# Patient Record
Sex: FEMALE | Race: WHITE | NOT HISPANIC OR LATINO | Employment: OTHER | URBAN - METROPOLITAN AREA
[De-identification: names, ages, dates, MRNs, and addresses within clinical notes are randomized per-mention and may not be internally consistent; named-entity substitution may affect disease eponyms.]

---

## 2017-04-21 ENCOUNTER — THERAPY VISIT (OUTPATIENT)
Dept: PHYSICAL THERAPY | Facility: CLINIC | Age: 67
End: 2017-04-21
Payer: MEDICARE

## 2017-04-21 DIAGNOSIS — M25.562 BILATERAL KNEE PAIN: ICD-10-CM

## 2017-04-21 DIAGNOSIS — M25.561 BILATERAL KNEE PAIN: ICD-10-CM

## 2017-04-21 DIAGNOSIS — M25.512 LEFT SHOULDER PAIN: Primary | ICD-10-CM

## 2017-04-21 PROCEDURE — G8979 MOBILITY GOAL STATUS: HCPCS | Mod: GP | Performed by: PHYSICAL THERAPIST

## 2017-04-21 PROCEDURE — 97530 THERAPEUTIC ACTIVITIES: CPT | Mod: GP | Performed by: PHYSICAL THERAPIST

## 2017-04-21 PROCEDURE — G8978 MOBILITY CURRENT STATUS: HCPCS | Mod: GP | Performed by: PHYSICAL THERAPIST

## 2017-04-21 PROCEDURE — 97110 THERAPEUTIC EXERCISES: CPT | Mod: GP | Performed by: PHYSICAL THERAPIST

## 2017-04-21 PROCEDURE — 97162 PT EVAL MOD COMPLEX 30 MIN: CPT | Mod: GP | Performed by: PHYSICAL THERAPIST

## 2017-04-21 ASSESSMENT — ACTIVITIES OF DAILY LIVING (ADL)
HOW_WOULD_YOU_RATE_THE_CURRENT_FUNCTION_OF_YOUR_KNEE_DURING_YOUR_USUAL_DAILY_ACTIVITIES_ON_A_SCALE_FROM_0_TO_100_WITH_100_BEING_YOUR_LEVEL_OF_KNEE_FUNCTION_PRIOR_TO_YOUR_INJURY_AND_0_BEING_THE_INABILITY_TO_PERFORM_ANY_OF_YOUR_USUAL_DAILY_ACTIVITIES?: 75
GO UP STAIRS: ACTIVITY IS SOMEWHAT DIFFICULT
SIT WITH YOUR KNEE BENT: ACTIVITY IS NOT DIFFICULT
AS_A_RESULT_OF_YOUR_KNEE_INJURY,_HOW_WOULD_YOU_RATE_YOUR_CURRENT_LEVEL_OF_DAILY_ACTIVITY?: NEARLY NORMAL
WALK: ACTIVITY IS MINIMALLY DIFFICULT
SQUAT: ACTIVITY IS FAIRLY DIFFICULT
KNEE_ACTIVITY_OF_DAILY_LIVING_SCORE: 64.29
HOW_WOULD_YOU_RATE_THE_OVERALL_FUNCTION_OF_YOUR_KNEE_DURING_YOUR_USUAL_DAILY_ACTIVITIES?: NEARLY NORMAL
GO DOWN STAIRS: ACTIVITY IS MINIMALLY DIFFICULT
WEAKNESS: THE SYMPTOM AFFECTS MY ACTIVITY MODERATELY
KNEEL ON THE FRONT OF YOUR KNEE: ACTIVITY IS VERY DIFFICULT
KNEE_ACTIVITY_OF_DAILY_LIVING_SUM: 45
LIMPING: THE SYMPTOM AFFECTS MY ACTIVITY SLIGHTLY
SWELLING: I DO NOT HAVE THE SYMPTOM
GIVING WAY, BUCKLING OR SHIFTING OF KNEE: THE SYMPTOM AFFECTS MY ACTIVITY SLIGHTLY
STAND: ACTIVITY IS NOT DIFFICULT
RISE FROM A CHAIR: ACTIVITY IS FAIRLY DIFFICULT
RAW_SCORE: 45
STIFFNESS: THE SYMPTOM AFFECTS MY ACTIVITY SLIGHTLY
PAIN: THE SYMPTOM AFFECTS MY ACTIVITY SLIGHTLY

## 2017-04-21 NOTE — PROGRESS NOTES
Subjective:  Physical Therapy Initial Evaluation    Therapist Impression:   Petra Mcnulty is a 66 year old female patient presenting to Physical Therapy with: knee and L shoulder pain. These impairments limit their ability to stand without pain. Skilled PT services are necessary in order to reduce impairments and improve independent function.    Precautions/Restrictions/MD instructions: Evaluate and treat       Subjective:    DOI/onset: 4/4/17  History of current symptoms:  Shoulder: pain started 4 months ago; Knees have hurt since TKAs. Shoulder pain when reaching to the left side. Also still has neck pain after diskectomy.  Primary pain location: knees: right above knee cap; shoulder: down to right bicep and forearm on occasion  Quality/radiation: knee: none;  Shoulder: down arm.  Exacerbated by: Knee: sit to stand, prolonged walking. Shoulder: laying down on that side in bed Relieved by: rest  Pain: Pain is rated 6/10 Currently, 0/10 at best, and 9/10 at worst.  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 Overweight, HTN, sleep apnea, thyroid. Anterior cervical diskectomy and fusion C3-4.  Medications: refer to medical chart; gabapentine for neck pain, thyroid, coumadin, pain meds, msucle relaxants, HTN  Sleeping: sleeps on right side; sleep apnea    Patient's goals: Stand without pain; strengthen left shoulder  General health as reported by patient: fair.     Previous functional status:  fully functional prior to pain onset/injury.     HPI                    Objective:  KNEE:    Standing Posture: lordosis with slightly flexed hips and standing knee valgus.    Functional:   - Squat/ SL Squat: pain with double leg mini  squat    ROM: slightly decreased ROM with R and L due to total knee replacements: R 0-2-110, L 0-2- 110    Strength:   L R   HIP     Flex -supine 90  (and SLR) 100 100   Ext -prone     ABd -sidelying 3-/5 (cramp at hip) 3/5   KNEE     Flex 4/5 4/5   Ext 5 5     Hip PROM:  Decreased extension bilaterally    Palpation: TTP throughout the quadriceps tendon proximal to the patellae bilaterally    Patellar tracking: wnl    ______________________________________________________________    SHOULDER:    Irritability: low    Posture: forward head, rounded shoulders    Cervical Screen: She has on and off, intermittent neck pain that she has had for a long time and takes gabapentine for     neurodynamic quick exam in standing (abduction + wrist extension): negative    Shoulder: (* indicates patient's pain); ROM in parentheses is PROM   AROM L  (PROM) AROM R  (PROM) MMT L MMT R   Flex wnl wnl 4/5* 5   Abd wnl wnl 3+/5* 5   IR   5 5   ER wnl wnl 3+/5* 5   Ext/IR       Supine 90 degree abduction ROM: IR and ER were 90 degrees    Neuro screening: negative    Palpation: TTP at anterior shoulder    GH/Capsular Mobility: mild hypermobility    Special tests:   L R   Impingement     Neer's     Hawkin's-Johnathon (+)    Coracoid Impingement     Internal impingement            System    Physical Exam    General     ROS    Assessment/Plan:      Patient is a 66 year old female with left side shoulder and both sides knee complaints.    Patient has the following significant findings with corresponding treatment plan.                Diagnosis 1:  Bilateral knee quadriceps tendonopathy  Pain -  manual therapy, self management, education and home program  Decreased strength - therapeutic exercise, therapeutic activities and home program  Decreased proprioception - neuro re-education, therapeutic activities and home program  Impaired gait - gait training and home program  Impaired muscle performance - neuro re-education and home program  Decreased  function - therapeutic activities and home program  Diagnosis 2:  Left shoulder RC tendonopathy   Pain -  manual therapy, self management, education and home program  Decreased strength - therapeutic exercise, therapeutic activities and home program  Decreased proprioception - neuro re-education, therapeutic activities and home program  Impaired muscle performance - neuro re-education and home program  Decreased function - therapeutic activities and home program  Impaired posture - neuro re-education and home program  Instability -  Therapeutic Activity  Therapeutic Exercise  home program    Therapy Evaluation Codes:   1) History comprised of:   Personal factors that impact the plan of care:      Time since onset of symptoms.    Comorbidity factors that impact the plan of care are:      Depression, High blood pressure, Osteoarthritis, Overweight, Sleep disorder/apnea and Thyroid.     Medications impacting care: High blood pressure, Heparin/coumadin, Muscle relaxant, Pain and thyroid.  2) Examination of Body Systems comprised of:   Body structures and functions that impact the plan of care:      Knee and Shoulder.   Activity limitations that impact the plan of care are:      Lifting and Standing.  3) Clinical presentation characteristics are:   Evolving/Changing. Shoulder has been getting worse for 4 months  4) Decision-Making    Moderate complexity using standardized patient assessment instrument and/or measureable assessment of functional outcome.  Cumulative Therapy Evaluation is: Moderate complexity.    Previous and current functional limitations:  (See Goal Flow Sheet for this information)    Short term and Long term goals: (See Goal Flow Sheet for this information)     Communication ability:  Patient appears to be able to clearly communicate and understand verbal and written communication and follow directions correctly.  Treatment Explanation - The following has been discussed with the patient:   RX  ordered/plan of care  Anticipated outcomes  Possible risks and side effects  This patient would benefit from PT intervention to resume normal activities.   Rehab potential is good.    Frequency:  1 X week, once daily  Duration:  for 8 weeks  Discharge Plan:  Achieve all LTG.  Independent in home treatment program.  Reach maximal therapeutic benefit.    Please refer to the daily flowsheet for treatment today, total treatment time and time spent performing 1:1 timed codes.

## 2017-04-21 NOTE — LETTER
DEPARTMENT OF HEALTH AND HUMAN SERVICES  [facility] MEDICA[facility] SERVICES    PLAN/UPDATED PLAN OF PROGRESS FOR OUTPATIENT REHABILITATION    PATIENTS NAME:  Petra Mcnulty   : 1950  PROVIDER NUMBER:    [id number]  [id number]  PROVIDER NAME: [facility]  MEDICAL RECORD NUMBER: [id number]   START OF CARE DATE:  SOC Date: 17   TYPE:  PT  PRIMARY/TREATMENT DIAGNOSIS: (Pertinent Medical Diagnosis)  Left shoulder pain  Bilateral knee pain    VISITS FROM START OF CARE:  Rxs Used: 1     Physical Therapy Initial Evaluation  17    Therapist Impression:   Petra Mcnulty is a 66 year old female patient presenting to Physical Therapy with: knee and L shoulder pain. These impairments limit their ability to stand without pain. Skilled PT services are necessary in order to reduce impairments and improve independent function.    Precautions/Restrictions/MD instructions: Evaluate and treat       Subjective:    DOI/onset: 17  History of current symptoms:  Shoulder: pain started 4 months ago; Knees have hurt since TKAs. Shoulder pain when reaching to the left side. Also still has neck pain after diskectomy.  Primary pain location: knees: right above knee cap; shoulder: down to right bicep and forearm on occasion  Quality/radiation: knee: none;  Shoulder: down arm.  Exacerbated by: Knee: sit to stand, prolonged walking. Shoulder: laying down on that side in bed Relieved by: rest  Pain: Pain is rated 6/10 Currently, 0/10 at best, and 9/10 at worst.  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 Overweight, HTN, sleep apnea, thyroid. Anterior cervical diskectomy and fusion C3-4.  Medications: refer to  medical chart; gabapentine for neck pain, thyroid, coumadin, pain meds, msucle relaxants, HTN  Sleeping: sleeps on right side; sleep apnea    Patient's goals: Stand without pain; strengthen left shoulder  General health as reported by patient: fair.     Previous functional status:  fully functional prior to pain onset/injury.                 PATIENTS NAME:  Petra Mcnulty   : 1950    Objective:  KNEE:    Standing Posture: lordosis with slightly flexed hips and standing knee valgus.    Functional:   - Squat/ SL Squat: pain with double leg mini squat    ROM: slightly decreased ROM with R and L due to total knee replacements: R 0-2-110, L 0-2- 110    Strength:   L R   HIP     Flex -supine 90  (and SLR) 100 100   Ext -prone     ABd -sidelying 3-/5 (cramp at hip) 3/5   KNEE     Flex 4/5 4/5   Ext 5 5     Hip PROM:  Decreased extension bilaterally  Palpation: TTP throughout the quadriceps tendon proximal to the patellae bilaterally  Patellar tracking: wnl  ___________________________________________________________    SHOULDER:    Irritability: low  Posture: forward head, rounded shoulders    Cervical Screen: She has on and off, intermittent neck pain that she has had for a long time and takes gabapentine for     neurodynamic quick exam in standing (abduction + wrist extension): negative    Shoulder: (* indicates patient's pain); ROM in parentheses is PROM   AROM L  (PROM) AROM R  (PROM) MMT L MMT R   Flex wnl wnl 4/5* 5   Abd wnl wnl 3+/5* 5   IR   5 5   ER wnl wnl 3+/5* 5   Ext/IR       Supine 90 degree abduction ROM: IR and ER were 90 degrees    Neuro screening: negative  Palpation: TTP at anterior shoulder  GH/Capsular Mobility: mild hypermobility      PATIENTS NAME:  Petra Mcnulty   : 1950    Special tests:   L R   Impingement     Neer's     Wenkin's-Johnathon (+)    Coracoid Impingement     Internal impingement            Assessment/Plan:      Patient is a 66 year old female with left side  shoulder and both sides knee complaints.    Patient has the following significant findings with corresponding treatment plan.                Diagnosis 1:  Bilateral knee quadriceps tendonopathy  Pain -  manual therapy, self management, education and home program  Decreased strength - therapeutic exercise, therapeutic activities and home program  Decreased proprioception - neuro re-education, therapeutic activities and home program  Impaired gait - gait training and home program  Impaired muscle performance - neuro re-education and home program  Decreased function - therapeutic activities and home program  Diagnosis 2:  Left shoulder RC tendonopathy   Pain -  manual therapy, self management, education and home program  Decreased strength - therapeutic exercise, therapeutic activities and home program  Decreased proprioception - neuro re-education, therapeutic activities and home program  Impaired muscle performance - neuro re-education and home program  Decreased function - therapeutic activities and home program  Impaired posture - neuro re-education and home program  Instability -  Therapeutic Activity  Therapeutic Exercise  home program    Therapy Evaluation Codes:   1) History comprised of:   Personal factors that impact the plan of care:      Time since onset of symptoms.    Comorbidity factors that impact the plan of care are:      Depression, High blood pressure, Osteoarthritis, Overweight, Sleep disorder/apnea and Thyroid.     Medications impacting care: High blood pressure, Heparin/coumadin, Muscle relaxant, Pain and thyroid.  2) Examination of Body Systems comprised of:   Body structures and functions that impact the plan of care:      Knee and Shoulder.   Activity limitations that impact the plan of care are:      Lifting and Standing.  3) Clinical presentation characteristics are:   Evolving/Changing. Shoulder has been getting worse for 4 months  4) Decision-Making    Moderate complexity using  "standardized patient assessment instrument and/or measureable assessment of functional outcome.    PATIENTS NAME:  Petra Mcnulty   : 1950    Cumulative Therapy Evaluation is: Moderate complexity.    Previous and current functional limitations:  (See Goal Flow Sheet for this information)    Short term and Long term goals: (See Goal Flow Sheet for this information)     Communication ability:  Patient appears to be able to clearly communicate and understand verbal and written communication and follow directions correctly.  Treatment Explanation - The following has been discussed with the patient:   RX ordered/plan of care  Anticipated outcomes  Possible risks and side effects  This patient would benefit from PT intervention to resume normal activities.   Rehab potential is good.    Frequency:  1 X week, once daily  Duration:  for 8 weeks  Discharge Plan:  Achieve all LTG.  Independent in home treatment program.  Reach maximal therapeutic benefit.                  Caregiver Signature/Credentials _____________________________ Date ________      Treating Provider: Jon Schoenecker, DPT   I have reviewed and certified the need for these services and plan of treatment while under my care.        PHYSICIAN'S SIGNATURE:   ___________________________________ Date___________     Jerome Fritz    Certification period:  Beginning of Cert date period: 17 to  End of Cert period date: 17     Functional Level Progress Report: Please see attached \"Goal Flow sheet for Functional level.\"    ____X____ Continue Services or       ________ DC Services                Service dates: From  SOC Date: 17 date to present                         "

## 2017-04-21 NOTE — MR AVS SNAPSHOT
"              After Visit Summary   4/21/2017    Petra Mcnulty    MRN: [id number]           Patient Information     Date Of Birth          1950        Visit Information        Provider Department      4/21/2017 11:40 AM Schoenecker, Jon, PT [facility]        Today's Diagnoses     Left shoulder pain    -  1    Bilateral knee pain           Follow-ups after your visit        Your next 10 appointments already scheduled     Apr 28, 2017 11:00 AM CDT   ANA Extremity with Jon Schoenecker, PT   [facility] ([facility]  )    [address] [facility]  [address] [place] [address]   [phone]            May 08, 2017  5:50 PM CDT   ANA Extremity with Jon Schoenecker, PT   [facility] ([facility]  )    [address] [address] [address]   [phone]              Who to contact     If you have questions or need follow up information about today's clinic visit or your schedule please contact [facility] directly at [phone].  Normal or non-critical lab and imaging results will be communicated to you by Data Camphart, letter or phone within 4 business days after the clinic has received the results. If you do not hear from us within 7 days, please contact the clinic through The FeedRoomt or phone. If you have a critical or abnormal lab result, we will notify you by phone as soon as possible.  Submit refill requests through In[facility] or call your pharmacy and they will forward the refill request to us. Please allow 3 business days for your refill to be completed.          Additional Information About Your Visit        Data Camphart Information     Inclinix lets you send messages to your doctor, view your test results, renew your prescriptions, schedule appointments and more. To sign up, go to [email or web address] . Click on \"Log in\" on the left side of the screen, which will take you to the Welcome page. Then click on \"Sign up Now\" on the right side of the page.     You " will be asked to enter the access code listed below, as well as some personal information. Please follow the directions to create your username and password.     Your access code is: [email or web address]  Expires: 2017  1:21 PM     Your access code will  in 90 days. If you need help or a new code, please call your [facility] clinic or [phone].        Care EveryWhere ID     This is your Care EveryWhere ID. This could be used by other organizations to access your [facility] medical records  [id number]         Blood Pressure from Last 3 Encounters:   No data found for BP    Weight from Last 3 Encounters:   No data found for Wt              We Performed the Following     ANA CERT REPORT     ANA INITIAL EVAL REPORT     PT Eval, Moderate Complexity ([id number])     THERAPEUTIC ACTIVITIES     THERAPEUTIC EXERCISES        Primary Care Provider Office Phone # Fax #    Connie Rivers [phone]       [facility] INTERNAL MEDICINE [address] [address]        Thank you!     Thank you for choosing [facility]  for your care. Our goal is always to provide you with excellent care. Hearing back from our patients is one way we can continue to improve our services. Please take a few minutes to complete the written survey that you may receive in the mail after your visit with us. Thank you!             Your Updated Medication List - Protect others around you: Learn how to safely use, store and throw away your medicines at www.disposemymeds.org.      Notice  As of 2017  1:21 PM    You have not been prescribed any medications.

## 2017-05-01 ENCOUNTER — THERAPY VISIT (OUTPATIENT)
Dept: PHYSICAL THERAPY | Facility: CLINIC | Age: 67
End: 2017-05-01
Payer: MEDICARE

## 2017-05-01 DIAGNOSIS — M25.562 BILATERAL KNEE PAIN: ICD-10-CM

## 2017-05-01 DIAGNOSIS — M25.512 LEFT SHOULDER PAIN: ICD-10-CM

## 2017-05-01 DIAGNOSIS — M25.561 BILATERAL KNEE PAIN: ICD-10-CM

## 2017-05-01 PROCEDURE — 97110 THERAPEUTIC EXERCISES: CPT | Mod: GP | Performed by: PHYSICAL THERAPIST

## 2017-05-01 PROCEDURE — 97530 THERAPEUTIC ACTIVITIES: CPT | Mod: GP | Performed by: PHYSICAL THERAPIST

## 2017-05-01 NOTE — MR AVS SNAPSHOT
After Visit Summary   5/1/2017    Petra Mcnulty    MRN: [id number]           Patient Information     Date Of Birth          1950        Visit Information        Provider Department      5/1/2017 5:00 PM Sawyer Pacheco, PT [facility]        Today's Diagnoses     Bilateral knee pain        Left shoulder pain           Follow-ups after your visit        Your next 10 appointments already scheduled     May 08, 2017  5:50 PM CDT   ANA Extremity with Jon Schoenecker, PT   [facility] ([facility] [place]  )    [address] [place] [address]   [phone]            May 11, 2017  3:10 PM CDT   ANA Extremity with Jon Schoenecker, PT   [facility] ([facility] [place]  )    [address] [place] [address]   [phone]            May 15, 2017 11:20 AM CDT   ANA Extremity with Jon Schoenecker, PT   [facility] ([facility] [place]  )    [address] [place] [address]   [phone]            May 18, 2017 10:40 AM CDT   ANA Extremity with Jon Schoenecker, PT   [facility] ([facility] [place]  )    [address] [place] [address]   [phone]            May 22, 2017 11:20 AM CDT   ANA Extremity with Jon Schoenecker, PT   [facility] ([facility] [place]  )    [address] [place] [address]   [phone]            Jun 01, 2017  3:50 PM CDT   ANA Extremity with Jon Schoenecker, PT   [facility] ([facility] [place]  )    [address] [place] [address]   [phone]              Who to contact     If you have questions or need follow up information about today's clinic visit or your schedule please contact [facility] directly at [phone].  Normal or non-critical lab and imaging results will be communicated to you by MyChart, letter or phone within 4 business days after the clinic has received the results. If you do not hear  "from us within 7 days, please contact the clinic through Zinkia or phone. If you have a critical or abnormal lab result, we will notify you by phone as soon as possible.  Submit refill requests through Zinkia or call your pharmacy and they will forward the refill request to us. Please allow 3 business days for your refill to be completed.          Additional Information About Your Visit        PicturaeharPlunify Information     Zinkia lets you send messages to your doctor, view your test results, renew your prescriptions, schedule appointments and more. To sign up, go to [email or web address] . Click on \"Log in\" on the left side of the screen, which will take you to the Welcome page. Then click on \"Sign up Now\" on the right side of the page.     You will be asked to enter the access code listed below, as well as some personal information. Please follow the directions to create your username and password.     Your access code is: [email or web address]  Expires: 2017  1:21 PM     Your access code will  in 90 days. If you need help or a new code, please call your [facility] clinic or [phone].        Care EveryWhere ID     This is your Care EveryWhere ID. This could be used by other organizations to access your [facility] medical records  [id number]         Blood Pressure from Last 3 Encounters:   No data found for BP    Weight from Last 3 Encounters:   No data found for Wt              We Performed the Following     Therapeutic Activities     Therapeutic Exercises        Primary Care Provider Office Phone # Fax #    Connie Garciaulloch [phone]       [facility] INTERNAL MEDICINE [address] [facility] [address]  [facility] [address]        Thank you!     Thank you for choosing [facility]  for your care. Our goal is always to provide you with excellent care. Hearing back from our patients is one way we can continue to improve our services. Please take a few minutes to complete the written survey that you " may receive in the mail after your visit with us. Thank you!             Your Updated Medication List - Protect others around you: Learn how to safely use, store and throw away your medicines at www.disposemymeds.org.      Notice  As of 5/1/2017  5:42 PM    You have not been prescribed any medications.

## 2017-05-01 NOTE — PROGRESS NOTES
Subjective:    HPI                    Objective:    System    Physical Exam    General     ROS    Assessment/Plan:      SUBJECTIVE  Subjective changes as noted by pt:  Pt still notes intemittent pain. Pain increases throughout the day as I become more fatigued.     Current pain level: 5/10  Left shoulder 2 knees   Changes in function:  Pt notes increased ease with rising from a chair. Pt still notes difficulty with sidelying.     Adverse reaction to treatment or activity:  None    OBJECTIVE  Changes in objective findings:  Pt still notes pain with resisted left shoulder ER. Improved strength quads.         ASSESSMENT  Petra continues to require intervention to meet STG and LTG's: PT  Patient's symptoms are resolving.  Response to therapy has shown an improvement in  strength  Progress made towards STG/LTG?  Yes,     PLAN  Current treatment program is being advanced to more complex exercises.    PTA/ATC plan:  N/A    Please refer to the daily flowsheet for treatment today, total treatment time and time spent performing 1:1 timed codes.

## 2017-05-08 ENCOUNTER — THERAPY VISIT (OUTPATIENT)
Dept: PHYSICAL THERAPY | Facility: CLINIC | Age: 67
End: 2017-05-08
Payer: MEDICARE

## 2017-05-08 DIAGNOSIS — M25.512 LEFT SHOULDER PAIN: ICD-10-CM

## 2017-05-08 DIAGNOSIS — M25.562 BILATERAL KNEE PAIN: ICD-10-CM

## 2017-05-08 DIAGNOSIS — M25.561 BILATERAL KNEE PAIN: ICD-10-CM

## 2017-05-08 PROCEDURE — 97110 THERAPEUTIC EXERCISES: CPT | Mod: GP | Performed by: PHYSICAL THERAPIST

## 2017-05-08 PROCEDURE — 97530 THERAPEUTIC ACTIVITIES: CPT | Mod: GP | Performed by: PHYSICAL THERAPIST

## 2017-05-15 ENCOUNTER — THERAPY VISIT (OUTPATIENT)
Dept: PHYSICAL THERAPY | Facility: CLINIC | Age: 67
End: 2017-05-15
Payer: MEDICARE

## 2017-05-15 DIAGNOSIS — M25.512 LEFT SHOULDER PAIN: ICD-10-CM

## 2017-05-15 DIAGNOSIS — M25.562 BILATERAL KNEE PAIN: ICD-10-CM

## 2017-05-15 DIAGNOSIS — M25.561 BILATERAL KNEE PAIN: ICD-10-CM

## 2017-05-15 PROCEDURE — 97110 THERAPEUTIC EXERCISES: CPT | Mod: GP | Performed by: PHYSICAL THERAPIST

## 2017-05-15 PROCEDURE — 97140 MANUAL THERAPY 1/> REGIONS: CPT | Mod: GP | Performed by: PHYSICAL THERAPIST

## 2017-05-22 ENCOUNTER — THERAPY VISIT (OUTPATIENT)
Dept: PHYSICAL THERAPY | Facility: CLINIC | Age: 67
End: 2017-05-22
Payer: MEDICARE

## 2017-05-22 DIAGNOSIS — M25.512 LEFT SHOULDER PAIN: ICD-10-CM

## 2017-05-22 DIAGNOSIS — M25.561 BILATERAL KNEE PAIN: ICD-10-CM

## 2017-05-22 DIAGNOSIS — M25.562 BILATERAL KNEE PAIN: ICD-10-CM

## 2017-05-22 PROCEDURE — 97530 THERAPEUTIC ACTIVITIES: CPT | Mod: GP | Performed by: PHYSICAL THERAPIST

## 2017-05-22 PROCEDURE — 97110 THERAPEUTIC EXERCISES: CPT | Mod: GP | Performed by: PHYSICAL THERAPIST

## 2017-06-01 ENCOUNTER — THERAPY VISIT (OUTPATIENT)
Dept: PHYSICAL THERAPY | Facility: CLINIC | Age: 67
End: 2017-06-01
Payer: MEDICARE

## 2017-06-01 DIAGNOSIS — M25.512 LEFT SHOULDER PAIN: ICD-10-CM

## 2017-06-01 DIAGNOSIS — M25.561 BILATERAL KNEE PAIN: ICD-10-CM

## 2017-06-01 DIAGNOSIS — M25.562 BILATERAL KNEE PAIN: ICD-10-CM

## 2017-06-01 PROCEDURE — 97110 THERAPEUTIC EXERCISES: CPT | Mod: GP | Performed by: PHYSICAL THERAPIST

## 2017-06-01 PROCEDURE — 97530 THERAPEUTIC ACTIVITIES: CPT | Mod: GP | Performed by: PHYSICAL THERAPIST

## 2017-06-01 PROCEDURE — 97140 MANUAL THERAPY 1/> REGIONS: CPT | Mod: GP | Performed by: PHYSICAL THERAPIST

## 2017-06-05 ENCOUNTER — THERAPY VISIT (OUTPATIENT)
Dept: PHYSICAL THERAPY | Facility: CLINIC | Age: 67
End: 2017-06-05
Payer: MEDICARE

## 2017-06-05 DIAGNOSIS — M25.512 LEFT SHOULDER PAIN: ICD-10-CM

## 2017-06-05 DIAGNOSIS — M25.561 BILATERAL KNEE PAIN: ICD-10-CM

## 2017-06-05 DIAGNOSIS — M25.562 BILATERAL KNEE PAIN: ICD-10-CM

## 2017-06-05 PROCEDURE — 97140 MANUAL THERAPY 1/> REGIONS: CPT | Mod: GP | Performed by: PHYSICAL THERAPIST

## 2017-06-05 PROCEDURE — 97110 THERAPEUTIC EXERCISES: CPT | Mod: GP | Performed by: PHYSICAL THERAPIST

## 2017-06-19 ENCOUNTER — THERAPY VISIT (OUTPATIENT)
Dept: PHYSICAL THERAPY | Facility: CLINIC | Age: 67
End: 2017-06-19
Payer: MEDICARE

## 2017-06-19 DIAGNOSIS — M25.561 BILATERAL KNEE PAIN: ICD-10-CM

## 2017-06-19 DIAGNOSIS — M25.512 LEFT SHOULDER PAIN: ICD-10-CM

## 2017-06-19 DIAGNOSIS — M25.562 BILATERAL KNEE PAIN: ICD-10-CM

## 2017-06-19 PROCEDURE — 97110 THERAPEUTIC EXERCISES: CPT | Mod: GP | Performed by: PHYSICAL THERAPIST

## 2017-06-19 PROCEDURE — 97140 MANUAL THERAPY 1/> REGIONS: CPT | Mod: GP | Performed by: PHYSICAL THERAPIST

## 2017-06-19 ASSESSMENT — ACTIVITIES OF DAILY LIVING (ADL)
WALK: ACTIVITY IS MINIMALLY DIFFICULT
RAW_SCORE: 50
GO UP STAIRS: ACTIVITY IS MINIMALLY DIFFICULT
KNEE_ACTIVITY_OF_DAILY_LIVING_SCORE: 71.43
AS_A_RESULT_OF_YOUR_KNEE_INJURY,_HOW_WOULD_YOU_RATE_YOUR_CURRENT_LEVEL_OF_DAILY_ACTIVITY?: NEARLY NORMAL
KNEE_ACTIVITY_OF_DAILY_LIVING_SUM: 50
SQUAT: ACTIVITY IS SOMEWHAT DIFFICULT
RISE FROM A CHAIR: ACTIVITY IS MINIMALLY DIFFICULT
WEAKNESS: THE SYMPTOM AFFECTS MY ACTIVITY SLIGHTLY
HOW_WOULD_YOU_RATE_THE_CURRENT_FUNCTION_OF_YOUR_KNEE_DURING_YOUR_USUAL_DAILY_ACTIVITIES_ON_A_SCALE_FROM_0_TO_100_WITH_100_BEING_YOUR_LEVEL_OF_KNEE_FUNCTION_PRIOR_TO_YOUR_INJURY_AND_0_BEING_THE_INABILITY_TO_PERFORM_ANY_OF_YOUR_USUAL_DAILY_ACTIVITIES?: 90
KNEEL ON THE FRONT OF YOUR KNEE: ACTIVITY IS VERY DIFFICULT
HOW_WOULD_YOU_RATE_THE_OVERALL_FUNCTION_OF_YOUR_KNEE_DURING_YOUR_USUAL_DAILY_ACTIVITIES?: NEARLY NORMAL
GO DOWN STAIRS: ACTIVITY IS MINIMALLY DIFFICULT
GIVING WAY, BUCKLING OR SHIFTING OF KNEE: I HAVE THE SYMPTOM BUT IT DOES NOT AFFECT MY ACTIVITY
STAND: ACTIVITY IS MINIMALLY DIFFICULT
SIT WITH YOUR KNEE BENT: ACTIVITY IS NOT DIFFICULT
LIMPING: THE SYMPTOM AFFECTS MY ACTIVITY SLIGHTLY
STIFFNESS: THE SYMPTOM AFFECTS MY ACTIVITY SLIGHTLY
SWELLING: I DO NOT HAVE THE SYMPTOM
PAIN: THE SYMPTOM AFFECTS MY ACTIVITY SLIGHTLY

## 2017-07-20 ENCOUNTER — THERAPY VISIT (OUTPATIENT)
Dept: PHYSICAL THERAPY | Facility: CLINIC | Age: 67
End: 2017-07-20
Payer: MEDICARE

## 2017-07-20 DIAGNOSIS — M25.562 BILATERAL KNEE PAIN: ICD-10-CM

## 2017-07-20 DIAGNOSIS — M25.512 LEFT SHOULDER PAIN: ICD-10-CM

## 2017-07-20 DIAGNOSIS — M25.561 BILATERAL KNEE PAIN: ICD-10-CM

## 2017-07-20 PROCEDURE — G8979 MOBILITY GOAL STATUS: HCPCS | Mod: GP | Performed by: PHYSICAL THERAPIST

## 2017-07-20 PROCEDURE — 97110 THERAPEUTIC EXERCISES: CPT | Mod: GP | Performed by: PHYSICAL THERAPIST

## 2017-07-20 PROCEDURE — G8978 MOBILITY CURRENT STATUS: HCPCS | Mod: GP | Performed by: PHYSICAL THERAPIST

## 2017-07-20 NOTE — LETTER
DEPARTMENT OF HEALTH AND HUMAN SERVICES  CENTERS FOR MEDICARE & MEDICAID SERVICES    PLAN/UPDATED PLAN OF PROGRESS FOR OUTPATIENT REHABILITATION    PATIENTS NAME:  Petra Mcnulty   : 1950  [facility] NUMBER:    [id number]  [id number]   PROVIDER NAME: [facility]  MEDICAL RECORD NUMBER: [id number]   START OF CARE DATE:  SOC Date: 17   TYPE:  PT  PRIMARY/TREATMENT DIAGNOSIS:Bilateral knee pain  Left shoulder pain  VISITS FROM START OF CARE:  Rxs Used: 9     PROGRESS  REPORT  Progress reporting period is from 17 to 17.       SUBJECTIVE  Subjective: Shoulder and knees are better overall but left shoulder still painful on occasion and it goes past elbow. She is concerned it might be her neck as she has had a C3-4 fusion 12 years ago - pain only at night or when driving.      Current Pain level: 1/10.     Initial Pain level: 6/10.   Changes in function:  Yes (See Goal flowsheet attached for changes in current functional level)  Adverse reaction to treatment or activity: None    OBJECTIVE  Objective: no arm pain with any resisted movement today - couldn't reproduce. Gave Retraction to try when she does get pain. No knee pain with squatting.     ASSESSMENT/PLAN  Updated problem list and treatment plan: Diagnosis 1:  Bilateral knee and Left shoulder pain  Pain -  manual therapy, self management, education, directional preference exercise and home program  Decreased ROM/flexibility - manual therapy, therapeutic exercise and home program  Decreased strength - therapeutic exercise, therapeutic activities and home program  Decreased function - therapeutic activities and home program  STG/LTGs have been met or progress has been made towards goals:  Yes (See Goal flow sheet completed today.)  Assessment of Progress: The patient's condition is improving.  Self Management Plans:  Patient has been instructed in a home treatment program.  Patient is independent in self management of symptoms.  I  "have re-evaluated this patient and find that the nature, scope, duration and intensity of the therapy is appropriate for the medical condition of the patient.  Petra continues to require the following intervention to meet STG and LTG's:  PT                PATIENTS NAME:  Petra Mcnulty   : 1950      Recommendations:  This patient would benefit from continued therapy.     Frequency:  2 X a month, once daily  Duration:  for 2 months      Caregiver Signature/Credentials _____________________________ Date ________       Treating Provider: Jon Schoenecker, DPT     I have reviewed and certified the need for these services and plan of treatment while under my care.        PHYSICIAN'S SIGNATURE:   ______________________________  Date___________      Jerome Fritz    Certification period:  Beginning of Cert date period: 17 to  End of Cert period date: 10/01/17     Functional Level Progress Report: Please see attached \"Goal Flow sheet for Functional level.\"    ____X____ Continue Services or       ________ DC Services                Service dates: From  SOC Date: 17 date to present                         "

## 2017-07-20 NOTE — MR AVS SNAPSHOT
"              After Visit Summary   7/20/2017    Petra Mcnulty    MRN: [id number]           Patient Information     Date Of Birth          1950        Visit Information        Provider Department      7/20/2017 11:20 AM Schoenecker, Jon, [facility]        Today's Diagnoses     Bilateral knee pain        Left shoulder pain           Follow-ups after your visit        Your next 10 appointments already scheduled     Aug 10, 2017  2:50 PM CDT   ANA Extremity with [facility] ([facility]  )    [address] [address] [address]   [phone]              Who to contact     If you have questions or need follow up information about today's clinic visit or your schedule please contact [facility] directly at [phone].  Normal or non-critical lab and imaging results will be communicated to you by XO Grouphart, letter or phone within 4 business days after the clinic has received the results. If you do not hear from us within 7 days, please contact the clinic through MyChart or phone. If you have a critical or abnormal lab result, we will notify you by phone as soon as possible.  Submit refill requests through ViS or call your pharmacy and they will forward the refill request to us. Please allow 3 business days for your refill to be completed.          Additional Information About Your Visit        MyChart Information     ViS lets you send messages to your doctor, view your test results, renew your prescriptions, schedule appointments and more. To sign up, go to [email or web address] . Click on \"Log in\" on the left side of the screen, which will take you to the Welcome page. Then click on \"Sign up Now\" on the right side of the page.     You will be asked to enter the access code listed below, as well as some personal information. Please follow the directions to create your username and password.     Your access code is: " [email or web address]  Expires: 2017  1:21 PM     Your access code will  in 90 days. If you need help or a new code, please call your [place] clinic or [phone].        Care EveryWhere ID     This is your Care EveryWhere ID. This could be used by other organizations to access your [place] medical records  [id number]         Blood Pressure from Last 3 Encounters:   No data found for BP    Weight from Last 3 Encounters:   No data found for Wt              We Performed the Following     THERAPEUTIC EXERCISES        Primary Care Provider Office Phone # Fax #    Connie Rivers [phone]       [facility] INTERNAL MEDICINE [address] [address]        Equal Access to Services     VAL HOLLINS : Hadii aad ku hadasho Somagueali, waaxda luqadaha, qaybta kaalmada adeegyada, jose tom . So [facility] [phone].    ATENCIÓN: Si habla español, tiene a butler disposición servicios gratuitos de asistencia lingüística. Llame al [phone].    We comply with applicable federal civil rights laws and [place] laws. We do not discriminate on the basis of race, color, national origin, age, disability sex, sexual orientation or gender identity.            Thank you!     Thank you for choosing [facility]  for your care. Our goal is always to provide you with excellent care. Hearing back from our patients is one way we can continue to improve our services. Please take a few minutes to complete the written survey that you may receive in the mail after your visit with us. Thank you!             Your Updated Medication List - Protect others around you: Learn how to safely use, store and throw away your medicines at www.disposemymeds.org.      Notice  As of 2017 12:05 PM    You have not been prescribed any medications.

## 2017-08-10 ENCOUNTER — THERAPY VISIT (OUTPATIENT)
Dept: PHYSICAL THERAPY | Facility: CLINIC | Age: 67
End: 2017-08-10
Payer: MEDICARE

## 2017-08-10 DIAGNOSIS — M25.561 PAIN IN BOTH KNEES, UNSPECIFIED CHRONICITY: ICD-10-CM

## 2017-08-10 DIAGNOSIS — M25.562 PAIN IN BOTH KNEES, UNSPECIFIED CHRONICITY: ICD-10-CM

## 2017-08-10 DIAGNOSIS — M25.512 LEFT SHOULDER PAIN, UNSPECIFIED CHRONICITY: ICD-10-CM

## 2017-08-10 PROCEDURE — 97110 THERAPEUTIC EXERCISES: CPT | Mod: GP | Performed by: PHYSICAL THERAPIST

## 2017-08-10 PROCEDURE — 97530 THERAPEUTIC ACTIVITIES: CPT | Mod: GP | Performed by: PHYSICAL THERAPIST

## 2017-08-10 PROCEDURE — G8980 MOBILITY D/C STATUS: HCPCS | Mod: GP | Performed by: PHYSICAL THERAPIST

## 2017-08-10 PROCEDURE — G8979 MOBILITY GOAL STATUS: HCPCS | Mod: GP | Performed by: PHYSICAL THERAPIST

## 2017-08-10 ASSESSMENT — ACTIVITIES OF DAILY LIVING (ADL)
KNEEL ON THE FRONT OF YOUR KNEE: ACTIVITY IS VERY DIFFICULT
SIT WITH YOUR KNEE BENT: ACTIVITY IS SOMEWHAT DIFFICULT
WALK: ACTIVITY IS SOMEWHAT DIFFICULT
HOW_WOULD_YOU_RATE_THE_OVERALL_FUNCTION_OF_YOUR_KNEE_DURING_YOUR_USUAL_DAILY_ACTIVITIES?: NEARLY NORMAL
RAW_SCORE: 39
LIMPING: THE SYMPTOM AFFECTS MY ACTIVITY SLIGHTLY
KNEE_ACTIVITY_OF_DAILY_LIVING_SUM: 39
AS_A_RESULT_OF_YOUR_KNEE_INJURY,_HOW_WOULD_YOU_RATE_YOUR_CURRENT_LEVEL_OF_DAILY_ACTIVITY?: NEARLY NORMAL
SWELLING: I HAVE THE SYMPTOM BUT IT DOES NOT AFFECT MY ACTIVITY
STIFFNESS: THE SYMPTOM AFFECTS MY ACTIVITY SLIGHTLY
SQUAT: ACTIVITY IS VERY DIFFICULT
GO UP STAIRS: ACTIVITY IS SOMEWHAT DIFFICULT
RISE FROM A CHAIR: ACTIVITY IS SOMEWHAT DIFFICULT
KNEE_ACTIVITY_OF_DAILY_LIVING_SCORE: 55.71
PAIN: THE SYMPTOM AFFECTS MY ACTIVITY SLIGHTLY
GO DOWN STAIRS: ACTIVITY IS MINIMALLY DIFFICULT
WEAKNESS: THE SYMPTOM AFFECTS MY ACTIVITY SLIGHTLY
GIVING WAY, BUCKLING OR SHIFTING OF KNEE: THE SYMPTOM AFFECTS MY ACTIVITY SLIGHTLY
STAND: ACTIVITY IS FAIRLY DIFFICULT
HOW_WOULD_YOU_RATE_THE_CURRENT_FUNCTION_OF_YOUR_KNEE_DURING_YOUR_USUAL_DAILY_ACTIVITIES_ON_A_SCALE_FROM_0_TO_100_WITH_100_BEING_YOUR_LEVEL_OF_KNEE_FUNCTION_PRIOR_TO_YOUR_INJURY_AND_0_BEING_THE_INABILITY_TO_PERFORM_ANY_OF_YOUR_USUAL_DAILY_ACTIVITIES?: 75

## 2017-08-10 NOTE — MR AVS SNAPSHOT
"              After Visit Summary   8/10/2017    Petra Mcnulty    MRN: [id number]           Patient Information     Date Of Birth          1950        Visit Information        Provider Department      8/10/2017 2:50 PM Alyx Nelson, PT [facility]        Today's Diagnoses     Pain in both knees, unspecified chronicity        Left shoulder pain, unspecified chronicity           Follow-ups after your visit        Who to contact     If you have questions or need follow up information about today's clinic visit or your schedule please contact [facility] directly at [phone].  Normal or non-critical lab and imaging results will be communicated to you by 0xdatahart, letter or phone within 4 business days after the clinic has received the results. If you do not hear from us within 7 days, please contact the clinic through 0xdatahart or phone. If you have a critical or abnormal lab result, we will notify you by phone as soon as possible.  Submit refill requests through AriadNEXT or call your pharmacy and they will forward the refill request to us. Please allow 3 business days for your refill to be completed.          Additional Information About Your Visit        MyChart Information     AriadNEXT lets you send messages to your doctor, view your test results, renew your prescriptions, schedule appointments and more. To sign up, go to [email or web address] . Click on \"Log in\" on the left side of the screen, which will take you to the Welcome page. Then click on \"Sign up Now\" on the right side of the page.     You will be asked to enter the access code listed below, as well as some personal information. Please follow the directions to create your username and password.     Your access code is: [email or web address]  Expires: 11/15/2017  9:35 AM     Your access code will  in 90 days. If you need help or a new code, please call your [place] clinic or [phone].        Care EveryWhere ID     This is your " Care EveryWhere ID. This could be used by other organizations to access your [place] medical records  [id number]         Blood Pressure from Last 3 Encounters:   No data found for BP    Weight from Last 3 Encounters:   No data found for Wt              We Performed the Following     THERAPEUTIC ACTIVITIES     THERAPEUTIC EXERCISES        Primary Care Provider Office Phone # Fax #    Connie Rivers [phone]       [facility] INTERNAL MEDICINE [address] [address]        Equal Access to Services     VAL HOLLINS : Hadii aad ku hadasho Soomaali, waaxda luqadaha, qaybta kaalmada adeegyada, waxay idiin hayaan adeeg kharash la'ibrahiman . So [facility] [phone].    ATENCIÓN: Si habla español, tiene a butler disposición servicios gratuitos de asistencia lingüística. [facility] [phone].    We comply with applicable federal civil rights laws and [place] laws. We do not discriminate on the basis of race, color, national origin, age, disability sex, sexual orientation or gender identity.            Thank you!     Thank you for choosing [facility]  for your care. Our goal is always to provide you with excellent care. Hearing back from our patients is one way we can continue to improve our services. Please take a few minutes to complete the written survey that you may receive in the mail after your visit with us. Thank you!             Your Updated Medication List - Protect others around you: Learn how to safely use, store and throw away your medicines at www.disposemymeds.org.      Notice  As of 8/10/2017 11:59 PM    You have not been prescribed any medications.

## 2017-08-14 NOTE — PROGRESS NOTES
Subjective:    HPI                    Objective:    System    Physical Exam    General     ROS    Assessment/Plan:      PROGRESS  REPORT    Progress reporting period is from 4/21/17 to 7/20/17.       SUBJECTIVE  Subjective: Shoulder and knees are better overall but left shoulder still painful on occasion and it goes past elbow. She is concerned it might be her neck as she has had a C3-4 fusion 12 years ago - pain only at night or when driving.      Current Pain level: 1/10.     Initial Pain level: 6/10.   Changes in function:  Yes (See Goal flowsheet attached for changes in current functional level)  Adverse reaction to treatment or activity: None    OBJECTIVE  Objective: no arm pain with any resisted movement today - couldn't reproduce. Gave Retraction to try when she does get pain. No knee pain with squatting.     ASSESSMENT/PLAN  Updated problem list and treatment plan: Diagnosis 1:  Bilateral knee and Left shoulder pain  Pain -  manual therapy, self management, education, directional preference exercise and home program  Decreased ROM/flexibility - manual therapy, therapeutic exercise and home program  Decreased strength - therapeutic exercise, therapeutic activities and home program  Decreased function - therapeutic activities and home program  STG/LTGs have been met or progress has been made towards goals:  Yes (See Goal flow sheet completed today.)  Assessment of Progress: The patient's condition is improving.  Self Management Plans:  Patient has been instructed in a home treatment program.  Patient is independent in self management of symptoms.  I have re-evaluated this patient and find that the nature, scope, duration and intensity of the therapy is appropriate for the medical condition of the patient.  Petra continues to require the following intervention to meet STG and LTG's:  PT    Recommendations:  This patient would benefit from continued therapy.     Frequency:  2 X a month, once daily  Duration:   for 2 months          Please refer to the daily flowsheet for treatment today, total treatment time and time spent performing 1:1 timed codes.

## 2017-08-17 PROBLEM — M25.561 BILATERAL KNEE PAIN: Status: RESOLVED | Noted: 2017-04-21 | Resolved: 2017-08-17

## 2017-08-17 PROBLEM — M25.562 BILATERAL KNEE PAIN: Status: RESOLVED | Noted: 2017-04-21 | Resolved: 2017-08-17

## 2017-08-17 PROBLEM — M25.512 LEFT SHOULDER PAIN: Status: RESOLVED | Noted: 2017-04-21 | Resolved: 2017-08-17

## 2017-08-17 NOTE — PROGRESS NOTES
Discharge  REPORT     Progress reporting period is from 4/21/17 to 8/10/17.        SUBJECTIVE  Subjective: Shoulder and knees are better overall but left shoulder still painful on occasion and it goes past elbow. She is concerned it might be her neck as she has had a C3-4 fusion 12 years ago - pain only at night or when driving.      Current Pain level: 1/10.     Initial Pain level: 6/10.   Changes in function:  Yes (See Goal flowsheet attached for changes in current functional level)  Adverse reaction to treatment or activity: None     OBJECTIVE  Objective: no arm pain with any resisted movement today - couldn't reproduce. Gave Retraction to try when she does get pain. No knee pain with squatting.      ASSESSMENT/PLAN  Updated problem list and treatment plan: Diagnosis 1:  Bilateral knee and Left shoulder pain  Pain -  manual therapy, self management, education, directional preference exercise and home program  Decreased ROM/flexibility - manual therapy, therapeutic exercise and home program  Decreased strength - therapeutic exercise, therapeutic activities and home program  Decreased function - therapeutic activities and home program  STG/LTGs have been met or progress has been made towards goals:  Yes (See Goal flow sheet completed today.)  Assessment of Progress: The patient's condition is improving.  Self Management Plans:  Patient has been instructed in a home treatment program.  Patient is independent in self management of symptoms.  I have re-evaluated this patient and find that the nature, scope, duration and intensity of the therapy is appropriate for the medical condition of the patient.  Petra continues to require the following intervention to meet STG and LTG's:  PT     Recommendations:  This patient would benefit from continued therapy.     Frequency:  2 X a month, once daily  Duration:  for 2 months              Please refer to the daily flowsheet for treatment today, total treatment time and time  spent performing 1:1 timed codes.

## 2017-12-05 ENCOUNTER — MEDICAL CORRESPONDENCE (OUTPATIENT)
Dept: HEALTH INFORMATION MANAGEMENT | Facility: CLINIC | Age: 67
End: 2017-12-05

## 2018-01-08 ENCOUNTER — HOSPITAL ENCOUNTER (OUTPATIENT)
Dept: PHYSICAL THERAPY | Facility: CLINIC | Age: 68
Setting detail: THERAPIES SERIES
End: 2018-01-08
Attending: INTERNAL MEDICINE
Payer: MEDICARE

## 2018-01-08 PROCEDURE — G8978 MOBILITY CURRENT STATUS: HCPCS | Mod: GP,CJ | Performed by: PHYSICAL THERAPIST

## 2018-01-08 PROCEDURE — 97110 THERAPEUTIC EXERCISES: CPT | Mod: GP | Performed by: PHYSICAL THERAPIST

## 2018-01-08 PROCEDURE — 97161 PT EVAL LOW COMPLEX 20 MIN: CPT | Mod: GP | Performed by: PHYSICAL THERAPIST

## 2018-01-08 PROCEDURE — 40000189 ZZH STATISTIC PT POOL VISIT: Performed by: PHYSICAL THERAPIST

## 2018-01-08 PROCEDURE — G8979 MOBILITY GOAL STATUS: HCPCS | Mod: GP,CI | Performed by: PHYSICAL THERAPIST

## 2018-01-08 NOTE — PROGRESS NOTES
01/08/18 0801   Quick Adds   Type of Visit Initial OP PT Evaluation   General Information   Start of Care Date 01/08/18   Referring Physician Jerome Fritz MD  ([facility])   Orders Evaluate and Treat as Indicated   Order Date 12/05/17   Medical Diagnosis Lower Back Pain   Onset of illness/injury or Date of Surgery 11/30/14  (after TIANA in 2014)   Precautions/Limitations no known precautions/limitations   Special Instructions aquatic therapy   Surgical/Medical history reviewed Yes   Pertinent history of current problem (include personal factors and/or comorbidities that impact the POC) Pt presents to PT to address chronic LBP, orders for pool therapy.  PMH including overweight, HTN, thyroid problems, depression.  Hx of B TKAs (2010 and 2013), L TIANA (2014) with complicated hematoma/bleeding issues, pain, surgical revision, and debility which has since improved but did put on weight and felt depressed during this time period, C3-4 fusion/diskectomy ~12 yrs ago.  She reports increased LBP over the past couple years, she relates to difficult recovery after TIANA and putting on extra weight.  She reports pain increases with prolonged standing and walking, improves with sitting/resting.  She reports pain bands across her low back, L>R, denies radicular pain into her legs.  She reports she can ambulate for short distances without AD, but uses a 4WW for longer distances.  She uses Tylenol, Ibuprofen, and Robaxin for pain control prn.  Hx of aquatic therapy for her knees, which helped.  She is currently attending a [employer] program for weight management 1x/wk.   Prior level of function comment Indep PLOF, uses a 4WW for long distance amb outside of home.  Limited by chronic LBP, stanidng tolerance <5-10 min, walking <1 block.   Diagnostic Tests MRI   MRI Results Results   MRI results Lumbar facet joint effusion, degenerative changes, multi-level spondylosis, neural foraminal narrowing, mild disc  bulges.   Previous/Current Treatment Physical Therapy;Medication(s)   Improvement after PT Moderate   Improvement after medication Moderate   Current Community Support (spouse)   Patient role/Employment history Retired   [facility]   Home/Community Accessibility Comments Split level home; 8 stairs up, 8 stairs down. Railings present.   Current Assistive Devices Four Wheeled Walker  (for longer distances)   ADL Devices Raised Toilet Seat;Shower/Tub Grab Bar  (bed rail)   Assistive Devices Comments Uses 4WW for longer distances outside   Patient/Family Goals Statement Improve standing tolerance and walking tolerance   General Information Comments Pt and spouse both have current [employer] memberships.   Fall Risk Screen   Fall screen completed by PT   Have you fallen 2 or more times in the past year? No  (1 fall ~4 months ago)   Have you fallen and had an injury in the past year? No   Timed Up and Go score (seconds) 10.03   Is patient a fall risk? No   Pain   Pain location banding across low back, L>R   Pain rating 3/10   Pain description Pressure;Sharp;Dull   Pain comments Pain increases with prolonged standing/walking, 8-9/10 at works, improves to 0-2/10 at rest.   Cognitive Status Examination   Orientation orientation to person, place and time   Integumentary   Integumentary Comments mild swelling B lower legs   Posture   Posture Forward head position;Protracted shoulders   Palpation   Palpation minimal TTP about low back, mild central pain wtih standing PA assessment of lumbar spine   Range of Motion (ROM)   ROM Comment ~80 deg standing trunk rotation, mild loss standing trunk flexion/extension.  Decreased end range B hip flexion due to soft tissue approximation, decreased lumbar mobility with modified standing spring test and LTR assessement.   Strength   Strength Comments Full 5/5 B DF, knee ext, knee flexion, 4/5 B hip flexion, R hip abduction 4/5, L hip abduction 4-/5.  Able to activate TA but  weak/fatigues quickly.   Bed Mobility   Bed Mobility Comments Log roll indep   Transfer Skills   Transfer Comments Indep sit <> stand, stabilizes independently, mild use of hands   Gait   Gait Comments Mild slowed gait speed, B toe out, trendelenberg during L stance phase, decreased stance time on L.  Reciprocal gait pattern, no AD used.  Reciprocal pattern up/down stairs, use of 1 railing.   Gait Special Tests   Gait Special Tests 25 FOOT TIMED WALK   Gait Special Tests 25 Foot Timed Walk   Seconds 8.06   Comments no AD   Balance   Balance Comments NBOS EO and EC steady with minimal sway.   Balance Special Tests   Balance Special Tests Sit to stand reps   Balance Special Tests Sit to Stand Reps in 30 Seconds   Reps in 30 seconds 10   Comments no use of hands, 18'' chair   Sensory Examination   Sensory Perception no deficits were identified   Muscle Tone   Muscle Tone no deficits were identified   Modality Interventions   Planned Modality Interventions Comments as needed per therapist discretion   Planned Therapy Interventions   Planned Therapy Interventions balance training;gait training;neuromuscular re-education;ROM;strengthening;stretching;transfer training;manual therapy;joint mobilization;other (see comments)  (aquatic therapy)   Clinical Impression   Criteria for Skilled Therapeutic Interventions Met yes, treatment indicated   PT Diagnosis LBP with impaired functional mobility tolerance   Influenced by the following impairments LBP, decreased spinal mobility, decreased LE hip ROM, proximal hip/core weakness, overweight   Functional limitations due to impairments Impaired tolerance with bed mobility skills, sit <> stand transitions, limited standing tolerance < 5-10 min, limited walking tolerance < 1-2 blocks, impaired tolerance with weight management program   Clinical Presentation Stable/Uncomplicated   Clinical Presentation Rationale stable medical status, PMH, PLOF, previous success with pool therapy for  her knees, support from spouse at home, retired   Clinical Decision Making (Complexity) Low complexity   Therapy Frequency 2 times/Week   Predicted Duration of Therapy Intervention (days/wks) 6 wks  (extended POC to allow for scheduling)   Risk & Benefits of therapy have been explained Yes   Patient, Family & other staff in agreement with plan of care Yes   Goal 1   Goal Identifier Oswestry   Goal Description Petra will score ** on the Oswestry to indicate significantly improved LBP and decreased perception of disability, needed for improved QOL.   Target Date 02/23/18   Goal 2   Goal Identifier Pain   Goal Description Petra will report pain no greater than 4/10 along her low back with daily activity for improved tolerance with weight management program, ADLs, walking, and improved QOL.   Target Date 02/23/18   Goal 3   Goal Identifier TUG   Goal Description Petra will demo TUG in 8 sec or less to indicate improving mobility skills, strength, and balance with decreased risk for falls (baseline score 10.03 sec, no AD).   Target Date 02/23/18   Goal 4   Goal Identifier HEP   Goal Description Petra will demo (I) with aquatic HEP for continued management/improvement in symptoms on her own for improved mobility tolerance, return to weight management program and improved QOL.   Target Date 02/23/18   Total Evaluation Time   Total Evaluation Time (Minutes) 30   Therapy Certification   Certification date from 01/08/18   Certification date to 02/23/18   Medical Diagnosis Lower Back Pain

## 2018-01-08 NOTE — PROGRESS NOTES
[facility]        OUTPATIENT PHYSICAL THERAPY FUNCTIONAL EVALUATION  PLAN OF TREATMENT FOR OUTPATIENT REHABILITATION  (COMPLETE FOR INITIAL CLAIMS ONLY)  Patient's Last Name, First Name, M.I.  YOB: 1950  Petra Mcnulty     Provider's Name   [facility]   Medical Record No.  [id number]     Start of Care Date:  01/08/18   Onset Date:  11/30/14 (after TIANA in 2014)   Type:     _X__PT   ____OT  ____SLP Medical Diagnosis:  Lower Back Pain     PT Diagnosis:  LBP with impaired functional mobility tolerance Visits from SOC:  1                              __________________________________________________________________________________  Plan of Treatment/Functional Goals:  balance training, gait training, neuromuscular re-education, ROM, strengthening, stretching, transfer training, manual therapy, joint mobilization, other (see comments) (aquatic therapy)           GOALS  Oswestry  Petra will score ** on the Oswestry to indicate significantly improved LBP and decreased perception of disability, needed for improved QOL.  02/23/18    Pain  Petra will report pain no greater than 4/10 along her low back with daily activity for improved tolerance with weight management program, ADLs, walking, and improved QOL.  02/23/18    TUG  Petra will demo TUG in 8 sec or less to indicate improving mobility skills, strength, and balance with decreased risk for falls (baseline score 10.03 sec, no AD).  02/23/18    HEP   Petra will demo (I) with aquatic HEP for continued management/improvement in symptoms on her own for improved mobility tolerance, return to weight management program and improved QOL.  02/23/18    Therapy Frequency:  2 times/Week (decrease frequency as indicated)  Predicted Duration of Therapy Intervention:  6 wks (extended POC to allow for scheduling)    Salas Ponce  PT                                    I CERTIFY THE NEED FOR THESE SERVICES FURNISHED UNDER        THIS PLAN OF TREATMENT AND WHILE UNDER MY CARE .             Physician Signature               Date    X_____________________________________________________                        Certification Date From:  01/08/18   Certification Date To:  02/23/18    Referring Provider:  Jerome Fritz MD ([facility])    Initial Assessment  See Epic Evaluation- Start of Care Date: 01/08/18

## 2018-01-16 ENCOUNTER — HOSPITAL ENCOUNTER (OUTPATIENT)
Dept: PHYSICAL THERAPY | Facility: CLINIC | Age: 68
Setting detail: THERAPIES SERIES
End: 2018-01-16
Attending: INTERNAL MEDICINE
Payer: MEDICARE

## 2018-01-16 PROCEDURE — 97113 AQUATIC THERAPY/EXERCISES: CPT | Mod: GP | Performed by: PHYSICAL THERAPIST

## 2018-01-16 PROCEDURE — 40000189 ZZH STATISTIC PT POOL VISIT: Performed by: PHYSICAL THERAPIST

## 2018-01-16 NOTE — PROGRESS NOTES
01/16/18 1200   Oswestry Disability Index (AMANDA   [employer] 1980 Version 2.1a, All rights reserved)   Section 1 - Pain intensity 1   Section 2 - Personal care (washing, dressing, etc.)  1   Section 3 - Lifting 2   Section 4 - Walking 3   Section 5 - Sitting 0   Section 6 - Standing 4   Section 7 - Sleeping 1   Section 8 - Sex life (if applicable) 0   Section 9 - Social life 3   Section 10 - Traveling 1   Sum 16   Count 10   Oswestry Score (%) 32 %

## 2018-01-16 NOTE — PROGRESS NOTES
AQUATIC PHYSICAL THERAPY EXERCISE LOG (TRUNK)  Date  1/16/18   Warm Up Ambulation (Forward/Side/Back/March/All) Forward, backwards walking and side stepping, x4 widths of pool each             Stretching/ROM Chin Tucks     CROM (Flex/Ext/SB/Rot/All)     Shoulder (Shrugs/Rolls)     Scapular (Retraction/Depression)     Upper Trunk (Levator/Scalene/Upper Trapezius)     Pec Stretch (Unilateral/Bilateral)     Posterior Shoulder     Gluteal Back against wall, knee to chest, 2x30 seconds B    Hamstring (On Step/Cuff) Back against wall, leg extended straight in front of body, 2x30 seconds B    Calf (In Hole/At Wall)     Quad     Hip Flexor (Kneel)     Piriformis (Seated/Stand)     Trunk ROM (Flex/Ext/SB/Rot/All)     BAD RAGAZ              Strengthening Abdominal Sets/ Pelvic Tilt Verbal cues for upright posture, abdominal activation with exercises    Shoulder (Flex/Ext, Abd/Add, IR/ER, Circles, Alternation flex/ext for core)     Horizon (Abd/Add, Diagonals)     Rowing Arms     UE PNF (D1/D2)     Abdominal Sets (Push/Pull, side to side, push down with board) Away from wall, push/pull and pushdowns with medium kickboard, x10 reps each    Squat     Lunge Squat     Hip (Flex/Ext, Abd/Add, single leg bike, circles, figure 8, All) Hip flexion, abduction, and extension, x10 reps each B    Heel/Toe Raises x10 reps    Step Ups (Forward, Lateral)     Noodle Push Downs with UE(B UE/1 UE) for core strengthening     Noodle Push Downs with LE     Stir the Pot/Punches with Dumbells     Sit to Stand at Bench     Prone Plank on step     Side Plank on step              Aerobic Fast Walking     Bike/Ski/Johnathan/All              Balance Narrow Base of Support 2x30 seconds, eyes open and closed    Tandem Stand Staggered stance, 2x30 seconds B, eyes open and closed    Single Leg Stance     Heel/Toe Walking     3 Step and Stop x4 widths of pool    Braiding                   Cool Down Ambulation x2 widths of pool    [place] Dangle     Whirlpool At  end of session for pain relief and relaxation

## 2018-02-05 ENCOUNTER — HOSPITAL ENCOUNTER (OUTPATIENT)
Dept: PHYSICAL THERAPY | Facility: CLINIC | Age: 68
Setting detail: THERAPIES SERIES
End: 2018-02-05
Attending: INTERNAL MEDICINE
Payer: MEDICARE

## 2018-02-05 PROCEDURE — 97113 AQUATIC THERAPY/EXERCISES: CPT | Mod: GP | Performed by: PHYSICAL THERAPIST

## 2018-02-05 PROCEDURE — 40000193 ZZH STATISTIC PT WARD VISIT: Performed by: PHYSICAL THERAPIST

## 2018-02-05 NOTE — PROGRESS NOTES
AQUATIC PHYSICAL THERAPY EXERCISE LOG (TRUNK)  Date   1/16/18 April Gayle, DPT  2/5/18   Warm Up Ambulation (Forward/Side/Back/March/All) Forward, backwards walking and side stepping, x4 widths of pool each F, S, B x 2 each                     Stretching/ROM Chin Tucks        CROM (Flex/Ext/SB/Rot/All)        Shoulder (Shrugs/Rolls)        Scapular (Retraction/Depression)        Upper Trunk (Levator/Scalene/Upper Trapezius)        Pec Stretch (Unilateral/Bilateral)        Posterior Shoulder        Gluteal Back against wall, knee to chest, 2x30 seconds B 30 sec x 2      Hamstring (On Step/Cuff) Back against wall, leg extended straight in front of body, 2x30 seconds B 30 sec x 2     Calf (In Hole/At Wall)        Quad   30 sec x 2 with noodle     Hip Flexor (Kneel)   30 sec x 2 in standing, visual and verbal cues     Piriformis (Seated/Stand)        Trunk ROM (Flex/Ext/SB/Rot/All)        BAD RAGAZ                        Strengthening Abdominal Sets/ Pelvic Tilt Verbal cues for upright posture, abdominal activation with exercises Verbal cues for for abd set     Shoulder (Flex/Ext, Abd/Add, IR/ER, Circles, Alternation flex/ext for core)        Horizon (Abd/Add, Diagonals)        Rowing Arms        UE PNF (D1/D2)        Abdominal Sets (Push/Pull, side to side, push down with board) Away from wall, push/pull and pushdowns with medium kickboard, x10 reps each With KB x 10 with small KB     Squat   X 10 Wall support     Lunge Squat        Hip (Flex/Ext, Abd/Add, single leg bike, circles, figure 8, All) Hip flexion, abduction, and extension, x10 reps each B X 10 with wall support     Heel/Toe Raises x10 reps      Step Ups (Forward, Lateral)        Noodle Push Downs with UE(B UE/1 UE) for core strengthening        Noodle Push Downs with LE   X 10 with colored noodle     Stir the Pot/Punches with Dumbells        Sit to Stand at Bench        Prone Plank on step        Side Plank on step                        Aerobic Fast  Walking        Bike/Ski/Johnathan/All   X 5 mins in corner                     Balance Narrow Base of Support 2x30 seconds, eyes open and closed      Tandem Stand Staggered stance, 2x30 seconds B, eyes open and closed      Single Leg Stance        Heel/Toe Walking        3 Step and Stop x4 widths of pool      Braiding                                 Cool Down Ambulation x2 widths of pool      [place] Dangle        Whirlpool At end of session for pain relief and relaxation At end of session

## 2018-02-12 ENCOUNTER — HOSPITAL ENCOUNTER (OUTPATIENT)
Dept: PHYSICAL THERAPY | Facility: CLINIC | Age: 68
Setting detail: THERAPIES SERIES
End: 2018-02-12
Attending: INTERNAL MEDICINE
Payer: MEDICARE

## 2018-02-12 PROCEDURE — 97113 AQUATIC THERAPY/EXERCISES: CPT | Mod: GP | Performed by: PHYSICAL THERAPIST

## 2018-02-12 PROCEDURE — 40000189 ZZH STATISTIC PT POOL VISIT: Performed by: PHYSICAL THERAPIST

## 2018-02-12 NOTE — PROGRESS NOTES
AQUATIC PHYSICAL THERAPY EXERCISE LOG (TRUNK)  Date    1/16/18 April Gayle, DPT  2/5/18 April Gayle, DPT  2/12/18   Warm Up Ambulation (Forward/Side/Back/March/All) Forward, backwards walking and side stepping, x4 widths of pool each F, S, B x 2 each X 2 Each                               Stretching/ROM Chin Tucks            CROM (Flex/Ext/SB/Rot/All)            Shoulder (Shrugs/Rolls)            Scapular (Retraction/Depression)            Upper Trunk (Levator/Scalene/Upper Trapezius)            Pec Stretch (Unilateral/Bilateral)            Posterior Shoulder            Gluteal Back against wall, knee to chest, 2x30 seconds B 30 sec x 2  30 sec x 2      Hamstring (On Step/Cuff) Back against wall, leg extended straight in front of body, 2x30 seconds B 30 sec x 2 30 sec x 2      Calf (In Hole/At Wall)      30 sec x 2      Quad    30 sec x 2 with noodle       Hip Flexor (Kneel)    30 sec x 2 in standing, visual and verbal cues      Piriformis (Seated/Stand)            Trunk ROM (Flex/Ext/SB/Rot/All)            BAD RAGAZ                    Hip adduction stretch x 30 sec x 2                 Strengthening Abdominal Sets/ Pelvic Tilt Verbal cues for upright posture, abdominal activation with exercises Verbal cues for for abd set With exercises      Shoulder (Flex/Ext, Abd/Add, IR/ER, Circles, Alternation flex/ext for core)      For core strengthening x 10        Horizon (Abd/Add, Diagonals)      As above      Rowing Arms            UE PNF (D1/D2)            Abdominal Sets (Push/Pull, side to side, push down with board) Away from wall, push/pull and pushdowns with medium kickboard, x10 reps each With KB x 10 with small KB With small KB     Squat    X 10 Wall support X 10 Wall support     Lunge Squat            Hip (Flex/Ext, Abd/Add, single leg bike, circles, figure 8, All) Hip flexion, abduction, and extension, x10 reps each B X 10 with wall support X 10 with wall support      Heel/Toe Raises x10 reps         Step Ups  (Forward, Lateral)      X 10 without wall support      Noodle Push Downs with UE(B UE/1 UE) for core strengthening      X 10 with colored noodle      Noodle Push Downs with LE    X 10 with colored noodle X 10 with colored noodle     Stir the Pot/Punches with Dumbells            Sit to Stand at Bench            Prone Plank on step            Side Plank on step                                     Aerobic Fast Walking            Bike/Ski/Johnathan/All    X 5 mins in corner X 5 mins in corner                              Balance Narrow Base of Support 2x30 seconds, eyes open and closed         Tandem Stand Staggered stance, 2x30 seconds B, eyes open and closed         Single Leg Stance            Heel/Toe Walking            3 Step and Stop x4 widths of pool         Braiding                                                   Cool Down Ambulation x2 widths of pool         [place] Dangle            Whirlpool At end of session for pain relief and relaxation At end of session

## 2018-02-15 ENCOUNTER — HOSPITAL ENCOUNTER (OUTPATIENT)
Dept: PHYSICAL THERAPY | Facility: CLINIC | Age: 68
Setting detail: THERAPIES SERIES
End: 2018-02-15
Attending: INTERNAL MEDICINE
Payer: MEDICARE

## 2018-02-15 PROCEDURE — 97113 AQUATIC THERAPY/EXERCISES: CPT | Mod: GP | Performed by: PHYSICAL THERAPIST

## 2018-02-15 PROCEDURE — 40000189 ZZH STATISTIC PT POOL VISIT: Performed by: PHYSICAL THERAPIST

## 2018-02-15 NOTE — PROGRESS NOTES
AQUATIC PHYSICAL THERAPY EXERCISE LOG (TRUNK)  Date    1/16/18 April Gayle, DPT  2/5/18 April Gayle, DPT  2/12/18 April Gayle, DPT  2/15/18   Warm Up Ambulation (Forward/Side/Back/March/All) Forward, backwards walking and side stepping, x4 widths of pool each F, S, B x 2 each X 2 Each X 2 each                                     Stretching/ROM Chin Tucks               CROM (Flex/Ext/SB/Rot/All)               Shoulder (Shrugs/Rolls)               Scapular (Retraction/Depression)               Upper Trunk (Levator/Scalene/Upper Trapezius)               Pec Stretch (Unilateral/Bilateral)               Posterior Shoulder               Gluteal Back against wall, knee to chest, 2x30 seconds B 30 sec x 2  30 sec x 2 30 sec x 2      Hamstring (On Step/Cuff) Back against wall, leg extended straight in front of body, 2x30 seconds B 30 sec x 2 30 sec x 2 30 sec x 2      Calf (In Hole/At Wall)       30 sec x 2 30 sec x 2      Quad    30 sec x 2 with noodle         Hip Flexor (Kneel)    30 sec x 2 in standing, visual and verbal cues         Piriformis (Seated/Stand)               Trunk ROM (Flex/Ext/SB/Rot/All)               BAD RAGAZ                        Hip adduction stretch x 30 sec x 2 Hip add stretch 30 sec x 2                    Strengthening Abdominal Sets/ Pelvic Tilt Verbal cues for upright posture, abdominal activation with exercises Verbal cues for for abd set With exercises With exercise      Shoulder (Flex/Ext, Abd/Add, IR/ER, Circles, Alternation flex/ext for core)       For core strengthening x 10 For core- with feet together, increased R hip pain, improved with feet apart- had to take break due to pain      Horizon (Abd/Add, Diagonals)       As above As above      Rowing Arms               UE PNF (D1/D2)               Abdominal Sets (Push/Pull, side to side, push down with board) Away from wall, push/pull and pushdowns with medium kickboard, x10 reps each With KB x 10 with small KB With small KB With Med KB x  10      Squat    X 10 Wall support X 10 Wall support X 10 with noodle and glute taps     Lunge Squat               Hip (Flex/Ext, Abd/Add, single leg bike, circles, figure 8, All) Hip flexion, abduction, and extension, x10 reps each B X 10 with wall support X 10 with wall support X 10 with wall support      Heel/Toe Raises x10 reps            Step Ups (Forward, Lateral)       X 10 without wall support Without wall support      Noodle Push Downs with UE(B UE/1 UE) for core strengthening       X 10 with colored noodle X 10 with colored noodle       Noodle Push Downs with LE    X 10 with colored noodle X 10 with colored noodle X 10 with colored noodle      Stir the Pot/Punches with Dumbells               Sit to Stand at Bench               Prone Plank on step               Side Plank on step                                              Aerobic Fast Walking               Bike/Ski/Johnathan/All    X 5 mins in corner X 5 mins in corner X 5 mins in corner                                     Balance Narrow Base of Support 2x30 seconds, eyes open and closed            Tandem Stand Staggered stance, 2x30 seconds B, eyes open and closed            Single Leg Stance               Heel/Toe Walking               3 Step and Stop x4 widths of pool            Braiding                                                               Cool Down Ambulation x2 widths of pool            Deep Water Dangle               Whirlpool At end of session for pain relief and relaxation At end of session

## 2018-02-27 ENCOUNTER — HOSPITAL ENCOUNTER (OUTPATIENT)
Dept: PHYSICAL THERAPY | Facility: CLINIC | Age: 68
Setting detail: THERAPIES SERIES
End: 2018-02-27
Attending: INTERNAL MEDICINE
Payer: MEDICARE

## 2018-02-27 PROCEDURE — 40000189 ZZH STATISTIC PT POOL VISIT: Performed by: PHYSICAL THERAPIST

## 2018-02-27 PROCEDURE — 97113 AQUATIC THERAPY/EXERCISES: CPT | Mod: GP | Performed by: PHYSICAL THERAPIST

## 2018-02-27 NOTE — PROGRESS NOTES
Outpatient Physical Therapy Progress Note     Patient: Petra Mcnulty  : 1950    Beginning/End Dates of Reporting Period:  2018 to 2018    Referring Provider: Dr. Jerome Fritz    Therapy Diagnosis: LBP with impaired functional mobility tolerance     Client Self Report: Patient rates low back pain at 2-3/10 at start of session today. Discussed progress in PT so far. Patient reports minimal change in her ability to stand for >10 minutes on land without a significant increase in pain. However, she notes that she is easily able to tolerate 45 minutes of standing in pool and the aquatic environment allows her to complete a full workout with minimal pain.      Outcome Measures (most recent score):   Oswestry Score (%): 28 %    Goals:  Goal Identifier Oswestry   Goal Description Petra will report an objective reduction in low back pain symptoms as evidenced by a reduction of 12 points (the minimally clinically important difference) in her score on the Oswestry Disability Index in order to engage in activities of daily living with less pain.    Target Date 18  extend to 18   Progress: Progressing. Petra's score on the Oswestry has decreased 4 points since initial evaluation.       Goal Identifier Pain   Goal Description Petra will report pain no greater than 4/10 along her low back with daily activity for improved tolerance with weight management program, ADLs, walking, and improved QOL.    Target Date 18  extend to 18   Progress: Minimal change. Petra continues to report a significant increase in low back pain at 8-9/10 with standing activities for >10 minutes.        Goal Identifier TUG   Goal Description Petra will demo TUG in 8 sec or less to indicate improving mobility skills, strength, and balance with decreased risk for falls    Target Date 18  extend to 18   Progress: Progressing. Petra completes Timed Up and Go in 8.4 seconds; improved from 10.03 seconds at  initial evaluation.       Goal Identifier HEP   Goal Description  Petra will demo (I) with aquatic HEP for continued management/improvement in symptoms on her own for improved mobility tolerance, return to weight management program and improved QOL.    Target Date 02/23/18  extend to 5/27/18   Progress: Progressing. Petra is able to demonstrate all exercises in her aquatic HEP with minimal verbal cues.     Progress Toward Goals:   Progress this reporting period: Petra has completed 5 pool therapy sessions since start of care. She participates well with pool-based activity and reports improved tolerance with aquatic exercise vs. land. See above for information regarding progress towards goals.     Plan:  Continue therapy per current plan of care. Anticipate 2 additional sessions prior to discharge to ensure independence with aquatic HEP. Patient is member of [facility] and plans to continue with her program independently in the [facility] pool.    Discharge:  No    Thank you for referring Petra to [facility]. Please contact me at [phone] with any questions or concerns.    Elva Britton, PT, DPT  [facility] - [address]  [place] [address]  Office: [phone]  Pager: [phone]  Fax: [phone]  [email or web address]

## 2018-02-27 NOTE — PROGRESS NOTES
AQUATIC PHYSICAL THERAPY EXERCISE LOG (TRUNK)  Date    1/16/18 April Gayle, DPT  2/5/18 April Gayle, DPT  2/12/18 April Gayle, DPT  2/15/18 Elva Britton, PT  2/27/18   Warm Up Ambulation (Forward/Side/Back/March/All) Forward, backwards walking and side stepping, x4 widths of pool each F, S, B x 2 each X 2 Each X 2 each x2 each                                       Stretching/ROM Chin Tucks                CROM (Flex/Ext/SB/Rot/All)                Shoulder (Shrugs/Rolls)                Scapular (Retraction/Depression)                Upper Trunk (Levator/Scalene/Upper Trapezius)                Pec Stretch (Unilateral/Bilateral)                Posterior Shoulder                Gluteal Back against wall, knee to chest, 2x30 seconds B 30 sec x 2  30 sec x 2 30 sec x 2 30 sec x 2      Hamstring (On Step/Cuff) Back against wall, leg extended straight in front of body, 2x30 seconds B 30 sec x 2 30 sec x 2 30 sec x 2 30 sec x 2      Calf (In Hole/At Wall)       30 sec x 2 30 sec x 2 30 seconds x 2      Quad    30 sec x 2 with noodle          Hip Flexor (Kneel)    30 sec x 2 in standing, visual and verbal cues          Piriformis (Seated/Stand)                Trunk ROM (Flex/Ext/SB/Rot/All)                BAD RAGAZ                         Hip adduction stretch x 30 sec x 2 Hip add stretch 30 sec x 2 Hip adduction stretch, 2x30 sec B                     Strengthening Abdominal Sets/ Pelvic Tilt Verbal cues for upright posture, abdominal activation with exercises Verbal cues for for abd set With exercises With exercise With exercise      Shoulder (Flex/Ext, Abd/Add, IR/ER, Circles, Alternation flex/ext for core)       For core strengthening x 10 For core- with feet together, increased R hip pain, improved with feet apart- had to take break due to pain Feet apart, away from wall, with closed blue paddles; completed shoulder flexion, abduction, and ER, x15 reps each B      Horizon (Abd/Add, Diagonals)       As above As above x15  reps with closed blue paddles      Rowing Arms                UE PNF (D1/D2)                Abdominal Sets (Push/Pull, side to side, push down with board) Away from wall, push/pull and pushdowns with medium kickboard, x10 reps each With KB x 10 with small KB With small KB With Med KB x 10 With large KB, x15      Squat    X 10 Wall support X 10 Wall support X 10 with noodle and glute taps x20     Lunge Squat                Hip (Flex/Ext, Abd/Add, single leg bike, circles, figure 8, All) Hip flexion, abduction, and extension, x10 reps each B X 10 with wall support X 10 with wall support X 10 with wall support x20 each with 1-2 UE support from wall      Heel/Toe Raises x10 reps             Step Ups (Forward, Lateral)       X 10 without wall support Without wall support x10 each with medium step, minimal wall support      Noodle Push Downs with UE(B UE/1 UE) for core strengthening       X 10 with colored noodle X 10 with colored noodle        Noodle Push Downs with LE    X 10 with colored noodle X 10 with colored noodle X 10 with colored noodle x20 with blue/white noodle       Stir the Pot/Punches with Dumbells          Punches with green/yellow dumbbells, x1 minute      Sit to Stand at Bench                Prone Plank on step                Side Plank on step                                                 Aerobic Fast Walking                Bike/Ski/Johnathan/All    X 5 mins in corner X 5 mins in corner X 5 mins in corner                                        Balance Narrow Base of Support 2x30 seconds, eyes open and closed             Tandem Stand Staggered stance, 2x30 seconds B, eyes open and closed             Single Leg Stance                Heel/Toe Walking                3 Step and Stop x4 widths of pool             Braiding                                                                   Cool Down Ambulation x2 widths of pool             [place] Dangle                Whirlpool At end of session for pain relief  and relaxation At end of session

## 2018-02-27 NOTE — PROGRESS NOTES
[facility]      OUTPATIENT PHYSICAL THERAPY  PLAN OF TREATMENT FOR OUTPATIENT REHABILITATION    Patient's Last Name, First Name, M.I.                YOB: 1950  Petra Mcnulty                        Provider's Name  [facility] Medical Record No.  [id number]                               Onset Date: 11/30/14 (after TIANA surgery)   Start of Care Date: 1/8/18   Type:     _X_PT   ___OT   ___SLP Medical Diagnosis: Lower back pain                       PT Diagnosis: Low back pain with impaired functional mobility tolerance      _________________________________________________________________________________  Plan of Treatment:    Frequency/Duration: 1x/week for 4 weeks (anticipate 2 more visits prior to discharge)     Goals:  Goal Identifier Oswestry   Goal Description Petra will report an objective reduction in low back pain symptoms as evidenced by a reduction of 12 points (the minimally clinically important difference) in her score on the Oswestry Disability Index in order to engage in activities of daily living with less pain.    Target Date 05/27/18       Goal Identifier Pain   Goal Description Petra will report pain no greater than 4/10 along her low back with daily activity for improved tolerance with weight management program, ADLs, walking, and improved QOL.   Target Date 05/27/18       Goal Identifier TUG   Goal Description Petra will demo TUG in 8 sec or less to indicate improving mobility skills, strength, and balance with decreased risk for falls    Target Date 05/27/18       Goal Identifier HEP   Goal Description  Petra will demo (I) with aquatic HEP for continued management/improvement in symptoms on her own for improved mobility tolerance, return to weight management program and improved QOL. \   Target Date 05/27/18     Progress Toward Goals:   Progress this  reporting period: Petra has completed 5 pool therapy sessions since start of care. She participates well with pool-based activity and reports improved tolerance with aquatic exercise vs. Land. Her Oswestry score has improved by 4 points from start of care and her Timed Up and Go has improved from 10.03 seconds to 8.40 seconds.    Certification date from 2/23/18 to 5/24/18 (extended date to full 90 days ensure completion of all sessions within this certification period).    Elva Britton, PT          I CERTIFY THE NEED FOR THESE SERVICES FURNISHED UNDER        THIS PLAN OF TREATMENT AND WHILE UNDER MY CARE .             Physician Signature               Date    X_____________________________________________________                      Referring Provider: Dr. Jerome Fritz

## 2018-02-27 NOTE — PROGRESS NOTES
02/27/18 0800   Oswestry Disability Index (AMANDA   [employer] 1980 Version 2.1a, All rights reserved)   Section 1 - Pain intensity 1   Section 2 - Personal care (washing, dressing, etc.)  2   Section 3 - Lifting 0   Section 4 - Walking 3   Section 5 - Sitting 0   Section 6 - Standing 4   Section 7 - Sleeping 0   Section 8 - Sex life (if applicable) 1   Section 9 - Social life 3   Section 10 - Traveling 0   Sum 14   Count 10   Oswestry Score (%) 28 %

## 2018-03-08 ENCOUNTER — HOSPITAL ENCOUNTER (OUTPATIENT)
Dept: PHYSICAL THERAPY | Facility: CLINIC | Age: 68
Setting detail: THERAPIES SERIES
End: 2018-03-08
Attending: INTERNAL MEDICINE
Payer: MEDICARE

## 2018-03-08 PROCEDURE — 40000189 ZZH STATISTIC PT POOL VISIT: Performed by: PHYSICAL THERAPIST

## 2018-03-08 PROCEDURE — 97113 AQUATIC THERAPY/EXERCISES: CPT | Mod: GP | Performed by: PHYSICAL THERAPIST

## 2018-03-08 NOTE — PROGRESS NOTES
AQUATIC PHYSICAL THERAPY EXERCISE LOG (TRUNK)  Date    1/16/18 April Gayle, DPT  2/5/18 April Gayle, DPT  2/12/18 April Gayle, DPT  2/15/18 Elva Britton, PT  2/27/18 April Gayle, DPT  3/8/18   Warm Up Ambulation (Forward/Side/Back/March/All) Forward, backwards walking and side stepping, x4 widths of pool each F, S, B x 2 each X 2 Each X 2 each x2 each X 2 each                                         Stretching/ROM Chin Tucks                 CROM (Flex/Ext/SB/Rot/All)                 Shoulder (Shrugs/Rolls)                 Scapular (Retraction/Depression)                 Upper Trunk (Levator/Scalene/Upper Trapezius)                 Pec Stretch (Unilateral/Bilateral)                 Posterior Shoulder                 Gluteal Back against wall, knee to chest, 2x30 seconds B 30 sec x 2  30 sec x 2 30 sec x 2 30 sec x 2 30 sec x 2       Hamstring (On Step/Cuff) Back against wall, leg extended straight in front of body, 2x30 seconds B 30 sec x 2 30 sec x 2 30 sec x 2 30 sec x 2 30 sec x 2 with noodle      Calf (In Hole/At Wall)       30 sec x 2 30 sec x 2 30 seconds x 2       Quad    30 sec x 2 with noodle     30 sec x 2 with noodle      Hip Flexor (Kneel)    30 sec x 2 in standing, visual and verbal cues           Piriformis (Seated/Stand)                 Trunk ROM (Flex/Ext/SB/Rot/All)                 BAD RAGAZ                          Hip adduction stretch x 30 sec x 2 Hip add stretch 30 sec x 2 Hip adduction stretch, 2x30 sec B Hip adduction stretch 30 sec x 2                      Strengthening Abdominal Sets/ Pelvic Tilt Verbal cues for upright posture, abdominal activation with exercises Verbal cues for for abd set With exercises With exercise With exercise With exercises      Shoulder (Flex/Ext, Abd/Add, IR/ER, Circles, Alternation flex/ext for core)       For core strengthening x 10 For core- with feet together, increased R hip pain, improved with feet apart- had to take break due to pain Feet apart, away from  wall, with closed blue paddles; completed shoulder flexion, abduction, and ER, x15 reps each B Feet together with closed paddles x 10       Horizon (Abd/Add, Diagonals)       As above As above x15 reps with closed blue paddles As above      Rowing Arms                 UE PNF (D1/D2)                 Abdominal Sets (Push/Pull, side to side, push down with board) Away from wall, push/pull and pushdowns with medium kickboard, x10 reps each With KB x 10 with small KB With small KB With Med KB x 10 With large KB, x15 With large KB x 10      Squat    X 10 Wall support X 10 Wall support X 10 with noodle and glute taps x20 X 10 away from wall      Lunge Squat           On step for positioning assistance- x 10 cues for pushing through heel for improved glute activation      Hip (Flex/Ext, Abd/Add, single leg bike, circles, figure 8, All) Hip flexion, abduction, and extension, x10 reps each B X 10 with wall support X 10 with wall support X 10 with wall support x20 each with 1-2 UE support from wall X 10 with DBs for balance challenge      Heel/Toe Raises x10 reps              Step Ups (Forward, Lateral)       X 10 without wall support Without wall support x10 each with medium step, minimal wall support       Noodle Push Downs with UE(B UE/1 UE) for core strengthening       X 10 with colored noodle X 10 with colored noodle   X 10 with colored noodle      Noodle Push Downs with LE    X 10 with colored noodle X 10 with colored noodle X 10 with colored noodle x20 with blue/white noodle  X 10 with colored/white noodle      Stir the Pot/Punches with Dumbells          Punches with green/yellow dumbbells, x1 minute       Sit to Stand at Bench                 Prone Plank on step                 Side Plank on step                              X 2 laps of monster walks- walking laterally in squat postion, mild increases in low back pain                      Aerobic Fast Walking                 Bike/Ski/Johnathan/All    X 5 mins in corner X 5  mins in corner X 5 mins in corner  X 5 mins with flippers                                         Balance Narrow Base of Support 2x30 seconds, eyes open and closed              Tandem Stand Staggered stance, 2x30 seconds B, eyes open and closed              Single Leg Stance                 Heel/Toe Walking                 3 Step and Stop x4 widths of pool              Braiding                                                                       Cool Down Ambulation x2 widths of pool              Deep Water Dangle                 Whirlpool At end of session for pain relief and relaxation At end of session

## 2018-03-15 ENCOUNTER — HOSPITAL ENCOUNTER (OUTPATIENT)
Dept: PHYSICAL THERAPY | Facility: CLINIC | Age: 68
Setting detail: THERAPIES SERIES
End: 2018-03-15
Attending: INTERNAL MEDICINE
Payer: MEDICARE

## 2018-03-15 PROCEDURE — 97113 AQUATIC THERAPY/EXERCISES: CPT | Mod: GP | Performed by: PHYSICAL THERAPIST

## 2018-03-15 PROCEDURE — 40000189 ZZH STATISTIC PT POOL VISIT: Performed by: PHYSICAL THERAPIST

## 2018-03-15 NOTE — PROGRESS NOTES
AQUATIC PHYSICAL THERAPY EXERCISE LOG (TRUNK)  Date    1/16/18 April Gayle, DPT  2/5/18 April Gayle, DPT  2/12/18 April Gayle, DPT  2/15/18 Elva Britton, PT  2/27/18 April Gayle, DPT  3/8/18 April Gayle, DPT   Warm Up Ambulation (Forward/Side/Back/March/All) Forward, backwards walking and side stepping, x4 widths of pool each F, S, B x 2 each X 2 Each X 2 each x2 each X 2 each X 2 Each                                           Stretching/ROM Chin Tucks                  CROM (Flex/Ext/SB/Rot/All)                  Shoulder (Shrugs/Rolls)                  Scapular (Retraction/Depression)                  Upper Trunk (Levator/Scalene/Upper Trapezius)                  Pec Stretch (Unilateral/Bilateral)                  Posterior Shoulder                  Gluteal Back against wall, knee to chest, 2x30 seconds B 30 sec x 2  30 sec x 2 30 sec x 2 30 sec x 2 30 sec x 2  30 sec x 2      Hamstring (On Step/Cuff) Back against wall, leg extended straight in front of body, 2x30 seconds B 30 sec x 2 30 sec x 2 30 sec x 2 30 sec x 2 30 sec x 2 with noodle 30 sec x 2 with noodle      Calf (In Hole/At Wall)       30 sec x 2 30 sec x 2 30 seconds x 2        Quad    30 sec x 2 with noodle     30 sec x 2 with noodle 30 sec x 2 with noodle      Hip Flexor (Kneel)    30 sec x 2 in standing, visual and verbal cues            Piriformis (Seated/Stand)                  Trunk ROM (Flex/Ext/SB/Rot/All)                  JAIME ROCKWELL                           Hip adduction stretch x 30 sec x 2 Hip add stretch 30 sec x 2 Hip adduction stretch, 2x30 sec B Hip adduction stretch 30 sec x 2 Hip adduction 30 sec x 2                       Strengthening Abdominal Sets/ Pelvic Tilt Verbal cues for upright posture, abdominal activation with exercises Verbal cues for for abd set With exercises With exercise With exercise With exercises With exercises      Shoulder (Flex/Ext, Abd/Add, IR/ER, Circles, Alternation flex/ext for core)       For core strengthening  x 10 For core- with feet together, increased R hip pain, improved with feet apart- had to take break due to pain Feet apart, away from wall, with closed blue paddles; completed shoulder flexion, abduction, and ER, x15 reps each B Feet together with closed paddles x 10  With feet together closed paddles x 10      Horizon (Abd/Add, Diagonals)       As above As above x15 reps with closed blue paddles As above As above      Rowing Arms                  UE PNF (D1/D2)                  Abdominal Sets (Push/Pull, side to side, push down with board) Away from wall, push/pull and pushdowns with medium kickboard, x10 reps each With KB x 10 with small KB With small KB With Med KB x 10 With large KB, x15 With large KB x 10 With med KB, cues for shoulder positioning      Squat    X 10 Wall support X 10 Wall support X 10 with noodle and glute taps x20 X 10 away from wall  Away from wall x 10     Lunge Squat           On step for positioning assistance- x 10 cues for pushing through heel for improved glute activation On step- tolerating well with push through heel x 10      Hip (Flex/Ext, Abd/Add, single leg bike, circles, figure 8, All) Hip flexion, abduction, and extension, x10 reps each B X 10 with wall support X 10 with wall support X 10 with wall support x20 each with 1-2 UE support from wall X 10 with DBs for balance challenge X 10 with DBs for balance challenge      Heel/Toe Raises x10 reps               Step Ups (Forward, Lateral)       X 10 without wall support Without wall support x10 each with medium step, minimal wall support        Noodle Push Downs with UE(B UE/1 UE) for core strengthening       X 10 with colored noodle X 10 with colored noodle   X 10 with colored noodle X 10 with colored noodle      Noodle Push Downs with LE    X 10 with colored noodle X 10 with colored noodle X 10 with colored noodle x20 with blue/white noodle  X 10 with colored/white noodle X 10 with white noodle      Stir the Pot/Punches with  Dumbells          Punches with green/yellow dumbbells, x1 minute        Sit to Stand at Bench                  Prone Plank on step                  Side Plank on step                               X 2 laps of monster walks- walking laterally in squat postion, mild increases in low back pain                        Aerobic Fast Walking                  Bike/Ski/Johnathan/All    X 5 mins in corner X 5 mins in corner X 5 mins in corner  X 5 mins with flippers Over noodle with education on goal of increased aerobic exercise to 30 mins as tolerated.                                            Balance Narrow Base of Support 2x30 seconds, eyes open and closed               Tandem Stand Staggered stance, 2x30 seconds B, eyes open and closed               Single Leg Stance                  Heel/Toe Walking                  3 Step and Stop x4 widths of pool               Braiding                                                                           Cool Down Ambulation x2 widths of pool               Deep Water Dangle                  Whirlpool At end of session for pain relief and relaxation At end of session

## 2018-03-15 NOTE — PROGRESS NOTES
Outpatient Physical Therapy Progress Note and Discharge Note     Patient: Petra Mcnulty  : 1950    Beginning/End Dates of Reporting Period:  18 to 3/15/2018    Referring Provider: Jerome Dos Santos Diagnosis: LBP with impaired functional mobility tolerance     Client Self Report: Pt reports she feels relief from her pain while in the pool. Pt reports she will continue with pool exercises 2/week. Pt also looking into  at the [facility] to round out her exercise program. Pt feels comfortable with her HEP and is ready to continue independently    Objective Measurements:  Objective Measure: Oswestry score   Details: 32%  Objective Measure: TUG  Details: 9.5 secs, no AD               Goals:  Goal Identifier Oswestry   Goal Description Petra will report an objective reduction in low back pain symptoms as evidenced by a reduction of 12 points (the minimally clinically important difference) in her score on the Oswestry Disability Index in order to engage in activities of daily living with less pain. (28%, from 32%)   Target Date 18   Date Met      Progress:Improved by 4 % from initial session, but continued limitations with functional mobility.      Goal Identifier Pain   Goal Description Petra will report pain no greater than 4/10 along her low back with daily activity for improved tolerance with weight management program, ADLs, walking, and improved QOL. (Pt reports 4-5/10 in the past week, but today 7/10)   Target Date 18   Date Met      Progress: Most days able to perform activity with 4-5/10 on average but continues to have some days where pain is 7/10 like today.      Goal Identifier TUG   Goal Description Petra will demo TUG in 8 sec or less to indicate improving mobility skills, strength, and balance with decreased risk for falls (baseline score 10.03 sec, no AD). (9.5 seconds)   Target Date 18   Date Met      Progress:improved from initial, with decrease in by 0.5  seconds.        Goal Identifier HEP   Goal Description  Petra will demo (I) with aquatic HEP for continued management/improvement in symptoms on her own for improved mobility tolerance, return to weight management program and improved QOL.   Target Date 05/27/18   Date Met   3/15/18   Progress: Goal met today   Progress Toward Goals:   Progress this reporting period: Pt TUG score decreasing by 0.5 secs. Pt pain continues to flutuate, average days only 4-5/10. Pt continues to have days with pain higher as today 7/10. Pt did decrease her Oswestry score by 4% points but not meeting MCID on this measure. Pt reports feeling better in pool, no pain increases(or minimal) with exercises. Pt reports that she is able to stand pain free/or minimal pain for her 40-45 min session in the pool. Pt reports feeling confident in HEP and will continue 2/week at least.     Plan:  Discharge from therapy.    Discharge:    Reason for Discharge: Pt met 1/4 goals, but made progress in all areas. Pt feel comfortable with HEP.     Equipment Issued: HEP    Discharge Plan: Patient to continue home program.

## 2019-07-29 ENCOUNTER — TRANSFERRED RECORDS (OUTPATIENT)
Dept: PHYSICAL THERAPY | Facility: CLINIC | Age: 69
End: 2019-07-29

## 2019-08-09 ENCOUNTER — TRANSFERRED RECORDS (OUTPATIENT)
Dept: HEALTH INFORMATION MANAGEMENT | Facility: CLINIC | Age: 69
End: 2019-08-09

## 2019-09-12 ENCOUNTER — THERAPY VISIT (OUTPATIENT)
Dept: PHYSICAL THERAPY | Facility: CLINIC | Age: 69
End: 2019-09-12
Payer: COMMERCIAL

## 2019-09-12 DIAGNOSIS — N39.41 URGE INCONTINENCE OF URINE: ICD-10-CM

## 2019-09-12 DIAGNOSIS — N81.89 PELVIC FLOOR WEAKNESS IN FEMALE: ICD-10-CM

## 2019-09-12 PROCEDURE — 97162 PT EVAL MOD COMPLEX 30 MIN: CPT | Mod: GP | Performed by: PHYSICAL THERAPIST

## 2019-09-12 PROCEDURE — 97535 SELF CARE MNGMENT TRAINING: CPT | Mod: GP | Performed by: PHYSICAL THERAPIST

## 2019-09-12 PROCEDURE — 97110 THERAPEUTIC EXERCISES: CPT | Mod: GP | Performed by: PHYSICAL THERAPIST

## 2019-09-12 NOTE — PROGRESS NOTES
Physical Therapy Initial Evaluation/Plan of Care    History of current episode:    Onset date/MD order: MD referral 8/9/19    CC/Present symptoms: Petra presents with urinary incontinence, ongoing for several years (>20). Recommended to start PT. Was on oxybutynin for many years, recently changed medication. Uses Poise pads 24/7, previously was able to use smaller ones but has needed increased thickness with time.  Notices some post-void dribbling, needs to change position on toilet to empty bladder fully. Significant urge, often not able to make it to the bathroom in time. Notices some irritation in the vulvar region.   Pain rating (0-10): 0  Conditioning is improving/unchanging/worsening: worsening    How problem began: not sure  Pelvic/Abdominal Surgeries:cholecystectomy  Hx of or present sexually transmitted disease: none  SMHx: high blood pressure, overweight, sleep apnea, thyroid problems, COPD  General Health: fair  Current occupation: retired nurse  Goals: try to gain control of bladder, prevent leakage  Red flags: none    # of pregnancies: 2  # of live births: 2  # vaginal deliveries: 2  # c-sections: 0  Complications: none - not sure if small tear with last child    Urination:  Do you leak on the way to the bathroom or with a strong urge to void? Yes - a lot of times doesn't make it to the bathroom  Do you leak with cough,sneeze, jumping, running? No   Any other activities that cause leaking? no  Do you have triggers that make you feel you can't wait to go to the bathroom? Yes - sometimes water running, gets occasional bladder spasms  Type of pad and number used per day? 2-3 Poise pads per day  When you leak what is the amount? large  How long can you delay the need to urinate? 11 - 30 minutes.   Do you feel excessive pressure in pelvic floor: no  Frequency of daytime urination: 8x/day  Frequency of nighttime urination: 1-4x/night  Can you stop the flow of urine when on the toilet? No  Is the volume of  urine passed usually: small to medium. (8sec rule= 250ml with average bladder storing 400-600ml)  Do you strain to pass urine? No  Do you have a slow or hesitant urinary stream? No  Do you have difficulty initiating the urine stream? No  Is urination painful? No  How many bladder infections have you had in last 12 months? 1  Fluid intake(one glass is 8oz or one cup) 2-3 glasses water/day, 0 caffeinated glasses/day  0 alcohol glasses/day.    Bowel habits:  Frequency of bowel movements? 2 times a day  Consistancy of stool? soft, soft formed  Do you ignore the urge to defecate? No  Do you strain to pass stool? Yes     Pelvic Pain:  Do you have any pelvic pain with intercourse, exams, use of tampons? No  Are you sexually active?No  Have you ever been worried for your physical safety? No  Have you practiced the PF(kegel) exercises for 4 or more weeks? no    Treatment/Education provided this session (see flow sheet for information):      Flexibility: Assess next    Abdominal Wall  Diastasis recti: none noted    External Exam:  Skin condition: mild redness noted; white cream visible on external tissue  Scars: none  Is tissue symmetrical?: yes  Introitus: not visible easily externally  Muscle contraction - pelvic elevation present/slight/absent? slight  Perineal mobility - descent/no movement? No movement  Cystocele/Rectocele/none?: none visible on supine strain    Internal Exam    Sensation intact?: yes    Muscle exam   Neg Pain (-) Min Pain (+) Mod Pain (++) Severe Pain (+++) Increased tone?   Ischiocavernosus -       Bulbocavernosus -       Transverse Perineals -       Levator Ani -       Perineal Body -         Contraction Grade  0 (no contraction)    1 (flicker)    2 (weak squeeze, 2 sec hold) X   3 (fair squeeze, definite lift)    4 (good squeeze, good hold, repeatable)    5 (strong squeeze, good hold, repeatable)    -fatigues quickly    Patient is a 68 year old female with pelvic complaints.    Patient has the  following significant findings with corresponding treatment plan.                Diagnosis 1:  Urge incontinence, pelvic floor weakness  Decreased strength - therapeutic exercise, therapeutic activities and biofeedback  Decreased proprioception - neuro re-education and therapeutic activities  Impaired muscle performance - neuro re-education  Decreased function - therapeutic activities and self-care/home management    Therapy Evaluation Codes:   1) History comprised of:   Personal factors that impact the plan of care:      None.    Comorbidity factors that impact the plan of care are:      Overweight.     Medications impacting care: High blood pressure, Muscle relaxant and toviaz.  2) Examination of Body Systems comprised of:   Body structures and functions that impact the plan of care:      Lumbar spine and Pelvis.   Activity limitations that impact the plan of care are:      Frequency, Urge incontinence and Urinary incontinence.  3) Clinical presentation characteristics are:   Evolving/Changing.  4) Decision-Making    Moderate complexity using standardized patient assessment instrument and/or measureable assessment of functional outcome.  Cumulative Therapy Evaluation is: Moderate complexity.    Previous and current functional limitations:  (See Goal Flow Sheet for this information)    Short term and Long term goals: (See Goal Flow Sheet for this information)     Communication ability:  Patient appears to be able to clearly communicate and understand verbal and written communication and follow directions correctly.  Treatment Explanation - The following has been discussed with the patient:   RX ordered/plan of care  Anticipated outcomes  Possible risks and side effects  This patient would benefit from PT intervention to resume normal activities.   Rehab potential is good.    Frequency:  1 X week, once daily x 3 weeks then tapering to 1x every other week x 8 weeks  Duration:  for 12 weeks  Discharge Plan:  Achieve  all LTG.  Independent in home treatment program.  Reach maximal therapeutic benefit.    Please refer to the daily flowsheet for treatment today, total treatment time and time spent performing 1:1 timed codes.

## 2019-09-12 NOTE — LETTER
ANA CRUZ PT  [address] [facility] [address]  [facility] [address]  [phone]    2019    Re: Petra Mcnulty   :   1950  MRN:  [id number]   REFERRING PHYSICIAN:   Karlee CRUZ PT  Date of Initial Evaluation:  2019  Visits:  Rxs Used: 1  Reason for Referral:     Urge incontinence of urine  Pelvic floor weakness in female    EVALUATION SUMMARY    Physical Therapy Initial Evaluation/Plan of Care    History of current episode:    Onset date/MD order: MD referral 19    CC/Present symptoms: Petra presents with urinary incontinence, ongoing for several years (>20). Recommended to start PT. Was on oxybutynin for many years, recently changed medication. Uses Poise pads , previously was able to use smaller ones but has needed increased thickness with time.  Notices some post-void dribbling, needs to change position on toilet to empty bladder fully. Significant urge, often not able to make it to the bathroom in time. Notices some irritation in the vulvar region.   Pain rating (0-10): 0  Conditioning is improving/unchanging/worsening: worsening    How problem began: not sure  Pelvic/Abdominal Surgeries:cholecystectomy  Hx of or present sexually transmitted disease: none  SMHx: high blood pressure, overweight, sleep apnea, thyroid problems, COPD  General Health: fair  Current occupation: retired nurse  Goals: try to gain control of bladder, prevent leakage  Red flags: none    # of pregnancies: 2  # of live births: 2  # vaginal deliveries: 2  # c-sections: 0  Complications: none - not sure if small tear with last child                        Re: Petra Mcnulty   :   1950    Urination:  Do you leak on the way to the bathroom or with a strong urge to void? Yes - a lot of times doesn't make it to the bathroom  Do you leak with cough,sneeze, jumping, running? No   Any other activities that cause leaking? no  Do you have triggers that make you feel you  can't wait to go to the bathroom? Yes - sometimes water running, gets occasional bladder spasms  Type of pad and number used per day? 2-3 Poise pads per day  When you leak what is the amount? large  How long can you delay the need to urinate? 11 - 30 minutes.   Do you feel excessive pressure in pelvic floor: no  Frequency of daytime urination: 8x/day  Frequency of nighttime urination: 1-4x/night  Can you stop the flow of urine when on the toilet? No  Is the volume of urine passed usually: small to medium. (8sec rule= 250ml with average bladder storing 400-600ml)  Do you strain to pass urine? No  Do you have a slow or hesitant urinary stream? No  Do you have difficulty initiating the urine stream? No  Is urination painful? No  How many bladder infections have you had in last 12 months? 1  Fluid intake(one glass is 8oz or one cup) 2-3 glasses water/day, 0 caffeinated glasses/day  0 alcohol glasses/day.    Bowel habits:  Frequency of bowel movements? 2 times a day  Consistancy of stool? soft, soft formed  Do you ignore the urge to defecate? No  Do you strain to pass stool? Yes     Pelvic Pain:  Do you have any pelvic pain with intercourse, exams, use of tampons? No  Are you sexually active?No  Have you ever been worried for your physical safety? No  Have you practiced the PF(kegel) exercises for 4 or more weeks? no    Treatment/Education provided this session (see flow sheet for information):      Flexibility: Assess next    Abdominal Wall  Diastasis recti: none noted    External Exam:  Skin condition: mild redness noted; white cream visible on external tissue  Scars: none  Is tissue symmetrical?: yes  Introitus: not visible easily externally  Muscle contraction - pelvic elevation present/slight/absent? slight  Perineal mobility - descent/no movement? No movement  Cystocele/Rectocele/none?: none visible on supine strain          Re: Petra Mcnulty   :   1950      Internal Exam    Sensation intact?:  yes    Muscle exam   Neg Pain (-) Min Pain (+) Mod Pain (++) Severe Pain (+++) Increased tone?   Ischiocavernosus -       Bulbocavernosus -       Transverse Perineals -       Levator Ani -       Perineal Body -         Contraction Grade  0 (no contraction)    1 (flicker)    2 (weak squeeze, 2 sec hold) X   3 (fair squeeze, definite lift)    4 (good squeeze, good hold, repeatable)    5 (strong squeeze, good hold, repeatable)    -fatigues quickly    Patient is a 68 year old female with pelvic complaints.    Patient has the following significant findings with corresponding treatment plan.                Diagnosis 1:  Urge incontinence, pelvic floor weakness  Decreased strength - therapeutic exercise, therapeutic activities and biofeedback  Decreased proprioception - neuro re-education and therapeutic activities  Impaired muscle performance - neuro re-education  Decreased function - therapeutic activities and self-care/home management    Therapy Evaluation Codes:   1) History comprised of:   Personal factors that impact the plan of care:      None.    Comorbidity factors that impact the plan of care are:      Overweight.     Medications impacting care: High blood pressure, Muscle relaxant and toviaz.  2) Examination of Body Systems comprised of:   Body structures and functions that impact the plan of care:      Lumbar spine and Pelvis.   Activity limitations that impact the plan of care are:      Frequency, Urge incontinence and Urinary incontinence.  3) Clinical presentation characteristics are:   Evolving/Changing.  4) Decision-Making    Moderate complexity using standardized patient assessment instrument and/or measureable assessment of functional outcome.  Cumulative Therapy Evaluation is: Moderate complexity.    Previous and current functional limitations:  (See Goal Flow Sheet for this information)    Short term and Long term goals: (See Goal Flow Sheet for this information)           Re: Petra Mcnulty DOB:    1950    Communication ability:  Patient appears to be able to clearly communicate and understand verbal and written communication and follow directions correctly.  Treatment Explanation - The following has been discussed with the patient:   RX ordered/plan of care  Anticipated outcomes  Possible risks and side effects  This patient would benefit from PT intervention to resume normal activities.   Rehab potential is good.    Frequency:  1 X week, once daily x 3 weeks then tapering to 1x every other week x 8 weeks  Duration:  for 12 weeks  Discharge Plan:  Achieve all LTG.  Independent in home treatment program.  Reach maximal therapeutic benefit.      Thank you for your referral.    INQUIRIES  Therapist: KATHY Parker [facility] PT  [id number] [address] [address]  Phone: [phone]  Fax: [phone]

## 2019-09-17 ENCOUNTER — THERAPY VISIT (OUTPATIENT)
Dept: PHYSICAL THERAPY | Facility: CLINIC | Age: 69
End: 2019-09-17
Payer: COMMERCIAL

## 2019-09-17 DIAGNOSIS — N81.89 PELVIC FLOOR WEAKNESS IN FEMALE: ICD-10-CM

## 2019-09-17 DIAGNOSIS — N39.41 URGE INCONTINENCE OF URINE: ICD-10-CM

## 2019-09-17 PROCEDURE — 97535 SELF CARE MNGMENT TRAINING: CPT | Mod: GP | Performed by: PHYSICAL THERAPIST

## 2019-09-17 PROCEDURE — 97110 THERAPEUTIC EXERCISES: CPT | Mod: GP | Performed by: PHYSICAL THERAPIST

## 2019-09-24 ENCOUNTER — THERAPY VISIT (OUTPATIENT)
Dept: PHYSICAL THERAPY | Facility: CLINIC | Age: 69
End: 2019-09-24
Payer: COMMERCIAL

## 2019-09-24 DIAGNOSIS — N39.41 URGE INCONTINENCE OF URINE: ICD-10-CM

## 2019-09-24 DIAGNOSIS — N81.89 PELVIC FLOOR WEAKNESS IN FEMALE: ICD-10-CM

## 2019-09-24 PROCEDURE — 97110 THERAPEUTIC EXERCISES: CPT | Mod: GP | Performed by: PHYSICAL THERAPIST

## 2019-10-15 ENCOUNTER — THERAPY VISIT (OUTPATIENT)
Dept: PHYSICAL THERAPY | Facility: CLINIC | Age: 69
End: 2019-10-15
Payer: COMMERCIAL

## 2019-10-15 DIAGNOSIS — N39.41 URGE INCONTINENCE OF URINE: ICD-10-CM

## 2019-10-15 DIAGNOSIS — N81.89 PELVIC FLOOR WEAKNESS IN FEMALE: ICD-10-CM

## 2019-10-15 PROCEDURE — 97110 THERAPEUTIC EXERCISES: CPT | Mod: GP | Performed by: PHYSICAL THERAPIST

## 2019-10-15 PROCEDURE — 97535 SELF CARE MNGMENT TRAINING: CPT | Mod: GP | Performed by: PHYSICAL THERAPIST

## 2019-11-26 ENCOUNTER — THERAPY VISIT (OUTPATIENT)
Dept: PHYSICAL THERAPY | Facility: CLINIC | Age: 69
End: 2019-11-26
Payer: COMMERCIAL

## 2019-11-26 DIAGNOSIS — N81.89 PELVIC FLOOR WEAKNESS IN FEMALE: ICD-10-CM

## 2019-11-26 DIAGNOSIS — N39.41 URGE INCONTINENCE OF URINE: ICD-10-CM

## 2019-11-26 PROCEDURE — 97110 THERAPEUTIC EXERCISES: CPT | Mod: GP | Performed by: PHYSICAL THERAPIST

## 2019-11-26 PROCEDURE — 97535 SELF CARE MNGMENT TRAINING: CPT | Mod: GP | Performed by: PHYSICAL THERAPIST

## 2019-11-26 NOTE — PROGRESS NOTES
DISCHARGE REPORT    Progress reporting period is from 9/12/19 to 11/26/19.       SUBJECTIVE  Subjective changes noted by patient:  Waiting to hear about getting a 7 day trial of spinal cord stimulation for LBP, wonders if this may impact bladder symptoms. Overall feels bladder symptoms continue to be improved, but has had a couple of episodes where she has had large losses of urine over the past ~6 weeks. Wonders if there is a dietary trigger that might have caused those instances. Feels like when she has the large loss it comes along with bladder spasms.    Current pain level is 0/10  .     Previous pain level was 0/10.   Changes in function:  Yes (See Goal flowsheet attached for changes in current functional level)  Adverse reaction to treatment or activity: None    OBJECTIVE  Changes noted in objective findings:  Yes-  Objective: pt making gradual progress with urinary sx, good PF/TA engagement at this time     ASSESSMENT/PLAN  Updated problem list and treatment plan: Diagnosis 1:  Urge incontinence, pelvic floor weakness  Decreased strength - home program  STG/LTGs have been met or progress has been made towards goals:  Yes (See Goal flow sheet completed today.)  Assessment of Progress: The patient's condition is improving.  The patient has met all of their long term goals.  Self Management Plans:  Patient is independent in a home treatment program.  I have re-evaluated this patient and find that the nature, scope, duration and intensity of the therapy is appropriate for the medical condition of the patient.  Petra continues to require the following intervention to meet STG and LTG's:  PT intervention is no longer required to meet STG/LTG.    Recommendations:  This patient is ready to be discharged from therapy and continue their home treatment program.    Please refer to the daily flowsheet for treatment today, total treatment time and time spent performing 1:1 timed codes.

## 2020-01-27 ENCOUNTER — TRANSFERRED RECORDS (OUTPATIENT)
Dept: HEALTH INFORMATION MANAGEMENT | Facility: CLINIC | Age: 70
End: 2020-01-27

## 2020-02-05 RX ORDER — HYDROCODONE BITARTRATE AND ACETAMINOPHEN 5; 325 MG/1; MG/1
1 TABLET ORAL EVERY 6 HOURS PRN
COMMUNITY
End: 2022-01-01

## 2020-02-05 RX ORDER — ACETAMINOPHEN 500 MG
1000 TABLET ORAL 2 TIMES DAILY
COMMUNITY

## 2020-02-05 RX ORDER — WARFARIN SODIUM 2 MG/1
2 TABLET ORAL DAILY
COMMUNITY
End: 2020-05-22

## 2020-02-05 RX ORDER — FESOTERODINE FUMARATE 8 MG/1
TABLET, FILM COATED, EXTENDED RELEASE ORAL DAILY
COMMUNITY
End: 2021-03-07

## 2020-02-05 RX ORDER — LISINOPRIL 10 MG/1
10 TABLET ORAL DAILY
COMMUNITY
End: 2021-01-26

## 2020-02-05 RX ORDER — METHOCARBAMOL 750 MG/1
750 TABLET, FILM COATED ORAL 4 TIMES DAILY PRN
COMMUNITY
End: 2020-06-18

## 2020-02-05 RX ORDER — GABAPENTIN 100 MG/1
100 CAPSULE ORAL 3 TIMES DAILY
COMMUNITY
End: 2020-06-18

## 2020-02-05 RX ORDER — ALBUTEROL SULFATE 90 UG/1
2 AEROSOL, METERED RESPIRATORY (INHALATION) EVERY 4 HOURS PRN
COMMUNITY
End: 2020-06-09

## 2020-02-05 RX ORDER — LORATADINE 10 MG/1
10 TABLET ORAL DAILY
COMMUNITY

## 2020-02-05 RX ORDER — PRAVASTATIN SODIUM 40 MG
40 TABLET ORAL DAILY
COMMUNITY
End: 2021-01-26

## 2020-02-05 RX ORDER — SILVER SULFADIAZINE 10 MG/G
CREAM TOPICAL DAILY
COMMUNITY
End: 2020-06-09

## 2020-02-05 RX ORDER — CHLORAL HYDRATE 500 MG
2 CAPSULE ORAL 2 TIMES DAILY
Status: ON HOLD | COMMUNITY
End: 2020-02-06

## 2020-02-05 RX ORDER — TRIAMCINOLONE ACETONIDE 1 MG/G
CREAM TOPICAL 2 TIMES DAILY
Status: ON HOLD | COMMUNITY
End: 2020-02-06

## 2020-02-05 RX ORDER — LEVOTHYROXINE SODIUM 100 UG/1
100 TABLET ORAL DAILY
COMMUNITY
End: 2021-01-26

## 2020-02-05 RX ORDER — GABAPENTIN 300 MG/1
300 CAPSULE ORAL 3 TIMES DAILY
COMMUNITY
End: 2020-06-18

## 2020-02-05 RX ORDER — CLOBETASOL PROPIONATE 0.5 MG/G
OINTMENT TOPICAL AT BEDTIME
COMMUNITY
End: 2020-02-12

## 2020-02-05 RX ORDER — MUPIROCIN 20 MG/G
OINTMENT TOPICAL 2 TIMES DAILY
COMMUNITY
End: 2020-06-09

## 2020-02-05 RX ORDER — METHYLPREDNISOLONE 4 MG
TABLET, DOSE PACK ORAL SEE ADMIN INSTRUCTIONS
Status: ON HOLD | COMMUNITY
End: 2020-02-06

## 2020-02-06 ENCOUNTER — HOSPITAL ENCOUNTER (OUTPATIENT)
Facility: CLINIC | Age: 70
Discharge: HOME | End: 2020-02-06
Attending: PSYCHIATRY & NEUROLOGY | Admitting: PSYCHIATRY & NEUROLOGY
Payer: COMMERCIAL

## 2020-02-06 ENCOUNTER — ANESTHESIA EVENT (OUTPATIENT)
Dept: SURGERY | Facility: CLINIC | Age: 70
End: 2020-02-06
Payer: COMMERCIAL

## 2020-02-06 ENCOUNTER — ANESTHESIA (OUTPATIENT)
Dept: SURGERY | Facility: CLINIC | Age: 70
End: 2020-02-06
Payer: COMMERCIAL

## 2020-02-06 ENCOUNTER — APPOINTMENT (OUTPATIENT)
Dept: GENERAL RADIOLOGY | Facility: CLINIC | Age: 70
End: 2020-02-06
Attending: PSYCHIATRY & NEUROLOGY
Payer: COMMERCIAL

## 2020-02-06 VITALS
HEART RATE: 71 BPM | OXYGEN SATURATION: 94 % | BODY MASS INDEX: 44.41 KG/M2 | SYSTOLIC BLOOD PRESSURE: 155 MMHG | WEIGHT: 293 LBS | HEIGHT: 68 IN | RESPIRATION RATE: 16 BRPM | DIASTOLIC BLOOD PRESSURE: 86 MMHG | TEMPERATURE: 98.2 F

## 2020-02-06 LAB
APTT PPP: 29 SEC (ref 22–37)
INR PPP: 1.06 (ref 0.86–1.14)

## 2020-02-06 PROCEDURE — 25000128 H RX IP 250 OP 636: Performed by: NURSE ANESTHETIST, CERTIFIED REGISTERED

## 2020-02-06 PROCEDURE — 37000008 ZZH ANESTHESIA TECHNICAL FEE, 1ST 30 MIN: Performed by: PSYCHIATRY & NEUROLOGY

## 2020-02-06 PROCEDURE — 71000027 ZZH RECOVERY PHASE 2 EACH 15 MINS: Performed by: PSYCHIATRY & NEUROLOGY

## 2020-02-06 PROCEDURE — 71000013 ZZH RECOVERY PHASE 1 LEVEL 1 EA ADDTL HR: Performed by: PSYCHIATRY & NEUROLOGY

## 2020-02-06 PROCEDURE — C1897 LEAD, NEUROSTIM TEST KIT: HCPCS | Performed by: PSYCHIATRY & NEUROLOGY

## 2020-02-06 PROCEDURE — 36000063 ZZH SURGERY LEVEL 4 EA 15 ADDTL MIN: Performed by: PSYCHIATRY & NEUROLOGY

## 2020-02-06 PROCEDURE — 40000279 XR SURGERY CARM FLUORO GREATER THAN 5 MIN W STILLS

## 2020-02-06 PROCEDURE — 25000125 ZZHC RX 250: Performed by: NURSE ANESTHETIST, CERTIFIED REGISTERED

## 2020-02-06 PROCEDURE — 27210794 ZZH OR GENERAL SUPPLY STERILE: Performed by: PSYCHIATRY & NEUROLOGY

## 2020-02-06 PROCEDURE — 40000169 ZZH STATISTIC PRE-PROCEDURE ASSESSMENT I: Performed by: PSYCHIATRY & NEUROLOGY

## 2020-02-06 PROCEDURE — 36415 COLL VENOUS BLD VENIPUNCTURE: CPT | Performed by: PSYCHIATRY & NEUROLOGY

## 2020-02-06 PROCEDURE — 25000128 H RX IP 250 OP 636: Performed by: PSYCHIATRY & NEUROLOGY

## 2020-02-06 PROCEDURE — 25800030 ZZH RX IP 258 OP 636: Performed by: NURSE ANESTHETIST, CERTIFIED REGISTERED

## 2020-02-06 PROCEDURE — 71000012 ZZH RECOVERY PHASE 1 LEVEL 1 FIRST HR: Performed by: PSYCHIATRY & NEUROLOGY

## 2020-02-06 PROCEDURE — 85730 THROMBOPLASTIN TIME PARTIAL: CPT | Performed by: PSYCHIATRY & NEUROLOGY

## 2020-02-06 PROCEDURE — 25000125 ZZHC RX 250: Performed by: PSYCHIATRY & NEUROLOGY

## 2020-02-06 PROCEDURE — 85610 PROTHROMBIN TIME: CPT | Performed by: PSYCHIATRY & NEUROLOGY

## 2020-02-06 PROCEDURE — 36000065 ZZH SURGERY LEVEL 4 W FLUORO 1ST 30 MIN: Performed by: PSYCHIATRY & NEUROLOGY

## 2020-02-06 PROCEDURE — 37000009 ZZH ANESTHESIA TECHNICAL FEE, EACH ADDTL 15 MIN: Performed by: PSYCHIATRY & NEUROLOGY

## 2020-02-06 PROCEDURE — 27211024 ZZHC OR SUPPLY OTHER OPNP: Performed by: PSYCHIATRY & NEUROLOGY

## 2020-02-06 DEVICE — LEAD KIT VECTRIS TRIAL COMPACT SURESCAN 1X8MMX90CM 977D260: Type: IMPLANTABLE DEVICE | Site: BACK | Status: FUNCTIONAL

## 2020-02-06 RX ORDER — SODIUM CHLORIDE, SODIUM LACTATE, POTASSIUM CHLORIDE, CALCIUM CHLORIDE 600; 310; 30; 20 MG/100ML; MG/100ML; MG/100ML; MG/100ML
INJECTION, SOLUTION INTRAVENOUS CONTINUOUS
Status: DISCONTINUED | OUTPATIENT
Start: 2020-02-06 | End: 2020-02-06 | Stop reason: HOSPADM

## 2020-02-06 RX ORDER — HYDROMORPHONE HYDROCHLORIDE 1 MG/ML
.3-.5 INJECTION, SOLUTION INTRAMUSCULAR; INTRAVENOUS; SUBCUTANEOUS EVERY 10 MIN PRN
Status: DISCONTINUED | OUTPATIENT
Start: 2020-02-06 | End: 2020-02-06 | Stop reason: HOSPADM

## 2020-02-06 RX ORDER — MEPERIDINE HYDROCHLORIDE 25 MG/ML
12.5 INJECTION INTRAMUSCULAR; INTRAVENOUS; SUBCUTANEOUS
Status: DISCONTINUED | OUTPATIENT
Start: 2020-02-06 | End: 2020-02-06 | Stop reason: HOSPADM

## 2020-02-06 RX ORDER — FENTANYL CITRATE 0.05 MG/ML
25-50 INJECTION, SOLUTION INTRAMUSCULAR; INTRAVENOUS
Status: DISCONTINUED | OUTPATIENT
Start: 2020-02-06 | End: 2020-02-06 | Stop reason: HOSPADM

## 2020-02-06 RX ORDER — CEFAZOLIN SODIUM 2 G/100ML
2 INJECTION, SOLUTION INTRAVENOUS
Status: DISCONTINUED | OUTPATIENT
Start: 2020-02-06 | End: 2020-02-06 | Stop reason: HOSPADM

## 2020-02-06 RX ORDER — NICOTINE POLACRILEX 4 MG
15-30 LOZENGE BUCCAL
Status: DISCONTINUED | OUTPATIENT
Start: 2020-02-06 | End: 2020-02-06 | Stop reason: HOSPADM

## 2020-02-06 RX ORDER — PROPOFOL 10 MG/ML
INJECTION, EMULSION INTRAVENOUS CONTINUOUS PRN
Status: DISCONTINUED | OUTPATIENT
Start: 2020-02-06 | End: 2020-02-06

## 2020-02-06 RX ORDER — LIDOCAINE 40 MG/G
CREAM TOPICAL
Status: DISCONTINUED | OUTPATIENT
Start: 2020-02-06 | End: 2020-02-06 | Stop reason: HOSPADM

## 2020-02-06 RX ORDER — LIDOCAINE HYDROCHLORIDE 20 MG/ML
INJECTION, SOLUTION INFILTRATION; PERINEURAL PRN
Status: DISCONTINUED | OUTPATIENT
Start: 2020-02-06 | End: 2020-02-06

## 2020-02-06 RX ORDER — ONDANSETRON 2 MG/ML
4 INJECTION INTRAMUSCULAR; INTRAVENOUS EVERY 30 MIN PRN
Status: DISCONTINUED | OUTPATIENT
Start: 2020-02-06 | End: 2020-02-06 | Stop reason: HOSPADM

## 2020-02-06 RX ORDER — DEXTROSE MONOHYDRATE 25 G/50ML
25-50 INJECTION, SOLUTION INTRAVENOUS
Status: DISCONTINUED | OUTPATIENT
Start: 2020-02-06 | End: 2020-02-06 | Stop reason: HOSPADM

## 2020-02-06 RX ORDER — MAGNESIUM HYDROXIDE 1200 MG/15ML
LIQUID ORAL PRN
Status: DISCONTINUED | OUTPATIENT
Start: 2020-02-06 | End: 2020-02-06 | Stop reason: HOSPADM

## 2020-02-06 RX ORDER — LIDOCAINE HYDROCHLORIDE 10 MG/ML
INJECTION, SOLUTION EPIDURAL; INFILTRATION; INTRACAUDAL; PERINEURAL
Status: DISCONTINUED
Start: 2020-02-06 | End: 2020-02-06 | Stop reason: HOSPADM

## 2020-02-06 RX ORDER — NALOXONE HYDROCHLORIDE 0.4 MG/ML
.1-.4 INJECTION, SOLUTION INTRAMUSCULAR; INTRAVENOUS; SUBCUTANEOUS
Status: DISCONTINUED | OUTPATIENT
Start: 2020-02-06 | End: 2020-02-06 | Stop reason: HOSPADM

## 2020-02-06 RX ORDER — CEFAZOLIN SODIUM IN 0.9 % NACL 3 G/100 ML
3 INTRAVENOUS SOLUTION, PIGGYBACK (ML) INTRAVENOUS ONCE
Status: COMPLETED | OUTPATIENT
Start: 2020-02-06 | End: 2020-02-06

## 2020-02-06 RX ORDER — ONDANSETRON 4 MG/1
4 TABLET, ORALLY DISINTEGRATING ORAL EVERY 30 MIN PRN
Status: DISCONTINUED | OUTPATIENT
Start: 2020-02-06 | End: 2020-02-06 | Stop reason: HOSPADM

## 2020-02-06 RX ORDER — ONDANSETRON 2 MG/ML
INJECTION INTRAMUSCULAR; INTRAVENOUS PRN
Status: DISCONTINUED | OUTPATIENT
Start: 2020-02-06 | End: 2020-02-06

## 2020-02-06 RX ORDER — SODIUM CHLORIDE, SODIUM LACTATE, POTASSIUM CHLORIDE, CALCIUM CHLORIDE 600; 310; 30; 20 MG/100ML; MG/100ML; MG/100ML; MG/100ML
INJECTION, SOLUTION INTRAVENOUS CONTINUOUS PRN
Status: DISCONTINUED | OUTPATIENT
Start: 2020-02-06 | End: 2020-02-06

## 2020-02-06 RX ADMIN — SODIUM CHLORIDE, POTASSIUM CHLORIDE, SODIUM LACTATE AND CALCIUM CHLORIDE: 600; 310; 30; 20 INJECTION, SOLUTION INTRAVENOUS at 07:40

## 2020-02-06 RX ADMIN — Medication 3 G: at 07:43

## 2020-02-06 RX ADMIN — LIDOCAINE HYDROCHLORIDE 40 MG: 20 INJECTION, SOLUTION INFILTRATION; PERINEURAL at 07:42

## 2020-02-06 RX ADMIN — PROPOFOL 50 MCG/KG/MIN: 10 INJECTION, EMULSION INTRAVENOUS at 07:42

## 2020-02-06 RX ADMIN — MIDAZOLAM 2 MG: 1 INJECTION INTRAMUSCULAR; INTRAVENOUS at 07:40

## 2020-02-06 RX ADMIN — ONDANSETRON 4 MG: 2 INJECTION INTRAMUSCULAR; INTRAVENOUS at 08:21

## 2020-02-06 RX ADMIN — DEXMEDETOMIDINE HYDROCHLORIDE 0.7 MCG/KG/HR: 100 INJECTION, SOLUTION INTRAVENOUS at 07:43

## 2020-02-06 ASSESSMENT — LIFESTYLE VARIABLES: TOBACCO_USE: 1

## 2020-02-06 ASSESSMENT — MIFFLIN-ST. JEOR: SCORE: 2060.83

## 2020-02-06 ASSESSMENT — COPD QUESTIONNAIRES: COPD: 1

## 2020-02-06 NOTE — DISCHARGE INSTRUCTIONS
Same Day Surgery Discharge Instructions for  Sedation and General Anesthesia       It's not unusual to feel dizzy, light-headed or faint for up to 24 hours after surgery or while taking pain medication.  If you have these symptoms: sit for a few minutes before standing and have someone assist you when you get up to walk or use the bathroom.      You should rest and relax for the next 24 hours. We recommend you make arrangements to have an adult stay with you for at least 24 hours after your discharge.  Avoid hazardous and strenuous activity.      DO NOT DRIVE any vehicle or operate mechanical equipment for 24 hours following the end of your surgery.  Even though you may feel normal, your reactions may be affected by the medication you have received.      Do not drink alcoholic beverages for 24 hours following surgery.       Slowly progress to your regular diet as you feel able. It's not unusual to feel nauseated and/or vomit after receiving anesthesia.  If you develop these symptoms, drink clear liquids (apple juice, ginger ale, broth, 7-up, etc. ) until you feel better.  If your nausea and vomiting persists for 24 hours, please notify your surgeon.        All narcotic pain medications, along with inactivity and anesthesia, can cause constipation. Drinking plenty of liquids and increasing fiber intake will help.      For any questions of a medical nature, call your surgeon.      Do not make important decisions for 24 hours.      If you had general anesthesia, you may have a sore throat for a couple of days related to the breathing tube used during surgery.  You may use Cepacol lozenges to help with this discomfort.  If it worsens or if you develop a fever, contact your surgeon.       If you feel your pain is not well managed with the pain medications prescribed by your surgeon, please contact your surgeon's office to let them know so they can address your concerns.       Spinal Cord Stimulator Trial Discharge  Instructions    After your trial procedure:      Resume light activity as tolerated    Restrict bending, lifting and twisting to reduce the chance of your leads moving.      Resume your regular pain medication regimen    Begin taking your oral antibiotic the day after your trial procedure.  It is important you finish the entire course of medication regimen    No driving with the stimulator on (Nevro brand--exempt)    A small amount of blood visible in the bandage is praful;, if any leaks out of the bandage contact [facility].    Site Care:      DO NOT remove any bandages from the trial.  A RN or your provider will assess your dressing at your mid-trial appointment and will remove the dressing at your lead pull appointment.     NO showering, baths, pools or whirlpools during the trial    Bandage needs to stay dry and intact.    After the bandage is removed, check your insertion sites daily.  Keep the area clean and dry.    Call [facility] during business hours or the on-call provider if you develop any of the following:      Signs of infection such as: fevers, chills, increased pain, drainage (as mentioned above), redness and swelling from your incision sites.    Headaches persisting for more than 48 hours.      Unusual changes in or stopping of sensation in your legs or back.      Uncontrolled pain.    Signs or symptoms of a deep vein thrombosis (DVT): swelling in one or both legs, pain or tenderness in one or both legs, warm skin on your leg, and/or red or discolored skin on your leg.    [facility]: [phone]     After hours provider: [phone]    Emergency Situations go to the Emergency Department or Call 911:      Sudden sever back pain.    Sudden onset of leg weakness and spasms.    Loss of bladder control and/or bowel function.    Signs or symptoms of a pulmonary embolism(PE): sudden coughing--which may bring up blood sharp chest pain, rapid breathing or shortness of  breath, and/or severe lightheadedness.        **Because you had anesthesia today and your history of sleep apnea, it is extremely important that you use your CPAP machine for the next 24 hours while napping or sleeping.**

## 2020-02-06 NOTE — ANESTHESIA POSTPROCEDURE EVALUATION
Patient: Petra Mcnulty    Procedure(s):  SPINAL CORD STIMULATOR TRIAL    Diagnosis:Postlaminectomy syndrome, cervical region [M96.1]  Diagnosis Additional Information: No value filed.    Anesthesia Type:  MAC    Note:  Anesthesia Post Evaluation    Patient location during evaluation: PACU  Patient participation: Able to fully participate in evaluation  Level of consciousness: responsive to verbal stimuli  Pain management: adequate  Airway patency: patent  Cardiovascular status: acceptable  Respiratory status: acceptable  Hydration status: acceptable  PONV: none     Anesthetic complications: None          Last vitals:  Vitals:    02/06/20 0900 02/06/20 0915 02/06/20 0930   BP: 116/66 124/65 132/58   Pulse: 73 71 68   Resp: 12 22 16   Temp:      SpO2: 95% 95% 96%         Electronically Signed By: Yvan Mcmillan MD  February 6, 2020  9:48 AM

## 2020-02-06 NOTE — ANESTHESIA CARE TRANSFER NOTE
Patient: Petra Mcnulty    Procedure(s):  SPINAL CORD STIMULATOR TRIAL    Diagnosis: Postlaminectomy syndrome, cervical region [M96.1]  Diagnosis Additional Information: No value filed.    Anesthesia Type:   MAC     Note:  Airway :Face Mask  Patient transferred to:PACU  Handoff Report: Identifed the Patient, Identified the Reponsible Provider, Reviewed the pertinent medical history, Discussed the surgical course, Reviewed Intra-OP anesthesia mangement and issues during anesthesia, Set expectations for post-procedure period and Allowed opportunity for questions and acknowledgement of understanding      Vitals: (Last set prior to Anesthesia Care Transfer)    CRNA VITALS  2/6/2020 0812 - 2/6/2020 0846      2/6/2020             Resp Rate (set):  10                Electronically Signed By: BRETT Munoz CRNA  February 6, 2020  8:46 AM

## 2020-02-06 NOTE — ANESTHESIA PREPROCEDURE EVALUATION
Anesthesia Pre-Procedure Evaluation    Patient: Petra Mcnulty   MRN: [id number] : 1950          Preoperative Diagnosis: Postlaminectomy syndrome, cervical region [M96.1]    Procedure(s):  SPINAL CORD STIMULATOR TRIAL    Past Medical History:   Diagnosis Date     COPD (chronic obstructive pulmonary disease) (H)      Diabetes (H)      Factor V deficiency (H)      Glaucoma      Hypertension      Hypothyroid      Insomnia      Obese      Onychomycosis      PPD positive     INH TX x 1 year     Pulmonary embolism (H)      Sleep apnea      Past Surgical History:   Procedure Laterality Date     ARTHROSCOPY KNEE       AS TOTAL HIP ARTHROPLASTY       BIOPSY      breast     C TOTAL KNEE ARTHROPLASTY       CHOLECYSTECTOMY       COLONOSCOPY       HYSTEROSCOPY      d and c     EKG - SR, first degree AVB  K 4.4  INR 1.06    Anesthesia Evaluation     . Pt has had prior anesthetic.            ROS/MED HX    ENT/Pulmonary:     (+)sleep apnea, tobacco use, Past use COPD, , . Other pulmonary disease hx of PE .    Neurologic:       Cardiovascular: Comment: Normal dobutamine stress test     (+) hypertension----. Taking blood thinners : . . BARTH, . :. .       METS/Exercise Tolerance:     Hematologic:     (+) History of blood clots pt is anticoagulated, Other Hematologic Disorder-Factor V Leiden      Musculoskeletal: Comment: Post laminectomy syndrome  Neck and back pain  Lumbar radiculopathy  (+) arthritis,  -       GI/Hepatic:     (+) GERD       Renal/Genitourinary: Comment: Stress incontinence        Endo:     (+) type II DM thyroid problem hypothyroidism, Obesity, .      Psychiatric:         Infectious Disease: Comment: Hx of + PPD - treated        Malignancy:         Other:                          Physical Exam  Normal systems: cardiovascular, pulmonary and dental    Airway   Mallampati: III  TM distance: >3 FB  Neck ROM: limited  Comment: Prominent frontal incisors    Dental     Cardiovascular   Rhythm and rate:  regular and normal      Pulmonary    breath sounds clear to auscultation            Lab Results   Component Value Date    PTT 29 02/06/2020    INR 1.06 02/06/2020       Preop Vitals  BP Readings from Last 3 Encounters:   No data found for BP    Pulse Readings from Last 3 Encounters:   No data found for Pulse      Resp Readings from Last 3 Encounters:   No data found for Resp    SpO2 Readings from Last 3 Encounters:   No data found for SpO2      Temp Readings from Last 1 Encounters:   No data found for Temp    Ht Readings from Last 1 Encounters:   No data found for Ht      Wt Readings from Last 1 Encounters:   No data found for Wt    There is no height or weight on file to calculate BMI.       Anesthesia Plan      History & Physical Review  History and physical reviewed and following examination; no interval change.    ASA Status:  3 .    NPO Status:  > 8 hours    Plan for MAC Reason for MAC:  Deep or markedly invasive procedure (G8)  PONV prophylaxis:  Ondansetron (or other 5HT-3)       Postoperative Care  Postoperative pain management:  IV analgesics.      Consents  Anesthetic plan, risks, benefits and alternatives discussed with:  Patient and Spouse..                 Yvan Mcmillan MD

## 2020-02-12 ENCOUNTER — OFFICE VISIT (OUTPATIENT)
Dept: INTERNAL MEDICINE | Facility: CLINIC | Age: 70
End: 2020-02-12
Payer: COMMERCIAL

## 2020-02-12 VITALS
HEIGHT: 68 IN | BODY MASS INDEX: 44.41 KG/M2 | OXYGEN SATURATION: 95 % | RESPIRATION RATE: 12 BRPM | DIASTOLIC BLOOD PRESSURE: 70 MMHG | SYSTOLIC BLOOD PRESSURE: 120 MMHG | HEART RATE: 96 BPM | TEMPERATURE: 98.2 F | WEIGHT: 293 LBS

## 2020-02-12 DIAGNOSIS — D68.51 FACTOR V LEIDEN (H): ICD-10-CM

## 2020-02-12 DIAGNOSIS — Z86.711 HISTORY OF PULMONARY EMBOLISM: ICD-10-CM

## 2020-02-12 DIAGNOSIS — E66.01 MORBID OBESITY (H): Primary | ICD-10-CM

## 2020-02-12 DIAGNOSIS — G62.9 NEUROPATHY: ICD-10-CM

## 2020-02-12 DIAGNOSIS — N95.0 POSTMENOPAUSAL BLEEDING: ICD-10-CM

## 2020-02-12 DIAGNOSIS — Z01.00 VISIT FOR EYE AND VISION EXAM: ICD-10-CM

## 2020-02-12 DIAGNOSIS — E78.5 HYPERLIPIDEMIA, UNSPECIFIED HYPERLIPIDEMIA TYPE: ICD-10-CM

## 2020-02-12 DIAGNOSIS — G47.33 OSA (OBSTRUCTIVE SLEEP APNEA): ICD-10-CM

## 2020-02-12 DIAGNOSIS — I10 ESSENTIAL HYPERTENSION: ICD-10-CM

## 2020-02-12 PROCEDURE — 99203 OFFICE O/P NEW LOW 30 MIN: CPT | Performed by: INTERNAL MEDICINE

## 2020-02-12 RX ORDER — FLUTICASONE PROPIONATE 50 MCG
1 SPRAY, SUSPENSION (ML) NASAL DAILY
COMMUNITY
End: 2020-06-09

## 2020-02-12 ASSESSMENT — MIFFLIN-ST. JEOR: SCORE: 2047.23

## 2020-02-12 NOTE — PROGRESS NOTES
Subjective     Patient present with her .     Petra Mcnulty is a 69 year old female who presents to clinic today for the following health issues:    HPI   New Patient/Transfer of Care  Changing since previous primary care provider is out of network.   Hyperlipidemia Follow-Up      Are you regularly taking any medication or supplement to lower your cholesterol?   Yes- pravastatin    Are you having muscle aches or other side effects that you think could be caused by your cholesterol lowering medication?  No    Hypertension Follow-up      Do you check your blood pressure regularly outside of the clinic? No     Are you following a low salt diet? Yes    Are your blood pressures ever more than 140 on the top number (systolic) OR more   than 90 on the bottom number (diastolic), for example 140/90? No    Hypothyroidism Follow-up      Since last visit, patient describes the following symptoms: Weight stable, no hair loss, no skin changes, no constipation, no loose stools    H/o of PE and factor V Leiden.  Normally on coumadin.  The patient is needing a referral for INR.    H/o spinal cord stimulator- removing tomorrow.    July 19th she had vaginal bleeding.  She had another episode in 2013 and had a polyp.   Ordered a pelvic ultrasound and had a biopsy of the polyp.  The tissue was benign.   She was to have a D&C and insurance no longer covered this gynecology.  She has had one episode since.     Patient would like a referral to an sleep medicine doctor closer to for her LOR.   She also would like an eye doctor for her routine eye visits.       How many servings of fruits and vegetables do you eat daily?  2-3    On average, how many sweetened beverages do you drink each day (Examples: soda, juice, sweet tea, etc.  Do NOT count diet or artificially sweetened beverages)?   0    How many days per week do you exercise enough to make your heart beat faster? 3 or less    How many minutes a day do you exercise enough to  "make your heart beat faster? 9 or less    How many days per week do you miss taking your medication? 0        Reviewed and updated as needed this visit by Provider         Review of Systems   ROS COMP: CONSTITUTIONAL: NEGATIVE for fever, chills, change in weight  RESP: NEGATIVE for significant cough or SOB  CV: NEGATIVE for chest pain, palpitations or peripheral edema      Objective    /70   Pulse 96   Temp 98.2  F (36.8  C) (Oral)   Resp 12   Ht 1.727 m (5' 8\")   Wt 147.9 kg (326 lb)   SpO2 95%   BMI 49.57 kg/m    Body mass index is 49.57 kg/m .  Physical Exam   GENERAL: healthy, alert and no distress  NECK: no adenopathy, no asymmetry, masses, or scars and thyroid normal to palpation  RESP: lungs clear to auscultation - no rales, rhonchi or wheezes  CV: regular rate and rhythm, normal S1 S2, no S3 or S4, no murmur, click or rub    Diagnostic Test Results:  Labs reviewed in Epic        Assessment & Plan     (E66.01) Morbid obesity (H)  (primary encounter diagnosis)  Comment:   Plan:     (D68.51) Factor V Leiden (H)  Comment:   Plan: ANTICOAGULATION CLINIC REFERRAL           (E78.5) Hyperlipidemia, unspecified hyperlipidemia type  Comment:   Plan: statin    (I10) Essential hypertension  Comment: at goal  Plan:lisinopril    (Z86.711) History of pulmonary embolism  Comment: needs new referral closer to her  Plan: ANTICOAGULATION CLINIC REFERRAL            (G47.33) LOR (obstructive sleep apnea)  Comment: needs new referral closer   Plan: SLEEP EVALUATION & MANAGEMENT REFERRAL - ADULT         [facility]          [phone] (Age 18 and up)            (G62.9) Neuropathy  Comment:   Plan:     (N95.0) Postmenopausal bleeding  Comment: needs new referral  Plan: OB/GYN REFERRAL            (Z01.00) Visit for eye and vision exam  Comment: needs new referral  Plan: OPHTHALMOLOGY ADULT REFERRAL               BMI:   Estimated body mass index is 49.57 kg/m  as calculated from the " "following:    Height as of this encounter: 1.727 m (5' 8\").    Weight as of this encounter: 147.9 kg (326 lb).         See Patient Instructions    No follow-ups on file.    Mona Diaz MD  [facility]    "

## 2020-02-12 NOTE — NURSING NOTE
"/70   Pulse 96   Temp 98.2  F (36.8  C) (Oral)   Resp 12   Ht 1.727 m (5' 8\")   Wt 147.9 kg (326 lb)   SpO2 95%   BMI 49.57 kg/m      "

## 2020-02-14 ENCOUNTER — TELEPHONE (OUTPATIENT)
Dept: INTERNAL MEDICINE | Facility: CLINIC | Age: 70
End: 2020-02-14

## 2020-02-14 NOTE — TELEPHONE ENCOUNTER
New INR clinic referral received.  Called patient to set up appointment.  She has been on warfarin for several years and is in the process of switching clinics due to insurance reasons.  Patient had a procedure yesterday and is currently on lovenox for bridging.  Patient would prefer to go to [place] [facility] [place] [facility] during this bridging period and has an appointment on 2/21.  Assisted patient to schedule visit on 2/28 for follow up.  Patient is a retired nurse.  She verbalized understanding of plan.  Rae Wing RN

## 2020-02-28 ENCOUNTER — TELEPHONE (OUTPATIENT)
Dept: OBGYN | Facility: CLINIC | Age: 70
End: 2020-02-28

## 2020-02-28 ENCOUNTER — ANTICOAGULATION THERAPY VISIT (OUTPATIENT)
Dept: ANTICOAGULATION | Facility: CLINIC | Age: 70
End: 2020-02-28
Payer: COMMERCIAL

## 2020-02-28 DIAGNOSIS — Z86.711 HISTORY OF PULMONARY EMBOLISM: ICD-10-CM

## 2020-02-28 DIAGNOSIS — D68.51 FACTOR V LEIDEN (H): ICD-10-CM

## 2020-02-28 LAB — INR POINT OF CARE: 1.9 (ref 0.86–1.14)

## 2020-02-28 PROCEDURE — 85610 PROTHROMBIN TIME: CPT | Mod: QW

## 2020-02-28 PROCEDURE — 36416 COLLJ CAPILLARY BLOOD SPEC: CPT

## 2020-02-28 PROCEDURE — 99207 ZZC NO CHARGE NURSE ONLY: CPT

## 2020-02-28 NOTE — TELEPHONE ENCOUNTER
Patient completed SARITA to release from [facility] to [place]    Completed in office.    Faxed to [facility] and awaiting records

## 2020-02-28 NOTE — PROGRESS NOTES
ANTICOAGULATION FOLLOW-UP CLINIC VISIT    Patient Name:  Petra Mcnulty  Date:  2020  Contact Type:  Face to Face    SUBJECTIVE:  Patient Findings     Comments:   Patient has been on warfarin for several years.  She is new to our INR Clinic.  She is a retired nurse.  Spinal cord stimulator trial procedure done on 20.  Patient was using lovenox bridging. Last INR was 1.6 on 20.  Maintenance dose increased today since INR continues to be low.        Clinical Outcomes     Comments:   Patient has been on warfarin for several years.  She is new to our INR Clinic.  She is a retired nurse.  Spinal cord stimulator trial procedure done on 20.  Patient was using lovenox bridging. Last INR was 1.6 on 20.  Maintenance dose increased today since INR continues to be low.           OBJECTIVE    INR Protime   Date Value Ref Range Status   2020 1.9 (A) 0.86 - 1.14 Final       ASSESSMENT / PLAN  INR assessment SUB    Recheck INR In: 1 WEEK    INR Location Clinic      Anticoagulation Summary  As of 2020    INR goal:   2.0-3.0   TTR:   --   INR used for dosin.9! (2020)   Warfarin maintenance plan:   3 mg (2 mg x 1.5) every Wed; 4 mg (2 mg x 2) all other days   Full warfarin instructions:   3 mg every Wed; 4 mg all other days   Weekly warfarin total:   27 mg   Plan last modified:   Rae Wing RN (2020)   Next INR check:   3/6/2020   Target end date:   Indefinite    Indications    History of pulmonary embolism [Z86.711]  Factor V Leiden (H) [D68.51]             Anticoagulation Episode Summary     INR check location:       Preferred lab:       Send INR reminders to:   [facility]    Comments:         Anticoagulation Care Providers     Provider Role Specialty Phone number    Mona Diaz MD Referring Internal Medicine [phone]            See the Encounter Report to view Anticoagulation Flowsheet and Dosing Calendar (Go to Encounters tab in chart review, and  find the Anticoagulation Therapy Visit)    Dosage adjustment made based on physician directed care plan.    Rae Wing RN

## 2020-03-06 ENCOUNTER — ANTICOAGULATION THERAPY VISIT (OUTPATIENT)
Dept: ANTICOAGULATION | Facility: CLINIC | Age: 70
End: 2020-03-06
Payer: COMMERCIAL

## 2020-03-06 DIAGNOSIS — Z86.711 HISTORY OF PULMONARY EMBOLISM: ICD-10-CM

## 2020-03-06 DIAGNOSIS — D68.51 FACTOR V LEIDEN (H): ICD-10-CM

## 2020-03-06 LAB — INR POINT OF CARE: 1.9 (ref 0.86–1.14)

## 2020-03-06 PROCEDURE — 36416 COLLJ CAPILLARY BLOOD SPEC: CPT

## 2020-03-06 PROCEDURE — 99207 ZZC NO CHARGE NURSE ONLY: CPT

## 2020-03-06 PROCEDURE — 85610 PROTHROMBIN TIME: CPT | Mod: QW

## 2020-03-06 NOTE — PROGRESS NOTES
ANTICOAGULATION FOLLOW-UP CLINIC VISIT    Patient Name:  Petra Mcnulty  Date:  3/6/2020  Contact Type:  Face to Face    SUBJECTIVE:  Patient Findings     Positives:   Upcoming invasive procedure (Working on scheduling her spinal cord stimulator.  Previously used lovenox bridging when she had the trial done.)    Comments:   The patient was assessed for diet, medication, and activity level changes, missed or extra doses, bruising or bleeding, with no problem findings.  Patient denies any identifiable changes that caused the sub therapeutic INR. Maintenance dose was increased by 4% today.            Clinical Outcomes     Negatives:   Major bleeding event, Thromboembolic event, Anticoagulation-related hospital admission, Anticoagulation-related ED visit, Anticoagulation-related fatality    Comments:   The patient was assessed for diet, medication, and activity level changes, missed or extra doses, bruising or bleeding, with no problem findings.  Patient denies any identifiable changes that caused the sub therapeutic INR. Maintenance dose was increased by 4% today.               OBJECTIVE    INR Protime   Date Value Ref Range Status   2020 1.9 (A) 0.86 - 1.14 Final       ASSESSMENT / PLAN  INR assessment SUB    Recheck INR In: 1 WEEK    INR Location Clinic      Anticoagulation Summary  As of 3/6/2020    INR goal:   2.0-3.0   TTR:   0.0 % (1 wk)   INR used for dosin.9! (3/6/2020)   Warfarin maintenance plan:   4 mg (2 mg x 2) every day   Full warfarin instructions:   3/6: 6 mg; Otherwise 4 mg every day   Weekly warfarin total:   28 mg   Plan last modified:   Rae Wing RN (3/6/2020)   Next INR check:   3/13/2020   Target end date:   Indefinite    Indications    History of pulmonary embolism [Z86.711]  Factor V Leiden (H) [D68.51]             Anticoagulation Episode Summary     INR check location:       Preferred lab:       Send INR reminders to:   [facility]    Comments:          Anticoagulation Care Providers     Provider Role Specialty Phone number    Mona Diaz MD Referring Internal Medicine [phone]            See the Encounter Report to view Anticoagulation Flowsheet and Dosing Calendar (Go to Encounters tab in chart review, and find the Anticoagulation Therapy Visit)    Dosage adjustment made based on physician directed care plan.    Rae Wing RN

## 2020-03-10 ENCOUNTER — TELEPHONE (OUTPATIENT)
Dept: INTERNAL MEDICINE | Facility: CLINIC | Age: 70
End: 2020-03-10

## 2020-03-10 NOTE — TELEPHONE ENCOUNTER
Fax received from [facility] - INR Hold for review and signature.  Put in Dr. Diaz's in basket.

## 2020-03-11 NOTE — TELEPHONE ENCOUNTER
Patient had the trial for the spinal cord stimulator prior to establishing care with the INR clinic.  She did use lovenox for that procedure as well.  Previously she held her warfarin for 13 days without any lovenox or warfarin before she was told that she could restart them.  Last INR was 1.9 which is still subtherapeutic for her, however she did stop the lovenox since she had been on it for several days.  Patient is due for INR check on 3/13.  Please advise if/when it would be recommended for patient to schedule the spinal cord stimulator procedure.    Patient will need another rx for the lovenox.  Previous dose was 150 mg BID.  Rae Wing RN

## 2020-03-13 ENCOUNTER — ANTICOAGULATION THERAPY VISIT (OUTPATIENT)
Dept: ANTICOAGULATION | Facility: CLINIC | Age: 70
End: 2020-03-13
Payer: COMMERCIAL

## 2020-03-13 DIAGNOSIS — Z86.711 HISTORY OF PULMONARY EMBOLISM: ICD-10-CM

## 2020-03-13 DIAGNOSIS — D68.51 FACTOR V LEIDEN (H): ICD-10-CM

## 2020-03-13 LAB — INR POINT OF CARE: 3.2 (ref 0.86–1.14)

## 2020-03-13 PROCEDURE — 85610 PROTHROMBIN TIME: CPT | Mod: QW

## 2020-03-13 PROCEDURE — 99207 ZZC NO CHARGE NURSE ONLY: CPT

## 2020-03-13 PROCEDURE — 36416 COLLJ CAPILLARY BLOOD SPEC: CPT

## 2020-03-13 NOTE — TELEPHONE ENCOUNTER
INR today was 3.2.  Patient also states that that she would be able to start her warfarin and lovenox the same day as the procedure this time since they will not need to remove anything afterward.  Please advise if/when patient can have the spinal cord stimulator placed and send rx for lovenox.  Rae Wing RN

## 2020-03-13 NOTE — PROGRESS NOTES
ANTICOAGULATION FOLLOW-UP CLINIC VISIT    Patient Name:  Petra Mcnulty  Date:  3/13/2020  Contact Type:  Face to Face    SUBJECTIVE:  Patient Findings     Comments:   May be having a spinal cord stimulator placed soon.  She will need lovenox bridging for this.  The patient was assessed for diet, medication, and activity level changes, missed or extra doses, bruising or bleeding, with no problem findings.          Clinical Outcomes     Negatives:   Major bleeding event, Thromboembolic event, Anticoagulation-related hospital admission, Anticoagulation-related ED visit, Anticoagulation-related fatality    Comments:   May be having a spinal cord stimulator placed soon.  She will need lovenox bridging for this.  The patient was assessed for diet, medication, and activity level changes, missed or extra doses, bruising or bleeding, with no problem findings.             OBJECTIVE    INR Protime   Date Value Ref Range Status   03/13/2020 3.2 (A) 0.86 - 1.14 Final       ASSESSMENT / PLAN  INR assessment SUPRA    Recheck INR In: 1 WEEK    INR Location Clinic      Anticoagulation Summary  As of 3/13/2020    INR goal:   2.0-3.0   TTR:   38.5 % (2 wk)   INR used for dosing:   3.2! (3/13/2020)   Warfarin maintenance plan:   4 mg (2 mg x 2) every day   Full warfarin instructions:   4 mg every day   Weekly warfarin total:   28 mg   No change documented:   Rae Wing RN   Plan last modified:   Rae Wing RN (3/6/2020)   Next INR check:   3/20/2020   Target end date:   Indefinite    Indications    History of pulmonary embolism [Z86.711]  Factor V Leiden (H) [D68.51]             Anticoagulation Episode Summary     INR check location:       Preferred lab:       [facility]    Comments:         Anticoagulation Care Providers     Provider Role Specialty Phone number    Mona Diaz MD Referring Internal Medicine [phone]            See the Encounter Report to view  Anticoagulation Flowsheet and Dosing Calendar (Go to Encounters tab in chart review, and find the Anticoagulation Therapy Visit)    Dosage adjustment made based on physician directed care plan.    Rae Wing RN

## 2020-03-17 ENCOUNTER — TELEPHONE (OUTPATIENT)
Dept: INTERNAL MEDICINE | Facility: CLINIC | Age: 70
End: 2020-03-17

## 2020-03-17 NOTE — TELEPHONE ENCOUNTER
Plan is for patient to have INR this Friday.    If still therapeutic, okay to bridge with lovenox and hold coumadin for the procedure.

## 2020-03-17 NOTE — TELEPHONE ENCOUNTER
[facility] pain clinic  Orders to hold coumadin for spinal cord stimulator  Dr. Diaz in basket  Fax to [phone]

## 2020-03-17 NOTE — OP NOTE
"Name:    Petra Mcnulty   :    1950  Location:     Procedure date: 2020 07:30 AM  Procedure:    [employer] Spinal Cord Stimulator Trial  Indication:  Postlaminectomy syndrome, not elsewhere classified M96.1      Allergies:  Ingredient Reaction (Severity) Medication Name Comment   ATORVASTATIN CALCIUM Muscle pain Lipitor    SIMVASTATIN Rash Zocor        This procedure was performed using MAC sedation due to the patient's anxiety.    Leads:  The lead lot numbers were [id number] and [id number].    Description of Procedure: IV access was obtained via a registered nurse and antibiotic mixed in normal saline was administered through the IV. During this time, a [employer] representative met with the patient to discuss where the patient's pain was located in relation to where they hoped to receive spinal cord stimulation.     The patient was brought into the procedure room and positioned prone on the fluoroscopy table with pillows under their abdomen and prepped with Duraprep surgical solution and draped in the usual sterile fashion. The registered nurse initiated a verbal timeout or \"pause for the cause\" stating the patient's name, date of birth, and procedure to be performed. Karlee Duffy MD  then repeated back the procedure and the patient's name in confirmation. MAC sedation was administered in increments throughout the procedure which resulted in satisfactory relaxation and sedation.    The patient's skin was anesthetized at the appropriate level and a 14 gauge Tuohy needle was introduced through the skin on the left side. Fluoroscopy was used to identify correct placement. The neurostimulator kit manufactured by [employer] was used. The needle was advanced up to the T12-L1 lamina then advanced further through the T12-L1 interlaminar space. The epidural space was penetrated as a loss of resistance was felt with an air filled syringe. The stylet was then removed and a " spinal cord stimulator lead with 8 contacts was introduced. The lead was advanced without difficulty under fluoroscopy until the top of the lead was at the T7/T8 interspace. This lead was Left of midline. A lateral fluoroscopy view confirmed posterior epidural placement.     A second 14 gauge Tuohy needle was introduced through the skin on the left side. Fluoroscopy was used to identify correct placement. The neurostimulator kit manufactured by [employer] was used. The needle was advanced up to the T11-T12 lamina and then advanced further through the T11-T12 interlaminar space. The epidural space was penetrated as a loss of resistance was felt with an air filled syringe. The stylet was then removed and a spinal cord stimulator lead with 8 contacts was introduced. The lead was advanced without difficulty under fluoroscopy until the top of the lead was at the T7/T8 interspace. This lead was right of midline. A lateral fluoroscopy view confirmed posterior epidural placement.     The needles were removed and the leads were secured to the skin with steri strips. The insertion sites were then covered with a Tegaderm dressing. The leads were connected to the external pulse generator.     The patient was brought back to the recovery area where they were monitored by a registered nurse. Physiologic parameters were observed utilizing pulse oximetery and blood pressure checks every ten minutes in recovery. Monitoring revealed normal oxygen saturation and pulse rate. The patient tolerated the procedure well and no immediate complications were encountered. Further reprogramming was performed in the recovery area. The patient was shown how to use the neurostimulator controls and will follow up in our office in two days for re-evaluation.      Karlee Duffy MD   02/06/2020 07:30 AM

## 2020-03-20 ENCOUNTER — ANTICOAGULATION THERAPY VISIT (OUTPATIENT)
Dept: ANTICOAGULATION | Facility: CLINIC | Age: 70
End: 2020-03-20
Payer: COMMERCIAL

## 2020-03-20 DIAGNOSIS — Z86.711 HISTORY OF PULMONARY EMBOLISM: ICD-10-CM

## 2020-03-20 DIAGNOSIS — D68.51 FACTOR V LEIDEN (H): ICD-10-CM

## 2020-03-20 LAB — INR POINT OF CARE: 4.5 (ref 0.86–1.14)

## 2020-03-20 PROCEDURE — 85610 PROTHROMBIN TIME: CPT | Mod: QW

## 2020-03-20 PROCEDURE — 99207 ZZC NO CHARGE NURSE ONLY: CPT

## 2020-03-20 PROCEDURE — 36416 COLLJ CAPILLARY BLOOD SPEC: CPT

## 2020-03-20 NOTE — PROGRESS NOTES
ANTICOAGULATION FOLLOW-UP CLINIC VISIT    Patient Name:  Petra Mcnulty  Date:  3/20/2020  Contact Type:  Face to Face    SUBJECTIVE:  Patient Findings     Comments:   Patient was previously taking 4 mg daily except 3 mg Mon, Wed, Fri prior to establishing care with our INR clinic.  Will consider trying this maintenance dose after INR stabilizes again. The patient was assessed for diet, medication, and activity level changes, missed or extra doses, bruising or bleeding, with no problem findings.  Explained to her that next INR will be in lab and she will be called with results.           Clinical Outcomes     Negatives:   Major bleeding event, Thromboembolic event, Anticoagulation-related hospital admission, Anticoagulation-related ED visit, Anticoagulation-related fatality    Comments:   Patient was previously taking 4 mg daily except 3 mg Mon, Wed, Fri prior to establishing care with our INR clinic.  Will consider trying this maintenance dose after INR stabilizes again. The patient was assessed for diet, medication, and activity level changes, missed or extra doses, bruising or bleeding, with no problem findings.  Explained to her that next INR will be in lab and she will be called with results.              OBJECTIVE    INR Protime   Date Value Ref Range Status   2020 4.5 (A) 0.86 - 1.14 Final       ASSESSMENT / PLAN  INR assessment SUPRA    Recheck INR In: 1 WEEK    INR Location Clinic      Anticoagulation Summary  As of 3/20/2020    INR goal:   2.0-3.0   TTR:   25.6 % (3 wk)   INR used for dosin.5! (3/20/2020)   Warfarin maintenance plan:   3 mg (2 mg x 1.5) every Mon, Wed, Fri; 4 mg (2 mg x 2) all other days   Full warfarin instructions:   3/20: Hold; 3/21: 3 mg; Otherwise 3 mg every Mon, Wed, Fri; 4 mg all other days   Weekly warfarin total:   25 mg   Plan last modified:   Rae Wing RN (3/20/2020)   Next INR check:   3/27/2020   Target end date:   Indefinite    Indications    History of  pulmonary embolism [Z86.711]  Factor V Leiden (H) [D68.51]             Anticoagulation Episode Summary     INR check location:       Preferred lab:       Send INR reminders to:   [facility]    Comments:   Call cell number with results for INR      Anticoagulation Care Providers     Provider Role Specialty Phone number    Mona Diaz MD [facility] Internal Medicine [phone]            See the Encounter Report to view Anticoagulation Flowsheet and Dosing Calendar (Go to Encounters tab in chart review, and find the Anticoagulation Therapy Visit)    Dosage adjustment made based on physician directed care plan.    Rae Wing RN

## 2020-03-24 DIAGNOSIS — Z79.01 LONG TERM CURRENT USE OF ANTICOAGULANTS WITH INR GOAL OF 2.0-3.0: Primary | ICD-10-CM

## 2020-03-24 DIAGNOSIS — D68.51 FACTOR V LEIDEN (H): ICD-10-CM

## 2020-03-24 DIAGNOSIS — Z86.711 HISTORY OF PULMONARY EMBOLISM: ICD-10-CM

## 2020-03-27 ENCOUNTER — ANTICOAGULATION THERAPY VISIT (OUTPATIENT)
Dept: ANTICOAGULATION | Facility: CLINIC | Age: 70
End: 2020-03-27

## 2020-03-27 DIAGNOSIS — D68.51 FACTOR V LEIDEN (H): ICD-10-CM

## 2020-03-27 DIAGNOSIS — Z86.711 HISTORY OF PULMONARY EMBOLISM: ICD-10-CM

## 2020-03-27 DIAGNOSIS — Z79.01 LONG TERM CURRENT USE OF ANTICOAGULANTS WITH INR GOAL OF 2.0-3.0: ICD-10-CM

## 2020-03-27 LAB — INR PPP: 3 (ref 0.86–1.14)

## 2020-03-27 PROCEDURE — 36416 COLLJ CAPILLARY BLOOD SPEC: CPT | Performed by: INTERNAL MEDICINE

## 2020-03-27 PROCEDURE — 85610 PROTHROMBIN TIME: CPT | Performed by: INTERNAL MEDICINE

## 2020-03-27 NOTE — TELEPHONE ENCOUNTER
Please double check plan with Rae Wing RN, as she knows the details from her previous procedure

## 2020-03-27 NOTE — PROGRESS NOTES
INR 3.0. Pt had 21 mg past week coming down from 4.5. Discussed with pt keeping maintenance  dose same. Pt will take 3mg today and 3/28/20, then cont 3mg MWF, 4mg all other days. INR check 2 weeks appt made, Maxine GouldRN

## 2020-03-31 NOTE — TELEPHONE ENCOUNTER
Spoke with patient on 3/20.  Her procedure is currently on hold (since it is an elective procedure).  She will notify our clinic once this has been rescheduled.  She is aware that she will need to use lovenox bridging when she holds the warfarin.  Rae Wing RN     Improved.

## 2020-04-10 ENCOUNTER — ANTICOAGULATION THERAPY VISIT (OUTPATIENT)
Dept: ANTICOAGULATION | Facility: CLINIC | Age: 70
End: 2020-04-10

## 2020-04-10 DIAGNOSIS — Z86.711 HISTORY OF PULMONARY EMBOLISM: ICD-10-CM

## 2020-04-10 DIAGNOSIS — D68.51 FACTOR V LEIDEN (H): ICD-10-CM

## 2020-04-10 DIAGNOSIS — Z79.01 LONG TERM CURRENT USE OF ANTICOAGULANTS WITH INR GOAL OF 2.0-3.0: ICD-10-CM

## 2020-04-10 LAB
CAPILLARY BLOOD COLLECTION: NORMAL
INR PPP: 3.4 (ref 0.86–1.14)

## 2020-04-10 PROCEDURE — 99207 ZZC NO CHARGE NURSE ONLY: CPT | Performed by: INTERNAL MEDICINE

## 2020-04-10 PROCEDURE — 36416 COLLJ CAPILLARY BLOOD SPEC: CPT | Performed by: INTERNAL MEDICINE

## 2020-04-10 PROCEDURE — 85610 PROTHROMBIN TIME: CPT | Performed by: INTERNAL MEDICINE

## 2020-04-10 NOTE — PROGRESS NOTES
ANTICOAGULATION FOLLOW-UP CLINIC VISIT    Patient Name:  Petra Mcnulty  Date:  4/10/2020  Contact Type:  Telephone/ discussed with patient    SUBJECTIVE:  Patient Findings     Comments:   Patient denies any identifiable changes that caused the supra therapeutic INR. INR has been consistently elevated so maintenance dose was decreased by 8% today.  The patient was assessed for diet, medication, and activity level changes, missed or extra doses, bruising or bleeding, with no problem findings.            Clinical Outcomes     Negatives:   Major bleeding event, Thromboembolic event, Anticoagulation-related hospital admission, Anticoagulation-related ED visit, Anticoagulation-related fatality    Comments:   Patient denies any identifiable changes that caused the supra therapeutic INR. INR has been consistently elevated so maintenance dose was decreased by 8% today.  The patient was assessed for diet, medication, and activity level changes, missed or extra doses, bruising or bleeding, with no problem findings.               OBJECTIVE    INR   Date Value Ref Range Status   04/10/2020 3.40 (H) 0.86 - 1.14 Final     Comment:     This test is intended for monitoring Coumadin therapy.  Results are not   accurate in patients with prolonged INR due to factor deficiency.         ASSESSMENT / PLAN  INR assessment SUPRA    Recheck INR In: 2 WEEKS    INR Location Clinic      Anticoagulation Summary  As of 4/10/2020    INR goal:   2.0-3.0   TTR:   12.7 % (1.4 mo)   INR used for dosing:   3.40! (4/10/2020)   Warfarin maintenance plan:   4 mg (2 mg x 2) every Sun, Thu; 3 mg (2 mg x 1.5) all other days   Full warfarin instructions:   4/10: 1 mg; Otherwise 4 mg every Sun, Thu; 3 mg all other days   Weekly warfarin total:   23 mg   Plan last modified:   Rae Wing RN (4/10/2020)   Next INR check:   4/24/2020   Target end date:   Indefinite    Indications    History of pulmonary embolism [Z86.711]  Factor V Leiden (H) [D68.51]              Anticoagulation Episode Summary     INR check location:       Preferred lab:       Send INR reminders to:   [facility]    Comments:   Call cell number with results for INR      Anticoagulation Care Providers     Provider Role Specialty Phone number    Mona Diaz MD [facility] Internal Medicine [phone]            See the Encounter Report to view Anticoagulation Flowsheet and Dosing Calendar (Go to Encounters tab in chart review, and find the Anticoagulation Therapy Visit)    Dosage adjustment made based on physician directed care plan.    Rae Wing RN

## 2020-04-20 ENCOUNTER — VIRTUAL VISIT (OUTPATIENT)
Dept: OBGYN | Facility: CLINIC | Age: 70
End: 2020-04-20
Payer: COMMERCIAL

## 2020-04-20 DIAGNOSIS — N39.46 MIXED STRESS AND URGE URINARY INCONTINENCE: ICD-10-CM

## 2020-04-20 DIAGNOSIS — N95.0 PMB (POSTMENOPAUSAL BLEEDING): Primary | ICD-10-CM

## 2020-04-20 PROCEDURE — 99202 OFFICE O/P NEW SF 15 MIN: CPT | Mod: 95 | Performed by: OBSTETRICS & GYNECOLOGY

## 2020-04-20 NOTE — PROGRESS NOTES
"Petra Mcnulty is a 69 year old female who is being evaluated via a billable telephone visit.      The patient has been notified of following:     \"This telephone visit will be conducted via a call between you and your physician/provider. We have found that certain health care needs can be provided without the need for a physical exam.  This service lets us provide the care you need with a short phone conversation.  If a prescription is necessary we can send it directly to your pharmacy.  If lab work is needed we can place an order for that and you can then stop by our lab to have the test done at a later time.    Telephone visits are billed at different rates depending on your insurance coverage. During this emergency period, for some insurers they may be billed the same as an in-person visit.  Please reach out to your insurance provider with any questions.    If during the course of the call the physician/provider feels a telephone visit is not appropriate, you will not be charged for this service.\"    Patient has given verbal consent for Telephone visit?  Yes    How would you like to obtain your AVS? Mail a copy      "

## 2020-04-20 NOTE — PROGRESS NOTES
"Petra Mcnulty is a 69 year old female who is being evaluated via a billable telephone visit.      The patient has been notified of following:     \"This telephone visit will be conducted via a call between you and your physician/provider. We have found that certain health care needs can be provided without the need for a physical exam.  This service lets us provide the care you need with a short phone conversation.  If a prescription is necessary we can send it directly to your pharmacy.  If lab work is needed we can place an order for that and you can then stop by our lab to have the test done at a later time.    Telephone visits are billed at different rates depending on your insurance coverage. During this emergency period, for some insurers they may be billed the same as an in-person visit.  Please reach out to your insurance provider with any questions.    If during the course of the call the physician/provider feels a telephone visit is not appropriate, you will not be charged for this service.\"    Patient has given verbal consent for Telephone visit?  Yes    How would you like to obtain your AVS? Mail a copy    Petra Mcnulty is a 69 year old female  with recurrent postmenopausal bleeding. Her first recent episode occurred in July, lasting 3 days and she felt this to be heavy with clots. She did have pelvic cramping with this episode similar to a period. She underwent pelvic ultrasound and endometrial biopsy at that time. The US revealed a 6cm stripe, the endometrial biopsy revealed benign endometrial tissue and a polyp.     More recently she had a quarter sized spot of bright red blood on toilet paper in January. She wears a pad daily for urinary incontinence and has never had blood on the pad.    She does have a history of polyp with postmenopausal bleeding in  which resolved with hysteroscopic polypectomy.     Endometrial Cancer Risk Factors:  HRT: negative, hx FVL w/PE in  (multiple " large clots bilaterally). She did use premarin vaginal cream previously, but due to price she stopped using. Now using Replens   Obesity:  Weighs 327, morbidly obese  Nulliparity: no,    Family history: negative  Personal history of breast cancer: negative     She also has a history of stress incontinence and has undergone urodynamic testing showing mixed urinary incontinence. She is on medication for urge incontinence but also is interested in a midurethral sling procedure for management of her stress incontinence. She plans a consultation with Dr. Mac, but informs me that she would much rather have surgery at [facility] than elsewhere. I have offered the possibility of performing her TVT at the same time as a hysteroscopy once she undergoes urologic consultation.     She is on coumadin and wants to minimize the number of times that she comes off of her coumadin for surgery.     1. PMB (postmenopausal bleeding)  - hx endometrial biopsy with rocket in 2019. Recommend hysteroscopy with directed biopsies and possible polypectomy as soon as reasonably safe in the setting of COVID pandemic. Reviewed that endometrial biopsy is reassuring but does not completely rule out malignancy. She understands this but wants to avoid the hospital at this time due to infection risk.   - case requested for hysteroscopy, possibly in  or July. PCP for pre-op clearance and management of coumadin ezio-op    2. Mixed stress and urge urinary incontinence  - if TVT is felt to be reasonable following Urology consultation I am happy to perform this during hysteroscopy procedure. Discussed with patient, but would plan pre-op visit for further discussion of risk, benefits, and alternatives.        Total time of phone visit was 22 minutes    Parisa Goldberg MD

## 2020-04-20 NOTE — PROGRESS NOTES
"Petra Mcnulty is a 69 year old female who is being evaluated via a billable telephone visit.      The patient has been notified of following:     \"This telephone visit will be conducted via a call between you and your physician/provider. We have found that certain health care needs can be provided without the need for a physical exam.  This service lets us provide the care you need with a short phone conversation.  If a prescription is necessary we can send it directly to your pharmacy.  If lab work is needed we can place an order for that and you can then stop by our lab to have the test done at a later time.    Telephone visits are billed at different rates depending on your insurance coverage. During this emergency period, for some insurers they may be billed the same as an in-person visit.  Please reach out to your insurance provider with any questions.    If during the course of the call the physician/provider feels a telephone visit is not appropriate, you will not be charged for this service.\"    Patient has given verbal consent for Telephone visit?  Yes    How would you like to obtain your AVS? Mail a copy    Petra Mcnulty is a 69 year old female  with recurrent postmenopausal bleeding. Her first recent episode occurred in July, lasting 3 days and she felt this to be heavy with clots. She did have pelvic cramping with this episode similar to a period. She underwent pelvic ultrasound and endometrial biopsy at that time. The US revealed a 6cm stripe     More recently she had a quarter sized spot of bright red blood on toilet paper in January. She wears a pad daily for urinary incontinence and has never had blood on the pad. She did     She does have a history of polyp with postmenopausal bleeding in  which resolved with hysteroscopic polypectomy.     Endometrial Cancer Risk Factors:  HRT: negative, hx FVL w/PE in  (multiple large clots bilaterally). She did use premarin vaginal cream " previously, but due to price she stopped using. Now using Replens   Obesity:  Weighs 327, morbidly obese  Nulliparity: no,    Family history: negative  Personal history of breast cancer: negative     She also has a history of stress incontinence and has undergone urodynamic testing    She is on coumadin and wants to minimize the number of times that she comes off of her coumadin for surgery.     Total time of phone visit was 22 minutes

## 2020-04-24 ENCOUNTER — ANTICOAGULATION THERAPY VISIT (OUTPATIENT)
Dept: ANTICOAGULATION | Facility: CLINIC | Age: 70
End: 2020-04-24

## 2020-04-24 DIAGNOSIS — Z79.01 LONG TERM CURRENT USE OF ANTICOAGULANTS WITH INR GOAL OF 2.0-3.0: ICD-10-CM

## 2020-04-24 DIAGNOSIS — Z86.711 HISTORY OF PULMONARY EMBOLISM: ICD-10-CM

## 2020-04-24 DIAGNOSIS — D68.51 FACTOR V LEIDEN (H): ICD-10-CM

## 2020-04-24 LAB
CAPILLARY BLOOD COLLECTION: NORMAL
INR PPP: 1.8 (ref 0.86–1.14)

## 2020-04-24 PROCEDURE — 99207 ZZC NO CHARGE NURSE ONLY: CPT | Performed by: INTERNAL MEDICINE

## 2020-04-24 PROCEDURE — 36416 COLLJ CAPILLARY BLOOD SPEC: CPT | Performed by: INTERNAL MEDICINE

## 2020-04-24 PROCEDURE — 85610 PROTHROMBIN TIME: CPT | Performed by: INTERNAL MEDICINE

## 2020-04-24 NOTE — PROGRESS NOTES
ANTICOAGULATION FOLLOW-UP CLINIC VISIT    Patient Name:  Petra Mcnulty  Date:  2020  Contact Type:  Telephone/ discussed with patient    SUBJECTIVE:  Patient Findings     Comments:   The patient was assessed for diet, medication, and activity level changes, missed or extra doses, bruising or bleeding, with no problem findings.  Maintenance dose was lowered by 8% at last visit.  Increased maintenance dose by 4% today to try to stabilize INR.          Clinical Outcomes     Negatives:   Major bleeding event, Thromboembolic event, Anticoagulation-related hospital admission, Anticoagulation-related ED visit, Anticoagulation-related fatality    Comments:   The patient was assessed for diet, medication, and activity level changes, missed or extra doses, bruising or bleeding, with no problem findings.  Maintenance dose was lowered by 8% at last visit.  Increased maintenance dose by 4% today to try to stabilize INR.             OBJECTIVE    INR   Date Value Ref Range Status   2020 1.80 (H) 0.86 - 1.14 Final     Comment:     This test is intended for monitoring Coumadin therapy.  Results are not   accurate in patients with prolonged INR due to factor deficiency.         ASSESSMENT / PLAN  INR assessment SUB    Recheck INR In: 2 WEEKS    INR Location Clinic      Anticoagulation Summary  As of 2020    INR goal:   2.0-3.0   TTR:   25.1 % (1.9 mo)   INR used for dosin.80! (2020)   Warfarin maintenance plan:   4 mg (2 mg x 2) every Sun, Tue, Thu; 3 mg (2 mg x 1.5) all other days   Full warfarin instructions:   : 4 mg; Otherwise 4 mg every Sun, Tue, Thu; 3 mg all other days   Weekly warfarin total:   24 mg   Plan last modified:   Rae Wing RN (2020)   Next INR check:   2020   Target end date:   Indefinite    Indications    History of pulmonary embolism [Z86.711]  Factor V Leiden (H) [D68.51]             Anticoagulation Episode Summary     INR check location:       Preferred lab:        Send INR reminders to:   MARIUM OLVERAS    Comments:   Call cell number with results for INR      Anticoagulation Care Providers     Provider Role Specialty Phone number    Mona Diaz MD Responsible Internal Medicine [phone]            See the Encounter Report to view Anticoagulation Flowsheet and Dosing Calendar (Go to Encounters tab in chart review, and find the Anticoagulation Therapy Visit)    Dosage adjustment made based on physician directed care plan.    Rae Wing RN

## 2020-05-04 ENCOUNTER — TELEPHONE (OUTPATIENT)
Dept: OBGYN | Facility: CLINIC | Age: 70
End: 2020-05-04

## 2020-05-04 ENCOUNTER — PREP FOR PROCEDURE (OUTPATIENT)
Dept: OBGYN | Facility: CLINIC | Age: 70
End: 2020-05-04

## 2020-05-04 DIAGNOSIS — N95.0 POSTMENOPAUSAL BLEEDING: Primary | ICD-10-CM

## 2020-05-04 NOTE — TELEPHONE ENCOUNTER
Surgeon:Parisa Goldberg MD   Assist:  No   Location: [facility]   Date/time preference:  per patient, when OR available for non-emergent cases and COVID risk is decreased     Surgery:  operative hysteroscopy with morcellation   Length of Surgery:  45 min   Diagnosis:  postmenopausal bleeding   Anesthesia type:  GENERAL     Special instructions / equipment:  myosure, fluid management   Am admit or same day: SAME DAY   Bowel prep: No   Pre op: PCP   Office visit with surgeon prior to surgery: No     Parisa Goldberg MD

## 2020-05-08 ENCOUNTER — ANTICOAGULATION THERAPY VISIT (OUTPATIENT)
Dept: NURSING | Facility: CLINIC | Age: 70
End: 2020-05-08

## 2020-05-08 DIAGNOSIS — Z86.711 HISTORY OF PULMONARY EMBOLISM: ICD-10-CM

## 2020-05-08 DIAGNOSIS — D68.51 FACTOR V LEIDEN (H): ICD-10-CM

## 2020-05-08 DIAGNOSIS — Z79.01 LONG TERM CURRENT USE OF ANTICOAGULANTS WITH INR GOAL OF 2.0-3.0: ICD-10-CM

## 2020-05-08 LAB
CAPILLARY BLOOD COLLECTION: NORMAL
INR PPP: 3.2 (ref 0.86–1.14)

## 2020-05-08 PROCEDURE — 36416 COLLJ CAPILLARY BLOOD SPEC: CPT | Performed by: INTERNAL MEDICINE

## 2020-05-08 PROCEDURE — 85610 PROTHROMBIN TIME: CPT | Performed by: INTERNAL MEDICINE

## 2020-05-08 NOTE — PROGRESS NOTES
Anticoagulation Management    Unable to reach Petra today.    Today's INR result of 3.2 is supratherapeutic (goal INR of 2.0-3.0).  Result received from: Clinic Lab    Follow up required to confirm warfarin dose taken and assess for changes    Left message to call [phone]      Anticoagulation clinic to follow up tentative plan- continue maintenance dose and follow up in 2 weeks. 3 weeks ago had maintenance dose decreased to 23 mg/week and came back subtherapeutic so was increased to 24 mg/week. Due to frequent shifts in INR and changes in mg/week want to see what INR does staying on a consistent dose. May need to change tablets to 1 mg and change weekly dose to 23.5 mg/week based off past dosing and results.    Soraya Ferguson RN  ANTICOAGULATION FOLLOW-UP CLINIC VISIT    Patient Name:  Petra Mcnulty  Date:  5/8/2020  Contact Type:  Telephone    SUBJECTIVE:  Patient Findings     Comments:   Patient is having increase in sciatic pain - inflammation. The patient was assessed for diet, medication, and activity level changes, missed or extra doses, bruising or bleeding, with no problem findings.          Clinical Outcomes     Negatives:   Major bleeding event, Thromboembolic event, Anticoagulation-related hospital admission, Anticoagulation-related ED visit, Anticoagulation-related fatality    Comments:   Patient is having increase in sciatic pain - inflammation. The patient was assessed for diet, medication, and activity level changes, missed or extra doses, bruising or bleeding, with no problem findings.             OBJECTIVE    INR   Date Value Ref Range Status   05/08/2020 3.20 (H) 0.86 - 1.14 Final     Comment:     This test is intended for monitoring Coumadin therapy.  Results are not   accurate in patients with prolonged INR due to factor deficiency.         ASSESSMENT / PLAN  No question data found.  Anticoagulation Summary  As of 5/8/2020    INR goal:   2.0-3.0   TTR:   34.3 % (2.3 mo)   INR used for  dosing:   3.20! (5/8/2020)   Warfarin maintenance plan:   4 mg (2 mg x 2) every Sun, Tue, Thu; 3 mg (2 mg x 1.5) all other days   Full warfarin instructions:   4 mg every Sun, Tue, Thu; 3 mg all other days   Weekly warfarin total:   24 mg   No change documented:   Soraya Ferguson RN   Plan last modified:   Rae Wing RN (4/24/2020)   Next INR check:   5/22/2020   Target end date:   Indefinite    Indications    History of pulmonary embolism [Z86.711]  Factor V Leiden (H) [D68.51]             Anticoagulation Episode Summary     INR check location:       Preferred lab:       Send INR reminders to:   [facility]    Comments:   Call cell number with results for INR      Anticoagulation Care Providers     Provider Role Specialty Phone number    Mona Diaz MD [facility] Internal Medicine [phone]            See the Encounter Report to view Anticoagulation Flowsheet and Dosing Calendar (Go to Encounters tab in chart review, and find the Anticoagulation Therapy Visit)        Soraya Ferguson RN

## 2020-05-12 ENCOUNTER — VIRTUAL VISIT (OUTPATIENT)
Dept: UROLOGY | Facility: CLINIC | Age: 70
End: 2020-05-12
Payer: COMMERCIAL

## 2020-05-12 DIAGNOSIS — N39.46 MIXED STRESS AND URGE URINARY INCONTINENCE: Primary | ICD-10-CM

## 2020-05-12 PROCEDURE — 99213 OFFICE O/P EST LOW 20 MIN: CPT | Mod: 95 | Performed by: UROLOGY

## 2020-05-12 RX ORDER — MIRABEGRON 25 MG/1
25 TABLET, FILM COATED, EXTENDED RELEASE ORAL DAILY
Qty: 30 TABLET | Refills: 3 | Status: SHIPPED | OUTPATIENT
Start: 2020-05-12 | End: 2020-08-13

## 2020-05-12 NOTE — PROGRESS NOTES
"May 12, 2020    Petra Mcnulty is a 69 year old female who is being evaluated via a billable telephone visit.      The patient has been notified of following:     \"This telephone visit will be conducted via a call between you and your physician/provider. We have found that certain health care needs can be provided without the need for a physical exam.  This service lets us provide the care you need with a short phone conversation.  If a prescription is necessary we can send it directly to your pharmacy.  If lab work is needed we can place an order for that and you can then stop by our lab to have the test done at a later time.    Telephone visits are billed at different rates depending on your insurance coverage. During this emergency period, for some insurers they may be billed the same as an in-person visit.  Please reach out to your insurance provider with any questions.    If during the course of the call the physician/provider feels a telephone visit is not appropriate, you will not be charged for this service.\"    Patient has given verbal consent for Telephone visit?  Yes    What phone number would you like to be contacted at? [phone]    How would you like to obtain your AVS? Mail a copy     Referring Provider: FABIAN Elam [facility]  [address] [place] [address]    Primary Care Provider: Mona Diaz    CC: Mixed urinary incontinence    HPI:  Petra Mcnulty is a 69 year old female who presents for evaluation of her pelvic floor symptoms.  She has followed with Karlee Deluca prior,was on oxybutynin but stopped secondary to concern for cognitive side effects. Currently on the fesotolterodine which helps.  She states that her nocturia is much better with her LOR treated.  Patient has had to transfer her care out of [facility] because of insurance changes.      Also has done PFPT with Vijaya at [facility]-minimal help    Denies gross hematuria, UTI, constipation.    Had vaginal " bleeding and found to have a endometrial polyp and she is in need of d&c, on hold because of COVID.  Has bad chronic back pain and needs spinal cord stimulator placed, also on hold because of COVID.    [place] [facility] [place] smaller capacity bladder-custodial 253mL, PVR 91    Past Medical History:   Diagnosis Date     COPD (chronic obstructive pulmonary disease) (H)      Factor V deficiency (H)      Glaucoma      History of thrombophlebitis      Hypertension      Hypothyroid      Insomnia      Obese      Onychomycosis      PPD positive     INH TX x 1 year     Pulmonary embolism (H)      Sleep apnea      Past Surgical History:   Procedure Laterality Date     ARTHROSCOPY KNEE       AS TOTAL HIP ARTHROPLASTY       BACK SURGERY      c3-c4 discectomy/fusion,     BACK SURGERY      lumbar decompression/lami      BIOPSY      breast     C TOTAL KNEE ARTHROPLASTY      left     CHOLECYSTECTOMY       COLONOSCOPY       D & C       HYSTEROSCOPY      d and c     INSERT STIMULATOR DORSAL COLUMN TRIAL N/A 2020    Procedure: SPINAL CORD STIMULATOR TRIAL;  Surgeon: Karlee Duffy MD;  Location:  OR       Social History     Socioeconomic History     Marital status:      Spouse name: Not on file     Number of children: Not on file     Years of education: Not on file     Highest education level: Not on file   Occupational History     Not on file   Social Needs     Financial resource strain: Not on file     Food insecurity     Worry: Not on file     Inability: Not on file     Transportation needs     Medical: Not on file     Non-medical: Not on file   Tobacco Use     Smoking status: Former Smoker     Packs/day: 0.50     Types: Cigarettes     Last attempt to quit: 2006     Years since quittin.3     Smokeless tobacco: Never Used   Substance and Sexual Activity     Alcohol use: Yes     Comment: rare     Drug use: Never     Sexual activity: Not on file   Lifestyle     Physical activity     Days per week: Not on file     Minutes  per session: Not on file     Stress: Not on file   Relationships     Social connections     Talks on phone: Not on file     Gets together: Not on file     Attends Nondenominational service: Not on file     Active member of club or organization: Not on file     Attends meetings of clubs or organizations: Not on file     Relationship status: Not on file     Intimate partner violence     Fear of current or ex partner: Not on file     Emotionally abused: Not on file     Physically abused: Not on file     Forced sexual activity: Not on file   Other Topics Concern     Parent/sibling w/ CABG, MI or angioplasty before 65F 55M? Not Asked   Social History Narrative     Not on file     History reviewed. No pertinent family history.    Allergies   Allergen Reactions     Atorvastatin      myalgia     Simvastatin Rash     Current Outpatient Medications   Medication     acetaminophen (TYLENOL) 500 MG tablet     albuterol (PROAIR HFA/PROVENTIL HFA/VENTOLIN HFA) 108 (90 Base) MCG/ACT inhaler     Fesoterodine Fumarate 8 MG TB24     gabapentin (NEURONTIN) 100 MG capsule     gabapentin (NEURONTIN) 300 MG capsule     HYDROcodone-acetaminophen (NORCO) 5-325 MG tablet     levothyroxine (SYNTHROID/LEVOTHROID) 100 MCG tablet     lisinopril (PRINIVIL/ZESTRIL) 10 MG tablet     loratadine (CLARITIN) 10 MG tablet     methocarbamol (ROBAXIN) 750 MG tablet     pravastatin (PRAVACHOL) 40 MG tablet     warfarin ANTICOAGULANT (COUMADIN) 2 MG tablet     enoxaparin ANTICOAGULANT (LOVENOX) 150 MG/ML syringe     fluticasone (FLONASE) 50 MCG/ACT nasal spray     mupirocin (BACTROBAN) 2 % external ointment     silver sulfADIAZINE (SILVADENE) 1 % external cream     No current facility-administered medications for this visit.      There were no vitals taken for this visit. No LMP recorded. Patient is postmenopausal. There is no height or weight on file to calculate BMI.  She is alert and oriented.  She is well groomed, comfortable in no acute distress.  Eyes have  anicteric sclerae, moist conjunctivae.  Normal mood and affect.   Normocephalic, atraumatic without masses, lesions, obvious abnormalities.  Non-labored breathing.      A/P: Petra Mcnulty is a 69 year old F with obesity, factor V Leiden, hyperlipidemia, history of PE, LOR, mixed urinary incontinence    We discussed the etiologies of stress and urge incontinence and how they differ. We discussed that the options of treating stress incontinence to include observation, weight loss, pelvic floor physical therapy, incontinence pessary, urethral bulking agents, and surgical correction most commonly with midurethral sling or autologous rectus fascial sling. We then discussed that urge incontinence treatments include observation, weight loss, medications most commonly anticholinergics, physical therapy, biofeedback, intravesical botulinum toxin, percutaneous tibial nerve stimulation and sacral neuromodulation.     At this time she wishes to try the mirabegron in addition to the fesotolterodine.  She will monitor her BP while on the medication    We discussed pessary and she is interested in this but not sure she wants [place] [facility] [place] so will consider    RTC 2-3 months, sooner if needed    20 minutes were spent with the patient today, > 50% in counseling and coordination of care    Leti Mac MD MPH    Urology    CC  Patient Care Team:  Mona Diaz MD as PCP - General (Internal Medicine)  Mona Diaz MD as Assigned PCP  JESUS MATA

## 2020-05-12 NOTE — PATIENT INSTRUCTIONS
Websites with free information:    American Urogynec[employer] Society patient website: www.voicesforpfd.org    Total Control Program: www.totalcontrolprogram.com    Try the mirabegron, monitor you blood pressure while on the medication    If you have questions [phone]    RTC 2-3 months, sooner if needed    It was a pleasure meeting with you today.  Thank you for allowing me and my team the privilege of caring for you today.  YOU are the reason we are here, and I truly hope we provided you with the excellent service you deserve.  Please let us know if there is anything else we can do for you so that we can be sure you are leaving completely satisfied with your care experience.

## 2020-05-13 DIAGNOSIS — Z11.59 ENCOUNTER FOR SCREENING FOR OTHER VIRAL DISEASES: Primary | ICD-10-CM

## 2020-05-13 PROBLEM — N95.0 POSTMENOPAUSAL BLEEDING: Status: ACTIVE | Noted: 2020-05-13

## 2020-05-14 NOTE — TELEPHONE ENCOUNTER
Type of surgery: operative hysteroscopy with morcellation  Location of surgery: [facility]  Date and time of surgery: 6/24/20 @ 10:30 am  Surgeon: Dr. Goldberg  Pre-Op Appt Date: Patient advised to schedule  Post-Op Appt Date:    Packet sent out: Yes  Pre-cert/Authorization completed:  No  Date: 5/14/20

## 2020-05-22 ENCOUNTER — ANTICOAGULATION THERAPY VISIT (OUTPATIENT)
Dept: ANTICOAGULATION | Facility: CLINIC | Age: 70
End: 2020-05-22

## 2020-05-22 ENCOUNTER — TELEPHONE (OUTPATIENT)
Dept: INTERNAL MEDICINE | Facility: CLINIC | Age: 70
End: 2020-05-22

## 2020-05-22 DIAGNOSIS — D68.51 FACTOR V LEIDEN (H): ICD-10-CM

## 2020-05-22 DIAGNOSIS — Z79.01 LONG TERM CURRENT USE OF ANTICOAGULANTS WITH INR GOAL OF 2.0-3.0: ICD-10-CM

## 2020-05-22 DIAGNOSIS — Z86.711 HISTORY OF PULMONARY EMBOLISM: ICD-10-CM

## 2020-05-22 LAB
CAPILLARY BLOOD COLLECTION: NORMAL
INR PPP: 2.9 (ref 0.86–1.14)

## 2020-05-22 PROCEDURE — 99207 ZZC NO CHARGE NURSE ONLY: CPT | Performed by: INTERNAL MEDICINE

## 2020-05-22 PROCEDURE — 85610 PROTHROMBIN TIME: CPT | Performed by: INTERNAL MEDICINE

## 2020-05-22 PROCEDURE — 36416 COLLJ CAPILLARY BLOOD SPEC: CPT | Performed by: INTERNAL MEDICINE

## 2020-05-22 RX ORDER — WARFARIN SODIUM 2 MG/1
TABLET ORAL
Qty: 161 TABLET | Refills: 0 | Status: SHIPPED | OUTPATIENT
Start: 2020-05-22 | End: 2020-05-22

## 2020-05-22 NOTE — TELEPHONE ENCOUNTER
Spoke with Petra regarding heparin and cost.  Will do bridge plan and loop back with Petra.  Prefers call back on cell.    Loren De La Cruz, PharmD BCACP  Anticoagulation Clinical Pharmacist

## 2020-05-22 NOTE — TELEPHONE ENCOUNTER
Spoke with Loren De La Cruz [facility] and found out that the price of heparin may be more expensive at this time due to a shortage.  Left message for patient to call office and will suggest for her to try [facility] to find a discount.  Rae Wing RN

## 2020-05-22 NOTE — TELEPHONE ENCOUNTER
Patient has an OB procedure scheduled on 6/24.  She has used lovenox bridging in the past, but this has been expensive for her.  She questions if heparin would be a safe and less expensive alternative for her to use for bridging.  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    I also gave her info for [employer] as another cost saving.  Rae Wing RN

## 2020-05-22 NOTE — PROGRESS NOTES
ANTICOAGULATION FOLLOW-UP CLINIC VISIT    Patient Name:  Petra Mcnulty  Date:  2020  Contact Type:  Telephone/ discussed with patient    SUBJECTIVE:  Patient Findings     Positives:   Upcoming invasive procedure (OB procedure .  Will need bridging (TE sent to PCP))    Comments:   The patient was assessed for diet, medication, and activity level changes, missed or extra doses, bruising or bleeding, with no problem findings.  Patient prefers for her 4 mg doses to be on Mon, Wed, and Fri so she can remember it easier.  Switched dose per patient's preference.         Clinical Outcomes     Negatives:   Major bleeding event, Thromboembolic event, Anticoagulation-related hospital admission, Anticoagulation-related ED visit, Anticoagulation-related fatality    Comments:   The patient was assessed for diet, medication, and activity level changes, missed or extra doses, bruising or bleeding, with no problem findings.  Patient prefers for her 4 mg doses to be on Mon, Wed, and Fri so she can remember it easier.  Switched dose per patient's preference.            OBJECTIVE    Recent labs: (last 7 days)     20  0909   INR 2.90*       ASSESSMENT / PLAN  INR assessment THER    Recheck INR In: 3 WEEKS    INR Location Clinic      Anticoagulation Summary  As of 2020    INR goal:   2.0-3.0   TTR:   34.1 % (2.8 mo)   INR used for dosin.90 (2020)   Warfarin maintenance plan:   4 mg (2 mg x 2) every Mon, Wed, Fri; 3 mg (2 mg x 1.5) all other days   Full warfarin instructions:   4 mg every Mon, Wed, Fri; 3 mg all other days   Weekly warfarin total:   24 mg   Plan last modified:   Rae Wing RN (2020)   Next INR check:   2020   Target end date:   Indefinite    Indications    History of pulmonary embolism [Z86.711]  Factor V Leiden (H) [D68.51]             Anticoagulation Episode Summary     INR check location:       Preferred lab:       Send INR reminders to:   [facility]     Comments:   Call cell number with results for INR      Anticoagulation Care Providers     Provider Role Specialty Phone number    Mona Diaz MD [facility] Internal Medicine [phone]            See the Encounter Report to view Anticoagulation Flowsheet and Dosing Calendar (Go to Encounters tab in chart review, and find the Anticoagulation Therapy Visit)    Dosage adjustment made based on physician directed care plan.    Rae Wing RN

## 2020-05-26 DIAGNOSIS — D68.51 FACTOR V LEIDEN (H): ICD-10-CM

## 2020-05-26 DIAGNOSIS — Z86.711 HISTORY OF PULMONARY EMBOLISM: ICD-10-CM

## 2020-05-27 ENCOUNTER — TELEPHONE (OUTPATIENT)
Dept: ANTICOAGULATION | Facility: CLINIC | Age: 70
End: 2020-05-27

## 2020-05-27 DIAGNOSIS — Z86.711 HISTORY OF PULMONARY EMBOLISM: ICD-10-CM

## 2020-05-27 DIAGNOSIS — D68.51 FACTOR V LEIDEN (H): Primary | ICD-10-CM

## 2020-05-27 RX ORDER — HEPARIN SODIUM 5000 [USP'U]/ML
5000 INJECTION, SOLUTION INTRAVENOUS; SUBCUTANEOUS EVERY 12 HOURS
Qty: 20 ML | Refills: 1 | Status: SHIPPED | OUTPATIENT
Start: 2020-05-27 | End: 2021-01-19

## 2020-05-27 NOTE — TELEPHONE ENCOUNTER
BARBER-PROCEDURAL ANTICOAGULATION  MANAGEMENT    Assessment     Warfarin interruption plan for hysteroscopy with morcellation on 2020.    PE, Factor V Leiden      Risk stratification for thromboembolism: moderate to high (2018 American Society of Hematology guideline)      unclear if Factor V Leiden heterzygous or homozygous (heterzygous = moderate, homozygous moves to high risk)    VTE: 2016 Anticoagulation Forum clinical guidance recommends, no bridge therapy for most VTE patients interrupting warfarin with possible exceptions for VTE within previous month, prior hx recurrent VTE during anticoagulation therapy interruption, or undergoing a procedure with high for VTE  (joint replacement surgery or major abdominal cancer resection    Plan       Pre-Procedure:    Hold warfarin until after procedure startin2020     Heparin 5,000U subq Q 8-12 hrs     Start Heparin: 2020    Last dose of heparin prior to procedure: 2020 PM       Post-Procedure:    Resume home warfarin dose if okay with provider doing procedure on night of procedure, 2020 PM: 4mg    Resume heparin ~ 24-72 hrs post procedure when okay with provider doing procedure. Continue until INR >= 2.3    Recheck INR 6 days after resuming warfarin   ?   Loren De La Cruz [facility]    Subjective/Objective:      Petra Mcnluty, a 69 year old female    Reason for Anticoagulation: PE and Factor V Leiden    Goal INR Range: 2.0-3.0    Patient bridged in past: Yes: with Lovenox    Pertinent History: HX excessive bruising with Lovenox.  Dose is 150mg BID based on weight, has high BMI.    Wt Readings from Last 3 Encounters:   20 147.9 kg (326 lb)   20 149.2 kg (329 lb)        Ideal body weight: 63.9 kg (140 lb 14 oz)  Adjusted ideal body weight: 97.5 kg (214 lb 14.8 oz)     Lab Results   Component Value Date    INR 2.90 (H) 2020    INR 3.20 (H) 2020    INR 1.80 (H) 2020     No results found for: HGB, HCT, PLT  No  results found for: CR  CrCl cannot be calculated (No successful lab value found.).

## 2020-05-27 NOTE — TELEPHONE ENCOUNTER
Left message for patient to call office.  Will review heparin dosing instructions listed below and let patient know that the rx has been sent to the pharmacy.  Rae Wing RN

## 2020-05-27 NOTE — TELEPHONE ENCOUNTER
See bridge plan encounter 05/27/2020.    Loren De La Cruz, PharmD BCACP  Anticoagulation Clinical Pharmacist

## 2020-05-27 NOTE — TELEPHONE ENCOUNTER
Notified patient that heparin will be every 12 hours and added pharmacy.  Please send in rx.  Rae Wing RN

## 2020-05-28 RX ORDER — WARFARIN SODIUM 2 MG/1
TABLET ORAL
Qty: 477 TABLET | Refills: 0 | Status: SHIPPED | OUTPATIENT
Start: 2020-05-28 | End: 2021-01-26

## 2020-05-28 NOTE — TELEPHONE ENCOUNTER
Spoke with patient.    Pharm:  Can you review Heparin start date per below?  Thank you.  Ruby Thomas RN  Anticoagulation Nurse - [facility] [place]

## 2020-05-28 NOTE — TELEPHONE ENCOUNTER
Spoke with Petra regarding questions about heparin.  Plan to start hold 06/19/2020 06/20/2020 hold warfarin    06/21/2020 hold warfarin & start heparin 5000U every 12 hours starting in AM    06/22/2020 hold warfarin & heparin 5000U every 12 hours    06/23/2020 hold warfarin & heparin 5000U every 12 hours    06/24/2020 resume warfarin 6mg dose PM after procedure if cleared by provider     06/25/2020 warfarin 4mg dose & restart heparin 5000U every 12 hours if cleared by provider    06/26/2020 resume warfarin schedule 4mg Fri/M/W, 3mg all other days of the week.  Continue heparin 5000U every 12 hours until INR at least 2.3.  Recheck INR 06/29/2020.    Loren De La Cruz, PharmD BCACP  Anticoagulation Clinical Pharmacist

## 2020-06-03 ENCOUNTER — TELEPHONE (OUTPATIENT)
Dept: INTERNAL MEDICINE | Facility: CLINIC | Age: 70
End: 2020-06-03

## 2020-06-08 NOTE — TELEPHONE ENCOUNTER
Due to updated info, if able to do spinal cord stimulator, would be able to just continue heparin bridge and restart warfarin after that procedure.  Updated bridge plan provided for review and appointment tomorrow.  In addition, have confirmed via Care Everywhere that DX is heterozygous Factor V Leiden carrier = moderate risk.      06/20/2020 hold warfarin     06/21/2020 hold warfarin & start heparin 5000U every 12 hours starting in AM     06/22/2020 hold warfarin & heparin 5000U every 12 hours     06/23/2020 hold warfarin & heparin 5000U every 12 hours, last dose 8-12 hours before procedure)     06/24/2020 hold warfarin & heparin (procedure day)     06/25/2020 hold warfarin & heparin      06/26/2020 day of implant procedure - resume warfarin schedule 4mg Fri/M/W, 3mg all other days of the week if cleared by provider.  Resume heparin 5000U every 12 hours 24-72 hours later as advised by provider doing spinal procedure, continue until INR at least 2.3.  Recheck INR 5-6 days after resume warfarin.    Loren De La Cruz, PharmD BCACP  Anticoagulation Clinical Pharmacist

## 2020-06-09 ENCOUNTER — OFFICE VISIT (OUTPATIENT)
Dept: INTERNAL MEDICINE | Facility: CLINIC | Age: 70
End: 2020-06-09
Payer: COMMERCIAL

## 2020-06-09 ENCOUNTER — ANTICOAGULATION THERAPY VISIT (OUTPATIENT)
Dept: ANTICOAGULATION | Facility: CLINIC | Age: 70
End: 2020-06-09

## 2020-06-09 VITALS
WEIGHT: 293 LBS | OXYGEN SATURATION: 99 % | BODY MASS INDEX: 44.41 KG/M2 | HEIGHT: 68 IN | TEMPERATURE: 97.7 F | RESPIRATION RATE: 20 BRPM | HEART RATE: 80 BPM | DIASTOLIC BLOOD PRESSURE: 76 MMHG | SYSTOLIC BLOOD PRESSURE: 132 MMHG

## 2020-06-09 DIAGNOSIS — Z86.711 HISTORY OF PULMONARY EMBOLISM: ICD-10-CM

## 2020-06-09 DIAGNOSIS — D68.51 FACTOR V LEIDEN (H): ICD-10-CM

## 2020-06-09 DIAGNOSIS — D68.51 HETEROZYGOUS FACTOR V LEIDEN MUTATION (H): ICD-10-CM

## 2020-06-09 DIAGNOSIS — N95.0 POST-MENOPAUSAL BLEEDING: ICD-10-CM

## 2020-06-09 DIAGNOSIS — E66.01 MORBID OBESITY (H): ICD-10-CM

## 2020-06-09 DIAGNOSIS — Z01.818 PREOP GENERAL PHYSICAL EXAM: Primary | ICD-10-CM

## 2020-06-09 DIAGNOSIS — Z79.01 LONG TERM CURRENT USE OF ANTICOAGULANTS WITH INR GOAL OF 2.0-3.0: ICD-10-CM

## 2020-06-09 LAB
BASOPHILS # BLD AUTO: 0 10E9/L (ref 0–0.2)
BASOPHILS NFR BLD AUTO: 0.2 %
DIFFERENTIAL METHOD BLD: NORMAL
EOSINOPHIL # BLD AUTO: 0.1 10E9/L (ref 0–0.7)
EOSINOPHIL NFR BLD AUTO: 2.3 %
ERYTHROCYTE [DISTWIDTH] IN BLOOD BY AUTOMATED COUNT: 13 % (ref 10–15)
HCT VFR BLD AUTO: 45.5 % (ref 35–47)
HGB BLD-MCNC: 14.4 G/DL (ref 11.7–15.7)
INR PPP: 2.96 (ref 0.86–1.14)
LYMPHOCYTES # BLD AUTO: 1.7 10E9/L (ref 0.8–5.3)
LYMPHOCYTES NFR BLD AUTO: 27.5 %
MCH RBC QN AUTO: 30.2 PG (ref 26.5–33)
MCHC RBC AUTO-ENTMCNC: 31.6 G/DL (ref 31.5–36.5)
MCV RBC AUTO: 95 FL (ref 78–100)
MONOCYTES # BLD AUTO: 0.4 10E9/L (ref 0–1.3)
MONOCYTES NFR BLD AUTO: 6.4 %
NEUTROPHILS # BLD AUTO: 3.9 10E9/L (ref 1.6–8.3)
NEUTROPHILS NFR BLD AUTO: 63.6 %
PLATELET # BLD AUTO: 238 10E9/L (ref 150–450)
RBC # BLD AUTO: 4.77 10E12/L (ref 3.8–5.2)
WBC # BLD AUTO: 6.1 10E9/L (ref 4–11)

## 2020-06-09 PROCEDURE — 85025 COMPLETE CBC W/AUTO DIFF WBC: CPT | Performed by: INTERNAL MEDICINE

## 2020-06-09 PROCEDURE — 80053 COMPREHEN METABOLIC PANEL: CPT | Performed by: INTERNAL MEDICINE

## 2020-06-09 PROCEDURE — 99215 OFFICE O/P EST HI 40 MIN: CPT | Performed by: INTERNAL MEDICINE

## 2020-06-09 PROCEDURE — 36415 COLL VENOUS BLD VENIPUNCTURE: CPT | Performed by: INTERNAL MEDICINE

## 2020-06-09 PROCEDURE — 85610 PROTHROMBIN TIME: CPT | Performed by: INTERNAL MEDICINE

## 2020-06-09 PROCEDURE — 99207 ZZC NO CHARGE NURSE ONLY: CPT | Performed by: INTERNAL MEDICINE

## 2020-06-09 RX ORDER — ALBUTEROL SULFATE 90 UG/1
2 AEROSOL, METERED RESPIRATORY (INHALATION) EVERY 4 HOURS PRN
Qty: 1 INHALER | Refills: 3 | Status: SHIPPED | OUTPATIENT
Start: 2020-06-09 | End: 2021-09-27

## 2020-06-09 ASSESSMENT — MIFFLIN-ST. JEOR: SCORE: 2050.4

## 2020-06-09 NOTE — PROGRESS NOTES
[place] [address]  [facility] [address]  [phone]  Dept: [phone]    PRE-OP EVALUATION:  Today's date: 2020    Petra Mcnulty (: 1950) presents for pre-operative evaluation assessment.    Proposed Surgery/ Procedure: hysteroscopy/D&C & spinal cord stimulation implant  Date of Surgery/ Procedure: 2020 & 2020  Time of Surgery/ Procedure:   Hospital/Surgical Facility: [facility]  Primary Physician: Dr. BENNY English for Mona Diaz  Type of Anesthesia Anticipated: Local with MAC    Patient has a Health Care Directive or Living Will:  YES     1. NO - Do you have a history of heart attack, stroke, stent, bypass or surgery on an artery in the head, neck, heart or legs?  2. NO - Do you ever have any pain or discomfort in your chest?  3. NO - Do you have a history of  Heart Failure?  4. YES - ARE YOUR TROUBLED BY SHORTNESS OF BREATH WHEN WALKING ON THE LEVEL, UP A SLIGHT HILL OR AT NIGHT? For years, no change from baseline   5. NO - Do you currently have a cold, bronchitis or other respiratory infection?  6. YES - DO YOU HAVE A COUGH, SHORTNESS OF BREATH OR WHEEZING? occasional wheezing no change from baseline   7. NO - Do you sometimes get pains in the calves of your legs when you walk?  8. YES - DO YOU OR ANYONE IN YOUR FAMILY HAVE PREVIOUS HISTORY OF BLOOD CLOTS? Recurrent PEs. On anticoagulation since  Leiden Factor V positive  9. NO - Do you or does anyone in your family have a serious bleeding problem such as prolonged bleeding following surgeries or cuts?  10. NO - Have you ever had problems with anemia or been told to take iron pills?  11. YES - HAVE YOU HAD ANY ABNORMAL BLOOD LOSS SUCH AS BLACK, TARRY OR BLOODY STOOLS, OR ABNORMAL VAGINAL BLEEDING? See HPI  12. YES - HAVE YOU EVER HAD A BLOOD TRANSFUSION? 2014 after total hip arthroplasty   13. NO - Have you or any of your relatives ever had problems with anesthesia?  14. YES -  DO YOU HAVE SLEEP APNEA, EXCESSIVE SNORING OR DAYTIME DROWSINESS? Uses CPAP  15. NO - Do you have any prosthetic heart valves?  16. YES - DO YOU HAVE PROSTHETIC JOINTS? bilateral total knee arthroplasty right total hip arthroplasty   17. NO - Is there any chance that you may be pregnant?      HPI:     HPI related to upcoming procedure: 1 episode heavy menses recently getting hysteroscopy and removal of polyp.      See problem list for active medical problems.  Problems all longstanding and stable, except as noted/documented.  See ROS for pertinent symptoms related to these conditions.      MEDICAL HISTORY:     Patient Active Problem List    Diagnosis Date Noted     Postmenopausal bleeding 05/13/2020     Priority: Medium     Added automatically from request for surgery [id number]       Heterozygous factor V Leiden mutation (H) 02/12/2020     Priority: Medium     Morbid obesity (H) 02/12/2020     Priority: Medium     History of pulmonary embolism 02/12/2020     Priority: Medium     Knee pain 11/13/2013     Priority: Medium     Knee joint replacement by other means 11/13/2013     Priority: Medium     Pain in joint, lower leg 02/09/2011     Priority: Medium      Past Medical History:   Diagnosis Date     COPD (chronic obstructive pulmonary disease) (H)      Factor V deficiency (H)      Glaucoma      History of thrombophlebitis      Hypertension      Hypothyroid      Insomnia      Obese      Onychomycosis      PPD positive     INH TX x 1 year     Pulmonary embolism (H)      Sleep apnea      Past Surgical History:   Procedure Laterality Date     ARTHROSCOPY KNEE       AS TOTAL HIP ARTHROPLASTY       BACK SURGERY      c3-c4 discectomy/fusion,     BACK SURGERY      lumbar decompression/lami      BIOPSY      breast     C TOTAL KNEE ARTHROPLASTY      left     CHOLECYSTECTOMY       COLONOSCOPY       D & C       HYSTEROSCOPY      d and c     INSERT STIMULATOR DORSAL COLUMN TRIAL N/A 2/6/2020    Procedure: SPINAL CORD STIMULATOR  TRIAL;  Surgeon: Karlee Duffy MD;  Location: [facility] OR     Current Outpatient Medications   Medication Sig Dispense Refill     acetaminophen (TYLENOL) 500 MG tablet Take 1,000 mg by mouth daily        albuterol (PROAIR HFA/PROVENTIL HFA/VENTOLIN HFA) 108 (90 Base) MCG/ACT inhaler Inhale 2 puffs into the lungs every 4 hours as needed for shortness of breath / dyspnea or wheezing 1 Inhaler 3     Fesoterodine Fumarate 8 MG TB24 Take by mouth daily       gabapentin (NEURONTIN) 100 MG capsule Take 100 mg by mouth 3 times daily       gabapentin (NEURONTIN) 300 MG capsule Take 300 mg by mouth 3 times daily       Heparin Sodium, Porcine, (HEPARIN, PORCINE,) 5000 UNIT/ML SOLN injection Inject 1 mL (5,000 Units) Subcutaneous every 12 hours Starting 06/21, last dose 06/23/20 PM, resume as directed after surgery 20 mL 1     HYDROcodone-acetaminophen (NORCO) 5-325 MG tablet Take 1 tablet by mouth every 6 hours as needed for severe pain       levothyroxine (SYNTHROID/LEVOTHROID) 100 MCG tablet Take 100 mcg by mouth daily       lisinopril (PRINIVIL/ZESTRIL) 10 MG tablet Take 10 mg by mouth daily       loratadine (CLARITIN) 10 MG tablet Take 10 mg by mouth daily       methocarbamol (ROBAXIN) 750 MG tablet Take 750 mg by mouth 4 times daily as needed for muscle spasms       mirabegron (MYRBETRIQ) 25 MG 24 hr tablet Take 1 tablet (25 mg) by mouth daily 30 tablet 3     pravastatin (PRAVACHOL) 40 MG tablet Take 40 mg by mouth daily       warfarin ANTICOAGULANT (COUMADIN) 2 MG tablet TAKE 2 TABLETS ON MONDAY, WEDNESDAY, FRIDAY, AND 1 1/2 TABLET ON SUN, TUESDAY, THURSDAY, AND SATURDAY OR AS DIRECTED BY INR CLINIC 477 tablet 0     OTC products: None, except as noted above    Allergies   Allergen Reactions     Atorvastatin      myalgia     Simvastatin Rash      Latex Allergy: NO    Social History     Tobacco Use     Smoking status: Former Smoker     Packs/day: 0.50     Types: Cigarettes     Last attempt to quit: 1/1/2006     Years since  "quittin.4     Smokeless tobacco: Never Used   Substance Use Topics     Alcohol use: Yes     Comment: rare     History   Drug Use Unknown       REVIEW OF SYSTEMS:   CONSTITUTIONAL: NEGATIVE for fever, chills, change in weight  INTEGUMENTARY/SKIN: NEGATIVE for worrisome rashes, moles or lesions  EYES: NEGATIVE for vision changes or irritation  ENT/MOUTH: NEGATIVE for ear, mouth and throat problems  BREAST: NEGATIVE for masses, tenderness or discharge  CV: NEGATIVE for chest pain, palpitations positive for intermittent lower extremity edema worse in hot weather  GI: NEGATIVE for nausea, abdominal pain, heartburn, or change in bowel habits  : NEGATIVE for frequency, dysuria, or hematuria  MUSCULOSKELETAL: NEGATIVE for significant arthralgias or myalgia  NEURO: NEGATIVE for weakness, dizziness or paresthesias  ENDOCRINE: NEGATIVE for temperature intolerance, skin/hair changes  HEME: NEGATIVE for bleeding problems  PSYCHIATRIC: NEGATIVE for changes in mood or affect    EXAM:   /76 (BP Location: Right arm, Patient Position: Chair, Cuff Size: Adult Large)   Pulse 80   Temp 97.7  F (36.5  C) (Oral)   Resp 20   Ht 1.727 m (5' 8\")   Wt 148.2 kg (326 lb 11.2 oz)   SpO2 99%   Breastfeeding No   BMI 49.67 kg/m      GENERAL APPEARANCE: healthy, alert and no distress     EYES: EOMI, PERRL     HENT: ear canals and TM's normal and nose and mouth without ulcers or lesions     NECK: no adenopathy, no asymmetry, masses, or scars and thyroid normal to palpation     RESP: lungs clear to auscultation - no rales, rhonchi or wheezes     CV: regular rates and rhythm, normal S1 S2, no S3 or S4 and no murmur, click or rub     ABDOMEN:  soft, nontender, no HSM or masses and bowel sounds normal     MS: extremities normal- no gross deformities noted and decreased pedal pulses trace edema at ankles only.     SKIN: no suspicious lesions or rashes     NEURO: Normal strength and tone, sensory exam grossly normal, mentation intact " and speech normal     PSYCH: mentation appears normal. and affect normal/bright     LYMPHATICS: No cervical adenopathy    DIAGNOSTICS:     EKG: see chart  Labs Drawn and in Process:   Unresulted Labs Ordered in the Past 30 Days of this Admission     No orders found from 5/10/2020 to 6/10/2020.          Recent Labs   Lab Test 05/22/20  0909 05/08/20  0915   INR 2.90* 3.20*        IMPRESSION:   Reason for surgery/procedure: hysteroscopy and polyp removal  Diagnosis/reason for consult: Leiden Factor V, HX PE, Morbid Obesity    The proposed surgical procedure is considered INTERMEDIATE risk.    REVISED CARDIAC RISK INDEX  The patient has the following serious cardiovascular risks for perioperative complications such as (MI, PE, VFib and 3  AV Block):  No serious cardiac risks  INTERPRETATION: 0 risks: Class I (very low risk - 0.4% complication rate)    The patient has the following additional risks for perioperative complications:  Morbid obesity  Hx PE  Leiden Factor V heterozygote         ICD-10-CM    1. Preop general physical exam  Z01.818 Asymptomatic COVID-19 Virus (Coronavirus) by PCR     CBC with platelets and differential     Comprehensive metabolic panel (BMP + Alb, Alk Phos, ALT, AST, Total. Bili, TP)   2. Post-menopausal bleeding  N95.0 Asymptomatic COVID-19 Virus (Coronavirus) by PCR     CBC with platelets and differential     Comprehensive metabolic panel (BMP + Alb, Alk Phos, ALT, AST, Total. Bili, TP)   3. Morbid obesity (H)  E66.01    4. History of pulmonary embolism  Z86.711 albuterol (PROAIR HFA/PROVENTIL HFA/VENTOLIN HFA) 108 (90 Base) MCG/ACT inhaler     INR   5. Heterozygous factor V Leiden mutation (H)  D68.51    6. Factor V Leiden (H)  D68.51 INR   7. Long term current use of anticoagulants with INR goal of 2.0-3.0  Z79.01 INR       RECOMMENDATIONS:     --Because of DVT or PE history, patient should be bridged with heparin or low molecular wt heperin or equivalent before and after surgery      --Patient is to take all scheduled medications on the day of surgery EXCEPT for modifications listed below.    APPROVAL GIVEN to proceed with proposed procedure, without further diagnostic evaluation       Signed Electronically by: Shanta English MD    Copy of this evaluation report is provided to requesting physician.    [facility] Preop Guidelines    Revised Cardiac Risk Index

## 2020-06-09 NOTE — PROGRESS NOTES
"New bridging plan if patient also has spinal cord stimulator per phone encounter dated 6/3:    \"Due to updated info, if able to do spinal cord stimulator, would be able to just continue heparin bridge and restart warfarin after that procedure.  Updated bridge plan provided for review and appointment tomorrow.  In addition, have confirmed via Care Everywhere that DX is heterozygous Factor V Leiden carrier = moderate risk.        06/20/2020 hold warfarin     06/21/2020 hold warfarin & start heparin 5000U every 12 hours starting in AM     06/22/2020 hold warfarin & heparin 5000U every 12 hours     06/23/2020 hold warfarin & heparin 5000U every 12 hours, last dose 8-12 hours before procedure)     06/24/2020 hold warfarin & heparin (procedure day)     06/25/2020 hold warfarin & heparin      06/26/2020 day of implant procedure - resume warfarin schedule 4mg Fri/M/W, 3mg all other days of the week if cleared by provider.  Resume heparin 5000U every 12 hours 24-72 hours later as advised by provider doing spinal procedure, continue until INR at least 2.3.  Recheck INR 5-6 days after resume warfarin.     Loren De La Cruz, PharmD BCACP  Anticoagulation Clinical Pharmacist\"      Will notify patient of this bridging schedule once she confirms if she is having the spinal cord stimulator procedure.  Rae Wing RN       "

## 2020-06-09 NOTE — TELEPHONE ENCOUNTER
Spoke with patient today.  She may be having a spinal cord stimulator placed on 6/26.  She is also still scheduled for her OB procedure on 6/24.  The procedure on 6/26 is pending insurance approval.  Patient will call to confirm with INR clinic if this second procedure will be happening.  Please advise how bridging plan should be adjusted if patient does have this second procedure done so patient can be notified of plan when she calls back.  Rae Wing RN

## 2020-06-09 NOTE — PROGRESS NOTES
ANTICOAGULATION FOLLOW-UP CLINIC VISIT    Patient Name:  Petra Mcnulty  Date:  2020  Contact Type:  Telephone/ discussed with patient    SUBJECTIVE:  Patient Findings     Positives:   Upcoming invasive procedure (OB procedure on .  Will use heparin bridging.)    Comments:   Patient will be using Heparin bridging.  She may also have an additional procedure on  for a spinal cord stimulator.  See phone encounter dated 20 for orders for bridging plan.  Discussed dosing plan through  with patient at this time.  Will confirm further dosing once we know if she is having the additional procedure.  The patient was assessed for diet, medication, and activity level changes, missed or extra doses, bruising or bleeding, with no problem findings.          Clinical Outcomes     Negatives:   Major bleeding event, Thromboembolic event, Anticoagulation-related hospital admission, Anticoagulation-related ED visit, Anticoagulation-related fatality    Comments:   Patient will be using Heparin bridging.  She may also have an additional procedure on  for a spinal cord stimulator.  See phone encounter dated 20 for orders for bridging plan.  Discussed dosing plan through  with patient at this time.  Will confirm further dosing once we know if she is having the additional procedure.  The patient was assessed for diet, medication, and activity level changes, missed or extra doses, bruising or bleeding, with no problem findings.             OBJECTIVE    Recent labs: (last 7 days)     20  1113   INR 2.96*       ASSESSMENT / PLAN  INR assessment THER    Recheck INR In: 2 WEEKS    INR Location Clinic      Anticoagulation Summary  As of 2020    INR goal:   2.0-3.0   TTR:   45.8 % (3.4 mo)   INR used for dosin.96 (2020)   Warfarin maintenance plan:   4 mg (2 mg x 2) every Mon, Wed, Fri; 3 mg (2 mg x 1.5) all other days   Full warfarin instructions:   : Hold; : Hold; : Hold; :  Hold; 6/23: Hold; 6/24: 6 mg; 6/25: 4 mg; Otherwise 4 mg every Mon, Wed, Fri; 3 mg all other days   Weekly warfarin total:   24 mg   Plan last modified:   Rae Wing RN (5/22/2020)   Next INR check:   6/24/2020   Target end date:   Indefinite    Indications    History of pulmonary embolism [Z86.711]  Heterozygous factor V Leiden mutation (H) [D68.51]             Anticoagulation Episode Summary     INR check location:       Preferred lab:       Send INR reminders to:   [facility]    Comments:   Call cell number with results for INR      Anticoagulation Care Providers     Provider Role Specialty Phone number    Mona Diaz MD Responsible Internal Medicine [phone]            See the Encounter Report to view Anticoagulation Flowsheet and Dosing Calendar (Go to Encounters tab in chart review, and find the Anticoagulation Therapy Visit)    Dosage adjustment made based on physician directed care plan.    Rae Wing RN

## 2020-06-10 LAB
ALBUMIN SERPL-MCNC: 3.3 G/DL (ref 3.4–5)
ALP SERPL-CCNC: 67 U/L (ref 40–150)
ALT SERPL W P-5'-P-CCNC: 23 U/L (ref 0–50)
ANION GAP SERPL CALCULATED.3IONS-SCNC: 5 MMOL/L (ref 3–14)
AST SERPL W P-5'-P-CCNC: 22 U/L (ref 0–45)
BILIRUB SERPL-MCNC: 0.3 MG/DL (ref 0.2–1.3)
BUN SERPL-MCNC: 13 MG/DL (ref 7–30)
CALCIUM SERPL-MCNC: 8.3 MG/DL (ref 8.5–10.1)
CHLORIDE SERPL-SCNC: 109 MMOL/L (ref 94–109)
CO2 SERPL-SCNC: 25 MMOL/L (ref 20–32)
CREAT SERPL-MCNC: 0.55 MG/DL (ref 0.52–1.04)
GFR SERPL CREATININE-BSD FRML MDRD: >90 ML/MIN/{1.73_M2}
GLUCOSE SERPL-MCNC: 106 MG/DL (ref 70–99)
POTASSIUM SERPL-SCNC: 4.1 MMOL/L (ref 3.4–5.3)
PROT SERPL-MCNC: 7.4 G/DL (ref 6.8–8.8)
SODIUM SERPL-SCNC: 139 MMOL/L (ref 133–144)

## 2020-06-10 ASSESSMENT — ASTHMA QUESTIONNAIRES: ACT_TOTALSCORE: 15

## 2020-06-11 DIAGNOSIS — Z11.59 ENCOUNTER FOR SCREENING FOR OTHER VIRAL DISEASES: Primary | ICD-10-CM

## 2020-06-12 ENCOUNTER — MYC MEDICAL ADVICE (OUTPATIENT)
Dept: OBGYN | Facility: CLINIC | Age: 70
End: 2020-06-12

## 2020-06-15 ENCOUNTER — MYC MEDICAL ADVICE (OUTPATIENT)
Dept: OBGYN | Facility: CLINIC | Age: 70
End: 2020-06-15

## 2020-06-15 ENCOUNTER — TELEPHONE (OUTPATIENT)
Dept: ANTICOAGULATION | Facility: CLINIC | Age: 70
End: 2020-06-15

## 2020-06-15 DIAGNOSIS — Z86.711 HISTORY OF PULMONARY EMBOLISM: ICD-10-CM

## 2020-06-15 DIAGNOSIS — D68.51 HETEROZYGOUS FACTOR V LEIDEN MUTATION (H): ICD-10-CM

## 2020-06-15 NOTE — TELEPHONE ENCOUNTER
"Spoke with Petra, confirmed 06/26/2020 is set date for spinal procedure now,  proposed plan from last week confirmed, and sent via [employer]:    \"Due to updated info, if able to do spinal cord stimulator, would be able to just continue heparin bridge and restart warfarin after that procedure.  Updated bridge plan provided for review and appointment tomorrow.  In addition, have confirmed via Care Everywhere that DX is heterozygous Factor V Leiden carrier = moderate risk.        06/20/2020 hold warfarin     06/21/2020 hold warfarin & start heparin 5000U every 12 hours starting in AM     06/22/2020 hold warfarin & heparin 5000U every 12 hours     06/23/2020 hold warfarin & heparin 5000U every 12 hours, last dose 8-12 hours before procedure)     06/24/2020 hold warfarin & heparin (procedure day)     06/25/2020 hold warfarin & heparin      06/26/2020 day of implant procedure - resume warfarin schedule 4mg Fri/M/W, 3mg all other days of the week if cleared by provider.  Resume heparin 5000U every 12 hours 24-72 hours later as advised by provider doing spinal procedure, continue until INR at least 2.3.  Recheck INR 5-6 days after resume warfarin.       Signs and symptoms of clotting also included in message, discussed trha twill have a slightly longer time with no anticoagulation.    Loren De La Cruz, PharmD BCACP  Anticoagulation Clinical Pharmacist      "

## 2020-06-18 RX ORDER — GABAPENTIN 300 MG/1
300 CAPSULE ORAL
COMMUNITY
End: 2021-01-26

## 2020-06-18 RX ORDER — METHOCARBAMOL 750 MG/1
750 TABLET, FILM COATED ORAL AT BEDTIME
COMMUNITY
End: 2021-01-26

## 2020-06-21 DIAGNOSIS — Z11.59 ENCOUNTER FOR SCREENING FOR OTHER VIRAL DISEASES: ICD-10-CM

## 2020-06-21 DIAGNOSIS — Z01.818 PREOP GENERAL PHYSICAL EXAM: ICD-10-CM

## 2020-06-21 DIAGNOSIS — N95.0 POST-MENOPAUSAL BLEEDING: ICD-10-CM

## 2020-06-21 PROCEDURE — 99000 SPECIMEN HANDLING OFFICE-LAB: CPT | Performed by: OBSTETRICS & GYNECOLOGY

## 2020-06-21 PROCEDURE — U0003 INFECTIOUS AGENT DETECTION BY NUCLEIC ACID (DNA OR RNA); SEVERE ACUTE RESPIRATORY SYNDROME CORONAVIRUS 2 (SARS-COV-2) (CORONAVIRUS DISEASE [COVID-19]), AMPLIFIED PROBE TECHNIQUE, MAKING USE OF HIGH THROUGHPUT TECHNOLOGIES AS DESCRIBED BY CMS-2020-01-R: HCPCS | Mod: 90 | Performed by: OBSTETRICS & GYNECOLOGY

## 2020-06-21 PROCEDURE — 99207 ZZC NO BILLABLE SERVICE THIS VISIT: CPT

## 2020-06-22 LAB
SARS-COV-2 RNA SPEC QL NAA+PROBE: NOT DETECTED
SPECIMEN SOURCE: NORMAL

## 2020-06-23 DIAGNOSIS — Z11.59 ENCOUNTER FOR SCREENING FOR OTHER VIRAL DISEASES: ICD-10-CM

## 2020-06-23 LAB
SARS-COV-2 RNA SPEC QL NAA+PROBE: NOT DETECTED
SPECIMEN SOURCE: NORMAL

## 2020-06-23 PROCEDURE — 99000 SPECIMEN HANDLING OFFICE-LAB: CPT | Performed by: PHYSICAL MEDICINE & REHABILITATION

## 2020-06-23 PROCEDURE — U0003 INFECTIOUS AGENT DETECTION BY NUCLEIC ACID (DNA OR RNA); SEVERE ACUTE RESPIRATORY SYNDROME CORONAVIRUS 2 (SARS-COV-2) (CORONAVIRUS DISEASE [COVID-19]), AMPLIFIED PROBE TECHNIQUE, MAKING USE OF HIGH THROUGHPUT TECHNOLOGIES AS DESCRIBED BY CMS-2020-01-R: HCPCS | Mod: 90 | Performed by: PHYSICAL MEDICINE & REHABILITATION

## 2020-06-24 ENCOUNTER — HOSPITAL ENCOUNTER (OUTPATIENT)
Facility: CLINIC | Age: 70
Discharge: HOME | End: 2020-06-24
Attending: OBSTETRICS & GYNECOLOGY | Admitting: OBSTETRICS & GYNECOLOGY
Payer: COMMERCIAL

## 2020-06-24 ENCOUNTER — TELEPHONE (OUTPATIENT)
Dept: ANTICOAGULATION | Facility: CLINIC | Age: 70
End: 2020-06-24

## 2020-06-24 ENCOUNTER — ANESTHESIA (OUTPATIENT)
Dept: SURGERY | Facility: CLINIC | Age: 70
End: 2020-06-24
Payer: COMMERCIAL

## 2020-06-24 ENCOUNTER — ANESTHESIA EVENT (OUTPATIENT)
Dept: SURGERY | Facility: CLINIC | Age: 70
End: 2020-06-24
Payer: COMMERCIAL

## 2020-06-24 VITALS
HEIGHT: 68 IN | DIASTOLIC BLOOD PRESSURE: 78 MMHG | RESPIRATION RATE: 16 BRPM | SYSTOLIC BLOOD PRESSURE: 130 MMHG | TEMPERATURE: 97.9 F | BODY MASS INDEX: 44.41 KG/M2 | OXYGEN SATURATION: 96 % | WEIGHT: 293 LBS | HEART RATE: 79 BPM

## 2020-06-24 DIAGNOSIS — N95.0 POSTMENOPAUSAL BLEEDING: ICD-10-CM

## 2020-06-24 LAB
ERYTHROCYTE [DISTWIDTH] IN BLOOD BY AUTOMATED COUNT: 12.8 % (ref 10–15)
HCT VFR BLD AUTO: 45.9 % (ref 35–47)
HGB BLD-MCNC: 14.4 G/DL (ref 11.7–15.7)
INR PPP: 1.14 (ref 0.86–1.14)
MCH RBC QN AUTO: 30.3 PG (ref 26.5–33)
MCHC RBC AUTO-ENTMCNC: 31.4 G/DL (ref 31.5–36.5)
MCV RBC AUTO: 96 FL (ref 78–100)
PLATELET # BLD AUTO: 241 10E9/L (ref 150–450)
RBC # BLD AUTO: 4.76 10E12/L (ref 3.8–5.2)
WBC # BLD AUTO: 5.6 10E9/L (ref 4–11)

## 2020-06-24 PROCEDURE — 25000128 H RX IP 250 OP 636: Performed by: NURSE ANESTHETIST, CERTIFIED REGISTERED

## 2020-06-24 PROCEDURE — 36000058 ZZH SURGERY LEVEL 3 EA 15 ADDTL MIN: Performed by: OBSTETRICS & GYNECOLOGY

## 2020-06-24 PROCEDURE — 85610 PROTHROMBIN TIME: CPT | Performed by: OBSTETRICS & GYNECOLOGY

## 2020-06-24 PROCEDURE — 25000125 ZZHC RX 250: Performed by: NURSE ANESTHETIST, CERTIFIED REGISTERED

## 2020-06-24 PROCEDURE — 58558 HYSTEROSCOPY BIOPSY: CPT | Performed by: OBSTETRICS & GYNECOLOGY

## 2020-06-24 PROCEDURE — 27210794 ZZH OR GENERAL SUPPLY STERILE: Performed by: OBSTETRICS & GYNECOLOGY

## 2020-06-24 PROCEDURE — 25800030 ZZH RX IP 258 OP 636: Performed by: ANESTHESIOLOGY

## 2020-06-24 PROCEDURE — 25800030 ZZH RX IP 258 OP 636: Performed by: NURSE ANESTHETIST, CERTIFIED REGISTERED

## 2020-06-24 PROCEDURE — 71000012 ZZH RECOVERY PHASE 1 LEVEL 1 FIRST HR: Performed by: OBSTETRICS & GYNECOLOGY

## 2020-06-24 PROCEDURE — 71000027 ZZH RECOVERY PHASE 2 EACH 15 MINS: Performed by: OBSTETRICS & GYNECOLOGY

## 2020-06-24 PROCEDURE — 36415 COLL VENOUS BLD VENIPUNCTURE: CPT | Performed by: OBSTETRICS & GYNECOLOGY

## 2020-06-24 PROCEDURE — 88305 TISSUE EXAM BY PATHOLOGIST: CPT | Performed by: OBSTETRICS & GYNECOLOGY

## 2020-06-24 PROCEDURE — 37000008 ZZH ANESTHESIA TECHNICAL FEE, 1ST 30 MIN: Performed by: OBSTETRICS & GYNECOLOGY

## 2020-06-24 PROCEDURE — 37000009 ZZH ANESTHESIA TECHNICAL FEE, EACH ADDTL 15 MIN: Performed by: OBSTETRICS & GYNECOLOGY

## 2020-06-24 PROCEDURE — 40000306 ZZH STATISTIC PRE PROC ASSESS II: Performed by: OBSTETRICS & GYNECOLOGY

## 2020-06-24 PROCEDURE — 36000056 ZZH SURGERY LEVEL 3 1ST 30 MIN: Performed by: OBSTETRICS & GYNECOLOGY

## 2020-06-24 PROCEDURE — 85027 COMPLETE CBC AUTOMATED: CPT | Performed by: OBSTETRICS & GYNECOLOGY

## 2020-06-24 PROCEDURE — 88305 TISSUE EXAM BY PATHOLOGIST: CPT | Mod: 26 | Performed by: OBSTETRICS & GYNECOLOGY

## 2020-06-24 PROCEDURE — 88305 TISSUE EXAM BY PATHOLOGIST: CPT | Mod: 26 | Performed by: PATHOLOGY

## 2020-06-24 RX ORDER — DEXAMETHASONE SODIUM PHOSPHATE 4 MG/ML
INJECTION, SOLUTION INTRA-ARTICULAR; INTRALESIONAL; INTRAMUSCULAR; INTRAVENOUS; SOFT TISSUE PRN
Status: DISCONTINUED | OUTPATIENT
Start: 2020-06-24 | End: 2020-06-24

## 2020-06-24 RX ORDER — FENTANYL CITRATE 50 UG/ML
INJECTION, SOLUTION INTRAMUSCULAR; INTRAVENOUS PRN
Status: DISCONTINUED | OUTPATIENT
Start: 2020-06-24 | End: 2020-06-24

## 2020-06-24 RX ORDER — FENTANYL CITRATE 50 UG/ML
25-50 INJECTION, SOLUTION INTRAMUSCULAR; INTRAVENOUS
Status: DISCONTINUED | OUTPATIENT
Start: 2020-06-24 | End: 2020-06-24 | Stop reason: HOSPADM

## 2020-06-24 RX ORDER — LIDOCAINE 40 MG/G
CREAM TOPICAL
Status: DISCONTINUED | OUTPATIENT
Start: 2020-06-24 | End: 2020-06-24 | Stop reason: HOSPADM

## 2020-06-24 RX ORDER — HYDROCODONE BITARTRATE AND ACETAMINOPHEN 5; 325 MG/1; MG/1
1 TABLET ORAL
Status: DISCONTINUED | OUTPATIENT
Start: 2020-06-24 | End: 2020-06-24 | Stop reason: HOSPADM

## 2020-06-24 RX ORDER — GLYCOPYRROLATE 0.2 MG/ML
INJECTION, SOLUTION INTRAMUSCULAR; INTRAVENOUS PRN
Status: DISCONTINUED | OUTPATIENT
Start: 2020-06-24 | End: 2020-06-24

## 2020-06-24 RX ORDER — ONDANSETRON 4 MG/1
4 TABLET, ORALLY DISINTEGRATING ORAL EVERY 30 MIN PRN
Status: DISCONTINUED | OUTPATIENT
Start: 2020-06-24 | End: 2020-06-24 | Stop reason: HOSPADM

## 2020-06-24 RX ORDER — ONDANSETRON 2 MG/ML
4 INJECTION INTRAMUSCULAR; INTRAVENOUS EVERY 30 MIN PRN
Status: DISCONTINUED | OUTPATIENT
Start: 2020-06-24 | End: 2020-06-24 | Stop reason: HOSPADM

## 2020-06-24 RX ORDER — ONDANSETRON 2 MG/ML
INJECTION INTRAMUSCULAR; INTRAVENOUS PRN
Status: DISCONTINUED | OUTPATIENT
Start: 2020-06-24 | End: 2020-06-24

## 2020-06-24 RX ORDER — MEPERIDINE HYDROCHLORIDE 25 MG/ML
12.5 INJECTION INTRAMUSCULAR; INTRAVENOUS; SUBCUTANEOUS
Status: DISCONTINUED | OUTPATIENT
Start: 2020-06-24 | End: 2020-06-24 | Stop reason: HOSPADM

## 2020-06-24 RX ORDER — SODIUM CHLORIDE, SODIUM LACTATE, POTASSIUM CHLORIDE, CALCIUM CHLORIDE 600; 310; 30; 20 MG/100ML; MG/100ML; MG/100ML; MG/100ML
INJECTION, SOLUTION INTRAVENOUS CONTINUOUS
Status: DISCONTINUED | OUTPATIENT
Start: 2020-06-24 | End: 2020-06-24 | Stop reason: HOSPADM

## 2020-06-24 RX ORDER — LIDOCAINE HYDROCHLORIDE 10 MG/ML
INJECTION, SOLUTION INFILTRATION; PERINEURAL PRN
Status: DISCONTINUED | OUTPATIENT
Start: 2020-06-24 | End: 2020-06-24

## 2020-06-24 RX ORDER — ACETAMINOPHEN 325 MG/1
975 TABLET ORAL ONCE
Status: DISCONTINUED | OUTPATIENT
Start: 2020-06-24 | End: 2020-06-24 | Stop reason: HOSPADM

## 2020-06-24 RX ORDER — PROPOFOL 10 MG/ML
INJECTION, EMULSION INTRAVENOUS CONTINUOUS PRN
Status: DISCONTINUED | OUTPATIENT
Start: 2020-06-24 | End: 2020-06-24

## 2020-06-24 RX ORDER — PROPOFOL 10 MG/ML
INJECTION, EMULSION INTRAVENOUS PRN
Status: DISCONTINUED | OUTPATIENT
Start: 2020-06-24 | End: 2020-06-24

## 2020-06-24 RX ORDER — NALOXONE HYDROCHLORIDE 0.4 MG/ML
.1-.4 INJECTION, SOLUTION INTRAMUSCULAR; INTRAVENOUS; SUBCUTANEOUS
Status: DISCONTINUED | OUTPATIENT
Start: 2020-06-24 | End: 2020-06-24 | Stop reason: HOSPADM

## 2020-06-24 RX ADMIN — DEXAMETHASONE SODIUM PHOSPHATE 4 MG: 4 INJECTION, SOLUTION INTRA-ARTICULAR; INTRALESIONAL; INTRAMUSCULAR; INTRAVENOUS; SOFT TISSUE at 11:03

## 2020-06-24 RX ADMIN — SODIUM CHLORIDE, POTASSIUM CHLORIDE, SODIUM LACTATE AND CALCIUM CHLORIDE: 600; 310; 30; 20 INJECTION, SOLUTION INTRAVENOUS at 11:16

## 2020-06-24 RX ADMIN — PHENYLEPHRINE HYDROCHLORIDE 100 MCG: 10 INJECTION INTRAVENOUS at 11:14

## 2020-06-24 RX ADMIN — PROPOFOL 60 MCG/KG/MIN: 10 INJECTION, EMULSION INTRAVENOUS at 11:07

## 2020-06-24 RX ADMIN — ONDANSETRON HYDROCHLORIDE 4 MG: 2 INJECTION, SOLUTION INTRAVENOUS at 11:07

## 2020-06-24 RX ADMIN — PHENYLEPHRINE HYDROCHLORIDE 100 MCG: 10 INJECTION INTRAVENOUS at 11:25

## 2020-06-24 RX ADMIN — Medication 120 MG: at 10:53

## 2020-06-24 RX ADMIN — FENTANYL CITRATE 100 MCG: 50 INJECTION, SOLUTION INTRAMUSCULAR; INTRAVENOUS at 10:56

## 2020-06-24 RX ADMIN — MIDAZOLAM 1 MG: 1 INJECTION INTRAMUSCULAR; INTRAVENOUS at 10:53

## 2020-06-24 RX ADMIN — ROCURONIUM BROMIDE 5 MG: 10 INJECTION INTRAVENOUS at 10:53

## 2020-06-24 RX ADMIN — SODIUM CHLORIDE, POTASSIUM CHLORIDE, SODIUM LACTATE AND CALCIUM CHLORIDE: 600; 310; 30; 20 INJECTION, SOLUTION INTRAVENOUS at 10:39

## 2020-06-24 RX ADMIN — PROPOFOL 200 MG: 10 INJECTION, EMULSION INTRAVENOUS at 10:53

## 2020-06-24 RX ADMIN — LIDOCAINE HYDROCHLORIDE 50 MG: 10 INJECTION, SOLUTION INFILTRATION; PERINEURAL at 10:53

## 2020-06-24 RX ADMIN — GLYCOPYRROLATE 0.2 MG: 0.2 INJECTION, SOLUTION INTRAMUSCULAR; INTRAVENOUS at 10:53

## 2020-06-24 ASSESSMENT — MIFFLIN-ST. JEOR: SCORE: 2054.94

## 2020-06-24 ASSESSMENT — COPD QUESTIONNAIRES
COPD: 1
CAT_SEVERITY: MILD

## 2020-06-24 ASSESSMENT — ENCOUNTER SYMPTOMS: DYSRHYTHMIAS: 0

## 2020-06-24 ASSESSMENT — LIFESTYLE VARIABLES: TOBACCO_USE: 1

## 2020-06-24 NOTE — ANESTHESIA CARE TRANSFER NOTE
Patient: Petra Mcnulty    Procedure(s):  OPERATIVE HYSTEROSCOPY WITH MORCELLATION, polypectomy, D&C exam under anesthesia    Diagnosis: Postmenopausal bleeding [N95.0]  Diagnosis Additional Information: No value filed.    Anesthesia Type:   No value filed.     Note:  Airway :Face Mask  Patient transferred to:PACU  Comments: To PACU, oxygen per face mask, report to RN.      Vitals: (Last set prior to Anesthesia Care Transfer)    CRNA VITALS  6/24/2020 1102 - 6/24/2020 1142      6/24/2020             EKG:  NSR                Electronically Signed By: BRETT Farr CRNA  June 24, 2020  11:42 AM

## 2020-06-24 NOTE — PROGRESS NOTES
SPIRITUAL HEALTH SERVICES  [facility]  PRE-SURGERY VISIT    Pre-surgical visit with pt.  Provided spiritual support, prayer.   Oriented to Spiritual Health Services and availability for emotional/spiritual support during hospital stay.         John Hopper MA  Staff   Pager: [phone]  Phone: [phone]

## 2020-06-24 NOTE — TELEPHONE ENCOUNTER
ANTICOAGULATION  MANAGEMENT: Discharge Review    Petra Mcnulty chart reviewed for anticoagulation continuity of care    Outpatient surgery/procedure on today for OB procedure.  Also has procedure scheduled on 6/26 for spinal cord stimulator placement.    Discharge disposition: Home    Results:    Recent labs: (last 7 days)     06/24/20  0947   INR 1.14     Anticoagulation inpatient management:     held warfarin due to upcoming procedure.  Patient was given dosing instructions previously since this was a planned prodedure.  No changes made to the inital plan that was given.     Anticoagulation discharge instructions:     Warfarin dosing: as previously discussed   Bridging: bridging with heparin   INR goal change: No      Medication changes affecting anticoagulation: No    Additional factors affecting anticoagulation: No    Plan     No adjustment to anticoagulation plan needed    Patient not contacted    No adjustment to Anticoagulation Calendar was required (this was already done previously).      Rae Wing RN

## 2020-06-24 NOTE — ANESTHESIA POSTPROCEDURE EVALUATION
Patient: Petra Mcnulty    Procedure(s):  OPERATIVE HYSTEROSCOPY WITH MORCELLATION, polypectomy, D&C exam under anesthesia    Diagnosis:Postmenopausal bleeding [N95.0]  Diagnosis Additional Information: No value filed.    Anesthesia Type:  No value filed.    Note:  Anesthesia Post Evaluation    Patient location during evaluation: PACU  Patient participation: Able to fully participate in evaluation  Level of consciousness: awake  Pain management: adequate  Airway patency: patent  Cardiovascular status: acceptable  Respiratory status: acceptable  Hydration status: euvolemic  PONV: controlled     Anesthetic complications: None          Last vitals:  Vitals:    06/24/20 1150 06/24/20 1155 06/24/20 1205   BP: 120/81 126/83    Pulse: 79 74    Resp: 14 13    Temp:   97.2  F (36.2  C)   SpO2: 98% 98%          Electronically Signed By: Jose Carlos Colon MD  June 24, 2020  12:21 PM

## 2020-06-24 NOTE — OP NOTE
Operative Note    Patient: Petra Mcnulty  : 1950  MRN: [id number]    Date of Service: 2020    Pre-operative diagnosis:  1. Postmenopausal bleeding   2. Thickened endometrial stripe  3. History endometrial polyp  4. Morbid obesity, Body mass index is 49.83 kg/m .     Post-operative diagnosis:  1. Same as above  2. Endometrial polyp    Procedure:   1. Exam under anesthesia  2. Dilation and curettage under direct visualization  3. Operative hysteroscopy  4. polypectomy    Surgeon: Parisa Goldberg MD     Anesthesia: General    EBL: 5 mL  Urine: 100 mL clear  Fluids: 1200 cystalloid    Specimens: Endometrial curettings, endometrial polyp  Complications: none    Findings: EUA revealed normal cervix and small mobile anteverted uterus, no adnexal masses.  Uterine cavity with large fundal pedunculated polyp, bilateral ostia visualized, thin remaining endometrial lining    Indications: Petra Mcnulty is a 69 year old female who presented with recurrent episode of postmonopausal bleeding, history of endometrial polyp, and thickened endometrial stripe. Hysteroscopy for evaluation and management was recommended Risks, benefits, and alternatives to the procedure were discussed. The patient's questions were answered, understanding confirmed, and the patient signed written informed consent.    Technique: The patient was taken to the operating room where she was placed in the dorsal lithotomy position with feet in yellow fin stirrups. The patient was placed under General anesthesia. Patient safety time out was then performed. An exam under anesthesia was performed with the above noted findings. The patient was prepped and draped in the usual sterile fashion. A speculum was placed in the vagina and the cervix visualized. A single-toothed tenaculum was placed on the anterior lip of the cervix at 12 o'clock. The cervical os was carefully dilated to 6 mm with sequential dilators. The operating hysteroscope was  introduced through the cervix into the uterine cavity. Examination of the uterine cavity demonstrated the above noted findings. The remainder of the cavity was unremarkable. Pictures were taken. The hysteroscopic morcellator was used to remove the polyp with good success, then to perform a curettage under direct visualization of the entire uterine cavity. Pictures were taken. The hysteroscope apparatus was removed and total of 215 mL fluid deficit of normal saline noted. The curetings were sent to pathology. The tenaculum was removed from the cervix and good hemostasis was noted. The speculum was removed from the vagina.    Instrument counts were correct x2. The patient was awoken in the OR and transferred to the PACU in stable condition.          Parisa Goldberg MD

## 2020-06-24 NOTE — DISCHARGE INSTRUCTIONS
DR. SORIN HESS M.D.      CLINIC PHONE NUMBER:  [phone] (IF [facility] PATIENT)                                      [phone] (IF [facility] [facility] PATIENT)    LAPAROSCOPY, HYSTEROSCOPY OR PELVISCOPY DISCHARGE INSTRUCTIONS    PLEASE RETURN TO THE CLINIC IN:  ____1 WEEK  ____2 WEEKS  ____4 WEEKS  ____6 WEEKS  MAKE THIS APPOINTMENT AFTER YOU GET HOME IF IT HAS NOT ALREADY BEEN SCHEDULED.    DO NOT DRIVE A CAR, DRINK ALCOHOL OR USE MACHINERY FOR THE NEXT 24 HOURS.  YOU SHOULD WAIT UNTIL YOU HAVE RECOVERED BEFORE MAKING ANY IMPORTANT DECISIONS.    PAIN  YOU MAY HAVE CRAMPS, SHOULDER PAIN OR A LOW BACKACHE FOR 24 TO 48 HOURS.  TYLENOL (ACETAMINOPHEN) OR MOTRIN (IBUPROFEN) MAY HELP, OR YOUR DOCTOR MAY GIVE YOU PAIN MEDICINE.  CALL YOUR DOCTOR IF PAIN CANNOT BE CONTROLLED.    BLEEDING OR VAGINAL DISCHARGE  YOU MAY HAVE SOME BLEEDING OR DISCHARGE FOR UP TO A WEEK OR LONGER.  DO NOT DOUCHE, USE TAMPONS OR HAVE SEX (INTERCOURSE) FOR ______DAYS.  CALL YOUR DOCTOR IF YOU SOAK MORE THAN ONE MAXI PAD (SANITARY [place]) PER HOUR, OR IF YOU PASS LARGE BLOOD CLOTS.    FEVER  YOU MAY HAVE A LOW FEVER FOR THE FIRST TWO DAYS.  CALL YOUR DOCTOR IF IT GOES OVER 101 DEGREES.    NAUSEA  IF YOU HAVE NAUSEA (FEEL SICK TO YOUR STOMACH), STAY IN BED.  TRY DRINKING A SMALL AMOUNT OF 7-UP, TEA OR SOUP.    SWOLLEN BELLY  IF YOUR ABDOMEN (BELLY AREA) FEELS FIRM OR SWOLLEN, CALL YOUR DOCTOR.    DIZZINESS AND WEAKNESS  YOU MAY FEEL DIZZY OR WEAK FOR A FEW DAYS.  IF SO, YOU SHOULD REST OFTEN, STAND UP SLOWLY AND USE CARE WHEN CLIMBING STAIRS.    DIET AND ACTIVITY  EAT LIGHT MEALS AND DRINK PLENTY OF FLUIDS FOR THE FIRST 24 HOURS (OR LONGER, IF YOU HAVE NAUSEA).  WAIT 5 DAYS BEFORE BATHING.  SHOWERS ARE OKAY.  MOST WOMEN CAN RETURN TO WORK AFTER 24 HOURS.  YOU MAY GO BACK TO YOUR OTHER ACTIVITIES AFTER YOUR PAIN GOES AWAY.            IF YOU HAVE STITCHES  YOU MAY REMOVE YOUR BANDAGE THE DAY AFTER TREATMENT.  YOUR DOCTOR WILL  TELL YOU IF YOUR STITCHES NEED TO BE REMOVED.  SOME STITCHES DISSOLVE OVER TIME.         GENERAL ANESTHESIA OR SEDATION ADULT DISCHARGE INSTRUCTIONS   SPECIAL PRECAUTIONS FOR 24 HOURS AFTER SURGERY    IT IS NOT UNUSUAL TO FEEL LIGHT-HEADED OR FAINT, UP TO 24 HOURS AFTER SURGERY OR WHILE TAKING PAIN MEDICATION.  IF YOU HAVE THESE SYMPTOMS; SIT FOR A FEW MINUTES BEFORE STANDING AND HAVE SOMEONE ASSIST YOU WHEN YOU GET UP TO WALK OR USE THE BATHROOM.    YOU SHOULD REST AND RELAX FOR THE NEXT 24 HOURS AND YOU MUST MAKE ARRANGEMENTS TO HAVE SOMEONE STAY WITH YOU FOR AT LEAST 24 HOURS AFTER YOUR DISCHARGE.  AVOID HAZARDOUS AND STRENUOUS ACTIVITIES.  DO NOT MAKE IMPORTANT DECISIONS FOR 24 HOURS.    DO NOT DRIVE ANY VEHICLE OR OPERATE MECHANICAL EQUIPMENT FOR 24 HOURS FOLLOWING THE END OF YOUR SURGERY.  EVEN THOUGH YOU MAY FEEL NORMAL, YOUR REACTIONS MAY BE AFFECTED BY THE MEDICATION YOU HAVE RECEIVED.    DO NOT DRINK ALCOHOLIC BEVERAGES FOR 24 HOURS FOLLOWING YOUR SURGERY.    DRINK CLEAR LIQUIDS (APPLE JUICE, GINGER ALE, 7-UP, BROTH, ETC.).  PROGRESS TO YOUR REGULAR DIET AS YOU FEEL ABLE.    YOU MAY HAVE A DRY MOUTH, A SORE THROAT, MUSCLES ACHES OR TROUBLE SLEEPING.  THESE SHOULD GO AWAY AFTER 24 HOURS.    CALL YOUR DOCTOR FOR ANY OF THE FOLLOWING:  SIGNS OF INFECTION (FEVER, GROWING TENDERNESS AT THE SURGERY SITE, A LARGE AMOUNT OF DRAINAGE OR BLEEDING, SEVERE PAIN, FOUL-SMELLING DRAINAGE, REDNESS OR SWELLING.    IT HAS BEEN OVER 8 TO 10 HOURS SINCE SURGERY AND YOU ARE STILL NOT ABLE TO URINATE (PASS WATER).

## 2020-06-24 NOTE — ANESTHESIA PREPROCEDURE EVALUATION
Anesthesia Pre-Procedure Evaluation    Patient: Petra Mcnulty   MRN: [id number] : 1950          Preoperative Diagnosis: Postmenopausal bleeding [N95.0]    Procedure(s):  OPERATIVE HYSTEROSCOPY WITH MORCELLATION    Past Medical History:   Diagnosis Date     Arthritis     hands, joints     Coagulation disorder (H)     factor 5 leiden     COPD (chronic obstructive pulmonary disease) (H)      Factor V deficiency (H)      Glaucoma      History of blood transfusion      History of thrombophlebitis      Hypertension      Hypothyroid      Insomnia      Obese      Onychomycosis      Other chronic pain     back     PPD positive     INH TX x 1 year     Pulmonary embolism (H)      Sleep apnea     uses cpap     Uncomplicated asthma      Past Surgical History:   Procedure Laterality Date     ARTHROPLASTY HIP      left     ARTHROSCOPY KNEE       AS TOTAL HIP ARTHROPLASTY       BACK SURGERY      c3-c4 discectomy/fusion,     BACK SURGERY      lumbar decompression/lami      BIOPSY      breast     C TOTAL KNEE ARTHROPLASTY      left,right     CHOLECYSTECTOMY       COLONOSCOPY       D & C       HYSTEROSCOPY      d and c     INSERT STIMULATOR DORSAL COLUMN TRIAL N/A 2020    Procedure: SPINAL CORD STIMULATOR TRIAL;  Surgeon: Karlee Duffy MD;  Location: [facility] OR     Anesthesia Evaluation     .             ROS/MED HX    ENT/Pulmonary:     (+)sleep apnea, tobacco use, Past use Intermittent asthma mild COPD, , . .    Neurologic:  - neg neurologic ROS     Cardiovascular:     (+) ----. Taking blood thinners : . . . :. .      (-) CHF, arrhythmias and pulmonary hypertension   METS/Exercise Tolerance:     Hematologic:     (+) History of blood clots pt is anticoagulated, -      Musculoskeletal:        (-) arthritis   GI/Hepatic:        (-) GERD and hepatitis   Renal/Genitourinary:      (-) renal disease   Endo:     (+) thyroid problem hypothyroidism, Obesity, .      Psychiatric:        (-) psychiatric history   Infectious Disease:   - neg infectious disease ROS       Malignancy:      - no malignancy   Other:    (+) H/O Chronic Pain,                        Physical Exam      Airway   Mallampati: II  TM distance: >3 FB  Neck ROM: full    Dental     Cardiovascular   Rhythm and rate: regular and normal  (-) no murmur    Pulmonary    breath sounds clear to auscultation    Other findings: Lab Test        06/24/20 06/09/20 05/22/20                       0947          1113          0909          WBC          5.6          6.1           --           HGB          14.4         14.4          --           MCV          96           95            --           PLT          241          238           --           INR          1.14         2.96*        2.90*          Lab Test        06/09/20                       1113          NA           139           POTASSIUM    4.1           CHLORIDE     109           CO2          25            BUN          13            CR           0.55          ANIONGAP     5             GEORGE          8.3*          GLC          106*                Lab Results   Component Value Date    WBC 5.6 06/24/2020    HGB 14.4 06/24/2020    HCT 45.9 06/24/2020     06/24/2020     06/09/2020    POTASSIUM 4.1 06/09/2020    CHLORIDE 109 06/09/2020    CO2 25 06/09/2020    BUN 13 06/09/2020    CR 0.55 06/09/2020     (H) 06/09/2020    GEORGE 8.3 (L) 06/09/2020    ALBUMIN 3.3 (L) 06/09/2020    PROTTOTAL 7.4 06/09/2020    ALT 23 06/09/2020    AST 22 06/09/2020    ALKPHOS 67 06/09/2020    BILITOTAL 0.3 06/09/2020    PTT 29 02/06/2020    INR 1.14 06/24/2020       Preop Vitals  BP Readings from Last 3 Encounters:   06/24/20 135/64   06/09/20 132/76   02/12/20 120/70    Pulse Readings from Last 3 Encounters:   06/09/20 80   02/12/20 96   02/06/20 71      Resp Readings from Last 3 Encounters:   06/24/20 20   06/09/20 20   02/12/20 12    SpO2 Readings from Last 3 Encounters:   06/24/20 96%   06/09/20 99%   02/12/20 95%      Temp Readings  "from Last 1 Encounters:   06/24/20 97.9  F (36.6  C) (Temporal)    Ht Readings from Last 1 Encounters:   06/24/20 1.727 m (5' 8\")      Wt Readings from Last 1 Encounters:   06/24/20 148.6 kg (327 lb 11.2 oz)    Estimated body mass index is 49.83 kg/m  as calculated from the following:    Height as of this encounter: 1.727 m (5' 8\").    Weight as of this encounter: 148.6 kg (327 lb 11.2 oz).       Anesthesia Plan      History & Physical Review  History and physical reviewed and following examination; no interval change.    ASA Status:  3 .    NPO Status:  > 8 hours    Plan for General with Propofol induction. Maintenance will be Balanced.             Postoperative Care  Postoperative pain management:  IV analgesics and Oral pain medications.      Consents  Anesthetic plan, risks, benefits and alternatives discussed with:  Patient.  Use of blood products discussed: Yes.   Use of blood products discussed with Patient.  Consented to blood products.  .                 Jose Carlos Colon MD                    .  "

## 2020-06-25 LAB — COPATH REPORT: NORMAL

## 2020-06-25 NOTE — RESULT ENCOUNTER NOTE
I reviewed the pathology report in Dr. Goldberg's absence showing the following    Fragments of endometrium and endometrial polyp with endometrial   hyperplasia (simple and focal complex   hyperplasia) without definitive atypia.  Negative for malignancy in   submitted sample.    Please contact the patient and ask her to see Dr. Goldberg next week to review this path report and develop an appropriate therapy plan  Thank you  Vishal Lemus MD FACOG

## 2020-06-26 ENCOUNTER — ANESTHESIA EVENT (OUTPATIENT)
Dept: SURGERY | Facility: CLINIC | Age: 70
End: 2020-06-26
Payer: COMMERCIAL

## 2020-06-26 ENCOUNTER — ANESTHESIA (OUTPATIENT)
Dept: SURGERY | Facility: CLINIC | Age: 70
End: 2020-06-26
Payer: COMMERCIAL

## 2020-06-26 ENCOUNTER — APPOINTMENT (OUTPATIENT)
Dept: GENERAL RADIOLOGY | Facility: CLINIC | Age: 70
End: 2020-06-26
Attending: PHYSICAL MEDICINE & REHABILITATION
Payer: COMMERCIAL

## 2020-06-26 ENCOUNTER — HOSPITAL ENCOUNTER (OUTPATIENT)
Facility: CLINIC | Age: 70
Discharge: HOME | End: 2020-06-26
Attending: PHYSICAL MEDICINE & REHABILITATION | Admitting: PHYSICAL MEDICINE & REHABILITATION
Payer: COMMERCIAL

## 2020-06-26 VITALS
BODY MASS INDEX: 44.41 KG/M2 | RESPIRATION RATE: 16 BRPM | OXYGEN SATURATION: 96 % | WEIGHT: 293 LBS | HEART RATE: 73 BPM | SYSTOLIC BLOOD PRESSURE: 116 MMHG | DIASTOLIC BLOOD PRESSURE: 65 MMHG | HEIGHT: 68 IN | TEMPERATURE: 97.4 F

## 2020-06-26 LAB
APTT PPP: 28 SEC (ref 22–37)
INR PPP: 1.09 (ref 0.86–1.14)

## 2020-06-26 PROCEDURE — C1820 GENERATOR NEURO RECHG BAT SY: HCPCS | Performed by: PHYSICAL MEDICINE & REHABILITATION

## 2020-06-26 PROCEDURE — C1778 LEAD, NEUROSTIMULATOR: HCPCS | Performed by: PHYSICAL MEDICINE & REHABILITATION

## 2020-06-26 PROCEDURE — 36000063 ZZH SURGERY LEVEL 4 EA 15 ADDTL MIN: Performed by: PHYSICAL MEDICINE & REHABILITATION

## 2020-06-26 PROCEDURE — 36000065 ZZH SURGERY LEVEL 4 W FLUORO 1ST 30 MIN: Performed by: PHYSICAL MEDICINE & REHABILITATION

## 2020-06-26 PROCEDURE — 36415 COLL VENOUS BLD VENIPUNCTURE: CPT | Performed by: PHYSICAL MEDICINE & REHABILITATION

## 2020-06-26 PROCEDURE — 37000009 ZZH ANESTHESIA TECHNICAL FEE, EACH ADDTL 15 MIN: Performed by: PHYSICAL MEDICINE & REHABILITATION

## 2020-06-26 PROCEDURE — 40000277 XR SURGERY CARM FLUORO LESS THAN 5 MIN W STILLS

## 2020-06-26 PROCEDURE — 85730 THROMBOPLASTIN TIME PARTIAL: CPT | Performed by: PHYSICAL MEDICINE & REHABILITATION

## 2020-06-26 PROCEDURE — 71000027 ZZH RECOVERY PHASE 2 EACH 15 MINS: Performed by: PHYSICAL MEDICINE & REHABILITATION

## 2020-06-26 PROCEDURE — 37000008 ZZH ANESTHESIA TECHNICAL FEE, 1ST 30 MIN: Performed by: PHYSICAL MEDICINE & REHABILITATION

## 2020-06-26 PROCEDURE — 25000125 ZZHC RX 250: Performed by: PHYSICAL MEDICINE & REHABILITATION

## 2020-06-26 PROCEDURE — 25000132 ZZH RX MED GY IP 250 OP 250 PS 637: Performed by: ANESTHESIOLOGY

## 2020-06-26 PROCEDURE — 71000013 ZZH RECOVERY PHASE 1 LEVEL 1 EA ADDTL HR: Performed by: PHYSICAL MEDICINE & REHABILITATION

## 2020-06-26 PROCEDURE — 25000128 H RX IP 250 OP 636: Performed by: PHYSICAL MEDICINE & REHABILITATION

## 2020-06-26 PROCEDURE — 25800030 ZZH RX IP 258 OP 636: Performed by: NURSE ANESTHETIST, CERTIFIED REGISTERED

## 2020-06-26 PROCEDURE — 85610 PROTHROMBIN TIME: CPT | Performed by: PHYSICAL MEDICINE & REHABILITATION

## 2020-06-26 PROCEDURE — 71000012 ZZH RECOVERY PHASE 1 LEVEL 1 FIRST HR: Performed by: PHYSICAL MEDICINE & REHABILITATION

## 2020-06-26 PROCEDURE — 27211024 ZZHC OR SUPPLY OTHER OPNP: Performed by: PHYSICAL MEDICINE & REHABILITATION

## 2020-06-26 PROCEDURE — 25000128 H RX IP 250 OP 636: Performed by: NURSE ANESTHETIST, CERTIFIED REGISTERED

## 2020-06-26 PROCEDURE — 27210794 ZZH OR GENERAL SUPPLY STERILE: Performed by: PHYSICAL MEDICINE & REHABILITATION

## 2020-06-26 PROCEDURE — 25000125 ZZHC RX 250: Performed by: NURSE ANESTHETIST, CERTIFIED REGISTERED

## 2020-06-26 PROCEDURE — 40000170 ZZH STATISTIC PRE-PROCEDURE ASSESSMENT II: Performed by: PHYSICAL MEDICINE & REHABILITATION

## 2020-06-26 DEVICE — IMP LEAD KIT VECTRIS SCS COMPACT SURESCAN 1X8MMX60CM 977A260: Type: IMPLANTABLE DEVICE | Site: BACK | Status: FUNCTIONAL

## 2020-06-26 DEVICE — GENERATOR NEUROSTIM INTELLIS MRI SURESCAN 97715: Type: IMPLANTABLE DEVICE | Site: BACK | Status: FUNCTIONAL

## 2020-06-26 RX ORDER — KETAMINE HYDROCHLORIDE 10 MG/ML
INJECTION INTRAMUSCULAR; INTRAVENOUS PRN
Status: DISCONTINUED | OUTPATIENT
Start: 2020-06-26 | End: 2020-06-26

## 2020-06-26 RX ORDER — CEFAZOLIN SODIUM IN 0.9 % NACL 3 G/100 ML
3 INTRAVENOUS SOLUTION, PIGGYBACK (ML) INTRAVENOUS
Status: COMPLETED | OUTPATIENT
Start: 2020-06-26 | End: 2020-06-26

## 2020-06-26 RX ORDER — PROPOFOL 10 MG/ML
INJECTION, EMULSION INTRAVENOUS CONTINUOUS PRN
Status: DISCONTINUED | OUTPATIENT
Start: 2020-06-26 | End: 2020-06-26

## 2020-06-26 RX ORDER — NICOTINE POLACRILEX 4 MG
15-30 LOZENGE BUCCAL
Status: DISCONTINUED | OUTPATIENT
Start: 2020-06-26 | End: 2020-06-26 | Stop reason: HOSPADM

## 2020-06-26 RX ORDER — HYDROMORPHONE HYDROCHLORIDE 1 MG/ML
.3-.5 INJECTION, SOLUTION INTRAMUSCULAR; INTRAVENOUS; SUBCUTANEOUS EVERY 10 MIN PRN
Status: DISCONTINUED | OUTPATIENT
Start: 2020-06-26 | End: 2020-06-26 | Stop reason: HOSPADM

## 2020-06-26 RX ORDER — DEXTROSE MONOHYDRATE 25 G/50ML
25-50 INJECTION, SOLUTION INTRAVENOUS
Status: DISCONTINUED | OUTPATIENT
Start: 2020-06-26 | End: 2020-06-26 | Stop reason: HOSPADM

## 2020-06-26 RX ORDER — ONDANSETRON 2 MG/ML
4 INJECTION INTRAMUSCULAR; INTRAVENOUS EVERY 30 MIN PRN
Status: DISCONTINUED | OUTPATIENT
Start: 2020-06-26 | End: 2020-06-26 | Stop reason: HOSPADM

## 2020-06-26 RX ORDER — DEXAMETHASONE SODIUM PHOSPHATE 4 MG/ML
INJECTION, SOLUTION INTRA-ARTICULAR; INTRALESIONAL; INTRAMUSCULAR; INTRAVENOUS; SOFT TISSUE PRN
Status: DISCONTINUED | OUTPATIENT
Start: 2020-06-26 | End: 2020-06-26

## 2020-06-26 RX ORDER — ONDANSETRON 4 MG/1
4 TABLET, ORALLY DISINTEGRATING ORAL EVERY 30 MIN PRN
Status: DISCONTINUED | OUTPATIENT
Start: 2020-06-26 | End: 2020-06-26 | Stop reason: HOSPADM

## 2020-06-26 RX ORDER — FENTANYL CITRATE 0.05 MG/ML
25-50 INJECTION, SOLUTION INTRAMUSCULAR; INTRAVENOUS
Status: DISCONTINUED | OUTPATIENT
Start: 2020-06-26 | End: 2020-06-26 | Stop reason: HOSPADM

## 2020-06-26 RX ORDER — SODIUM CHLORIDE, SODIUM LACTATE, POTASSIUM CHLORIDE, CALCIUM CHLORIDE 600; 310; 30; 20 MG/100ML; MG/100ML; MG/100ML; MG/100ML
INJECTION, SOLUTION INTRAVENOUS CONTINUOUS PRN
Status: DISCONTINUED | OUTPATIENT
Start: 2020-06-26 | End: 2020-06-26

## 2020-06-26 RX ORDER — ONDANSETRON 2 MG/ML
INJECTION INTRAMUSCULAR; INTRAVENOUS PRN
Status: DISCONTINUED | OUTPATIENT
Start: 2020-06-26 | End: 2020-06-26

## 2020-06-26 RX ORDER — SODIUM CHLORIDE, SODIUM LACTATE, POTASSIUM CHLORIDE, CALCIUM CHLORIDE 600; 310; 30; 20 MG/100ML; MG/100ML; MG/100ML; MG/100ML
INJECTION, SOLUTION INTRAVENOUS CONTINUOUS
Status: DISCONTINUED | OUTPATIENT
Start: 2020-06-26 | End: 2020-06-26 | Stop reason: HOSPADM

## 2020-06-26 RX ORDER — HYDROCODONE BITARTRATE AND ACETAMINOPHEN 5; 325 MG/1; MG/1
1 TABLET ORAL EVERY 6 HOURS PRN
Status: DISCONTINUED | OUTPATIENT
Start: 2020-06-26 | End: 2020-06-26 | Stop reason: HOSPADM

## 2020-06-26 RX ORDER — NALOXONE HYDROCHLORIDE 0.4 MG/ML
.1-.4 INJECTION, SOLUTION INTRAMUSCULAR; INTRAVENOUS; SUBCUTANEOUS
Status: DISCONTINUED | OUTPATIENT
Start: 2020-06-26 | End: 2020-06-26 | Stop reason: HOSPADM

## 2020-06-26 RX ORDER — LIDOCAINE 40 MG/G
CREAM TOPICAL
Status: DISCONTINUED | OUTPATIENT
Start: 2020-06-26 | End: 2020-06-26 | Stop reason: HOSPADM

## 2020-06-26 RX ADMIN — PROPOFOL 50 MCG/KG/MIN: 10 INJECTION, EMULSION INTRAVENOUS at 11:37

## 2020-06-26 RX ADMIN — Medication 10 MG: at 12:24

## 2020-06-26 RX ADMIN — Medication 10 MG: at 12:01

## 2020-06-26 RX ADMIN — ONDANSETRON 4 MG: 2 INJECTION INTRAMUSCULAR; INTRAVENOUS at 12:23

## 2020-06-26 RX ADMIN — DEXMEDETOMIDINE HYDROCHLORIDE 0.7 MCG/KG/HR: 100 INJECTION, SOLUTION INTRAVENOUS at 11:37

## 2020-06-26 RX ADMIN — PHENYLEPHRINE HYDROCHLORIDE 100 MCG: 10 INJECTION INTRAVENOUS at 12:36

## 2020-06-26 RX ADMIN — DEXAMETHASONE SODIUM PHOSPHATE 4 MG: 4 INJECTION, SOLUTION INTRA-ARTICULAR; INTRALESIONAL; INTRAMUSCULAR; INTRAVENOUS; SOFT TISSUE at 12:23

## 2020-06-26 RX ADMIN — HYDROCODONE BITARTRATE AND ACETAMINOPHEN 1 TABLET: 5; 325 TABLET ORAL at 15:41

## 2020-06-26 RX ADMIN — Medication 10 MG: at 11:57

## 2020-06-26 RX ADMIN — MIDAZOLAM 2 MG: 1 INJECTION INTRAMUSCULAR; INTRAVENOUS at 11:37

## 2020-06-26 RX ADMIN — LIDOCAINE HYDROCHLORIDE 0.3 ML: 10 INJECTION, SOLUTION EPIDURAL; INFILTRATION; INTRACAUDAL; PERINEURAL at 10:24

## 2020-06-26 RX ADMIN — PHENYLEPHRINE HYDROCHLORIDE 100 MCG: 10 INJECTION INTRAVENOUS at 13:00

## 2020-06-26 RX ADMIN — SODIUM CHLORIDE, POTASSIUM CHLORIDE, SODIUM LACTATE AND CALCIUM CHLORIDE: 600; 310; 30; 20 INJECTION, SOLUTION INTRAVENOUS at 11:30

## 2020-06-26 RX ADMIN — PHENYLEPHRINE HYDROCHLORIDE 100 MCG: 10 INJECTION INTRAVENOUS at 12:51

## 2020-06-26 RX ADMIN — Medication 3 G: at 11:39

## 2020-06-26 ASSESSMENT — COPD QUESTIONNAIRES
CAT_SEVERITY: MODERATE
COPD: 1

## 2020-06-26 ASSESSMENT — LIFESTYLE VARIABLES: TOBACCO_USE: 1

## 2020-06-26 ASSESSMENT — MIFFLIN-ST. JEOR: SCORE: 2063.1

## 2020-06-26 NOTE — DISCHARGE INSTRUCTIONS
Same Day Surgery Discharge Instructions for  Sedation and General Anesthesia       It's not unusual to feel dizzy, light-headed or faint for up to 24 hours after surgery or while taking pain medication.  If you have these symptoms: sit for a few minutes before standing and have someone assist you when you get up to walk or use the bathroom.      You should rest and relax for the next 24 hours. We recommend you make arrangements to have an adult stay with you for at least 24 hours after your discharge.  Avoid hazardous and strenuous activity.      DO NOT DRIVE any vehicle or operate mechanical equipment for 24 hours following the end of your surgery.  Even though you may feel normal, your reactions may be affected by the medication you have received.      Do not drink alcoholic beverages for 24 hours following surgery.       Slowly progress to your regular diet as you feel able. It's not unusual to feel nauseated and/or vomit after receiving anesthesia.  If you develop these symptoms, drink clear liquids (apple juice, ginger ale, broth, 7-up, etc. ) until you feel better.  If your nausea and vomiting persists for 24 hours, please notify your surgeon.        All narcotic pain medications, along with inactivity and anesthesia, can cause constipation. Drinking plenty of liquids and increasing fiber intake will help.      For any questions of a medical nature, call your surgeon.      Do not make important decisions for 24 hours.      If you had general anesthesia, you may have a sore throat for a couple of days related to the breathing tube used during surgery.  You may use Cepacol lozenges to help with this discomfort.  If it worsens or if you develop a fever, contact your surgeon.       If you feel your pain is not well managed with the pain medications prescribed by your surgeon, please contact your surgeon's office to let them know so they can address your concerns.       CoVid 19 Information    We want to give you  information regarding Covid. Please consult your primary care provider with any questions you might have.     Patient who have symptoms (cough, fever, or shortness of breath), need to isolate for 7 days from when symptoms started OR 72 hours after fever resolves (without fever reducing medications) AND improvement of respiratory symptoms (whichever is longer).      Isolate yourself at home (in own room/own bathroom if possible)    Do Not allow any visitors    Do Not go to work or school    Do Not go to Denominational,  centers, shopping, or other public places.    Do Not shake hands.    Avoid close and intimate contact with others (hugging, kissing).    Follow CDC recommendations for household cleaning of frequently touched services.     After the initial 7 days, continue to isolate yourself from household members as much as possible. To continue decrease the risk of community spread and exposure, you and any members of your household should limit activities in public for 14 days after starting home isolation.     You can reference the following CDC link for helpful home isolation/care tips:  https://www.cdc.gov/coronavirus/2019-ncov/downloads/10Things.pdf    Protect Others:    Cover Your Mouth and Nose with a mask, disposable tissue or wash cloth to avoid spreading germs to others.    Wash your hands and face frequently with soap and water    Call Your Primary Doctor If: Breathing difficulty develops or you become worse.    For more information about COVID19 and options for caring for yourself at home, please visit the CDC website at https://www.cdc.gov/coronavirus/2019-ncov/about/steps-when-sick.html  For more options for care at [facility], please visit our website at [email or web address]    Spinal Cord Stimulator Implant Discharge Instruction      After Surgery:     Resume light activity as tolerated    Restrict bending, lifting and twisting to reduce the chance of your leads  moving for 6-8 weeks    Resume your regular pain medication regimen    Begin taking your oral antibiotic the day after your surgery.  It is important you finish the entire course of medication until gone.    Driving is not recommended while your stimulator is turned on    A small amount of blood visible on the bandages is normal, if any leaks out of the bandage contact [facility]    Site Care:    Remove any bandages 48 hours after surgery.  A RN or your provider will remove the bandages at your follow-up appointment at [facility].    Your incisions have been closed with dissolvable stitches.  There are steri-strips covering your incision under your bandages.  Do not remove these, they will fall off on their own in 5-7 days.    After the bandages are removed, check your incision sites daily.  Keep sites clean and dry.    During the first few days, you may notice some swelling or discoloration around the incision sites, which is normal.  Some fluid (yellow to light red to orange) may ooze or leak from the incisions. However, if the fluid is foul smelling, thick, or does not decrease in amount, call [facility].    DO NOT shower until your bandages have been removed.    NO soaking your incisions for 2 weeks.  This includes baths, whirlpools, swimming pools, and hot tubs.    Call [facility] during business hours or the on-call call provider after hours if you develop any of the following:    Signs of infection such as: fevers, chills, increased pain, drainage (as mentioned above), redness, and swelling from your incision sites.    Headache persisting for more than 48 hours    Unusual changes in or stopping of sensation in your legs or back.     Uncontrolled pain.     Signs or symptoms of a deep vein thrombosis (DVT) : swelling in one or both legs, pain or tenderness in one or both legs, warm skin on your leg, and/or red or discolored skin on your leg    [facility]  [facility]      After hours on-call provider     Emergency Situations go to the Emergency Department or Call 911:    Sudden severe back pain    Sudden onset of leg weakness and spasms    Loss of bladder control and/or bowel function    Signs or symptoms or a pulmonary embolism (PE): sudden coughing --which may bring up blood, sharp chest pain, rapid breathing or shortness of breath, and/or severe light headedness         RESUME COUMADIN TONIGHT  RESUME HEPARIN TOMORROW

## 2020-06-26 NOTE — ANESTHESIA CARE TRANSFER NOTE
Patient: Petra Mcnulty    Procedure(s):  SPINAL CORD STIMULATOR IMPLANT    Diagnosis: Postlaminectomy syndrome, cervical region [M96.1]  Diagnosis Additional Information: No value filed.    Anesthesia Type:   MAC     Note:  Airway :Nasal Cannula  Patient transferred to:PACU  Comments: At end of procedure, spontaneous respirations, patient alert to voice, able to follow commands. Oxygen via nasal cannula at 4 liters per minute to PACU. Oxygen tubing connected to wall O2 in PACU, SpO2, NiBP, and EKG monitors and alarms on and functioning, report on patient's clinical status given to PACU RN, RN questions answered. 100mcg phenylephrine given during handoff, RN aware. Subsequent /78. Handoff Report: Identifed the Patient, Identified the Reponsible Provider, Reviewed the pertinent medical history, Discussed the surgical course, Reviewed Intra-OP anesthesia mangement and issues during anesthesia, Set expectations for post-procedure period and Allowed opportunity for questions and acknowledgement of understanding      Vitals: (Last set prior to Anesthesia Care Transfer)    CRNA VITALS  6/26/2020 1222 - 6/26/2020 1302      6/26/2020             Resp Rate (set):  10                Electronically Signed By: BRETT Gonzalez CRNA  June 26, 2020  1:02 PM

## 2020-06-26 NOTE — ANESTHESIA PREPROCEDURE EVALUATION
Anesthesia Pre-Procedure Evaluation    Patient: Petra Mcnulty   MRN: [id number] : 1950          Preoperative Diagnosis: Postlaminectomy syndrome, cervical region [M96.1]    Procedure(s):  SPINAL CORD STIMULATOR IMPLANT    Past Medical History:   Diagnosis Date     Arthritis     hands, joints     Coagulation disorder (H)     factor 5 leiden     COPD (chronic obstructive pulmonary disease) (H)      Factor V deficiency (H)      Glaucoma      History of blood transfusion     x2     History of thrombophlebitis      Hypertension      Hypothyroid      Insomnia      Obese      Onychomycosis      Other chronic pain     back     PPD positive     INH TX x 1 year     Pulmonary embolism (H)      Sleep apnea     uses cpap     Uncomplicated asthma      Past Surgical History:   Procedure Laterality Date     ARTHROPLASTY HIP      left + 2 I&Ds     ARTHROSCOPY KNEE       AS TOTAL HIP ARTHROPLASTY       BACK SURGERY      c3-c4 discectomy/fusion,     BACK SURGERY      lumbar decompression/lami      BIOPSY      breast     C TOTAL KNEE ARTHROPLASTY      left,right     CHOLECYSTECTOMY       COLONOSCOPY       D & C       HYSTEROSCOPY      d and c     INSERT STIMULATOR DORSAL COLUMN TRIAL N/A 2020    Procedure: SPINAL CORD STIMULATOR TRIAL;  Surgeon: Karlee Duffy MD;  Location:  OR     OPERATIVE HYSTEROSCOPY WITH MORCELLATOR N/A 2020    Procedure: 1. Exam under anesthesia 2. Dilation and curettage under direct visualization 3. Operative hysteroscopy 4. polypectomy;  Surgeon: Parisa Goldberg MD;  Location:  OR       Anesthesia Evaluation     . Pt has had prior anesthetic. Type: General    No history of anesthetic complications          ROS/MED HX    ENT/Pulmonary:     (+)sleep apnea, tobacco use, moderate COPD, uses CPAP , . .   (-) recent URI   Neurologic:  - neg neurologic ROS     Cardiovascular:         METS/Exercise Tolerance:  >4 METS   Hematologic: Comments: [facility] Leiden  History of PE    (+) History of  "blood clots -      Musculoskeletal: Comment: Postlaminectomy syndrome  Chronic back pain  Radiculopathy    S/p TKA (bilat)  S/p TIANA        GI/Hepatic:  - neg GI/hepatic ROS       Renal/Genitourinary:      (-) renal disease   Endo:     (+) thyroid problem hypothyroidism, Obesity, .      Psychiatric:         Infectious Disease:         Malignancy:         Other:    (+) H/O Chronic Pain,                        Physical Exam  Normal systems: dental    Airway   Mallampati: III  TM distance: >3 FB  Neck ROM: full    Dental     Cardiovascular   Rhythm and rate: regular and normal      Pulmonary    breath sounds clear to auscultation            Lab Results   Component Value Date    WBC 5.6 06/24/2020    HGB 14.4 06/24/2020    HCT 45.9 06/24/2020     06/24/2020     06/09/2020    POTASSIUM 4.1 06/09/2020    CHLORIDE 109 06/09/2020    CO2 25 06/09/2020    BUN 13 06/09/2020    CR 0.55 06/09/2020     (H) 06/09/2020    GEORGE 8.3 (L) 06/09/2020    ALBUMIN 3.3 (L) 06/09/2020    PROTTOTAL 7.4 06/09/2020    ALT 23 06/09/2020    AST 22 06/09/2020    ALKPHOS 67 06/09/2020    BILITOTAL 0.3 06/09/2020    PTT 29 02/06/2020    INR 1.14 06/24/2020       Preop Vitals  BP Readings from Last 3 Encounters:   06/26/20 (!) 141/59   06/24/20 130/78   06/09/20 132/76    Pulse Readings from Last 3 Encounters:   06/26/20 86   06/24/20 79   06/09/20 80      Resp Readings from Last 3 Encounters:   06/26/20 16   06/24/20 16   06/09/20 20    SpO2 Readings from Last 3 Encounters:   06/26/20 95%   06/24/20 96%   06/09/20 99%      Temp Readings from Last 1 Encounters:   06/26/20 36.3  C (97.4  F) (Oral)    Ht Readings from Last 1 Encounters:   06/26/20 1.727 m (5' 8\")      Wt Readings from Last 1 Encounters:   06/26/20 149.5 kg (329 lb 8 oz)    Estimated body mass index is 50.1 kg/m  as calculated from the following:    Height as of this encounter: 1.727 m (5' 8\").    Weight as of this encounter: 149.5 kg (329 lb 8 oz).       Anesthesia " Plan      History & Physical Review  History and physical reviewed and following examination; no interval change.    ASA Status:  3 .    NPO Status:  > 8 hours    Plan for MAC     Tolerated propofol/precedex MAC for trial well.         Postoperative Care      Consents  Anesthetic plan, risks, benefits and alternatives discussed with:  Patient..                 Prateek Flores MD

## 2020-06-26 NOTE — ANESTHESIA POSTPROCEDURE EVALUATION
Patient: Petra Mcnulty    Procedure(s):  SPINAL CORD STIMULATOR IMPLANT    Diagnosis:Postlaminectomy syndrome, cervical region [M96.1]  Diagnosis Additional Information: No value filed.    Anesthesia Type:  MAC    Note:  Anesthesia Post Evaluation    Patient location during evaluation: PACU  Patient participation: Able to fully participate in evaluation  Level of consciousness: awake  Pain management: adequate  Airway patency: patent  Cardiovascular status: acceptable  Respiratory status: acceptable  Hydration status: acceptable  PONV: none     Anesthetic complications: None          Last vitals:  Vitals:    06/26/20 1410 06/26/20 1439 06/26/20 1541   BP: 125/56 116/65    Pulse: 73     Resp:  16    Temp:      SpO2: 94% 94% 96%         Electronically Signed By: Prateek Flores MD  June 26, 2020  5:15 PM

## 2020-06-27 NOTE — RESULT ENCOUNTER NOTE
Please call patient to notify her that in addition to the polyp that was resected at surgery, the pathology report does show some hyperplasia (without atypia). While this is on the lower end of the pre-cancer scale, it should be managed. Please have her see me in clinic in the next few weeks to discuss a plan.     Parisa Goldberg MD

## 2020-06-29 ENCOUNTER — DOCUMENTATION ONLY (OUTPATIENT)
Dept: NURSING | Facility: CLINIC | Age: 70
End: 2020-06-29

## 2020-06-29 NOTE — PROGRESS NOTES
ANTICOAGULATION  MANAGEMENT: Discharge Review    Petra Mcnulty chart reviewed for anticoagulation continuity of care    Hospital Admission on 06/26/20 for spinal cord stimulator.    Discharge disposition: Home    Results:    Recent labs: (last 7 days)     06/24/20  0947 06/26/20  0949   INR 1.14 1.09     Anticoagulation inpatient management:   held warfarin due to upcoming procedure.  Patient was given dosing instructions previously since this was a planned prodedure.  No changes made to the inital plan that was given.       Anticoagulation discharge instructions:     Warfarin dosing: as previously discussed with home INR clinic   Bridging: bridging with heparin   INR goal change: No      Medication changes affecting anticoagulation: No    Additional factors affecting anticoagulation: No    Plan     No adjustment to anticoagulation plan needed    Patient not contacted, INR already scheduled for 07/01/20    No adjustment to Anticoagulation Calendar was required    Jesenia Vazquez RN

## 2020-07-01 ENCOUNTER — ANTICOAGULATION THERAPY VISIT (OUTPATIENT)
Dept: ANTICOAGULATION | Facility: CLINIC | Age: 70
End: 2020-07-01

## 2020-07-01 DIAGNOSIS — D68.51 FACTOR V LEIDEN (H): ICD-10-CM

## 2020-07-01 DIAGNOSIS — Z86.711 HISTORY OF PULMONARY EMBOLISM: ICD-10-CM

## 2020-07-01 DIAGNOSIS — D68.51 HETEROZYGOUS FACTOR V LEIDEN MUTATION (H): ICD-10-CM

## 2020-07-01 DIAGNOSIS — Z79.01 LONG TERM CURRENT USE OF ANTICOAGULANTS WITH INR GOAL OF 2.0-3.0: ICD-10-CM

## 2020-07-01 LAB
CAPILLARY BLOOD COLLECTION: NORMAL
INR PPP: 1.4 (ref 0.86–1.14)

## 2020-07-01 PROCEDURE — 99207 ZZC NO CHARGE NURSE ONLY: CPT | Performed by: INTERNAL MEDICINE

## 2020-07-01 PROCEDURE — 36416 COLLJ CAPILLARY BLOOD SPEC: CPT | Performed by: INTERNAL MEDICINE

## 2020-07-01 PROCEDURE — 85610 PROTHROMBIN TIME: CPT | Performed by: INTERNAL MEDICINE

## 2020-07-01 NOTE — PROGRESS NOTES
ANTICOAGULATION FOLLOW-UP CLINIC VISIT    Patient Name:  Petra Mcnulty  Date:  2020  Contact Type:  Telephone/ discussed with patient    SUBJECTIVE:  Patient Findings     Comments:   Patient had an OB procedure on  and a spinal cord stimulator placed on .  She has been using heparin bridging during this time.  She is feeling well at this time and has very little pain now.  The patient was assessed for diet, medication, and activity level changes, extra doses, bruising or bleeding, with no problem findings.          Clinical Outcomes     Negatives:   Major bleeding event, Thromboembolic event, Anticoagulation-related hospital admission, Anticoagulation-related ED visit, Anticoagulation-related fatality    Comments:   Patient had an OB procedure on  and a spinal cord stimulator placed on .  She has been using heparin bridging during this time.  She is feeling well at this time and has very little pain now.  The patient was assessed for diet, medication, and activity level changes, extra doses, bruising or bleeding, with no problem findings.             OBJECTIVE    Recent labs: (last 7 days)     20  0926   INR 1.40*       ASSESSMENT / PLAN  INR assessment SUB    Recheck INR In: 5 DAYS    INR Location Clinic      Anticoagulation Summary  As of 2020    INR goal:   2.0-3.0   TTR:   44.0 % (4.1 mo)   INR used for dosin.40! (2020)   Warfarin maintenance plan:   4 mg (2 mg x 2) every Mon, Wed, Fri; 3 mg (2 mg x 1.5) all other days   Full warfarin instructions:   : 6 mg; 7/: 4 mg; Otherwise 4 mg every Mon, Wed, Fri; 3 mg all other days   Weekly warfarin total:   24 mg   Plan last modified:   Rae Wing RN (2020)   Next INR check:   2020   Target end date:   Indefinite    Indications    History of pulmonary embolism [Z86.711]  Heterozygous factor V Leiden mutation (H) [D68.51]             Anticoagulation Episode Summary     INR check location:       Preferred lab:        Send INR reminders to:   MARIUM OLVERAS    Comments:   Call cell number with results for INR      Anticoagulation Care Providers     Provider Role Specialty Phone number    Mona Diaz MD Responsible Internal Medicine [phone]            See the Encounter Report to view Anticoagulation Flowsheet and Dosing Calendar (Go to Encounters tab in chart review, and find the Anticoagulation Therapy Visit)    Dosage adjustment made based on physician directed care plan.    Rae Wing RN

## 2020-07-06 ENCOUNTER — ANTICOAGULATION THERAPY VISIT (OUTPATIENT)
Dept: NURSING | Facility: CLINIC | Age: 70
End: 2020-07-06
Payer: COMMERCIAL

## 2020-07-06 ENCOUNTER — OFFICE VISIT (OUTPATIENT)
Dept: OBGYN | Facility: CLINIC | Age: 70
End: 2020-07-06
Payer: COMMERCIAL

## 2020-07-06 VITALS — DIASTOLIC BLOOD PRESSURE: 62 MMHG | SYSTOLIC BLOOD PRESSURE: 128 MMHG | WEIGHT: 293 LBS | BODY MASS INDEX: 49.81 KG/M2

## 2020-07-06 DIAGNOSIS — Z79.01 LONG TERM CURRENT USE OF ANTICOAGULANTS WITH INR GOAL OF 2.0-3.0: ICD-10-CM

## 2020-07-06 DIAGNOSIS — D68.51 FACTOR V LEIDEN (H): ICD-10-CM

## 2020-07-06 DIAGNOSIS — Z86.711 HISTORY OF PULMONARY EMBOLISM: ICD-10-CM

## 2020-07-06 DIAGNOSIS — N85.00 ENDOMETRIAL HYPERPLASIA WITHOUT ATYPIA: Primary | ICD-10-CM

## 2020-07-06 DIAGNOSIS — D68.51 HETEROZYGOUS FACTOR V LEIDEN MUTATION (H): ICD-10-CM

## 2020-07-06 LAB
CAPILLARY BLOOD COLLECTION: NORMAL
INR PPP: 1.9 (ref 0.86–1.14)

## 2020-07-06 PROCEDURE — 99207 ZZC NO CHARGE NURSE ONLY: CPT

## 2020-07-06 PROCEDURE — 36416 COLLJ CAPILLARY BLOOD SPEC: CPT | Performed by: INTERNAL MEDICINE

## 2020-07-06 PROCEDURE — 85610 PROTHROMBIN TIME: CPT | Performed by: INTERNAL MEDICINE

## 2020-07-06 PROCEDURE — 99213 OFFICE O/P EST LOW 20 MIN: CPT | Performed by: OBSTETRICS & GYNECOLOGY

## 2020-07-06 NOTE — NURSING NOTE
"Chief Complaint   Patient presents with     Surgical Followup     and discuss Pathology results from surgery on 20, patient states she is feeling good.       Initial /62   Wt 148.6 kg (327 lb 9.6 oz)   BMI 49.81 kg/m   Estimated body mass index is 49.81 kg/m  as calculated from the following:    Height as of 20: 1.727 m (5' 8\").    Weight as of this encounter: 148.6 kg (327 lb 9.6 oz).  BP completed using cuff size: large    Questioned patient about current smoking habits.  Pt. Quit smoking some time ago.          The following HM Due: NONE      Jeromy Bhardwaj CMA                "

## 2020-07-06 NOTE — PROGRESS NOTES
"ANTICOAGULATION FOLLOW-UP CLINIC VISIT    Patient Name:  Petra Mcnulty  Date:  2020  Contact Type:  Telephone    SUBJECTIVE:  Patient Findings     Positives:   Change in health (Pt diagnosed with low grade 2 hyperplasia of the uterine wall, she will be getting an estrogen based IUD in near future.), Missed doses (Intentional hold  for GYN procedure and spinal stimulator placement), Change in medications (pt will continue bridging with Heparin, she got refilled recently so won't run out.), Bruising (very small bruises on abd from heparin injections. but pt states \"not as bad as when she used Lovenox in the past\")    Comments:   Warfarin dosing bumped to 6mg today, suggested by [facility] Pharm[facility]        Clinical Outcomes     Negatives:   Major bleeding event, Thromboembolic event, Anticoagulation-related hospital admission, Anticoagulation-related ED visit, Anticoagulation-related fatality    Comments:   Warfarin dosing bumped to 6mg today, suggested by [facility] Pharm[facility]           OBJECTIVE    Recent labs: (last 7 days)     20  1419   INR 1.90*       ASSESSMENT / PLAN  INR assessment SUB    Recheck INR In: 2 DAYS    INR Location Clinic      Anticoagulation Summary  As of 2020    INR goal:   2.0-3.0   TTR:   42.3 % (4.3 mo)   INR used for dosin.90! (2020)   Warfarin maintenance plan:   4 mg (2 mg x 2) every Mon, Wed, Fri; 3 mg (2 mg x 1.5) all other days   Full warfarin instructions:   : 6 mg; Otherwise 4 mg every Mon, Wed, Fri; 3 mg all other days   Weekly warfarin total:   24 mg   Plan last modified:   Jesenia Vazquez RN (2020)   Next INR check:   2020   Target end date:   Indefinite    Indications    History of pulmonary embolism [Z86.711]  Heterozygous factor V Leiden mutation (H) [D68.51]             Anticoagulation Episode Summary     INR check location:       Preferred lab:       Send INR reminders to:   MARIUM MORTON    Comments:   Call cell number " with results for INR      Anticoagulation Care Providers     Provider Role Specialty Phone number    Mona Diaz MD Responsible Internal Medicine [phone]            See the Encounter Report to view Anticoagulation Flowsheet and Dosing Calendar (Go to Encounters tab in chart review, and find the Anticoagulation Therapy Visit)        Jesenia Vazquez RN

## 2020-07-06 NOTE — PROGRESS NOTES
SUBJECTIVE:   CC: endometrial hyperplasia                                               Petra Mcnulty is a 69 year old  female who presents to clinic today to discuss management of simple endometrial hyperplasia with focal complex hyperplasia diagnosed during hysteroscopy 20 for postmenopausal bleeding and suspected endometrial polyp. She is recovering well from surgery with no complaints    FINAL DIAGNOSIS:   Endometrium, curettage.   - Fragments of endometrium and endometrial polyp with endometrial   hyperplasia (simple and focal complex   hyperplasia) without definitive atypia.  Negative for malignancy in   submitted sample.     Problem list and histories reviewed & adjusted, as indicated.  Additional history: as documented.    ROS:  Const: no weight changes, fever, chills       acetaminophen (TYLENOL) 500 MG tablet, Take 1,000 mg by mouth 2 times daily   albuterol (PROAIR HFA/PROVENTIL HFA/VENTOLIN HFA) 108 (90 Base) MCG/ACT inhaler, Inhale 2 puffs into the lungs every 4 hours as needed for shortness of breath / dyspnea or wheezing  Fesoterodine Fumarate 8 MG TB24, Take by mouth daily  gabapentin (NEURONTIN) 300 MG capsule, Take 300 mg by mouth 2 times daily  Heparin Sodium, Porcine, (HEPARIN, PORCINE,) 5000 UNIT/ML SOLN injection, Inject 1 mL (5,000 Units) Subcutaneous every 12 hours Starting , last dose 20 PM, resume as directed after surgery  HYDROcodone-acetaminophen (NORCO) 5-325 MG tablet, Take 1 tablet by mouth every 6 hours as needed for severe pain  levothyroxine (SYNTHROID/LEVOTHROID) 100 MCG tablet, Take 100 mcg by mouth daily  lisinopril (PRINIVIL/ZESTRIL) 10 MG tablet, Take 10 mg by mouth daily  loratadine (CLARITIN) 10 MG tablet, Take 10 mg by mouth daily  methocarbamol (ROBAXIN) 750 MG tablet, Take 750 mg by mouth At Bedtime  mirabegron (MYRBETRIQ) 25 MG 24 hr tablet, Take 1 tablet (25 mg) by mouth daily  pravastatin (PRAVACHOL) 40 MG tablet, Take 40 mg by mouth  daily  warfarin ANTICOAGULANT (COUMADIN) 2 MG tablet, TAKE 2 TABLETS ON MONDAY, WEDNESDAY, FRIDAY, AND 1 1/2 TABLET ON SUN, TUESDAY, THURSDAY, AND SATURDAY OR AS DIRECTED BY INR CLINIC    No current facility-administered medications on file prior to visit.     Allergies   Allergen Reactions     Atorvastatin      myalgia     Simvastatin Rash       OBJECTIVE:   /62   Wt 148.6 kg (327 lb 9.6 oz)   BMI 49.81 kg/m     Const: sitting in chair in no acute distress, comfortable  Pulm: no increased work of breathing, no cough  Psych: mood stable, appropriate affect  Neuro: A+Ox3     ASSESSMENT/PLAN:                                                    Petra Mcnulty is a 69 year old female  here to discuss management of endometrial hyperplasia, simple with focal complex hyperplasia, no atypia. We reviewed options for expectant management, progesterone therapy, and hysterectomy. Her obesity puts her at ongoing risk of EH and endometrial carcinoma so I advised against expectant management. Her history of VTE causes her to be higher risk for oral progesterone therapy, so we discussed LNG IUD as an appropriate option. She is also a poor surgical candidate due to obesity and chronic anticoagulation. Therefore she has agreed to return for Mirena IUD placement for management of hyperplasia.      1. 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  - return for mirena IUD placement in the next month, plans pretreatment with cytotec and ibuprofen (has both at home)   - plan to repeat sampling with endometrial biopsy in 3-6 months.    Parisa Goldberg MD  Obstetrics and Gynecology   [facility]

## 2020-07-07 NOTE — OP NOTE
Name:    Petra Mcnulty   :    1950  Location:   [facility]  Procedure date: 2020 11:30 AM  Procedure:     Medtronic Spinal Cord Stimulator Implant   Indication:   Postlaminectomy syndrome, not elsewhere classified,  M96.1      Allergies:  Ingredient Reaction (Severity) Medication Name Comment   ATORVASTATIN CALCIUM Muscle pain Lipitor    SIMVASTATIN Rash Zocor          Anesthesia: MAC and Local.    Implants: All implants were  products. The lead lot numbers were [id number]. The implantable pulse generator was lot number [id number] and serial number [id number].    Description of Procedure:  After discussing potential risks and benefits, the patient did wish to proceed and signed a written consent form. The pocket site was marked in the preoperative area. An antibiotic prescription was given to the patient to use for the next 10 days for prophylaxis. The patient was brought into the operating room and positioned prone on the operating table taking care relieve all pressure points and place extremities in a comfortable position. MAC anesthesia was induced. The operative field was prepped and draped in normal sterile fashion. The skin was anesthetized with Xylocaine at the appropriate level as identified fluoroscopically.  A paramedian incision was then made in the mid back. A 14 gauge Tuohy needle was then introduced under fluoroscopy. The T11-T12 epidural space was entered as a loss of resistance was felt with an air filled syringe. The 8 contact compact spinal cord stimulator lead was advanced to the  top T8 vertebral level. This lead was left of midline.  A second  8 contact compact spinal cord stimulator lead was placed through the epidural space and advanced to the  bottom T8 vertebral level. This lead was right of midline. Lateral and AP fluoroscopy views were obtained and confirmed appropriate posterior epidural placement.      The anchors were placed and tightened  around the leads using and then to the fascia using 2.0 silk sutures. A superficial gluteal pocket was developed on the  side.  A subcutaneous tunnel was made and the cables were pulled through to the gluteal pocket. The cable ends were connected to the IPG and all connections were secured to torque. An impedance check was performed and all impedances were within normal limits. The IPG and excess cable were then placed into the gluteal pocket with the excess cable coiled behind the IPG. The wounds were then irrigated profusely with saline solution was dispersed between the two incision sites. The incisions were closed in layers using absorbable sutures. Steri-strips were used as well as sutures. Final X-ray revealed no significant changes in lead positions.      The patient was awakened, positioned supine and delivered to the recovery  room.      Rosemary Dacosta DO   06/26/2020 11:30 AM

## 2020-07-08 ENCOUNTER — ANTICOAGULATION THERAPY VISIT (OUTPATIENT)
Dept: ANTICOAGULATION | Facility: CLINIC | Age: 70
End: 2020-07-08

## 2020-07-08 DIAGNOSIS — Z79.01 LONG TERM CURRENT USE OF ANTICOAGULANTS WITH INR GOAL OF 2.0-3.0: ICD-10-CM

## 2020-07-08 DIAGNOSIS — D68.51 FACTOR V LEIDEN (H): ICD-10-CM

## 2020-07-08 DIAGNOSIS — Z86.711 HISTORY OF PULMONARY EMBOLISM: ICD-10-CM

## 2020-07-08 DIAGNOSIS — D68.51 HETEROZYGOUS FACTOR V LEIDEN MUTATION (H): ICD-10-CM

## 2020-07-08 LAB
CAPILLARY BLOOD COLLECTION: NORMAL
INR PPP: 2.2 (ref 0.86–1.14)

## 2020-07-08 PROCEDURE — 85610 PROTHROMBIN TIME: CPT | Performed by: INTERNAL MEDICINE

## 2020-07-08 PROCEDURE — 36416 COLLJ CAPILLARY BLOOD SPEC: CPT | Performed by: INTERNAL MEDICINE

## 2020-07-08 PROCEDURE — 99207 ZZC NO CHARGE NURSE ONLY: CPT | Performed by: INTERNAL MEDICINE

## 2020-07-08 NOTE — PROGRESS NOTES
ANTICOAGULATION FOLLOW-UP CLINIC VISIT    Patient Name:  Petra Mcnulty  Date:  2020  Contact Type:  Telephone/ discussed with patient    SUBJECTIVE:  Patient Findings     Comments:   Patient will discontinue heparin today.  Will resume previous therapeutic maintenance dose.  May have an estrogen based IUD placed in the near future, but does not have a date for this yet.  The patient was assessed for diet, medication, and activity level changes, missed or extra doses, bruising or bleeding, with no problem findings.          Clinical Outcomes     Negatives:   Major bleeding event, Thromboembolic event, Anticoagulation-related hospital admission, Anticoagulation-related ED visit, Anticoagulation-related fatality    Comments:   Patient will discontinue heparin today.  Will resume previous therapeutic maintenance dose.  May have an estrogen based IUD placed in the near future, but does not have a date for this yet.  The patient was assessed for diet, medication, and activity level changes, missed or extra doses, bruising or bleeding, with no problem findings.             OBJECTIVE    Recent labs: (last 7 days)     20  1253   INR 2.20*       ASSESSMENT / PLAN  INR assessment THER    Recheck INR In: 5 DAYS    INR Location Clinic      Anticoagulation Summary  As of 2020    INR goal:   2.0-3.0   TTR:   42.6 % (4.4 mo)   INR used for dosin.20 (2020)   Warfarin maintenance plan:   4 mg (2 mg x 2) every Mon, Wed, Fri; 3 mg (2 mg x 1.5) all other days   Full warfarin instructions:   4 mg every Mon, Wed, Fri; 3 mg all other days   Weekly warfarin total:   24 mg   Plan last modified:   Jesenia Vazquez RN (2020)   Next INR check:   2020   Target end date:   Indefinite    Indications    History of pulmonary embolism [Z86.711]  Heterozygous factor V Leiden mutation (H) [D68.51]             Anticoagulation Episode Summary     INR check location:       Preferred lab:       Send INR reminders to:    MARIUM [facility]    Comments:   Call cell number with results for INR      Anticoagulation Care Providers     Provider Role Specialty Phone number    Mona Diaz MD Responsible Internal Medicine [phone]            See the Encounter Report to view Anticoagulation Flowsheet and Dosing Calendar (Go to Encounters tab in chart review, and find the Anticoagulation Therapy Visit)    Dosage adjustment made based on physician directed care plan.    Rae Wing RN

## 2020-07-13 ENCOUNTER — ANTICOAGULATION THERAPY VISIT (OUTPATIENT)
Dept: NURSING | Facility: CLINIC | Age: 70
End: 2020-07-13

## 2020-07-13 DIAGNOSIS — D68.51 HETEROZYGOUS FACTOR V LEIDEN MUTATION (H): ICD-10-CM

## 2020-07-13 DIAGNOSIS — Z86.711 HISTORY OF PULMONARY EMBOLISM: ICD-10-CM

## 2020-07-13 DIAGNOSIS — Z79.01 LONG TERM CURRENT USE OF ANTICOAGULANTS WITH INR GOAL OF 2.0-3.0: ICD-10-CM

## 2020-07-13 DIAGNOSIS — D68.51 FACTOR V LEIDEN (H): ICD-10-CM

## 2020-07-13 LAB
CAPILLARY BLOOD COLLECTION: NORMAL
INR PPP: 2.3 (ref 0.86–1.14)

## 2020-07-13 PROCEDURE — 36416 COLLJ CAPILLARY BLOOD SPEC: CPT | Performed by: INTERNAL MEDICINE

## 2020-07-13 PROCEDURE — 85610 PROTHROMBIN TIME: CPT | Performed by: INTERNAL MEDICINE

## 2020-07-13 PROCEDURE — 99207 ZZC NO CHARGE NURSE ONLY: CPT

## 2020-07-13 NOTE — PROGRESS NOTES
ANTICOAGULATION FOLLOW-UP CLINIC VISIT    Patient Name:  Petra Mcnulty  Date:  2020  Contact Type:  Telephone    SUBJECTIVE:  Patient Findings     Comments:   Called patient to discuss today's INR results: The patient was assessed for diet, medication, and activity level changes, missed or extra doses, bruising or bleeding, with no problem findings. Patient states she is healing/recovering well from recent GYN procedure and spinal stimulator placement. Reviewed maintenance warfarin dosing with patient. Patient will remain on the same dose until next INR check. No other questions or concerns. Scheduled next lab-only INR in two weeks, will be on vacation and out of town next week.  Vijaya DIAZ RN  Anticoagulation Clinic  [facility]          Clinical Outcomes     Negatives:   Major bleeding event, Thromboembolic event, Anticoagulation-related hospital admission, Anticoagulation-related ED visit, Anticoagulation-related fatality    Comments:   Called patient to discuss today's INR results: The patient was assessed for diet, medication, and activity level changes, missed or extra doses, bruising or bleeding, with no problem findings. Patient states she is healing/recovering well from recent GYN procedure and spinal stimulator placement. Reviewed maintenance warfarin dosing with patient. Patient will remain on the same dose until next INR check. No other questions or concerns. Scheduled next lab-only INR in two weeks, will be on vacation and out of town next week.  Vijaya DIAZ RN  Anticoagulation Clinic  [facility]             OBJECTIVE    Recent labs: (last 7 days)     20  1328   INR 2.30*       ASSESSMENT / PLAN  INR assessment THER    Recheck INR In: 2 WEEKS    INR Location Clinic      Anticoagulation Summary  As of 2020    INR goal:   2.0-3.0   TTR:   44.7 % (4.5 mo)   INR used for dosin.30 (2020)   Warfarin maintenance plan:   4 mg (2 mg x 2) every Mon, Wed, Fri; 3 mg (2 mg x 1.5) all  other days   Full warfarin instructions:   4 mg every Mon, Wed, Fri; 3 mg all other days   Weekly warfarin total:   24 mg   No change documented:   Vijaya Walters RN   Plan last modified:   Jesenia Vazquez RN (7/6/2020)   Next INR check:   7/27/2020   Priority:   Maintenance   Target end date:   Indefinite    Indications    History of pulmonary embolism [Z86.711]  Heterozygous factor V Leiden mutation (H) [D68.51]             Anticoagulation Episode Summary     INR check location:       Preferred lab:       Send INR reminders to:   [facility]    Comments:   Call cell number with results for INR      Anticoagulation Care Providers     Provider Role Specialty Phone number    Mona Diaz MD Responsible Internal Medicine [phone]            See the Encounter Report to view Anticoagulation Flowsheet and Dosing Calendar (Go to Encounters tab in chart review, and find the Anticoagulation Therapy Visit)    Vijaya Walters RN

## 2020-07-14 ENCOUNTER — DOCUMENTATION ONLY (OUTPATIENT)
Dept: OTHER | Facility: CLINIC | Age: 70
End: 2020-07-14

## 2020-07-27 ENCOUNTER — ANTICOAGULATION THERAPY VISIT (OUTPATIENT)
Dept: INTERNAL MEDICINE | Facility: CLINIC | Age: 70
End: 2020-07-27

## 2020-07-27 DIAGNOSIS — Z86.711 HISTORY OF PULMONARY EMBOLISM: ICD-10-CM

## 2020-07-27 DIAGNOSIS — D68.51 FACTOR V LEIDEN (H): ICD-10-CM

## 2020-07-27 DIAGNOSIS — Z79.01 LONG TERM CURRENT USE OF ANTICOAGULANTS WITH INR GOAL OF 2.0-3.0: ICD-10-CM

## 2020-07-27 DIAGNOSIS — D68.51 HETEROZYGOUS FACTOR V LEIDEN MUTATION (H): ICD-10-CM

## 2020-07-27 LAB
CAPILLARY BLOOD COLLECTION: NORMAL
INR PPP: 1.5 (ref 0.86–1.14)

## 2020-07-27 PROCEDURE — 85610 PROTHROMBIN TIME: CPT | Performed by: INTERNAL MEDICINE

## 2020-07-27 PROCEDURE — 36416 COLLJ CAPILLARY BLOOD SPEC: CPT | Performed by: INTERNAL MEDICINE

## 2020-07-27 NOTE — PROGRESS NOTES
ANTICOAGULATION MANAGEMENT     Patient Name:  Petra Mcnulty  Date:  7/27/2020    ASSESSMENT /SUBJECTIVE:    Today's INR result of 1.5 is subtherapeutic. Goal INR of 2.0-3.0      Warfarin dose taken: Warfarin taken as previously instructed    Diet: No new diet changes affecting INR    Medication changes/ interactions: No new medications/supplements affecting INR    Previous INR: Therapeutic     S/S of bleeding or thromboembolism: No    New injury or illness: No    Upcoming surgery, procedure or cardioversion: No    Additional findings: Patient reports recovering well from procedures a few weeks ago and had been up and moving around more until recent pain in butt/lower leg started. Denies and s/s of blood clots.      PLAN:    Spoke with Petra regarding INR result and instructed:     Warfarin Dosing Instructions: 6 mg today then change your warfarin dose to 3 mg on M and F, 4 mg all other days of the week. Increased weekly warfarin dose by 8.3%.    Instructed patient to follow up no later than: 1 week  Lab visit scheduled    Education provided: Impact of vitamin K foods on INR and Monitoring for clotting signs and symptoms      Petra verbalizes understanding and agrees to warfarin dosing plan.    Instructed to call the Anticoagulation Clinic for any changes, questions or concerns. (#[phone])        Maria A Johnson [facility]      OBJECTIVE:  Recent labs: (last 7 days)     07/27/20  1515   INR 1.50*         No question data found.  Anticoagulation Summary  As of 7/27/2020    INR goal:   2.0-3.0   TTR:   44.1 % (5 mo)   INR used for dosing:      Warfarin maintenance plan:   4 mg (2 mg x 2) every Mon, Wed, Fri; 3 mg (2 mg x 1.5) all other days   Full warfarin instructions:   4 mg every Mon, Wed, Fri; 3 mg all other days   Weekly warfarin total:   24 mg   Plan last modified:   Jesenia Vazquez RN (7/6/2020)   Next INR check:      Priority:   Maintenance   Target end date:   Indefinite    Indications    History of  pulmonary embolism [Z86.711]  Heterozygous factor V Leiden mutation (H) [D68.51]             Anticoagulation Episode Summary     INR check location:       Preferred lab:       Send INR reminders to:   MARIUM MORTON    Comments:   Call cell number with results for INR      Anticoagulation Care Providers     Provider Role Specialty Phone number    Mona Diaz MD [facility] Internal Medicine [phone]

## 2020-08-03 ENCOUNTER — DOCUMENTATION ONLY (OUTPATIENT)
Dept: CARE COORDINATION | Facility: CLINIC | Age: 70
End: 2020-08-03

## 2020-08-04 ENCOUNTER — ANTICOAGULATION THERAPY VISIT (OUTPATIENT)
Dept: ANTICOAGULATION | Facility: CLINIC | Age: 70
End: 2020-08-04

## 2020-08-04 DIAGNOSIS — Z86.711 HISTORY OF PULMONARY EMBOLISM: ICD-10-CM

## 2020-08-04 DIAGNOSIS — Z79.01 LONG TERM CURRENT USE OF ANTICOAGULANTS WITH INR GOAL OF 2.0-3.0: ICD-10-CM

## 2020-08-04 DIAGNOSIS — D68.51 FACTOR V LEIDEN (H): ICD-10-CM

## 2020-08-04 DIAGNOSIS — D68.51 HETEROZYGOUS FACTOR V LEIDEN MUTATION (H): ICD-10-CM

## 2020-08-04 LAB
CAPILLARY BLOOD COLLECTION: NORMAL
INR PPP: 1.9 (ref 0.86–1.14)

## 2020-08-04 PROCEDURE — 36416 COLLJ CAPILLARY BLOOD SPEC: CPT | Performed by: INTERNAL MEDICINE

## 2020-08-04 PROCEDURE — 85610 PROTHROMBIN TIME: CPT | Performed by: INTERNAL MEDICINE

## 2020-08-04 PROCEDURE — 99207 ZZC NO CHARGE NURSE ONLY: CPT | Performed by: INTERNAL MEDICINE

## 2020-08-04 NOTE — PROGRESS NOTES
ANTICOAGULATION FOLLOW-UP CLINIC VISIT    Patient Name:  Petra Mcnulty  Date:  2020  Contact Type:  Telephone/ discussed with patient    SUBJECTIVE:  Patient Findings     Positives:   Change in medications (voltaren cream and ibuprofen 800 mg (2 times per day) for buttox pain.  She has been taking these meds since last Thursday.)    Comments:   May be having an injection sometime soon, however, does not have anything scheduled.  INR continues to be sub therapeutic.  Maintenance dose was increased by 4% since she is also taking ibuprofen.  The patient was assessed for diet, and activity level changes, missed or extra doses, bruising or bleeding, with no problem findings.           Clinical Outcomes     Negatives:   Major bleeding event, Thromboembolic event, Anticoagulation-related hospital admission, Anticoagulation-related ED visit, Anticoagulation-related fatality    Comments:   May be having an injection sometime soon, however, does not have anything scheduled.  INR continues to be sub therapeutic.  Maintenance dose was increased by 4% since she is also taking ibuprofen.  The patient was assessed for diet, and activity level changes, missed or extra doses, bruising or bleeding, with no problem findings.              OBJECTIVE    Recent labs: (last 7 days)     20  1152   INR 1.90*       ASSESSMENT / PLAN  INR assessment SUB    Recheck INR In: 1 WEEK    INR Location Clinic      Anticoagulation Summary  As of 2020    INR goal:   2.0-3.0   TTR:   41.9 % (5.3 mo)   INR used for dosin.90! (2020)   Warfarin maintenance plan:   3 mg (2 mg x 1.5) every Mon; 4 mg (2 mg x 2) all other days   Full warfarin instructions:   3 mg every Mon; 4 mg all other days   Weekly warfarin total:   27 mg   Plan last modified:   Rae Wing RN (2020)   Next INR check:   2020   Priority:   High   Target end date:   Indefinite    Indications    History of pulmonary embolism [Z86.711]  Heterozygous factor  V Leiden mutation (H) [D68.51]             Anticoagulation Episode Summary     INR check location:       Preferred lab:       [facility]    Comments:   Call cell number with results for INR      Anticoagulation Care Providers     Provider Role Specialty Phone number    Mona Diaz MD [facility] Internal Medicine [phone]            See the Encounter Report to view Anticoagulation Flowsheet and Dosing Calendar (Go to Encounters tab in chart review, and find the Anticoagulation Therapy Visit)    Dosage adjustment made based on physician directed care plan.    Rae Wing RN

## 2020-08-11 ENCOUNTER — OFFICE VISIT (OUTPATIENT)
Dept: OBGYN | Facility: CLINIC | Age: 70
End: 2020-08-11
Payer: COMMERCIAL

## 2020-08-11 ENCOUNTER — PRE VISIT (OUTPATIENT)
Dept: UROLOGY | Facility: CLINIC | Age: 70
End: 2020-08-11

## 2020-08-11 ENCOUNTER — ANTICOAGULATION THERAPY VISIT (OUTPATIENT)
Dept: ANTICOAGULATION | Facility: CLINIC | Age: 70
End: 2020-08-11

## 2020-08-11 VITALS — DIASTOLIC BLOOD PRESSURE: 70 MMHG | WEIGHT: 293 LBS | BODY MASS INDEX: 49.43 KG/M2 | SYSTOLIC BLOOD PRESSURE: 132 MMHG

## 2020-08-11 DIAGNOSIS — D68.51 HETEROZYGOUS FACTOR V LEIDEN MUTATION (H): ICD-10-CM

## 2020-08-11 DIAGNOSIS — Z86.711 HISTORY OF PULMONARY EMBOLISM: ICD-10-CM

## 2020-08-11 DIAGNOSIS — Z30.430 ENCOUNTER FOR IUD INSERTION: ICD-10-CM

## 2020-08-11 DIAGNOSIS — D68.51 FACTOR V LEIDEN (H): ICD-10-CM

## 2020-08-11 DIAGNOSIS — Z79.01 LONG TERM CURRENT USE OF ANTICOAGULANTS WITH INR GOAL OF 2.0-3.0: ICD-10-CM

## 2020-08-11 DIAGNOSIS — N85.01 ENDOMETRIAL HYPERPLASIA WITHOUT ATYPIA, COMPLEX: Primary | ICD-10-CM

## 2020-08-11 LAB
CAPILLARY BLOOD COLLECTION: NORMAL
INR PPP: 2.1 (ref 0.86–1.14)

## 2020-08-11 PROCEDURE — 58300 INSERT INTRAUTERINE DEVICE: CPT | Performed by: OBSTETRICS & GYNECOLOGY

## 2020-08-11 PROCEDURE — 85610 PROTHROMBIN TIME: CPT | Performed by: INTERNAL MEDICINE

## 2020-08-11 PROCEDURE — 99207 ZZC NO CHARGE NURSE ONLY: CPT | Performed by: INTERNAL MEDICINE

## 2020-08-11 PROCEDURE — 36416 COLLJ CAPILLARY BLOOD SPEC: CPT | Performed by: INTERNAL MEDICINE

## 2020-08-11 NOTE — PROGRESS NOTES
INDICATIONS:                                                      Petra Mcnulty is a 69 year old female,   who presents for insertion of an IUD for history of postmenopausal bleeding with simple and focal complex hyperplasia without atypia. The risks, benefits and alternatives of IUD insertion were discussed in detail previously. She also has reviewed the product brochure.  She has elected to go ahead with the insertion  today and her questions were answered. Consent was signed. A pregnancy test was not indicated    PROCEDURE:                                                      The pelvic exam revealed normal external genitalia. On bimanual exam the uterus was  Midposition and normal in size with no tenderness present. A speculum was inserted into the vagina and the cervix was visualized. The cervix was prepped with Betadine . The anterior lip of the cervix was grasped with a single toothed tenaculum. The uterus sounded to 8 cm. A Mirena IUD was then inserted without difficulty. The string was cut to 2 cm.  The patient experienced a mild amount of cramping.    POST PROCEDURE:                                                      She  tolerated the procedure well. There were no complications. Patient was discharged in stable condition.    1. 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, complex  - INSERTION INTRAUTERINE DEVICE   - return for endometrial biopsy in 6 months.     Return to clinic in 4-5 weeks for IUD check.  Call if severe cramping, fever, abnormal bleeding, abnormal discharge or pelvic pain develop.  Patient was counseled about the chance of irregular bleeding.    Parisa Goldberg MD

## 2020-08-11 NOTE — NURSING NOTE
"Chief Complaint   Patient presents with     IUD     patient is here to have Mirena IUD placed. She did premedicate with Ibuprofen and Cytotec as requested.       Initial /70   Wt 147.5 kg (325 lb 1.6 oz)   BMI 49.43 kg/m   Estimated body mass index is 49.43 kg/m  as calculated from the following:    Height as of 20: 1.727 m (5' 8\").    Weight as of this encounter: 147.5 kg (325 lb 1.6 oz).  BP completed using cuff size: large    Questioned patient about current smoking habits.  Pt. quit smoking some time ago.          The following HM Due: NONE      Jeromy Bhardwaj CMA               "

## 2020-08-11 NOTE — PROGRESS NOTES
ANTICOAGULATION FOLLOW-UP CLINIC VISIT    Patient Name:  Petra Mcnulty  Date:  2020  Contact Type:  Telephone/ Called patient, she denies any changes. Orders discussed with patient, she agrees with the plan.Lab INR appointment scheduled on 20.    SUBJECTIVE:  Patient Findings     Comments:   The patient was assessed for diet, medication, and activity level changes, missed or extra doses, bruising or bleeding, with no problem findings. Maintenance warfarin dosing was reviewed with patient and will remain on the same dose until next INR check. Patient did not have any questions or concerns.             Clinical Outcomes     Negatives:   Major bleeding event, Thromboembolic event, Anticoagulation-related hospital admission, Anticoagulation-related ED visit, Anticoagulation-related fatality    Comments:   The patient was assessed for diet, medication, and activity level changes, missed or extra doses, bruising or bleeding, with no problem findings. Maintenance warfarin dosing was reviewed with patient and will remain on the same dose until next INR check. Patient did not have any questions or concerns.                OBJECTIVE    Recent labs: (last 7 days)     20  1506   INR 2.10*       ASSESSMENT / PLAN  INR assessment THER    Recheck INR In: 8 DAYS    INR Location Outside lab      Anticoagulation Summary  As of 2020    INR goal:   2.0-3.0   TTR:   42.2 % (5.5 mo)   INR used for dosin.10 (2020)   Warfarin maintenance plan:   3 mg (2 mg x 1.5) every Mon; 4 mg (2 mg x 2) all other days   Full warfarin instructions:   3 mg every Mon; 4 mg all other days   Weekly warfarin total:   27 mg   No change documented:   Martha De La Cruz RN   Plan last modified:   Rae Wing RN (2020)   Next INR check:   2020   Priority:   High   Target end date:   Indefinite    Indications    History of pulmonary embolism [Z86.711]  Heterozygous factor V Leiden mutation (H) [D68.51]              Anticoagulation Episode Summary     INR check location:       Preferred lab:       Send INR reminders to:   MARIUM MORTON    Comments:   Call cell number with results for INR      Anticoagulation Care Providers     Provider Role Specialty Phone number    Mona Diaz MD [facility] Internal Medicine [phone]            See the Encounter Report to view Anticoagulation Flowsheet and Dosing Calendar (Go to Encounters tab in chart review, and find the Anticoagulation Therapy Visit)    Dosage adjustment made based on physician directed care plan.    Martha De La Cruz RN

## 2020-08-11 NOTE — PROGRESS NOTES
Anticoagulation Management    Unable to reach Petra today.    Today's INR result of 2.10 is therapeutic (goal INR of 2.0-3.0).  Result received from: Clinic Lab    Follow up required to confirm warfarin dose taken and assess for changes.  Patient was previously taking ibuprofen 800 mg BID.  Will need to confirm if she is still taking this.    Left message to continue current dose of warfarin 4 mg mg tonight.      Anticoagulation clinic to follow up    Rae Wing RN

## 2020-08-11 NOTE — TELEPHONE ENCOUNTER
Reason for visit: Medication follow up      Relevant information: mixed incontinence    Records/imaging/labs/orders: all records available    Pt called: no need for a call    At Rooming: standard

## 2020-08-12 PROBLEM — Z30.430 ENCOUNTER FOR IUD INSERTION: Status: ACTIVE | Noted: 2020-08-11

## 2020-08-13 ENCOUNTER — VIRTUAL VISIT (OUTPATIENT)
Dept: UROLOGY | Facility: CLINIC | Age: 70
End: 2020-08-13
Payer: COMMERCIAL

## 2020-08-13 VITALS — WEIGHT: 293 LBS | BODY MASS INDEX: 44.41 KG/M2 | HEIGHT: 68 IN

## 2020-08-13 DIAGNOSIS — N39.46 URINARY INCONTINENCE, MIXED: ICD-10-CM

## 2020-08-13 DIAGNOSIS — E66.01 MORBID OBESITY (H): Primary | ICD-10-CM

## 2020-08-13 DIAGNOSIS — N39.46 MIXED STRESS AND URGE URINARY INCONTINENCE: ICD-10-CM

## 2020-08-13 DIAGNOSIS — Z86.711 HISTORY OF PULMONARY EMBOLISM: ICD-10-CM

## 2020-08-13 DIAGNOSIS — G47.33 OSA (OBSTRUCTIVE SLEEP APNEA): ICD-10-CM

## 2020-08-13 DIAGNOSIS — D68.51 HETEROZYGOUS FACTOR V LEIDEN MUTATION (H): ICD-10-CM

## 2020-08-13 RX ORDER — MIRABEGRON 25 MG/1
25 TABLET, FILM COATED, EXTENDED RELEASE ORAL DAILY
Qty: 30 TABLET | Refills: 11 | Status: SHIPPED | OUTPATIENT
Start: 2020-08-13 | End: 2021-04-27

## 2020-08-13 ASSESSMENT — PAIN SCALES - GENERAL: PAINLEVEL: NO PAIN (0)

## 2020-08-13 ASSESSMENT — MIFFLIN-ST. JEOR: SCORE: 2047.23

## 2020-08-13 NOTE — LETTER
"8/13/2020       RE: Petra Mcnulty  [address] [place] [address]     Dear Colleague,    Thank you for referring your patient, Petra Mcnulty, to the [facility] UROLOGY AND INST FOR PROSTATE AND UROLOGIC CANCERS at [facility]. Please see a copy of my visit note below.    August 13, 2020    Petra Mcnulty is a 69 year old female who is being evaluated via a billable telephone visit.      The patient has been notified of following:     \"This telephone visit will be conducted via a call between you and your physician/provider. We have found that certain health care needs can be provided without the need for a physical exam.  This service lets us provide the care you need with a short phone conversation.  If a prescription is necessary we can send it directly to your pharmacy.  If lab work is needed we can place an order for that and you can then stop by our lab to have the test done at a later time.    Telephone visits are billed at different rates depending on your insurance coverage. During this emergency period, for some insurers they may be billed the same as an in-person visit.  Please reach out to your insurance provider with any questions.    If during the course of the call the physician/provider feels a telephone visit is not appropriate, you will not be charged for this service.\"    Patient has given verbal consent for Telephone visit?  Yes    What phone number would you like to be contacted at? [phone]    How would you like to obtain your AVS? Acacia    Patient has elected a telephone call for today's follow up.  Called patient today and verified her name and date of birth prior to discussion.    Today's telephone visit is to discuss how she is doing after starting the mirabegron.  Her BP can get up into the 140s systolic.  She also notes that she has had some constipation.      She states that there are times the medications works really well and " other times there is no difference.      Patient denies any changes to her past medical, surgical or social history.  Patient denies any changes to her medications or allergies    A/P:  Petra Mcnulty is a 69 year old F with obesity, factor V Leiden, hyperlipidemia, history of PE, LOR, mixed urinary incontinence    We again discussed her options.  She is most interested in a pessary especially when we discussed that we can try it and she can do her follow up visits afterwards in [place].      RTC to [facility] for pessary fitting in the near future    18 minutes were spent in patient care today    Leti Mac MD MPH   of Urology    CC  Patient Care Team:  Mona Diaz MD as PCP - General (Internal Medicine)  Mona Diaz MD as Assigned PCP  SELF, REFERRED

## 2020-08-13 NOTE — PROGRESS NOTES
"Petra Mcnulty is a 69 year old female who is being evaluated via a billable video visit.      The patient has been notified of following:     \"This video visit will be conducted via a call between you and your physician/provider. We have found that certain health care needs can be provided without the need for an in-person physical exam.  This service lets us provide the care you need with a video conversation.  If a prescription is necessary we can send it directly to your pharmacy.  If lab work is needed we can place an order for that and you can then stop by our lab to have the test done at a later time.    Video visits are billed at different rates depending on your insurance coverage.  Please reach out to your insurance provider with any questions.    If during the course of the call the physician/provider feels a video visit is not appropriate, you will not be charged for this service.\"    Patient has given verbal consent for Video visit? Yes  How would you like to obtain your AVS? MyChart  If you are dropped from the video visit, the video invite should be resent to: Send to e-mail at: [email or web address]  Will anyone else be joining your video visit? No        Video-Visit Details    Type of service:  Video Visit    Video Start Time: 11:05 AM  Video End Time: 11:42 AM    Originating Location (pt. Location): Home    Distant Location (provider location):  [facility]     Platform used for Video Visit: [employer]    [facility] [place]   Outpatient Sleep Medicine Consultation  August 14, 2020      Name: Petra Mcnulty MRN# [id number]   Age: 69 year old YOB: 1950     Date of Consultation: August 14, 2020  Consultation is requested by: Mona Diaz MD  [facility] [place] [address] Mona Diaz  Primary care provider: Mona Diaz         Reason for Sleep Consult:     Petra Mcnulty is a 69 year old female with a prior " history of obstructive sleep apnea currently effectively treated with CPAP and new complaints of early morning awakening.         Assessment and Plan:     Summary Sleep Diagnoses:      Obstructive sleep apnea    Clinical features of psychophysiological insomnia      Comorbid Diagnoses:      History of pulmonary embolism with heterozygous factor V Leyden mutation    Postmenopausal bleeding    Morbid obesity BMI 50    Summary Recommendations:      CPAP supplies ordered    Patient is to fax download provided by [employer] to be scanned in documentation of CPAP usage for Medicare documentation    Return to clinic 1 year for irregular symptoms of present insomnia or recurrence of sleep disruption, daytime sleepiness or snoring    Stimulus control intervention: Avoid television after lights out in the room and move out of the room if you are alert for more than 15 minutes.  Read under low light in a separate room until you are sleepy then moved back to bedroom.  Review information on insomnia and sleep apnea provided in your after visit summary.     Summary Counseling:  Stimulus control intervention: Avoid television after lights out in the room and move out of the room if you are alert for more than 15 minutes.  Read under low light in a separate room until you are sleepy then moved back to bedroom.  Review information on insomnia and sleep apnea provided in your after visit summary.              History of Present Illness:     Petra Mcnulty is a 69 year old female who presented in 2016 at [facility] for complaints of nonrestorative sleep loud snoring and sleep disruption.  She had moderate obstructive sleep apnea and was initiated on CPAP with a nasal pillow mask which she continues.  She claims to be getting 8 hours sleep with 1-2 nights per week of difficulty maintaining sleep sometimes wakefulness after early morning awakening for the rest of the night.  She moved to the bedroom at 9 to 9:30 PM  with her  and they watch television until midnight.  She falls asleep promptly at 12 to 12:15 AM but awakens sometimes 4:57 in the morning and has rumination about attempting to fall asleep with some frustration.  Occasionally television is on during these times.    PREVI[place] [facility]   Date:PSG: 10/25/16 - AHI 24.8, Spo2 85%, PLM 11     SLEEP-WAKE SCHEDULE:   12am with TV occasional -8am with 1x/week prolonged awakening with active mind    SCALES       SLEEP APNEA: Stopbang score      SLEEP COMPLAINTS:  Cardio-respiratory   Snoring- 0/week  Dyspnea- denies  Morning headaches or confusion-denies  Coexisting Lung disease: denies    Coexisting Heart disease: denies    Does patient have a bed partner: denies  Has bed partner been sleeping separately because of snoring:  denies            RLS Screen: When you try to relax in the evening or sleep at  night, do you ever have unpleasant, restless feelings in your  legs that can be relieved by walking or movement? denies    Periodic limb movement: denies      Sleep Behaviors:    Leg symptoms/movements: denies    Motor restlessness:denies    Night terrors: denies    Bruxism: denies    Automatic behaviors: none    Other subjective complaints:    Anxiety or rumination denies    Pain and discomfort at  night: denies    Waking up with heart pounding or racing: denies    GERD or aspiration:denies         Parasomnia:   NREM - denies recurrent persistent confusional arousal, night eating, sleep walking or sleep terrors   REM  - denies dream enactment; injuries            Medications:     Current Outpatient Medications   Medication Sig     acetaminophen (TYLENOL) 500 MG tablet Take 1,000 mg by mouth 2 times daily      albuterol (PROAIR HFA/PROVENTIL HFA/VENTOLIN HFA) 108 (90 Base) MCG/ACT inhaler Inhale 2 puffs into the lungs every 4 hours as needed for shortness of breath / dyspnea or wheezing     diclofenac (VOLTAREN) 1 % topical gel Place onto the skin 4  times daily     Fesoterodine Fumarate 8 MG TB24 Take by mouth daily     gabapentin (NEURONTIN) 300 MG capsule Take 300 mg by mouth 2 times daily     HYDROcodone-acetaminophen (NORCO) 5-325 MG tablet Take 1 tablet by mouth every 6 hours as needed for severe pain     levonorgestrel (MIRENA) 20 MCG/24HR IUD 1 each (20 mcg) by Intrauterine route once     levothyroxine (SYNTHROID/LEVOTHROID) 100 MCG tablet Take 100 mcg by mouth daily     lisinopril (PRINIVIL/ZESTRIL) 10 MG tablet Take 10 mg by mouth daily     loratadine (CLARITIN) 10 MG tablet Take 10 mg by mouth daily     methocarbamol (ROBAXIN) 750 MG tablet Take 750 mg by mouth At Bedtime     mirabegron (MYRBETRIQ) 25 MG 24 hr tablet Take 1 tablet (25 mg) by mouth daily     pravastatin (PRAVACHOL) 40 MG tablet Take 40 mg by mouth daily     warfarin ANTICOAGULANT (COUMADIN) 2 MG tablet TAKE 2 TABLETS ON MONDAY, WEDNESDAY, FRIDAY, AND 1 1/2 TABLET ON SUN, TUESDAY, THURSDAY, AND SATURDAY OR AS DIRECTED BY INR CLINIC     Heparin Sodium, Porcine, (HEPARIN, PORCINE,) 5000 UNIT/ML SOLN injection Inject 1 mL (5,000 Units) Subcutaneous every 12 hours Starting 06/21, last dose 06/23/20 PM, resume as directed after surgery (Patient not taking: Reported on 8/13/2020)     No current facility-administered medications for this visit.         Allergies   Allergen Reactions     Atorvastatin      myalgia     Simvastatin Rash            Past Medical History:     Does not need 02 supplement at night   Past Medical History:   Diagnosis Date     Arthritis     hands, joints     Coagulation disorder (H)     factor 5 leiden     COPD (chronic obstructive pulmonary disease) (H)      Factor V deficiency (H) 2007     Glaucoma      History of blood transfusion     x2     History of thrombophlebitis      Hypertension      Hypothyroid      Insomnia      Obese      Onychomycosis      Other chronic pain     back     PPD positive     INH TX x 1 year     Pulmonary embolism (H)      Sleep apnea     uses  cpap     Uncomplicated asthma              Past Surgical History:    Previous upper airway surgery none  Past Surgical History:   Procedure Laterality Date     ARTHROPLASTY HIP      left + 2 I&Ds     ARTHROSCOPY KNEE       AS TOTAL HIP ARTHROPLASTY       BACK SURGERY      c3-c4 discectomy/fusion,     BACK SURGERY      lumbar decompression/lami      BIOPSY      breast     C TOTAL KNEE ARTHROPLASTY      left,right     CHOLECYSTECTOMY       COLONOSCOPY       D & C       HYSTEROSCOPY      d and c     INSERT STIMULATOR DORSAL COLUMN N/A 2020    Procedure: SPINAL CORD STIMULATOR IMPLANT;  Surgeon: Rosemary Dacosta DO;  Location:  OR     INSERT STIMULATOR DORSAL COLUMN TRIAL N/A 2020    Procedure: SPINAL CORD STIMULATOR TRIAL;  Surgeon: Karlee Duffy MD;  Location:  OR     OPERATIVE HYSTEROSCOPY WITH MORCELLATOR N/A 2020    Procedure: 1. Exam under anesthesia 2. Dilation and curettage under direct visualization 3. Operative hysteroscopy 4. polypectomy;  Surgeon: Parisa Goldberg MD;  Location:  OR            Social History:     Social History     Tobacco Use     Smoking status: Former Smoker     Packs/day: 0.50     Types: Cigarettes     Last attempt to quit: 2006     Years since quittin.6     Smokeless tobacco: Never Used   Substance Use Topics     Alcohol use: Yes     Comment: rare         Chemical History:        EtOH:              Family History:     Family History   Problem Relation Age of Onset     Diabetes Mother 80        Type II     Hypertension Mother      Cerebrovascular Disease Father                  Review of Systems:     A complete 10 point review of systems was negative other than HPI or as commented below:                Physical Examination:     Objective   Reported vitals:  There were no vitals taken for this visit.   healthy, alert and no distress  Psych: Alert and oriented times 3; coherent speech, normal   rate and volume, able to articulate logical thoughts, able   to  abstract reason, no tangential thoughts, no hallucinations   or delusions  Patient affect is normal.             Data: All pertinent previous laboratory data reviewed     No results found for: PH, PHARTERIAL, PO2, [id number], SAT, PCO2, HCO3, BASEEXCESS, AMRITA, BEB  No results found for: TSH  Lab Results   Component Value Date     (H) 06/09/2020     Lab Results   Component Value Date    HGB 14.4 06/24/2020    HGB 14.4 06/09/2020     Lab Results   Component Value Date    BUN 13 06/09/2020    CR 0.55 06/09/2020     Lab Results   Component Value Date    AST 22 06/09/2020    ALT 23 06/09/2020    ALKPHOS 67 06/09/2020    BILITOTAL 0.3 06/09/2020     No results found for: UAMP, UBARB, BENZODIAZEUR, UCANN, UCOC, OPIT, UPCP        Copy to: Mona Diaz MD 8/14/2020     Total time spent with patient: 45 min >50% counseling

## 2020-08-13 NOTE — PATIENT INSTRUCTIONS
Websites with free information:    [employer] patient website: www.voicesforpfd.org    Total Control Program: www.totalcontrolprogram.com    [facility] [address] [place] [address] 4A    It was a pleasure meeting with you today.  Thank you for allowing me and my team the privilege of caring for you today.  YOU are the reason we are here, and I truly hope we provided you with the excellent service you deserve.  Please let us know if there is anything else we can do for you so that we can be sure you are leaving completely satisfied with your care experience.

## 2020-08-13 NOTE — PROGRESS NOTES
"August 13, 2020    Petra Mcnulty is a 69 year old female who is being evaluated via a billable telephone visit.      The patient has been notified of following:     \"This telephone visit will be conducted via a call between you and your physician/provider. We have found that certain health care needs can be provided without the need for a physical exam.  This service lets us provide the care you need with a short phone conversation.  If a prescription is necessary we can send it directly to your pharmacy.  If lab work is needed we can place an order for that and you can then stop by our lab to have the test done at a later time.    Telephone visits are billed at different rates depending on your insurance coverage. During this emergency period, for some insurers they may be billed the same as an in-person visit.  Please reach out to your insurance provider with any questions.    If during the course of the call the physician/provider feels a telephone visit is not appropriate, you will not be charged for this service.\"    Patient has given verbal consent for Telephone visit?  Yes    What phone number would you like to be contacted at? [phone]    How would you like to obtain your AVS? Acacia    Patient has elected a telephone call for today's follow up.  Called patient today and verified her name and date of birth prior to discussion.    Today's telephone visit is to discuss how she is doing after starting the mirabegron.  Her BP can get up into the 140s systolic.  She also notes that she has had some constipation.      She states that there are times the medications works really well and other times there is no difference.      Patient denies any changes to her past medical, surgical or social history.  Patient denies any changes to her medications or allergies    A/P:  Petra Mcnulty is a 69 year old F with obesity, factor V Leiden, hyperlipidemia, history of PE, LOR, mixed urinary incontinence    We " again discussed her options.  She is most interested in a pessary especially when we discussed that we can try it and she can do her follow up visits afterwards in [place].      [place] [facility] [place] near [place]    18 minutes were spent in patient care today    Leti Mac MD MPH   of Urology    CC  Patient Care Team:  Mona Diaz MD as PCP - General (Internal Medicine)  Mona Diaz MD as Assigned PCP  SELF, REFERRED

## 2020-08-13 NOTE — NURSING NOTE
Chief Complaint   Patient presents with     RECHECK     Medication follow up        not currently breastfeeding. There is no height or weight on file to calculate BMI.    Patient Active Problem List   Diagnosis     Pain in joint, lower leg     Knee pain     Knee joint replacement by other means     Heterozygous factor V Leiden mutation (H)     Morbid obesity (H)     History of pulmonary embolism     Postmenopausal bleeding     Mirena IUD inserted 08/11/2020- Remove by 08/11/2025       Allergies   Allergen Reactions     Atorvastatin      myalgia     Simvastatin Rash       Current Outpatient Medications   Medication Sig Dispense Refill     acetaminophen (TYLENOL) 500 MG tablet Take 1,000 mg by mouth 2 times daily        albuterol (PROAIR HFA/PROVENTIL HFA/VENTOLIN HFA) 108 (90 Base) MCG/ACT inhaler Inhale 2 puffs into the lungs every 4 hours as needed for shortness of breath / dyspnea or wheezing 1 Inhaler 3     diclofenac (VOLTAREN) 1 % topical gel Place onto the skin 4 times daily       Fesoterodine Fumarate 8 MG TB24 Take by mouth daily       gabapentin (NEURONTIN) 300 MG capsule Take 300 mg by mouth 2 times daily       HYDROcodone-acetaminophen (NORCO) 5-325 MG tablet Take 1 tablet by mouth every 6 hours as needed for severe pain       levonorgestrel (MIRENA) 20 MCG/24HR IUD 1 each (20 mcg) by Intrauterine route once       levothyroxine (SYNTHROID/LEVOTHROID) 100 MCG tablet Take 100 mcg by mouth daily       lisinopril (PRINIVIL/ZESTRIL) 10 MG tablet Take 10 mg by mouth daily       loratadine (CLARITIN) 10 MG tablet Take 10 mg by mouth daily       methocarbamol (ROBAXIN) 750 MG tablet Take 750 mg by mouth At Bedtime       mirabegron (MYRBETRIQ) 25 MG 24 hr tablet Take 1 tablet (25 mg) by mouth daily 30 tablet 3     pravastatin (PRAVACHOL) 40 MG tablet Take 40 mg by mouth daily       warfarin ANTICOAGULANT (COUMADIN) 2 MG tablet TAKE 2 TABLETS ON MONDAY, WEDNESDAY, FRIDAY, AND 1 1/2 TABLET ON SUN, TUESDAY, THURSDAY,  AND SATURDAY OR AS DIRECTED BY INR CLINIC 477 tablet 0     Heparin Sodium, Porcine, (HEPARIN, PORCINE,) 5000 UNIT/ML SOLN injection Inject 1 mL (5,000 Units) Subcutaneous every 12 hours Starting , last dose 20 PM, resume as directed after surgery (Patient not taking: Reported on 2020) 20 mL 1       Social History     Tobacco Use     Smoking status: Former Smoker     Packs/day: 0.50     Types: Cigarettes     Last attempt to quit: 2006     Years since quittin.6     Smokeless tobacco: Never Used   Substance Use Topics     Alcohol use: Yes     Comment: rare     Drug use: Never       Sandra Blank CMA  2020  9:32 AM

## 2020-08-13 NOTE — PATIENT INSTRUCTIONS
Summary Recommendations:      CPAP supplies ordered    Patient is to fax download provided by [employer] to be scanned in documentation of CPAP usage for Medicare documentation    Return to clinic 1 year for irregular symptoms of present insomnia or recurrence of sleep disruption, daytime sleepiness or snoring  Stimulus control intervention: Avoid television after lights out in the room and move out of the room if you are alert for more than 15 minutes.  Read under low light in a separate room until you are sleepy then moved back to bedroom.  Review information on insomnia and sleep apnea provided in your after visit summary.         Cognitive Behavioral Therapy for Insomnia (CBT-I)    Developing Healthy Sleep Thoughts    Insomnia is often is triggered by stressful events such as a change in employment, a separation, medical illness, or loss.  How you handle your sleep problem, mainly your thoughts and behaviors, determines in large part whether your sleeplessness is short -term or develops into chronic insomnia well after the stressful event is over.     This step of your program involves learning a set of skills to change negative and worrisome thoughts about sleep.   Insomnia is more likely to persist if you interpret the onset as a threat or loss of control.  Worry, fears and untrue beliefs about insomnia can become a vicious cycle.  Negative sleep thoughts activate the physical and emotional systems of your body.  This strengthens wakefulness and weakens your sleep system.  This in turn produces increased stress and pressure to sleep.  We call this unhelpful sleep effort.     Changing How You Think About Insomnia    We now know that our thoughts and attitudes affect our stress response.  Negative sleep thoughts can worsen your insomnia. They can lead to greater fear or anxiety about sleep, which in turn can aggravate your sleep problem. Thinking more positively and realistically about your sleep promotes its health  and healing.     Myths about Sleep    ? People need 8 hours of sleep     This is untrue.  Sleep needs vary from person to person.  Most people need between 6-8 hours to feel alert during the day.  People who sleep 7 hours live longer than those who sleep 8 hours.  Research also suggests people who sleep 5 hours live longer than those who sleep 9 hours    ? Insomnia is the same as sleep deprivation    Sleep deprivation is lack of sleep due to not allowing enough time for sleep.  This is typically not true for insomnia where people have enough time for sleep and even extend their sleep time trying to get more sleep.  Most people with insomnia get about the same amount of sleep as normal sleepers.  The difference is that with insomnia the sleep is fragmented and less consolidated.    You are Getting More Sleep than You Think    Research using objective sleep tests reveal that people with insomnia get an average of one hour more sleep than they think. This is due in part because the brain misperceives Stage 1 and 2 sleep as being awake.  In addition, stress and arousal while awake in bed changes the brain's perception of time awake.    Examples of Unhealthy Sleep Thoughts    Negative sleep thoughts can have a profound impact on your ability to get a good night's sleep. Below are some examples of negative thoughts associated with insomnia that may sound familiar to you:    ? I must get 8 hours of sleep to function during the day.  ? I won't get to sleep tonight.  ? Insomnia is going to cause health problems.  ? I am dreading going to bed.  ? I woke up early again.  I know I won't get back to sleep.  ? The reason I feel terrible today is because of my insomnia.  ? I've totally lost control of my sleep.  ? I can't sleep without a sleeping pill.    Negative thoughts usually occur automatically and feel like a knee-jerk reaction.  They are often untrue or distorted, especially late in the evening as you become increasingly  tired and others are asleep.      Changing Unhealthy Sleep Thoughts    Now that you understand the impact that negative thoughts can have on your sleep, you are ready to change these unhelpful thoughts.  The strategy is powerful and simple:  By recognizing and replacing your negative thoughts about sleep with more accurate, positive thoughts, you will be less anxious and frustrated about your sleep. A more realistic and positive attitude about sleep will allow you to relax and sleep more easily through the night.           There are several important steps involved in changing your unhelpful and negative thoughts about sleep:            Examples of Healthy Sleep Thoughts    ? My work will not suffer much if I have a poor night's sleep.    ? I'm probably getting more sleep than I think.    ? Other things than my sleep affect my daytime functioning.    ? Because I didn't sleep well last night, I am more likely to sleep well tonight because of increased sleep drive that leads to deeper sleep.    ? Sleep requirements vary from one person to another.    ? If I don't sleep well, most of the time the worst that can happen is that my mood might not be as bright the next day.    ? If I awaken after about 5 hours of sleep, I have gotten the core sleep I need for the day.    ? I'm more likely to fall asleep the longer I've been awake    ? I'm more likely to fall asleep as my body temperature begins to decrease through the night.    ? My body's wakefulness system takes charge during the day to promote daytime functioning.    ? These sleep skills have worked for others, and they can work for me.    Other Recommendations for Changing Beliefs and Attitudes   About Your Sleep    ? Keep Realistic Expectations     There is a widespread belief that 8 hours of sleep is necessary to feel refreshed and function well during the day. Many believe that good sleep means never waking up at night.  Others come to expect that they should always  wake up in the morning feeling full of energy.  Concerns may arise when your actual sleep falls short of these expectations. Try to avoid placing undue pressure on yourself to achieve certain sleep levels.  It only increases anxiety about sleeping.  Focus on quality sleep not quantity of sleep.    ? Revise Your Thoughts about the Causes of Insomnia      There is a natural tendency to attribute our sleep problems completely to external factors such as a chemical imbalance, pain, aging or things over which we may have little control.  Although these factors may contribute to your insomnia, research shows CBT-I is beneficial even if they are present.    ? Don't Blame Insomnia for All Daytime Impairments      Many individuals blame insomnia for every symptom or concern they experience during the day from fatigue to lack of concentration. Though poor sleep may produce some of these symptoms, it us usually untrue that all daytime impairment is attributable to insomnia.  It is more often that other factors such as stress and co-occurring medical problems contribute more to how you feel during the day.      ? Don't Catastrophize      Catastrophic thinking means making a sleep mountain out of a molehill.  Some people believe poor sleep will have catastrophic consequences to their physical health, mental health and appearance. Others see insomnia as a complete loss of control.  These perceived consequences of insomnia often prompt people to seek medication or other treatment.  Keep in mind insomnia can be very unpleasant but for the most part is not dangerous.    ? Don't Focus on Sleep     Some people reduce their activity level because of poor sleep.  Although sleep is a necessary part of life, don't make it the focus of your thoughts and concern. Trust that if you engage in health sleep habits, your body will give you the sleep it needs.  Make sure you continue your normal activities despite your insomnia.    ? Never Try to  "Sleep      Of all the habits the most important one is this:  Never try to force yourself to sleep.  Doing so usually backfires and makes things worse. Instead, focus on keeping to your prescribed sleep schedule, getting up at the same time every day and using the bed only for sleep.        We would like to share some general information about sleep to help us work together to get better sleep for you.     Why do we sleep?   -clear toxins from the brain   -remove unnecessary neural connections that accumulate during the day   -enhance memory and learning   Without adequate sleep, our brain may become impaired in a manner that is similar to being intoxicated.       How much sleep do we need?   -The average adult needs 7-8 hours of sleep while young adolescents need up to 9 hours in order to function at their best.   -Between 12 and 20 years of age our sleep is often delayed up to an hour compared to older or younger people.   Aim for 7-8 sleep and a regular schedule; it may take a week or more to eliminate the effects of chronic insufficient sleep.       When should I sleep?   The timing of sleep is determined genetically for each of us.  Some of us are \"night owls\" and others are \"larks\".   The timing of sleep and the amount of sleep we need changes with our age.   Try to schedule your sleep time to fit your natural sleep schedule when you are not busy with school, work, or travel.       What if my sleep schedule doesn't fit my work schedule?   -If you have no trouble falling asleep or getting up during the work week, your work schedule is probably well-matched to your natural sleep schedule.   -You may benefit from a sleep  who can help support you to get to the best schedule.       What are some good habits to start with?   -Your bedroom is for sleeping and should be quiet, comfortably cool and dark before you lie down.   -You should not have electronic devices such as phones or computers in your bedroom. " "  -Your bedtime and awakening time should fit your natural tendency to fall asleep and wake up and should not vary much between weekdays and weekends.   -No caffeine within 6 hours or alcohol within two hours of bedtime  Be careful and regular with your sleep-you will feel better and think better.  MY TREATMENT INFORMATION FOR SLEEP APNEA-  Petra Mcnulty    DOCTOR : TERI BELLA MD    Frequently asked questions:  1. What is Obstructive Sleep Apnea (LOR)? LOR is the most common type of sleep apnea. Apnea means, \"without breath.\"  Apnea is most often caused by narrowing or collapse of the upper airway as muscles relax during sleep.   Almost everyone has occasional apneas. Most people with sleep apnea have had brief interruptions at night frequently for many years.  The severity of sleep apnea is related to how frequent and severe the events are.   2. What are the consequences of LOR? Symptoms include: feeling sleepy during the day, snoring loudly, gasping or stopping of breathing, trouble sleeping, and occasionally morning headaches or heartburn at night.  Sleepiness can be serious and even increase the risk of falling asleep while driving. Other health consequences may include development of high blood pressure and other cardiovascular disease in persons who are susceptible. Untreated LOR  can contribute to heart disease, stroke and diabetes.   3. What are the treatment options? In most situations, sleep apnea is a lifelong disease that must be managed with daily therapy. Medications are not effective for sleep apnea and surgery is generally not considered until other therapies have been tried. Your treatment is your choice . Continuous Positive Airway (CPAP) works right away and is the therapy that is effective in nearly everyone. An oral device to hold your jaw forward is usually the next most reliable option. Other options include postioning devices (to keep you off your back), weight loss, and surgery " including a tongue pacing device. There is more detail about some of these options below.    Important tips for using CPAP and similar devices   Know your equipment:  CPAP is continuous positive airway pressure that prevents obstructive sleep apnea by keeping the throat from collapsing while you are sleeping. In most cases, the device is  smart  and can slowly self-adjusts if your throat collapses and keeps a record every day of how well you are treated-this information is available to you and your care team.  BPAP is bilevel positive airway pressure that keeps your throat open and also assists each breath with a pressure boost to maintain adequate breathing.  Special kinds of BPAP are used in patients who have inadequate breathing from lung or heart disease. In most cases, the device is  smart  and can slowly self-adjusts to assist breathing. Like CPAP, the device keeps a record of how well you are treated.  Your mask is your connection to the device. You get to choose what feels most comfortable and the staff will help to make sure if fits. Here: are some examples of the different masks that are available:       Key points to remember on your journey with sleep apnea:  1. Sleep study.  PAP devices often need to be adjusted during a sleep study to show that they are effective and adjusted right.  2. Good tips to remember: Try wearing just the mask during a quiet time during the day so your body adapts to wearing it. A humidifier is recommended for comfort in most cases to prevent drying of your nose and throat. Allergy medication from your provider may help you if you are having nasal congestion.  3. Getting settled-in. It takes more than one night for most of us to get used to wearing a mask. Try wearing just the mask during a quiet time during the day so your body adapts to wearing it. A humidifier is recommended for comfort in most cases. Our team will work with you carefully on the first day and will be in  contact within 4 days and again at 2 and 4 weeks for advice and remote device adjustments. Your therapy is evaluated by the device each day.   4. Use it every night. The more you are able to sleep naturally for 7-8 hours, the more likely you will have good sleep and to prevent health risks or symptoms from sleep apnea. Even if you use it 4 hours it helps. Occasionally all of us are unable to use a medical therapy, in sleep apnea, it is not dangerous to miss one night.   5. Communicate. Call our skilled team on the number provided on the first day if your visit for problems that make it difficult to wear the device. Over 2 out of 3 patients can learn to wear the device long-term with help from our team. Remember to call our team or your sleep providers if you are unable to wear the device as we may have other solutions for those who cannot adapt to mask CPAP therapy. It is recommended that you sleep your sleep provider within the first 3 months and yearly after that if you are not having problems.   6. Use it for your health. We encourage use of CPAP masks during daytime quiet periods to allow your face and brain to adapt to the sensation of CPAP so that it will be a more natural sensation to awaken to at night or during naps. This can be very useful during the first few weeks or months of adapting to CPAP though it does not help medically to wear CPAP during wakefulness and  should not be used as a strategy just to meet guidelines.  7. Take care of your equipment. Make sure you clean your mask and tubing using directions every day and that your filter and mask are replaced as recommended or if they are not working.     BESIDES CPAP, WHAT OTHER THERAPIES ARE THERE?    Positioning Device  Positioning devices are generally used when sleep apnea is mild and only occurs on your back.This example shows a pillow that straps around the waist. It may be appropriate for those whose sleep study shows milder sleep apnea that  occurs primarily when lying flat on one's back. Preliminary studies have shown benefit but effectiveness at home may need to be verified by a home sleep test. These devices are generally not covered by medical insurance.  Examples of devices that maintain sleeping on the back to prevent snoring and mild sleep apnea.    Belt type body positioner  Http://[employer]/    Electronic reminder  Http://nightshifttherapy.com/  Http://[email or web address]au/      Oral Appliance  What is oral appliance therapy?  An oral appliance device fits on your teeth at night like a retainer used after having braces. The device is made by a specialized dentist and requires several visits over 1-2 months before a manufactured device is made to fit your teeth and is adjusted to prevent your sleep apnea. Once an oral device is working properly, snoring should be improved. A home sleep test may be recommended at that time if to determine whether the sleep apnea is adequately treated.       Some things to remember:  -Oral devices are often, but not always, covered by your medical insurance. Be sure to check with your insurance provider.   -If you are referred for oral therapy, you will be given a list of specialized dentists to consider or you may choose to visit the Web site of the American Academy of Dental Sleep Medicine  -Oral devices are less likely to work if you have severe sleep apnea or are extremely overweight.     More detailed information  An oral appliance is a small acrylic device that fits over the upper and lower teeth  (similar to a retainer or a mouth guard). This device slightly moves jaw forward, which moves the base of the tongue forward, opens the airway, improves breathing for effective treat snoring and obstructive sleep apnea in perhaps 7 out of 10 people .  The best working devices are custom-made by a dental device  after a mold is made of the teeth 1, 2, 3.  When is an oral appliance indicated?  Oral  appliance therapy is recommended as a first-line treatment for patients with primary snoring, mild sleep apnea, and for patients with moderate sleep apnea who prefer appliance therapy to use of CPAP4, 5. Severity of sleep apnea is determined by sleep testing and is based on the number of respiratory events per hour of sleep.   How successful is oral appliance therapy?  The success rate of oral appliance therapy in patients with mild sleep apnea is 75-80% while in patients with moderate sleep apnea it is 50-70%. The chance of success in patients with severe sleep apnea is 40-50%. The research also shows that oral appliances have a beneficial effect on the cardiovascular health of LOR patients at the same magnitude as CPAP therapy7.  Oral appliances should be a second-line treatment in cases of severe sleep apnea, but if not completely successful then a combination therapy utilizing CPAP plus oral appliance therapy may be effective. Oral appliances tend to be effective in a broad range of patients although studies show that the patients who have the highest success are females, younger patients, those with milder disease, and less severe obesity. 3, 6.   Finding a dentist that practices dental sleep medicine  Specific training is available through the American Academy of Dental Sleep Medicine for dentists interested in working in the field of sleep. To find a dentist who is educated in the field of sleep and the use of oral appliances, near you, visit the Web site of the American Academy of Dental Sleep Medicine.    References  1. Salinas et al. Objectively measured vs self-reported compliance during oral appliance therapy for sleep-disordered breathing. Chest 2013; 144(5): 3299-2356.  2. Magno et al. Objective measurement of compliance during oral appliance therapy for sleep-disordered breathing. Thorax 2013; 68(1): 91-96.  3. Spike et al. Mandibular advancement devices in 620 men and women with LOR  and snoring: tolerability and predictors of treatment success. Chest 2004; 125: 5208-1572.  4. Meenakshi et al. Oral appliances for snoring and LOR: a review. Sleep 2006; 29: 244-262.  5. Kika et al. Oral appliance treatment for LOR: an update. J Clin Sleep Med 2014; 10(2): 215-227.  6. Raquel et al. Predictors of OSAH treatment outcome. J Dent Res 2007; 86: 1528-0056.      Weight Loss:    Weight loss is a long-term strategy that may improve sleep apnea in some patients.    Weight management is a personal decision and the decision should be based on your interest and the potential benefits.  If you are interested in exploring weight loss strategies, the following discussion covers the impact on weight loss on sleep apnea and the approaches that may be successful.    Being overweight does not necessarily mean you will have health consequences.  Those who have BMI over 35 or over 27 with existing medical conditions carries greater risk.   Weight loss decreases severity of sleep apnea in most people with obesity. For those with mild obesity who have developed snoring with weight gain, even 15-30 pound weight loss can improve and occasionally eliminate sleep apnea.  Structured and life-long dietary and health habits are necessary to lose weight and keep healthier weight levels.     Though there may be significant health benefits from weight loss, long-term weight loss is very difficult to achieve- studies show success with dietary management in less than 10% of people. In addition, substantial weight loss may require years of dietary control and may be difficult if patients have severe obesity. In these cases, surgical management may be considered.  Finally, older individuals who have tolerated obesity without health complications may be less likely to benefit from weight loss strategies.        Your BMI is Body mass index is 49.57 kg/m .  Weight management is a personal decision.  If you are interested in  exploring weight loss strategies, the following discussion covers the approaches that may be successful. Body mass index (BMI) is one way to tell whether you are at a healthy weight, overweight, or obese. It measures your weight in relation to your height.  A BMI of 18.5 to 24.9 is in the healthy range. A person with a BMI of 25 to 29.9 is considered overweight, and someone with a BMI of 30 or greater is considered obese. More than two-thirds of American adults are considered overweight or obese.  Being overweight or obese increases the risk for further weight gain. Excess weight may lead to heart disease and diabetes.  Creating and following plans for healthy eating and physical activity may help you improve your health.  Weight control is part of healthy lifestyle and includes exercise, emotional health, and healthy eating habits. Careful eating habits lifelong are the mainstay of weight control. Though there are significant health benefits from weight loss, long-term weight loss with diet alone may be very difficult to achieve- studies show long-term success with dietary management in less than 10% of people. Attaining a healthy weight may be especially difficult to achieve in those with severe obesity. In some cases, medications, devices and surgical management might be considered.  What can you do?  If you are overweight or obese and are interested in methods for weight loss, you should discuss this with your provider.     Consider reducing daily calorie intake by 500 calories.     Keep a food journal.     Avoiding skipping meals, consider cutting portions instead.    Diet combined with exercise helps maintain muscle while optimizing fat loss. Strength training is particularly important for building and maintaining muscle mass. Exercise helps reduce stress, increase energy, and improves fitness. Increasing exercise without diet control, however, may not burn enough calories to loose weight.       Start walking  three days a week 10-20 minutes at a time    Work towards walking thirty minutes five days a week     Eventually, increase the speed of your walking for 1-2 minutes at time    In addition, we recommend that you review healthy lifestyles and methods for weight loss available through the National Institutes of Health patient information sites:  http://win.niddk.nih.gov/publications/index.htm    And look into health and wellness programs that may be available through your health insurance provider, employer, local community center, or cori club.          Surgery:    Surgery for obstructive sleep apnea is considered generally only when other therapies fail to work. Surgery may be discussed with you if you are having a difficult time tolerating CPAP and or when there is an abnormal structure that requires surgical correction.  Nose and throat surgeries often enlarge the airway to prevent collapse.  Most of these surgeries create pain for 1-2 weeks and up to half of the most common surgeries are not effective throughout life.  You should carefully discuss the benefits and drawbacks to surgery with your sleep provider and surgeon to determine if it is the best solution for you.   More information  Surgery for LOR is directed at areas that are responsible for narrowing or complete obstruction of the airway during sleep.  There are a wide range of procedures available to enlarge and/or stabilize the airway to prevent blockage of breathing in the three major areas where it can occur: the palate, tongue, and nasal regions.  Successful surgical treatment depends on the accurate identification of the factors responsible for obstructive sleep apnea in each person.  A personalized approach is required because there is no single treatment that works well for everyone.  Because of anatomic variation, consultation with an examination by a sleep surgeon is a critical first step in determining what surgical options are best for each  patient.  In some cases, examination during sedation may be recommended in order to guide the selection of procedures.  Patients will be counseled about risks and benefits as well as the typical recovery course after surgery. Surgery is typically not a cure for a person s LOR.  However, surgery will often significantly improve one s LOR severity (termed  success rate ).  Even in the absence of a cure, surgery will decrease the cardiovascular risk associated with OSA7; improve overall quality of life8 (sleepiness, functionality, sleep quality, etc).      Palate Procedures:  Patients with LOR often have narrowing of their airway in the region of their tonsils and uvula.  The goals of palate procedures are to widen the airway in this region as well as to help the tissues resist collapse.  Modern palate procedure techniques focus on tissue conservation and soft tissue rearrangement, rather than tissue removal.  Often the uvula is preserved in this procedure. Residual sleep apnea is common in patient after pharyngoplasty with an average reduction in sleep apnea events of 33%2.      Tongue Procedures:  ExamWhile patients are awake, the muscles that surround the throat are active and keep this region open for breathing. These muscles relax during sleep, allowing the tongue and other structures to collapse and block breathing.  There are several different tongue procedures available.  Selection of a tongue base procedure depends on characteristics seen on physical exam.  Generally, procedures are aimed at removing bulky tissues in this area or preventing the back of the tongue from falling back during sleep.  Success rates for tongue surgery range from 50-62%3.    Hypoglossal Nerve Stimulation:  Hypoglossal nerve stimulation has recently received approval from the [place] Food and Drug Administration for the treatment of obstructive sleep apnea.  This is based on research showing that the system was safe and effective  in treating sleep apnea6.  Results showed that the median AHI score decreased 68%, from 29.3 to 9.0. This therapy uses an implant system that senses breathing patterns and delivers mild stimulation to airway muscles, which keeps the airway open during sleep.  The system consists of three fully implanted components: a small generator (similar in size to a pacemaker), a breathing sensor, and a stimulation lead.  Using a small handheld remote, a patient turns the therapy on before bed and off upon awakening.    Candidates for this device must be greater than 22 years of age, have moderate to severe LOR (AHI between 20-65), BMI less than 32, have tried CPAP/oral appliance without success, and have appropriate upper airway anatomy (determined by a sleep endoscopy performed by Dr. Ayala).    Hypoglossal Nerve Stimulation Pathway:    The sleep surgeon s office will work with the patient through the insurance prior-authorization process (including communications and appeals).    Nasal Procedures:  Nasal obstruction can interfere with nasal breathing during the day and night.  Studies have shown that relief of nasal obstruction can improve the ability of some patients to tolerate positive airway pressure therapy for obstructive sleep apnea1.  Treatment options include medications such as nasal saline, topical corticosteroid and antihistamine sprays, and oral medications such as antihistamines or decongestants. Non-surgical treatments can include external nasal dilators for selected patients. If these are not successful by themselves, surgery can improve the nasal airway either alone or in combination with these other options.      Combination Procedures:  Combination of surgical procedures and other treatments may be recommended, particularly if patients have more than one area of narrowing or persistent positional disease.  The success rate of combination surgery ranges from 66-80%2,3.    References  1. Anusha BETH The Role  of the Nose in Snoring and Obstructive Sleep Apnoea: An Update.  Eur Arch Otorhinolaryngol. 2011; 268: 1365-73.  2.  Addis SM; Edwina JA; Remberto JR; Pallanch JF; Andrews MB; Yumiko SG; Williams OCAMPO. Surgical modifications of the upper airway for obstructive sleep apnea in adults: a systematic review and meta-analysis. SLEEP 2010;33(10):6696-5717. Mckenna MORALES. Hypopharyngeal surgery in obstructive sleep apnea: an evidence-based medicine review.  Arch Otolaryngol Head Neck Surg. 2006 Feb;132(2):206-13.  3. Vinod YH1, Yinka Y, Adan HOLDEN. The efficacy of anatomically based multilevel surgery for obstructive sleep apnea. Otolaryngol Head Neck Surg. 2003 Oct;129(4):327-35.  4. Kezirian E, Goldberg A. Hypopharyngeal Surgery in Obstructive Sleep Apnea: An Evidence-Based Medicine Review. Arch Otolaryngol Head Neck Surg. 2006 Feb;132(2):206-13.  5. Sean DC et al. Upper-Airway Stimulation for Obstructive Sleep Apnea.  N Engl J Med. 2014 Jan 9;370(2):139-49.  6. Yaima Y et al. Increased Incidence of Cardiovascular Disease in Middle-aged Men with Obstructive Sleep Apnea. Am J Respir Crit Care Med; 2002 166: 159-165  7. Galeas EM et al. Studying Life Effects and Effectiveness of Palatopharyngoplasty (SLEEP) study: Subjective Outcomes of Isolated Uvulopalatopharyngoplasty. Otolaryngol Head Neck Surg. 2011; 144: 623-631.    Your BMI is Body mass index is 49.57 kg/m .  Weight management is a personal decision.  If you are interested in exploring weight loss strategies, the following discussion covers the approaches that may be successful. Body mass index (BMI) is one way to tell whether you are at a healthy weight, overweight, or obese. It measures your weight in relation to your height.  A BMI of 18.5 to 24.9 is in the healthy range. A person with a BMI of 25 to 29.9 is considered overweight, and someone with a BMI of 30 or greater is considered obese. More than two-thirds of American adults are considered overweight or obese.  Being  overweight or obese increases the risk for further weight gain. Excess weight may lead to heart disease and diabetes.  Creating and following plans for healthy eating and physical activity may help you improve your health.  Weight control is part of healthy lifestyle and includes exercise, emotional health, and healthy eating habits. Careful eating habits lifelong are the mainstay of weight control. Though there are significant health benefits from weight loss, long-term weight loss with diet alone may be very difficult to achieve- studies show long-term success with dietary management in less than 10% of people. Attaining a healthy weight may be especially difficult to achieve in those with severe obesity. In some cases, medications, devices and surgical management might be considered.  What can you do?  If you are overweight or obese and are interested in methods for weight loss, you should discuss this with your provider.     Consider reducing daily calorie intake by 500 calories.     Keep a food journal.     Avoiding skipping meals, consider cutting portions instead.    Diet combined with exercise helps maintain muscle while optimizing fat loss. Strength training is particularly important for building and maintaining muscle mass. Exercise helps reduce stress, increase energy, and improves fitness. Increasing exercise without diet control, however, may not burn enough calories to loose weight.       Start walking three days a week 10-20 minutes at a time    Work towards walking thirty minutes five days a week     Eventually, increase the speed of your walking for 1-2 minutes at time    In addition, we recommend that you review healthy lifestyles and methods for weight loss available through the National Institutes of Health patient information sites:  http://win.niddk.nih.gov/publications/index.htm    And look into health and wellness programs that may be available through your health insurance provider,  employer, local community center, or cori club.    Weight management plan: Patient was referred to their PCP to discuss a diet and exercise plan.

## 2020-08-14 ENCOUNTER — VIRTUAL VISIT (OUTPATIENT)
Dept: SLEEP MEDICINE | Facility: CLINIC | Age: 70
End: 2020-08-14
Attending: INTERNAL MEDICINE
Payer: COMMERCIAL

## 2020-08-14 DIAGNOSIS — G47.33 OSA (OBSTRUCTIVE SLEEP APNEA): ICD-10-CM

## 2020-08-14 PROCEDURE — 99203 OFFICE O/P NEW LOW 30 MIN: CPT | Mod: 95 | Performed by: INTERNAL MEDICINE

## 2020-08-19 ENCOUNTER — ANTICOAGULATION THERAPY VISIT (OUTPATIENT)
Dept: ANTICOAGULATION | Facility: CLINIC | Age: 70
End: 2020-08-19

## 2020-08-19 DIAGNOSIS — Z79.01 LONG TERM CURRENT USE OF ANTICOAGULANTS WITH INR GOAL OF 2.0-3.0: ICD-10-CM

## 2020-08-19 DIAGNOSIS — D68.51 HETEROZYGOUS FACTOR V LEIDEN MUTATION (H): ICD-10-CM

## 2020-08-19 DIAGNOSIS — D68.51 FACTOR V LEIDEN (H): ICD-10-CM

## 2020-08-19 DIAGNOSIS — Z86.711 HISTORY OF PULMONARY EMBOLISM: ICD-10-CM

## 2020-08-19 LAB
CAPILLARY BLOOD COLLECTION: NORMAL
INR PPP: 2 (ref 0.86–1.14)

## 2020-08-19 PROCEDURE — 85610 PROTHROMBIN TIME: CPT | Performed by: INTERNAL MEDICINE

## 2020-08-19 PROCEDURE — 99207 ZZC NO CHARGE NURSE ONLY: CPT | Performed by: INTERNAL MEDICINE

## 2020-08-19 PROCEDURE — 36416 COLLJ CAPILLARY BLOOD SPEC: CPT | Performed by: INTERNAL MEDICINE

## 2020-08-19 NOTE — PROGRESS NOTES
ANTICOAGULATION FOLLOW-UP CLINIC VISIT    Patient Name:  Petra Mcnulty  Date:  2020  Contact Type:  Telephone/ Called patient, she denies any changes. Orders discussed with the patient, she agrees with the plan. Lab INR appointment scheduled on 20.    SUBJECTIVE:  Patient Findings     Comments:   The patient was assessed for diet, medication, and activity level changes, missed or extra doses, bruising or bleeding, with no problem findings. Maintenance warfarin dosing was reviewed with patient and will remain on the same dose until next INR check. Patient did not have any questions or concerns.             Clinical Outcomes     Negatives:   Major bleeding event, Thromboembolic event, Anticoagulation-related hospital admission, Anticoagulation-related ED visit, Anticoagulation-related fatality    Comments:   The patient was assessed for diet, medication, and activity level changes, missed or extra doses, bruising or bleeding, with no problem findings. Maintenance warfarin dosing was reviewed with patient and will remain on the same dose until next INR check. Patient did not have any questions or concerns.                OBJECTIVE    Recent labs: (last 7 days)     20  1255   INR 2.00*       ASSESSMENT / PLAN  INR assessment THER    Recheck INR In: 2 WEEKS    INR Location Outside lab      Anticoagulation Summary  As of 2020    INR goal:   2.0-3.0   TTR:   44.9 % (5.8 mo)   INR used for dosin.00 (2020)   Warfarin maintenance plan:   3 mg (2 mg x 1.5) every Mon; 4 mg (2 mg x 2) all other days   Full warfarin instructions:   3 mg every Mon; 4 mg all other days   Weekly warfarin total:   27 mg   No change documented:   Martha De La Cruz RN   Plan last modified:   Rae Wing RN (2020)   Next INR check:   2020   Priority:   High   Target end date:   Indefinite    Indications    History of pulmonary embolism [Z86.711]  Heterozygous factor V Leiden mutation (H) [D68.51]              Anticoagulation Episode Summary     INR check location:       Preferred lab:       Send INR reminders to:   MARIUM MORTON    Comments:   Call cell number with results for INR      Anticoagulation Care Providers     Provider Role Specialty Phone number    Mona Diaz MD [facility] [phone]            See the Encounter Report to view Anticoagulation Flowsheet and Dosing Calendar (Go to Encounters tab in chart review, and find the Anticoagulation Therapy Visit)    Dosage adjustment made based on physician directed care plan.    Mratha De La Cruz RN

## 2020-08-26 ENCOUNTER — TELEPHONE (OUTPATIENT)
Dept: OBGYN | Facility: CLINIC | Age: 70
End: 2020-08-26

## 2020-08-26 NOTE — LETTER
Regarding:  Petra Mcnulty                                                  YOB: 1950  Date:  9/3/2020      To Whom It May Concern:    I was asked to comment on the medical necessity of services provided to Petra Mcnulty 1950. This patient is under my care for evaluation and treatment of postmenopausal bleeding, endometrial polyp, and simple and complex endometrial hyperplasia. The recommended treatment for complex endometrial hyperplasia is progesterone therapy. The most effective delivery system for progesterone with the fewest side effects is a levonorgestrel IUD. The progesterone impregnated IUD is medically necessary and is the standard of care to treat simple and complex hyperplasia in a postmenopausal woman and is indicated for this purpose. ACOG recommends the use of local progesterone in a patient such a Ms. Mcnulty who is a poor surgical candidate due to morbid obesity, history of pulmonary embolism, and multiple other medical comorbidities.      Should you have any questions, please do not hesitate to contact my office at [phone]    Sincerely,        Parisa Goldberg MD   Obstetrics and Gynecology  [facility]

## 2020-08-26 NOTE — TELEPHONE ENCOUNTER
Patient said she spoke to Dr. Goldberg through [employer] on 8/26 about a letter of appeal for an OV on 8/11 for an IUD. Patient dropped off another letter to go with her letter per [employer] recommendation. Letter up at the front by Deanne beard.

## 2020-08-27 NOTE — TELEPHONE ENCOUNTER
[facility] address and fax number  [address] Her, [facility]

## 2020-09-02 ENCOUNTER — ANTICOAGULATION THERAPY VISIT (OUTPATIENT)
Dept: ANTICOAGULATION | Facility: CLINIC | Age: 70
End: 2020-09-02

## 2020-09-02 DIAGNOSIS — Z86.711 HISTORY OF PULMONARY EMBOLISM: ICD-10-CM

## 2020-09-02 DIAGNOSIS — D68.51 HETEROZYGOUS FACTOR V LEIDEN MUTATION (H): ICD-10-CM

## 2020-09-02 DIAGNOSIS — D68.51 FACTOR V LEIDEN (H): ICD-10-CM

## 2020-09-02 DIAGNOSIS — Z79.01 LONG TERM CURRENT USE OF ANTICOAGULANTS WITH INR GOAL OF 2.0-3.0: ICD-10-CM

## 2020-09-02 LAB
CAPILLARY BLOOD COLLECTION: NORMAL
INR PPP: 2.6 (ref 0.86–1.14)

## 2020-09-02 PROCEDURE — 85610 PROTHROMBIN TIME: CPT | Performed by: INTERNAL MEDICINE

## 2020-09-02 PROCEDURE — 99207 ZZC NO CHARGE NURSE ONLY: CPT | Performed by: INTERNAL MEDICINE

## 2020-09-02 PROCEDURE — 36416 COLLJ CAPILLARY BLOOD SPEC: CPT | Performed by: INTERNAL MEDICINE

## 2020-09-02 NOTE — PROGRESS NOTES
ANTICOAGULATION FOLLOW-UP CLINIC VISIT    Patient Name:  Petra Mcnulty  Date:  2020  Contact Type:  Telephone/ discussed with patient    SUBJECTIVE:  Patient Findings     Comments:   The patient was assessed for diet, medication, and activity level changes, missed or extra doses, bruising or bleeding, with no problem findings.          Clinical Outcomes     Negatives:   Major bleeding event, Thromboembolic event, Anticoagulation-related hospital admission, Anticoagulation-related ED visit, Anticoagulation-related fatality    Comments:   The patient was assessed for diet, medication, and activity level changes, missed or extra doses, bruising or bleeding, with no problem findings.             OBJECTIVE    Recent labs: (last 7 days)     20  1311   INR 2.60*       ASSESSMENT / PLAN  INR assessment THER    Recheck INR In: 3 WEEKS    INR Location Clinic      Anticoagulation Summary  As of 2020    INR goal:   2.0-3.0   TTR:   49.0 % (6.2 mo)   INR used for dosin.60 (2020)   Warfarin maintenance plan:   3 mg (2 mg x 1.5) every Mon; 4 mg (2 mg x 2) all other days   Full warfarin instructions:   3 mg every Mon; 4 mg all other days   Weekly warfarin total:   27 mg   No change documented:   Rae Wing RN   Plan last modified:   Rae Wing RN (2020)   Next INR check:   2020   Priority:   High   Target end date:   Indefinite    Indications    History of pulmonary embolism [Z86.711]  Heterozygous factor V Leiden mutation (H) [D68.51]             Anticoagulation Episode Summary     INR check location:       Preferred lab:       Send INR reminders to:   [facility]    Comments:   Call cell number with results for INR      Anticoagulation Care Providers     Provider Role Specialty Phone number    Mona Diaz MD [facility] Internal Medicine [phone]            See the Encounter Report to view Anticoagulation Flowsheet and Dosing Calendar (Go to Encounters tab in  chart review, and find the Anticoagulation Therapy Visit)    Dosage adjustment made based on physician directed care plan.    Rae Wing RN

## 2020-09-03 ENCOUNTER — TELEPHONE (OUTPATIENT)
Dept: SLEEP MEDICINE | Facility: CLINIC | Age: 70
End: 2020-09-03

## 2020-09-03 NOTE — TELEPHONE ENCOUNTER
[facility] called to get cpap download but states that they don't have her in their system. Pt unsure who cpap vendor is.    BRENDA Garcia

## 2020-09-08 ENCOUNTER — THERAPY VISIT (OUTPATIENT)
Dept: PHYSICAL THERAPY | Facility: CLINIC | Age: 70
End: 2020-09-08
Payer: COMMERCIAL

## 2020-09-08 DIAGNOSIS — N39.46 MIXED STRESS AND URGE URINARY INCONTINENCE: Primary | ICD-10-CM

## 2020-09-08 DIAGNOSIS — N39.46 URINARY INCONTINENCE, MIXED: Primary | ICD-10-CM

## 2020-09-08 PROCEDURE — 97535 SELF CARE MNGMENT TRAINING: CPT | Mod: GP | Performed by: PHYSICAL THERAPIST

## 2020-09-08 PROCEDURE — 97110 THERAPEUTIC EXERCISES: CPT | Mod: GP | Performed by: PHYSICAL THERAPIST

## 2020-09-08 PROCEDURE — 97162 PT EVAL MOD COMPLEX 30 MIN: CPT | Mod: GP | Performed by: PHYSICAL THERAPIST

## 2020-09-08 PROCEDURE — 97140 MANUAL THERAPY 1/> REGIONS: CPT | Mod: GP | Performed by: PHYSICAL THERAPIST

## 2020-09-08 NOTE — PROGRESS NOTES
Physical Therapy Initial Evaluation    Precautions/Restrictions/MD instructions: PT eval and treat.       Subjective History:    Injury/Condition Details:  Presenting Complaint Will be seeing Dr. Mac on Sep 24th, planning to get a pessary put in. Gets leakage when she walks, especially if she notices bladder spasms before she stands up. Also noticed one instance in bed in July. Feels like she can control the urgency a little better now. Last see in PT in Nov 2019, was told by Dr. Mac to get re-evaluated by PT. Had spinal implant inserted in June, was helping for a time but now thigh pain has returned.    Onset Timing/Date MD referral 9/8/20   Mechanism chronic       Prior Testing/Intervention for current condition:  Prior Tests none   Prior Treatment Pessary will be placed 9/24      Lifestyle & General Medical History  General Health Reported by Patient fair   Employment/Activities retired   Pertinent medical/surgical history high blood pressure, overweight, sleep apnea, thyroid problems, COPD   Patient Goals Reduce leakage     Flexibility:   L R   Adductors restricted restricted   Hamstrings restricted restricted   Piriformis WNL WNL   Gluteals restricted restricted   Iliopsoas WNL WNL     External Exam:  Skin condition: normal  Scars: none  Is tissue symmetrical?: yes  Introitus: normal - pt reports some irritation  Muscle contraction - pelvic elevation present/slight/absent? absent  Perineal mobility - descent/no movement? descent    Internal Exam    Sensation intact?: yes    Muscle exam   Neg Pain (-) Min Pain (+) Mod Pain (++) Severe Pain (+++) Increased tone?   Ischiocavernosus -       Bulbocavernosus -       Transverse Perineals  +      Levator Ani   ++ on L only-  yes   Perineal Body -         Contraction Grade  0 (no contraction)    1 (flicker)    2 (weak squeeze, 2 sec hold) X with cueing   3 (fair squeeze, definite lift)    4 (good squeeze, good hold, repeatable)    5 (strong squeeze, good hold,  repeatable)      Accessory use of gluteals noted with contraction.    Patient is a 69 year old female with pelvic complaints.    Patient has the following significant findings with corresponding treatment plan.                Diagnosis 1:  Mixed incontinence  Pain -  manual therapy, self management, education and home program  Decreased ROM/flexibility - manual therapy and therapeutic exercise  Decreased strength - therapeutic exercise and therapeutic activities  Decreased proprioception - neuro re-education and therapeutic activities  Impaired muscle performance - neuro re-education  Decreased function - therapeutic activities    Therapy Evaluation Codes:   1) History comprised of:   Personal factors that impact the plan of care:      None.    Comorbidity factors that impact the plan of care are:      High blood pressure and Overweight.     Medications impacting care: High blood pressure.  2) Examination of Body Systems comprised of:   Body structures and functions that impact the plan of care:      Hip, Lumbar spine and Pelvis.   Activity limitations that impact the plan of care are:      Stress incontinence, Urgency, Urge incontinence and Urinary incontinence.  3) Clinical presentation characteristics are:   Evolving/Changing.  4) Decision-Making    Moderate complexity using standardized patient assessment instrument and/or measureable assessment of functional outcome.  Cumulative Therapy Evaluation is: Moderate complexity.    Previous and current functional limitations:  (See Goal Flow Sheet for this information)    Short term and Long term goals: (See Goal Flow Sheet for this information)     Communication ability:  Patient appears to be able to clearly communicate and understand verbal and written communication and follow directions correctly.  Treatment Explanation - The following has been discussed with the patient:   RX ordered/plan of care  Anticipated outcomes  Possible risks and side effects  This  patient would benefit from PT intervention to resume normal activities.   Rehab potential is good.    Frequency:  1 X week, once daily x 4 weeks then tapering to 1x every other week  Duration:  for 12 weeks  Discharge Plan:  Achieve all LTG.  Independent in home treatment program.  Reach maximal therapeutic benefit.    Please refer to the daily flowsheet for treatment today, total treatment time and time spent performing 1:1 timed codes.

## 2020-09-08 NOTE — PROGRESS NOTES
"{Progress Note or Discharge Report:206911}    Progress reporting period is from *** to ***.       SUBJECTIVE  Subjective changes noted by patient:  Will be seeing Dr. Mca on Sep 24th, planning to get a pessary put in. Gets leakage when she walks, especially if she notices bladder spasms before she stands up. Also noticed one instance in bed in July. Feels like she can control the urgency a little better now. Last see in PT in Nov 2019, was told by Dr. Mac to get re-evaluated by PT. Had spinal implant inserted in June, was helping for a time but now thigh pain has returned.   Current pain level is {PAIN LEVEL (SCALE OF 10):048219}  .     Previous pain level was  {PAIN LEVEL (SCALE OF 10):568349}  .   Changes in function:  {Changes in Function:550903}  Adverse reaction to treatment or activity: {Adverse reaction to treatment or activity:217279::\"None\"}    OBJECTIVE  Changes noted in objective findings:  {Changes noted in objective findings:820301}        ASSESSMENT/PLAN  Updated problem list and treatment plan: {DIAGNOSIS/PLAN REHAB:578541}  STG/LTGs have been met or progress has been made towards goals:  {Goals met/progress made:024594::\"Yes (See Goal flow sheet completed today.)\"}  Assessment of Progress: {Assessment of progress:133554}  Self Management Plans:  {Self Management Plans:845962}  {Appropriateness of Rx:741600}  Petra continues to require the following intervention to meet STG and LTG's:  {INTERVENTION REHAB:205996}    Recommendations:  {RECOMMENDATIONS REHAB:370942}    Please refer to the daily flowsheet for treatment today, total treatment time and time spent performing 1:1 timed codes.        "

## 2020-09-08 NOTE — LETTER
ANA HERNANDEZ [place] [facility]  [address] [facility], [address]  [place] [address]  [phone]    2020    Re: Petra MANZANO Mcnulty   :   1950  MRN:  [id number]   REFERRING PHYSICIAN:   Karlee CRUZ PT    Date of Initial Evaluation:  20  Visits:  Rxs Used: 1  Reason for Referral:  Mixed stress and urge urinary incontinence    Physical Therapy Initial Evaluation    Precautions/Restrictions/MD instructions: PT eval and treat.       Subjective History:    Injury/Condition Details:  Presenting Complaint Will be seeing Dr. Mac on Sep 24th, planning to get a pessary put in. Gets leakage when she walks, especially if she notices bladder spasms before she stands up. Also noticed one instance in bed in July. Feels like she can control the urgency a little better now. Last see in PT in 2019, was told by Dr. Mac to get re-evaluated by PT. Had spinal implant inserted in , was helping for a time but now thigh pain has returned.    Onset Timing/Date MD referral 20   Mechanism chronic       Prior Testing/Intervention for current condition:  Prior Tests none   Prior Treatment Pessary will be placed       Lifestyle & General Medical History  General Health Reported by Patient fair   Employment/Activities retired   Pertinent medical/surgical history high blood pressure, overweight, sleep apnea, thyroid problems, COPD   Patient Goals Reduce leakage     Flexibility:   L R   Adductors restricted restricted   Hamstrings restricted restricted   Piriformis WNL WNL   Gluteals restricted restricted   Iliopsoas WNL WNL         Re: Petra Mcnulty   :   1950    External Exam:  Skin condition: normal  Scars: none  Is tissue symmetrical?: yes  Introitus: normal - pt reports some irritation  Muscle contraction - pelvic elevation present/slight/absent? absent  Perineal mobility - descent/no movement? descent    Internal Exam    Sensation intact?: yes    Muscle exam   Neg Pain (-) Min  Pain (+) Mod Pain (++) Severe Pain (+++) Increased tone?   Ischiocavernosus -       Bulbocavernosus -       Transverse Perineals  +      Levator Ani   ++ on L only-  yes   Perineal Body -         Contraction Grade  0 (no contraction)    1 (flicker)    2 (weak squeeze, 2 sec hold) X with cueing   3 (fair squeeze, definite lift)    4 (good squeeze, good hold, repeatable)    5 (strong squeeze, good hold, repeatable)      Accessory use of gluteals noted with contraction.    Patient is a 69 year old female with pelvic complaints.    Patient has the following significant findings with corresponding treatment plan.                Diagnosis 1:  Mixed incontinence  Pain -  manual therapy, self management, education and home program  Decreased ROM/flexibility - manual therapy and therapeutic exercise  Decreased strength - therapeutic exercise and therapeutic activities  Decreased proprioception - neuro re-education and therapeutic activities  Impaired muscle performance - neuro re-education  Decreased function - therapeutic activities    Therapy Evaluation Codes:   1) History comprised of:   Personal factors that impact the plan of care:      None.    Comorbidity factors that impact the plan of care are:      High blood pressure and Overweight.     Medications impacting care: High blood pressure.  2) Examination of Body Systems comprised of:   Body structures and functions that impact the plan of care:      Hip, Lumbar spine and Pelvis.   Activity limitations that impact the plan of care are:      Stress incontinence, Urgency, Urge incontinence and Urinary incontinence.  3) Clinical presentation characteristics are:   Evolving/Changing.  Re: Petra Mcnulty   :   1950    4) Decision-Making    Moderate complexity using standardized patient assessment instrument and/or measureable assessment of functional outcome.  Cumulative Therapy Evaluation is: Moderate complexity.    Previous and current functional limitations:   (See Goal Flow Sheet for this information)    Short term and Long term goals: (See Goal Flow Sheet for this information)     Communication ability:  Patient appears to be able to clearly communicate and understand verbal and written communication and follow directions correctly.  Treatment Explanation - The following has been discussed with the patient:   RX ordered/plan of care  Anticipated outcomes  Possible risks and side effects  This patient would benefit from PT intervention to resume normal activities.   Rehab potential is good.    Frequency:  1 X week, once daily x 4 weeks then tapering to 1x every other week  Duration:  for 12 weeks  Discharge Plan:  Achieve all LTG.  Independent in home treatment program.  Reach maximal therapeutic benefit.    Thank you for your referral.    INQUIRIES  Therapist: Valencia Olson PT, DPT  ANA [facility]  [id number] [place] [address], [address] [address]  Phone: [phone]  Fax: [phone]

## 2020-09-15 ENCOUNTER — PRE VISIT (OUTPATIENT)
Dept: UROLOGY | Facility: CLINIC | Age: 70
End: 2020-09-15

## 2020-09-15 NOTE — TELEPHONE ENCOUNTER
Reason for visit: Pessary fitting     Relevant information: incontinence    Records/imaging/labs/orders: all records available    Pt called: no need for a call    At Rooming: PVR, pessary kit, undress for exam

## 2020-09-17 ENCOUNTER — OFFICE VISIT (OUTPATIENT)
Dept: UROLOGY | Facility: CLINIC | Age: 70
End: 2020-09-17
Payer: COMMERCIAL

## 2020-09-17 VITALS — SYSTOLIC BLOOD PRESSURE: 139 MMHG | DIASTOLIC BLOOD PRESSURE: 71 MMHG | HEART RATE: 92 BPM

## 2020-09-17 DIAGNOSIS — N39.46 MIXED STRESS AND URGE URINARY INCONTINENCE: Primary | ICD-10-CM

## 2020-09-17 ASSESSMENT — PAIN SCALES - GENERAL: PAINLEVEL: NO PAIN (0)

## 2020-09-17 NOTE — LETTER
9/17/2020       RE: Petra Mcnulty  [address] [place] [address]     Dear Colleague,    Thank you for referring your patient, Petra Mcnulty, to the [facility] UROLOGY AND [facility] PR[facility] UROLOGIC [facility] at [facility]. Please see a copy of my visit note below.    September 17, 2020    Return visit    Patient returns today for pessary fitting.  She denies any changes in her health since last visit.    /71   Pulse 92   She is comfortable, in no distress, non-labored breathing.  Abdomen is soft, non-tender, non-distended.  Normal external female genitalia.  Negative CST.  Speculum and bimanual exam are unremarkable.    A/P: Petra Mcnulty is a 69 year old F with obesity, factor V Leiden, hyperlipidemia, history of PE, LOR, mixed urinary incontinence.     Fitted with #2 incontinence ring which she tolerated.  Will trial  Return to clinic in 6 weeks    Mendoza Ledezma MD  PGY-2 Urology    Addendum:    The patient was seen and evaluated with the resident.  The plan was formulated in conjunction with me and I agree with the above note with changes made as necessary.    25 minutes were spent with patient today, >50% in counseling and coordination of care    Leti Mac MD MPH   of Urology    CC  Patient Care Team:  Mona Diaz MD as PCP - General (Internal Medicine)  Mona Diaz MD as Assigned PCP  SELF, REFERRED

## 2020-09-17 NOTE — PROGRESS NOTES
September 17, 2020    Return visit    Patient returns today for pessary fitting.  She denies any changes in her health since last visit.    /71   Pulse 92   She is comfortable, in no distress, non-labored breathing.  Abdomen is soft, non-tender, non-distended.  Normal external female genitalia.  Negative CST.  Speculum and bimanual exam are unremarkable.    A/P: Petra Mcnulty is a 69 year old F with obesity, factor V Leiden, hyperlipidemia, history of PE, LOR, mixed urinary incontinence.     Fitted with #2 incontinence ring which she tolerated.  Will trial  Return to clinic in 6 weeks    Mendoza Ledezma MD  PGY-2 Urology    Addendum:    The patient was seen and evaluated with the resident.  The plan was formulated in conjunction with me and I agree with the above note with changes made as necessary.    25 minutes were spent with patient today, >50% in counseling and coordination of care    Leti Mac MD MPH   of Urology    CC  Patient Care Team:  Mona Diaz MD as PCP - General (Internal Medicine)  Mona Diaz MD as Assigned PCP  SELF, REFERRED

## 2020-09-17 NOTE — PATIENT INSTRUCTIONS
Websites with free information:    American [employer] patient website: www.voicesforpfd.org    Total Control Program: www.totalcontrolprogram.com      Please return to see me in [place] in 4-6 weeks, sooner if needed    It was a pleasure meeting with you today.  Thank you for allowing me and my team the privilege of caring for you today.  YOU are the reason we are here, and I truly hope we provided you with the excellent service you deserve.  Please let us know if there is anything else we can do for you so that we can be sure you are leaving completely satisfied with your care experience.

## 2020-09-17 NOTE — NURSING NOTE
Chief Complaint   Patient presents with     RECHECK     Pessary fitting       Blood pressure 139/71, pulse 92, not currently breastfeeding. There is no height or weight on file to calculate BMI.    Patient Active Problem List   Diagnosis     Pain in joint, lower leg     Knee pain     Knee joint replacement by other means     Heterozygous factor V Leiden mutation (H)     Morbid obesity (H)     History of pulmonary embolism     Postmenopausal bleeding     Mirena IUD inserted 08/11/2020- Remove by 08/11/2025     Mixed stress and urge urinary incontinence     LOR (obstructive sleep apnea)       Allergies   Allergen Reactions     Atorvastatin      myalgia     Simvastatin Rash       Current Outpatient Medications   Medication Sig Dispense Refill     acetaminophen (TYLENOL) 500 MG tablet Take 1,000 mg by mouth 2 times daily        albuterol (PROAIR HFA/PROVENTIL HFA/VENTOLIN HFA) 108 (90 Base) MCG/ACT inhaler Inhale 2 puffs into the lungs every 4 hours as needed for shortness of breath / dyspnea or wheezing 1 Inhaler 3     diclofenac (VOLTAREN) 1 % topical gel Place onto the skin 4 times daily       Fesoterodine Fumarate 8 MG TB24 Take by mouth daily       gabapentin (NEURONTIN) 300 MG capsule Take 300 mg by mouth 2 times daily       HYDROcodone-acetaminophen (NORCO) 5-325 MG tablet Take 1 tablet by mouth every 6 hours as needed for severe pain       levonorgestrel (MIRENA) 20 MCG/24HR IUD 1 each (20 mcg) by Intrauterine route once       levothyroxine (SYNTHROID/LEVOTHROID) 100 MCG tablet Take 100 mcg by mouth daily       lisinopril (PRINIVIL/ZESTRIL) 10 MG tablet Take 10 mg by mouth daily       loratadine (CLARITIN) 10 MG tablet Take 10 mg by mouth daily       methocarbamol (ROBAXIN) 750 MG tablet Take 750 mg by mouth At Bedtime       mirabegron (MYRBETRIQ) 25 MG 24 hr tablet Take 1 tablet (25 mg) by mouth daily 30 tablet 11     pravastatin (PRAVACHOL) 40 MG tablet Take 40 mg by mouth daily       warfarin ANTICOAGULANT  (COUMADIN) 2 MG tablet TAKE 2 TABLETS ON MONDAY, WEDNESDAY, FRIDAY, AND 1 1/2 TABLET ON SUN, TUESDAY, THURSDAY, AND SATURDAY OR AS DIRECTED BY INR CLINIC 477 tablet 0     Heparin Sodium, Porcine, (HEPARIN, PORCINE,) 5000 UNIT/ML SOLN injection Inject 1 mL (5,000 Units) Subcutaneous every 12 hours Starting , last dose 20 PM, resume as directed after surgery (Patient not taking: Reported on 2020) 20 mL 1       Social History     Tobacco Use     Smoking status: Former Smoker     Packs/day: 0.50     Types: Cigarettes     Last attempt to quit: 2006     Years since quittin.7     Smokeless tobacco: Never Used   Substance Use Topics     Alcohol use: Yes     Comment: rare     Drug use: Never       Sandra Blank CMA  2020  11:38 AM

## 2020-09-23 ENCOUNTER — ANTICOAGULATION THERAPY VISIT (OUTPATIENT)
Dept: ANTICOAGULATION | Facility: CLINIC | Age: 70
End: 2020-09-23

## 2020-09-23 DIAGNOSIS — Z86.711 HISTORY OF PULMONARY EMBOLISM: ICD-10-CM

## 2020-09-23 DIAGNOSIS — D68.51 HETEROZYGOUS FACTOR V LEIDEN MUTATION (H): ICD-10-CM

## 2020-09-23 DIAGNOSIS — Z79.01 LONG TERM CURRENT USE OF ANTICOAGULANTS WITH INR GOAL OF 2.0-3.0: ICD-10-CM

## 2020-09-23 DIAGNOSIS — D68.51 FACTOR V LEIDEN (H): ICD-10-CM

## 2020-09-23 LAB
CAPILLARY BLOOD COLLECTION: NORMAL
INR PPP: 3.1 (ref 0.86–1.14)

## 2020-09-23 PROCEDURE — 99207 ZZC NO CHARGE NURSE ONLY: CPT | Performed by: INTERNAL MEDICINE

## 2020-09-23 PROCEDURE — 85610 PROTHROMBIN TIME: CPT | Performed by: INTERNAL MEDICINE

## 2020-09-23 PROCEDURE — 36416 COLLJ CAPILLARY BLOOD SPEC: CPT | Performed by: INTERNAL MEDICINE

## 2020-09-23 NOTE — PROGRESS NOTES
ANTICOAGULATION FOLLOW-UP CLINIC VISIT    Patient Name:  Petra Mcnulty  Date:  9/23/2020  Contact Type:  Telephone/ discussed with patient    SUBJECTIVE:  Patient Findings     Positives:   Change in diet/appetite (May not be getting as much green veggies as usual.  Patient has been eating a lot of tomatoes and watermellon.  Encouraged her to increase her vitamin K intake.)    Comments:   The patient was assessed for medication, and activity level changes, missed or extra doses, bruising or bleeding, with no problem findings.          Clinical Outcomes     Negatives:   Major bleeding event, Thromboembolic event, Anticoagulation-related hospital admission, Anticoagulation-related ED visit, Anticoagulation-related fatality    Comments:   The patient was assessed for medication, and activity level changes, missed or extra doses, bruising or bleeding, with no problem findings.             OBJECTIVE    Recent labs: (last 7 days)     09/23/20  1124   INR 3.10*       ASSESSMENT / PLAN  INR assessment SUPRA    Recheck INR In: 3 WEEKS    INR Location Clinic      Anticoagulation Summary  As of 9/23/2020    INR goal:   2.0-3.0   TTR:   52.1 % (6.9 mo)   INR used for dosing:   3.10! (9/23/2020)   Warfarin maintenance plan:   3 mg (2 mg x 1.5) every Mon; 4 mg (2 mg x 2) all other days   Full warfarin instructions:   3 mg every Mon; 4 mg all other days   Weekly warfarin total:   27 mg   No change documented:   Rae Wing RN   Plan last modified:   Rae Wing RN (8/4/2020)   Next INR check:   10/14/2020   Priority:   High   Target end date:   Indefinite    Indications    History of pulmonary embolism [Z86.711]  Heterozygous factor V Leiden mutation (H) [D68.51]             Anticoagulation Episode Summary     INR check location:       Preferred lab:       [facility]    Comments:   Call cell number with results for INR      Anticoagulation Care Providers     Provider Role Specialty  Phone number    Mona Diaz MD [facility] Internal Medicine [phone]            See the Encounter Report to view Anticoagulation Flowsheet and Dosing Calendar (Go to Encounters tab in chart review, and find the Anticoagulation Therapy Visit)    Dosage adjustment made based on physician directed care plan.    Rae Wing RN

## 2020-09-25 ENCOUNTER — TRANSFERRED RECORDS (OUTPATIENT)
Dept: HEALTH INFORMATION MANAGEMENT | Facility: CLINIC | Age: 70
End: 2020-09-25

## 2020-09-25 LAB — HBA1C MFR BLD: 6.2 % (ref 0–5.7)

## 2020-10-14 ENCOUNTER — ANTICOAGULATION THERAPY VISIT (OUTPATIENT)
Dept: ANTICOAGULATION | Facility: CLINIC | Age: 70
End: 2020-10-14

## 2020-10-14 DIAGNOSIS — Z79.01 LONG TERM CURRENT USE OF ANTICOAGULANTS WITH INR GOAL OF 2.0-3.0: ICD-10-CM

## 2020-10-14 DIAGNOSIS — Z86.711 HISTORY OF PULMONARY EMBOLISM: ICD-10-CM

## 2020-10-14 DIAGNOSIS — D68.51 FACTOR V LEIDEN (H): ICD-10-CM

## 2020-10-14 DIAGNOSIS — D68.51 HETEROZYGOUS FACTOR V LEIDEN MUTATION (H): ICD-10-CM

## 2020-10-14 LAB
CAPILLARY BLOOD COLLECTION: NORMAL
INR PPP: 2.8 (ref 0.86–1.14)

## 2020-10-14 PROCEDURE — 36416 COLLJ CAPILLARY BLOOD SPEC: CPT | Performed by: INTERNAL MEDICINE

## 2020-10-14 PROCEDURE — 99207 PR NO CHARGE NURSE ONLY: CPT | Performed by: INTERNAL MEDICINE

## 2020-10-14 PROCEDURE — 85610 PROTHROMBIN TIME: CPT | Performed by: INTERNAL MEDICINE

## 2020-10-14 NOTE — PROGRESS NOTES
ANTICOAGULATION FOLLOW-UP CLINIC VISIT    Patient Name:  Petra Mcnulty  Date:  10/14/2020  Contact Type:  Telephone/ discussed with patient    SUBJECTIVE:  Patient Findings     Comments:  The patient was assessed for diet, medication, and activity level changes, missed or extra doses, bruising or bleeding, with no problem findings.          Clinical Outcomes     Negatives:  Major bleeding event, Thromboembolic event, Anticoagulation-related hospital admission, Anticoagulation-related ED visit, Anticoagulation-related fatality    Comments:  The patient was assessed for diet, medication, and activity level changes, missed or extra doses, bruising or bleeding, with no problem findings.             OBJECTIVE    Recent labs: (last 7 days)     10/14/20  1141   INR 2.80*       ASSESSMENT / PLAN  INR assessment THER    Recheck INR In: 4 WEEKS    INR Location Clinic      Anticoagulation Summary  As of 10/14/2020    INR goal:  2.0-3.0   TTR:  53.4 % (7.6 mo)   INR used for dosin.80 (10/14/2020)   Warfarin maintenance plan:  3 mg (2 mg x 1.5) every Mon; 4 mg (2 mg x 2) all other days   Full warfarin instructions:  3 mg every Mon; 4 mg all other days   Weekly warfarin total:  27 mg   No change documented:  Rae Wing RN   Plan last modified:  Rae Wing RN (2020)   Next INR check:  2020   Priority:  High   Target end date:  Indefinite    Indications    History of pulmonary embolism [Z86.711]  Heterozygous factor V Leiden mutation (H) [D68.51]             Anticoagulation Episode Summary     INR check location:      Preferred lab:      Send INR reminders to:  [facility]    Comments:  Call cell number with results for INR      Anticoagulation Care Providers     Provider Role Specialty Phone number    Mona Diaz MD [facility] Internal Medicine [phone]            See the Encounter Report to view Anticoagulation Flowsheet and Dosing Calendar (Go to Encounters tab in chart  review, and find the Anticoagulation Therapy Visit)    Dosage adjustment made based on physician directed care plan.    Rae Wing RN

## 2020-11-07 ENCOUNTER — HEALTH MAINTENANCE LETTER (OUTPATIENT)
Age: 70
End: 2020-11-07

## 2020-11-11 ENCOUNTER — ANTICOAGULATION THERAPY VISIT (OUTPATIENT)
Dept: ANTICOAGULATION | Facility: CLINIC | Age: 70
End: 2020-11-11

## 2020-11-11 DIAGNOSIS — D68.51 FACTOR V LEIDEN (H): ICD-10-CM

## 2020-11-11 DIAGNOSIS — D68.51 HETEROZYGOUS FACTOR V LEIDEN MUTATION (H): ICD-10-CM

## 2020-11-11 DIAGNOSIS — Z86.711 HISTORY OF PULMONARY EMBOLISM: ICD-10-CM

## 2020-11-11 DIAGNOSIS — Z79.01 LONG TERM CURRENT USE OF ANTICOAGULANTS WITH INR GOAL OF 2.0-3.0: ICD-10-CM

## 2020-11-11 LAB
CAPILLARY BLOOD COLLECTION: NORMAL
INR PPP: 3.1 (ref 0.86–1.14)

## 2020-11-11 PROCEDURE — 85610 PROTHROMBIN TIME: CPT | Performed by: INTERNAL MEDICINE

## 2020-11-11 PROCEDURE — 36416 COLLJ CAPILLARY BLOOD SPEC: CPT | Performed by: INTERNAL MEDICINE

## 2020-11-11 PROCEDURE — 99207 PR NO CHARGE NURSE ONLY: CPT

## 2020-11-11 RX ORDER — MULTIVIT WITH MINERALS/LUTEIN
1000 TABLET ORAL DAILY
COMMUNITY
Start: 2020-11-11

## 2020-11-11 RX ORDER — MULTIVIT-MIN/IRON/FOLIC ACID/K 18-600-40
CAPSULE ORAL
COMMUNITY
Start: 2020-11-11

## 2020-11-11 NOTE — PROGRESS NOTES
ANTICOAGULATION FOLLOW-UP CLINIC VISIT    Patient Name:  Petra Mcnulty  Date:  11/11/2020  Contact Type:  Telephone/ discussed with patient    SUBJECTIVE:  Patient Findings     Positives:  Signs/symptoms of bleeding (some bleeding from a sore on her hip, but this is healing ok.), Change in medications (reports that she has been taking Vitamin D and C.  Added these to medication list (no interaction))    Comments:  The patient was assessed for diet, and activity level changes, missed or extra doses, or bruising, with no problem findings.  Patient denies any identifiable changes that caused the supra therapeutic INR. Will consider maintenance dose adjustment if INR is elevated at next check.            Clinical Outcomes     Negatives:  Major bleeding event, Thromboembolic event, Anticoagulation-related hospital admission, Anticoagulation-related ED visit, Anticoagulation-related fatality    Comments:  The patient was assessed for diet, and activity level changes, missed or extra doses, or bruising, with no problem findings.  Patient denies any identifiable changes that caused the supra therapeutic INR. Will consider maintenance dose adjustment if INR is elevated at next check.               OBJECTIVE    Recent labs: (last 7 days)     11/11/20  0923   INR 3.10*       ASSESSMENT / PLAN  INR assessment SUPRA    Recheck INR In: 3 WEEKS    INR Location Clinic      Anticoagulation Summary  As of 11/11/2020    INR goal:  2.0-3.0   TTR:  54.9 % (8.6 mo)   INR used for dosing:  3.10 (11/11/2020)   Warfarin maintenance plan:  3 mg (2 mg x 1.5) every Mon; 4 mg (2 mg x 2) all other days   Full warfarin instructions:  3 mg every Mon; 4 mg all other days   Weekly warfarin total:  27 mg   No change documented:  Rae Wing RN   Plan last modified:  Rae Wing RN (8/4/2020)   Next INR check:  12/1/2020   Priority:  High   Target end date:  Indefinite    Indications    History of pulmonary embolism [Z86.711]  Heterozygous  factor V Leiden mutation (H) [D68.51]             Anticoagulation Episode Summary     INR check location:      Preferred lab:      Send INR reminders to:  [facility]    Comments:  Call cell number with results for INR      Anticoagulation Care Providers     Provider Role Specialty Phone number    Mona Diaz MD [facility] Internal Medicine [phone]            See the Encounter Report to view Anticoagulation Flowsheet and Dosing Calendar (Go to Encounters tab in chart review, and find the Anticoagulation Therapy Visit)    Dosage adjustment made based on physician directed care plan.    Rae Wing RN

## 2020-11-17 ENCOUNTER — OFFICE VISIT (OUTPATIENT)
Dept: UROLOGY | Facility: CLINIC | Age: 70
End: 2020-11-17
Payer: COMMERCIAL

## 2020-11-17 VITALS
WEIGHT: 293 LBS | BODY MASS INDEX: 44.41 KG/M2 | DIASTOLIC BLOOD PRESSURE: 68 MMHG | HEIGHT: 68 IN | HEART RATE: 78 BPM | SYSTOLIC BLOOD PRESSURE: 138 MMHG

## 2020-11-17 DIAGNOSIS — N39.46 MIXED STRESS AND URGE URINARY INCONTINENCE: Primary | ICD-10-CM

## 2020-11-17 DIAGNOSIS — E66.01 MORBID OBESITY (H): ICD-10-CM

## 2020-11-17 DIAGNOSIS — Z86.711 HISTORY OF PULMONARY EMBOLISM: ICD-10-CM

## 2020-11-17 LAB — RESIDUAL VOLUME (RV) (EXTERNAL): 58

## 2020-11-17 PROCEDURE — 99213 OFFICE O/P EST LOW 20 MIN: CPT | Mod: 25 | Performed by: UROLOGY

## 2020-11-17 PROCEDURE — 51798 US URINE CAPACITY MEASURE: CPT | Performed by: UROLOGY

## 2020-11-17 ASSESSMENT — MIFFLIN-ST. JEOR: SCORE: 2051.76

## 2020-11-17 ASSESSMENT — PAIN SCALES - GENERAL: PAINLEVEL: NO PAIN (0)

## 2020-11-17 NOTE — PROGRESS NOTES
"November 17, 2020    Return visit    Patient returns today for follow up for her mixed incontinence.  Pessary was too uncomfortable and was removed.  She is on medications with minimal help.  She did PFPT with minimal changes in her symptoms.  She is considering more invasive treatments.  [place] [facility] [place] showed a smaller capacity bladder but no DO/DOI/USI.   She denies any changes in her health since last visit.    /68   Pulse 78   Ht 1.727 m (5' 8\")   Wt 148.3 kg (327 lb)   BMI 49.72 kg/m    She is comfortable, in no distress, non-labored breathing.      PVR 58 mL by bladder scan    A/P: 69 year old F with obesity, factor V Leiden, history of PE on chronic anticoagulation, history of knee replacement, mixed urinary incontinence    Her leakage is not clear stress nor clear urgency incontinence.  She has already had UDS.  At this time recommend cystoscopy to ensure that there is not intravesical pathology contributing to some of the urgency symptoms and will also fill her bladder up at that time to do a CST to see if there is transurethral urine loss    RTC for cystoscopy in the near future    Leti Mac MD MPH   of Urology    CC  Patient Care Team:  Mona Diaz MD as PCP - General (Internal Medicine)  Mona Diaz MD as Assigned PCP  Adam Harvey MD as Assigned Sleep Provider  Parisa Goldberg MD as Assigned OBGYN Provider  Leti Mac MD as Assigned Surgical Provider  SELF, REFERRED              "

## 2020-11-17 NOTE — NURSING NOTE
Here for follow up . Had to remove the pessary  It was falling out and causing discomfort.Katie Nj LPN

## 2020-11-17 NOTE — PATIENT INSTRUCTIONS
Websites with free information:    American Urogynec[employer] Society patient website: www.voicesforpfd.org    Total Control Program: www.totalcontrolprogram.com    Please return for a cystoscopy (procedure to look in the bladder) and pelvic exam    It was a pleasure meeting with you today.  Thank you for allowing me and my team the privilege of caring for you today.  YOU are the reason we are here, and I truly hope we provided you with the excellent service you deserve.  Please let us know if there is anything else we can do for you so that we can be sure you are leaving completely satisfied with your care experience.    Cystoscopy    Cystoscopy is a procedure that lets your doctor look directly inside your urethra and bladder. It can be used to:    Help diagnose a problem with your urethra, bladder, or kidneys.    Take a sample (biopsy) of bladder or urethral tissue.    Treat certain problems (such as removing kidney stones).    Place a stent to bypass an obstruction.    Take special X-rays of the kidneys.  Based on the findings, your doctor may recommend other tests or treatments.  What is a cystoscope?  A cystoscope is a telescope-like instrument that contains lenses and fiberoptics (small glass wires that make bright light). The cystoscope may be straight and rigid, or flexible to bend around curves in the urethra. The doctor may look directly into the cystoscope, or project the image onto a monitor.  Getting ready    Ask your doctor if you should stop taking any medicines before the procedure.    Follow any other instructions your doctor gives you.  Tell your doctor before the exam if you:    Take any medicines, such as aspirin or blood thinners    Have allergies to any medicines    Are pregnant   The procedure  Cystoscopy is done in the doctor s office, surgery center, or hospital. The doctor and a nurse are present during the procedure. It takes only a few minutes, longer if a biopsy, X-ray, or treatment needs  to be done.  During the procedure:    You lie on an exam table on your back, knees bent and legs apart. You are covered with a drape.    Your urethra and the area around it are washed. Anesthetic jelly may be applied to numb the urethra.    The cystoscope is inserted. A sterile fluid is put into the bladder to expand it. You may feel pressure from this fluid.    When the procedure is done, the cystoscope is removed.  After the procedure   Once you re home:    Drink plenty of fluids.    You may have burning or light bleeding when you urinate--this is normal.    Medicines may be prescribed to ease any discomfort or prevent infection. Take these as directed.    Call your doctor if you have heavy bleeding or blood clots, burning that lasts more than a day, a fever over 100 F  (38  C), or trouble urinating.  Date Last Reviewed: 1/1/2017 2000-2017 [employer]. [address] [place] [address], [place] [address]. All rights reserved. This information is not intended as a substitute for professional medical care. Always follow your healthcare professional's instructions.

## 2020-11-17 NOTE — LETTER
"11/17/2020       RE: Petra Mcnulty  [address] [place] [address]     Dear Colleague,    Thank you for referring your patient, Petra Mcnulty, to the [facility] UROLOGY [facility] [place] at [facility]. Please see a copy of my visit note below.    November 17, 2020    Return visit    Patient returns today for follow up for her mixed incontinence.  Pessary was too uncomfortable and was removed.  She is on medications with minimal help.  She did PFPT with minimal changes in her symptoms.  She is considering more invasive treatments.  Her UDS at [facility] last year showed a smaller capacity bladder but no DO/DOI/USI.   She denies any changes in her health since last visit.    /68   Pulse 78   Ht 1.727 m (5' 8\")   Wt 148.3 kg (327 lb)   BMI 49.72 kg/m    She is comfortable, in no distress, non-labored breathing.      PVR 58 mL by bladder scan    A/P: 69 year old F with obesity, factor V Leiden, history of PE on chronic anticoagulation, history of knee replacement, mixed urinary incontinence    Her leakage is not clear stress nor clear urgency incontinence.  She has already had UDS.  At this time recommend cystoscopy to ensure that there is not intravesical pathology contributing to some of the urgency symptoms and will also fill her bladder up at that time to do a CST to see if there is transurethral urine loss    RTC for cystoscopy in the near future    Leti Mac MD MPH   of Urology    CC  Patient Care Team:  Mona Diaz MD as PCP - General (Internal Medicine)  Mona Diaz MD as Assigned PCP  Adam Harvey MD as Assigned Sleep Provider  Parisa Goldberg MD as Assigned OBGYN Provider  Leti Mac MD as Assigned Surgical Provider  SELF, REFERRED        "

## 2020-12-01 ENCOUNTER — ANTICOAGULATION THERAPY VISIT (OUTPATIENT)
Dept: ANTICOAGULATION | Facility: CLINIC | Age: 70
End: 2020-12-01

## 2020-12-01 DIAGNOSIS — Z79.01 LONG TERM CURRENT USE OF ANTICOAGULANTS WITH INR GOAL OF 2.0-3.0: ICD-10-CM

## 2020-12-01 DIAGNOSIS — D68.51 FACTOR V LEIDEN (H): ICD-10-CM

## 2020-12-01 DIAGNOSIS — Z86.711 HISTORY OF PULMONARY EMBOLISM: ICD-10-CM

## 2020-12-01 DIAGNOSIS — D68.51 HETEROZYGOUS FACTOR V LEIDEN MUTATION (H): ICD-10-CM

## 2020-12-01 LAB
CAPILLARY BLOOD COLLECTION: NORMAL
INR PPP: 3.1 (ref 0.86–1.14)

## 2020-12-01 PROCEDURE — 99207 PR NO CHARGE NURSE ONLY: CPT | Performed by: INTERNAL MEDICINE

## 2020-12-01 PROCEDURE — 85610 PROTHROMBIN TIME: CPT | Performed by: INTERNAL MEDICINE

## 2020-12-01 PROCEDURE — 36416 COLLJ CAPILLARY BLOOD SPEC: CPT | Performed by: INTERNAL MEDICINE

## 2020-12-01 NOTE — PROGRESS NOTES
ANTICOAGULATION FOLLOW-UP CLINIC VISIT    Patient Name:  Petra Mcnulty  Date:  12/1/2020  Contact Type:  Telephone/ Called patient, she reports inconsistent diet, she denies any other changes. Orders discussed with the patient, she agrees with the plan. Lab INR appointment scheduled on 12/22/20.    SUBJECTIVE:  Patient Findings     Positives:  Change in diet/appetite (Patient reports she has not been consistent, will try to be more consistent. )    Comments:  The patient was assessed for diet, medication, and activity level changes, missed or extra doses, bruising or bleeding, with no problem findings. Maintenance warfarin dosing was reviewed with patient and will remain on the same dose until next INR check. Patient did not have any questions or concerns. If INR remains elevated at next check, may consider decreasing weekly warfarin  maintenance dose.          Clinical Outcomes     Comments:  The patient was assessed for diet, medication, and activity level changes, missed or extra doses, bruising or bleeding, with no problem findings. Maintenance warfarin dosing was reviewed with patient and will remain on the same dose until next INR check. Patient did not have any questions or concerns. If INR remains elevated at next check, may consider decreasing weekly warfarin  maintenance dose.             OBJECTIVE    Recent labs: (last 7 days)     12/01/20  0925   INR 3.10*       ASSESSMENT / PLAN  INR assessment SUPRA    Recheck INR In: 3 WEEKS    INR Location Outside lab      Anticoagulation Summary  As of 12/1/2020    INR goal:  2.0-3.0   TTR:  50.9 % (9.2 mo)   INR used for dosing:  3.10 (12/1/2020)   Warfarin maintenance plan:  3 mg (2 mg x 1.5) every Mon; 4 mg (2 mg x 2) all other days   Full warfarin instructions:  3 mg every Mon; 4 mg all other days   Weekly warfarin total:  27 mg   No change documented:  Martha De La Cruz, RN   Plan last modified:  Rae Wing RN (8/4/2020)   Next INR check:  12/22/2020    Priority:  Maintenance   Target end date:  Indefinite    Indications    History of pulmonary embolism [Z86.711]  Heterozygous factor V Leiden mutation (H) [D68.51]             Anticoagulation Episode Summary     INR check location:      Preferred lab:      Send INR reminders to:  [facility]    Comments:  Call cell number with results for INR      Anticoagulation Care Providers     Provider Role Specialty Phone number    Mona Diaz MD [facility] Internal Medicine [phone]            See the Encounter Report to view Anticoagulation Flowsheet and Dosing Calendar (Go to Encounters tab in chart review, and find the Anticoagulation Therapy Visit)    Dosage adjustment made based on physician directed care plan.    Martha De La Cruz RN

## 2020-12-02 ENCOUNTER — TRANSFERRED RECORDS (OUTPATIENT)
Dept: HEALTH INFORMATION MANAGEMENT | Facility: CLINIC | Age: 70
End: 2020-12-02

## 2020-12-02 LAB — RETINOPATHY: NORMAL

## 2020-12-22 ENCOUNTER — ANTICOAGULATION THERAPY VISIT (OUTPATIENT)
Dept: ANTICOAGULATION | Facility: CLINIC | Age: 70
End: 2020-12-22

## 2020-12-22 DIAGNOSIS — D68.51 HETEROZYGOUS FACTOR V LEIDEN MUTATION (H): ICD-10-CM

## 2020-12-22 DIAGNOSIS — Z86.711 HISTORY OF PULMONARY EMBOLISM: ICD-10-CM

## 2020-12-22 DIAGNOSIS — Z79.01 LONG TERM CURRENT USE OF ANTICOAGULANTS WITH INR GOAL OF 2.0-3.0: ICD-10-CM

## 2020-12-22 DIAGNOSIS — D68.51 FACTOR V LEIDEN (H): ICD-10-CM

## 2020-12-22 LAB
CAPILLARY BLOOD COLLECTION: NORMAL
INR PPP: 2.9 (ref 0.86–1.14)

## 2020-12-22 PROCEDURE — 99207 PR NO CHARGE NURSE ONLY: CPT

## 2020-12-22 PROCEDURE — 85610 PROTHROMBIN TIME: CPT | Performed by: INTERNAL MEDICINE

## 2020-12-22 PROCEDURE — 36416 COLLJ CAPILLARY BLOOD SPEC: CPT | Performed by: INTERNAL MEDICINE

## 2020-12-22 NOTE — PROGRESS NOTES
ANTICOAGULATION FOLLOW-UP CLINIC VISIT    Patient Name:  Petra Mcnulty  Date:  2020  Contact Type:  Telephone/ Called patient, she reports more consistency in her diet, she denies any other changes. Orders discussed with the patient, she agrees with the plan. Lab INR appointment scheduled on 21 in the morning at patient request.     SUBJECTIVE:  Patient Findings     Comments:  The patient was assessed for diet, medication, and activity level changes, missed or extra doses, bruising or bleeding, with no problem findings. Maintenance warfarin dosing was reviewed with patient and will remain on the same dose until next INR check. Patient did not have any questions or concerns.           Clinical Outcomes     Negatives:  Major bleeding event, Thromboembolic event, Anticoagulation-related hospital admission, Anticoagulation-related ED visit, Anticoagulation-related fatality    Comments:  The patient was assessed for diet, medication, and activity level changes, missed or extra doses, bruising or bleeding, with no problem findings. Maintenance warfarin dosing was reviewed with patient and will remain on the same dose until next INR check. Patient did not have any questions or concerns.              OBJECTIVE    Recent labs: (last 7 days)     20  0914   INR 2.90*       ASSESSMENT / PLAN  INR assessment THER    Recheck INR In: 4 WEEKS    INR Location Outside lab      Anticoagulation Summary  As of 2020    INR goal:  2.0-3.0   TTR:  50.8 % (9.9 mo)   INR used for dosin.90 (2020)   Warfarin maintenance plan:  3 mg (2 mg x 1.5) every Mon; 4 mg (2 mg x 2) all other days   Full warfarin instructions:  3 mg every Mon; 4 mg all other days   Weekly warfarin total:  27 mg   No change documented:  Martha De La Cruz RN   Plan last modified:  Rae Wing RN (2020)   Next INR check:  2021   Priority:  Maintenance   Target end date:  Indefinite    Indications    History of pulmonary  embolism [Z86.711]  Heterozygous factor V Leiden mutation (H) [D68.51]             Anticoagulation Episode Summary     INR check location:      Preferred lab:      Send INR reminders to:  MARIUM MORTON    Comments:  Call cell number with results for INR      Anticoagulation Care Providers     Provider Role Specialty Phone number    Mona Diaz MD [facility] Internal Medicine [phone]            See the Encounter Report to view Anticoagulation Flowsheet and Dosing Calendar (Go to Encounters tab in chart review, and find the Anticoagulation Therapy Visit)    Dosage adjustment made based on physician directed care plan.    Martha De La Cruz RN

## 2021-01-01 ENCOUNTER — HEALTH MAINTENANCE LETTER (OUTPATIENT)
Age: 71
End: 2021-01-01

## 2021-01-05 ENCOUNTER — DOCUMENTATION ONLY (OUTPATIENT)
Dept: INTERNAL MEDICINE | Facility: CLINIC | Age: 71
End: 2021-01-05

## 2021-01-05 DIAGNOSIS — Z13.220 SCREENING CHOLESTEROL LEVEL: Primary | ICD-10-CM

## 2021-01-05 DIAGNOSIS — E66.01 MORBID OBESITY (H): ICD-10-CM

## 2021-01-19 ENCOUNTER — ANTICOAGULATION THERAPY VISIT (OUTPATIENT)
Dept: ANTICOAGULATION | Facility: CLINIC | Age: 71
End: 2021-01-19

## 2021-01-19 ENCOUNTER — OFFICE VISIT (OUTPATIENT)
Dept: UROLOGY | Facility: CLINIC | Age: 71
End: 2021-01-19
Payer: COMMERCIAL

## 2021-01-19 ENCOUNTER — OFFICE VISIT (OUTPATIENT)
Dept: OBGYN | Facility: CLINIC | Age: 71
End: 2021-01-19
Payer: COMMERCIAL

## 2021-01-19 ENCOUNTER — DOCUMENTATION ONLY (OUTPATIENT)
Dept: ANTICOAGULATION | Facility: CLINIC | Age: 71
End: 2021-01-19

## 2021-01-19 VITALS — WEIGHT: 293 LBS | SYSTOLIC BLOOD PRESSURE: 128 MMHG | DIASTOLIC BLOOD PRESSURE: 70 MMHG | BODY MASS INDEX: 45.39 KG/M2

## 2021-01-19 VITALS
WEIGHT: 293 LBS | SYSTOLIC BLOOD PRESSURE: 126 MMHG | DIASTOLIC BLOOD PRESSURE: 70 MMHG | HEIGHT: 68 IN | BODY MASS INDEX: 44.41 KG/M2

## 2021-01-19 DIAGNOSIS — Z13.220 SCREENING CHOLESTEROL LEVEL: ICD-10-CM

## 2021-01-19 DIAGNOSIS — N39.46 MIXED STRESS AND URGE URINARY INCONTINENCE: Primary | ICD-10-CM

## 2021-01-19 DIAGNOSIS — Z86.711 HISTORY OF PULMONARY EMBOLISM: ICD-10-CM

## 2021-01-19 DIAGNOSIS — Z79.01 LONG TERM CURRENT USE OF ANTICOAGULANTS WITH INR GOAL OF 2.0-3.0: ICD-10-CM

## 2021-01-19 DIAGNOSIS — R73.9 HYPERGLYCEMIA: ICD-10-CM

## 2021-01-19 DIAGNOSIS — E66.01 MORBID OBESITY (H): ICD-10-CM

## 2021-01-19 DIAGNOSIS — D68.51 FACTOR V LEIDEN (H): ICD-10-CM

## 2021-01-19 DIAGNOSIS — Z79.01 LONG TERM CURRENT USE OF ANTICOAGULANTS WITH INR GOAL OF 2.0-3.0: Primary | ICD-10-CM

## 2021-01-19 DIAGNOSIS — D68.51 HETEROZYGOUS FACTOR V LEIDEN MUTATION (H): ICD-10-CM

## 2021-01-19 DIAGNOSIS — R82.90 ABNORMAL URINE FINDING: ICD-10-CM

## 2021-01-19 DIAGNOSIS — N95.2 VAGINAL ATROPHY: ICD-10-CM

## 2021-01-19 DIAGNOSIS — Z87.42 HISTORY OF ENDOMETRIAL HYPERPLASIA: ICD-10-CM

## 2021-01-19 DIAGNOSIS — L90.0 LICHEN SCLEROSUS: Primary | ICD-10-CM

## 2021-01-19 LAB
ALBUMIN UR-MCNC: NEGATIVE MG/DL
APPEARANCE UR: CLEAR
BILIRUB UR QL STRIP: NEGATIVE
COLOR UR AUTO: YELLOW
ERYTHROCYTE [DISTWIDTH] IN BLOOD BY AUTOMATED COUNT: 13.4 % (ref 10–15)
GLUCOSE UR STRIP-MCNC: NEGATIVE MG/DL
HCT VFR BLD AUTO: 45.2 % (ref 35–47)
HGB BLD-MCNC: 14.4 G/DL (ref 11.7–15.7)
HGB UR QL STRIP: ABNORMAL
INR PPP: 2.1 (ref 0.86–1.14)
KETONES UR STRIP-MCNC: NEGATIVE MG/DL
LEUKOCYTE ESTERASE UR QL STRIP: NEGATIVE
MCH RBC QN AUTO: 30.3 PG (ref 26.5–33)
MCHC RBC AUTO-ENTMCNC: 31.9 G/DL (ref 31.5–36.5)
MCV RBC AUTO: 95 FL (ref 78–100)
NITRATE UR QL: POSITIVE
PH UR STRIP: 5.5 PH (ref 5–7)
PLATELET # BLD AUTO: 261 10E9/L (ref 150–450)
RBC # BLD AUTO: 4.76 10E12/L (ref 3.8–5.2)
SOURCE: ABNORMAL
SP GR UR STRIP: >1.03 (ref 1–1.03)
UROBILINOGEN UR STRIP-ACNC: 0.2 EU/DL (ref 0.2–1)
WBC # BLD AUTO: 6.2 10E9/L (ref 4–11)

## 2021-01-19 PROCEDURE — 85027 COMPLETE CBC AUTOMATED: CPT | Performed by: INTERNAL MEDICINE

## 2021-01-19 PROCEDURE — 81003 URINALYSIS AUTO W/O SCOPE: CPT | Mod: QW | Performed by: UROLOGY

## 2021-01-19 PROCEDURE — 99214 OFFICE O/P EST MOD 30 MIN: CPT | Performed by: UROLOGY

## 2021-01-19 PROCEDURE — 88305 TISSUE EXAM BY PATHOLOGIST: CPT

## 2021-01-19 PROCEDURE — 80061 LIPID PANEL: CPT | Performed by: INTERNAL MEDICINE

## 2021-01-19 PROCEDURE — 85610 PROTHROMBIN TIME: CPT | Performed by: INTERNAL MEDICINE

## 2021-01-19 PROCEDURE — 99207 PR NO CHARGE NURSE ONLY: CPT

## 2021-01-19 PROCEDURE — 83036 HEMOGLOBIN GLYCOSYLATED A1C: CPT | Performed by: OBSTETRICS & GYNECOLOGY

## 2021-01-19 PROCEDURE — 99214 OFFICE O/P EST MOD 30 MIN: CPT | Performed by: OBSTETRICS & GYNECOLOGY

## 2021-01-19 PROCEDURE — 80053 COMPREHEN METABOLIC PANEL: CPT | Performed by: INTERNAL MEDICINE

## 2021-01-19 PROCEDURE — 36415 COLL VENOUS BLD VENIPUNCTURE: CPT | Performed by: INTERNAL MEDICINE

## 2021-01-19 RX ORDER — ESTRADIOL 0.1 MG/G
1 CREAM VAGINAL
Qty: 42.5 G | Refills: 3 | Status: SHIPPED | OUTPATIENT
Start: 2021-01-21

## 2021-01-19 RX ORDER — CLOBETASOL PROPIONATE 0.5 MG/G
OINTMENT TOPICAL DAILY
Qty: 45 G | Refills: 4 | Status: SHIPPED | OUTPATIENT
Start: 2021-01-19 | End: 2022-01-01

## 2021-01-19 RX ORDER — CEPHALEXIN 500 MG/1
500 CAPSULE ORAL 2 TIMES DAILY
Qty: 10 CAPSULE | Refills: 0 | Status: SHIPPED | OUTPATIENT
Start: 2021-01-19 | End: 2021-01-24

## 2021-01-19 ASSESSMENT — PAIN SCALES - GENERAL: PAINLEVEL: MILD PAIN (2)

## 2021-01-19 ASSESSMENT — MIFFLIN-ST. JEOR: SCORE: 2046.76

## 2021-01-19 NOTE — PATIENT INSTRUCTIONS
Lichen Sclerosis    Lichen sclerosis is a chronic condition that occurs most often in postmenopausal women, usually involving itching, burning, and pain at the vulva. It likely occurs in 1 in 50 post-menopausal women. It is a benign, chronic, progressive skin condition characterized by inflammation, skin thinning, and distinctive change in the skin architecture of the vulva. The course usually includes frequent recurrences and remissions of signs and symptoms.    Although the risk of vulvar squamous cell carcinoma in women with vulvar Lichen Sclerosis is increased, this risk is estimated to be less than 5 percent. The vulva should be examined at least yearly for the remainder of your life and nonresolving lesions of localized thickened skin should be biopsied.  The goals of treatment is resolution of the symptoms (itching and pain) and signs of disease, including thickened skin, fissuring, and bruising.     Topical steroid ointment treatment is a highly effective therapy that can prevent progression of the disease. It is important to avoid scratching the area to prevent further progression.    Initially, we will try clobetasol ointment or cream nightly for 8 weeks then every other night for four weeks, then twice weekly for four weeks, using 1/2 fingertip unit to apply to all affected areas.    You should examine the vulvar area with a mirror and fingertips on a monthly basis and should return for evaluation if you see thickened areas of skin or sores that do not heal    Return at the end of the initial treatment course (12 weeks after starting therapy) to evaluate for continued need for treatment. Often the best results are seen when patient continue treatment 2-3 nights per week indefinitely.   If you ever develop worsening symptoms, I would ask you to increase the use of the ointment again to day for 7-10 days, then decrease to twice a week again. If you have worsening symptoms that do not respond to a burst of  treatment as described, please return for evaluation.     Vaginal Atrophy    Vaginal atrophy is a normal part of the aging process and is a reaction to decreased circulating estrogen, but this doesn't mean that there is nothing we can do about it. Symptoms often include vaginal dryness, itching, pain with intercourse, and narrowing of the vagina.     For dryness you can start by trying Replens, or another over the counter vaginal moisturizer.    Try a silicone or oil based lubricant for intercourse to make it more comfortable. If you want to talk with an expert on lubricants try visiting the [facility] on [facility] where they have a large variety of lubricants to try.   You can purchase lubricants at www.LikeWhere or amazon.com if they are hard to find elsewhere.    Read about sexual issues after menopause at www.TuCreaz.com Application.SiteWit    Vaginal estrogen is a medication inserted into the vagina which can decrease dryness, itching, narrowing, and pain with intercourse. It is administered in very low doses and is locally active. The risks associated with vaginal estrogen are far fewer and less significant than those associated with systemic hormone replacement therapy.  You may need to contact your insurance company to determine which vaginal estrogen formulation is covered at the best rate. This is different for each insurance company and for each type of supplemental coverage. Options include the following:    - Vagifem tablet, inserted vaginally daily for 14 days then 2-3x weekly  - Estring, a ring placed inside the vagina that rests at the top of the vagina for 3 months  - Estrace cream, a cream that is applied daily for 14 days then 2x/week inside the vagina. Apply 0.5g.  - Premarin cream, a cream that is applied daily for 14 days then 2x/week inside the vagina. Apply 0.5g

## 2021-01-19 NOTE — PROGRESS NOTES
ANTICOAGULATION MANAGEMENT      Petra Mcnulty due for annual renewal of referral to anticoagulation monitoring. Order pended for your review and signature.      ANTICOAGULATION SUMMARY      Warfarin indication(s)     PE  Factor V Leiden    Heart valve present?  NO       Current goal range   INR: 2.0-3.0     Goal appropriate for indication? Yes, INR 2-3 appropriate for hx of DVT, PE, hypercoagulable state, Afib, LVAD, or bileaflet AVR without risk factors     Current duration of therapy Indefinite/long term therapy   Time in Therapeutic Range (TTR)  (Goal > 60%) 55.1 %       Office visit with referring provider's group within last year yes on 6/9/2020       Martha De La Cruz RN

## 2021-01-19 NOTE — NURSING NOTE
"Chief Complaint   Patient presents with     Minor Procedure     patient is here for her 6 month EMB.     Vaginal Problem     some spots on her labia that are sore and feel \"burny\" and when she urinates the urine touching those areas hurts.         Initial /70   Wt 135.4 kg (298 lb 8 oz)   BMI 45.39 kg/m   Estimated body mass index is 45.39 kg/m  as calculated from the following:    Height as of an earlier encounter on 21: 1.727 m (5' 8\").    Weight as of this encounter: 135.4 kg (298 lb 8 oz).  BP completed using cuff size: large    Questioned patient about current smoking habits.  Pt. quit smoking some time ago.          The following HM Due: NONE      Jeromy Bhardwaj CMA             "

## 2021-01-19 NOTE — PROGRESS NOTES
ANTICOAGULATION FOLLOW-UP CLINIC VISIT    Patient Name:  Petra Mcnulty  Date:  2021  Contact Type:  Telephone/ Called patient, she denies any changes. Orders discussed with the patient, she agrees with the plan. Lab INR appointment scheduled on 21.    SUBJECTIVE:  Patient Findings     Comments:  The patient was assessed for diet, medication, and activity level changes, missed or extra doses, bruising or bleeding, with no problem findings. Maintenance warfarin dosing was reviewed with patient and will remain on the same dose until next INR check. Patient did not have any questions or concerns.           Clinical Outcomes     Negatives:  Major bleeding event, Thromboembolic event, Anticoagulation-related hospital admission, Anticoagulation-related ED visit, Anticoagulation-related fatality    Comments:  The patient was assessed for diet, medication, and activity level changes, missed or extra doses, bruising or bleeding, with no problem findings. Maintenance warfarin dosing was reviewed with patient and will remain on the same dose until next INR check. Patient did not have any questions or concerns.              OBJECTIVE    Recent labs: (last 7 days)     21  0925   INR 2.10*       ASSESSMENT / PLAN  INR assessment THER    Recheck INR In: 4 WEEKS    INR Location Outside lab      Anticoagulation Summary  As of 2021    INR goal:  2.0-3.0   TTR:  55.1 % (10.9 mo)   INR used for dosin.10 (2021)   Warfarin maintenance plan:  3 mg (2 mg x 1.5) every Mon; 4 mg (2 mg x 2) all other days   Full warfarin instructions:  3 mg every Mon; 4 mg all other days   Weekly warfarin total:  27 mg   No change documented:  Martha De La Cruz RN   Plan last modified:  Rae Wing RN (2020)   Next INR check:  2021   Priority:  Maintenance   Target end date:  Indefinite    Indications    History of pulmonary embolism [Z86.711]  Heterozygous factor V Leiden mutation (H) [D68.51]              Anticoagulation Episode Summary     INR check location:      Preferred lab:      Send INR reminders to:  MARIUM MORTON    Comments:  Call cell number with results for INR      Anticoagulation Care Providers     Provider Role Specialty Phone number    Mona Diaz MD [facility] Internal Medicine [phone]            See the Encounter Report to view Anticoagulation Flowsheet and Dosing Calendar (Go to Encounters tab in chart review, and find the Anticoagulation Therapy Visit)    Dosage adjustment made based on physician directed care plan.    Martha De La Cruz RN

## 2021-01-19 NOTE — LETTER
"1/19/2021       RE: Petra Mcnulty  [address] [place] [address]     Dear Colleague,    Thank you for referring your patient, Petra Mcnulty, to the [facility] UROLOGY CLINIC [place] at [facility]. Please see a copy of my visit note below.    Assessment & Plan     Mixed stress and urge urinary incontinence/Abnormal urine finding  Needs the cystoscopy but given a question of significant bacteriuria in setting of multiple joint replacements will send the urine culture today and reschedule the cystoscopy for next week     -Empiric cephALEXin (KEFLEX) 500 MG capsule; Take 1 capsule (500 mg) by mouth 2 times daily for 5 days, may need to extend based on the culture restuls  - Urine Culture Aerobic Bacterial    Leti Mac MD MPH  (she/her/hers)   of Urology  [facility]      Penelope Lambert is a 70 year old who presents to clinic today for the following health issues.    HPI     She is here today for cystoscopy for refractory urinary urgency incontinence.  She denies fevers, chills, nausea, vomiting or hematuria. She does have a history of multiple joint replacements    Objective    /70 (BP Location: Right arm)   Ht 1.727 m (5' 8\")   Wt 148.3 kg (327 lb)   BMI 49.72 kg/m    Body mass index is 49.72 kg/m .  Physical Exam   GENERAL: healthy, alert and no distress  NECK: no adenopathy, no asymmetry, masses, or scars noted  RESP: non-labored breating    Urine dip positive for nitrites        "

## 2021-01-19 NOTE — PROGRESS NOTES
"Assessment & Plan     Mixed stress and urge urinary incontinence/Abnormal urine finding  Needs the cystoscopy but given a question of significant bacteriuria in setting of multiple joint replacements will send the urine culture today and reschedule the cystoscopy for next week     -Empiric cephALEXin (KEFLEX) 500 MG capsule; Take 1 capsule (500 mg) by mouth 2 times daily for 5 days, may need to extend based on the culture restuls  - Urine Culture Aerobic Bacterial    Leti Mac MD MPH  (she/her/hers)   of Urology  [facility]      Penelope Lambert is a 70 year old who presents to clinic today for the following health issues.    HPI     She is here today for cystoscopy for refractory urinary urgency incontinence.  She denies fevers, chills, nausea, vomiting or hematuria. She does have a history of multiple joint replacements    Objective    /70 (BP Location: Right arm)   Ht 1.727 m (5' 8\")   Wt 148.3 kg (327 lb)   BMI 49.72 kg/m    Body mass index is 49.72 kg/m .  Physical Exam   GENERAL: healthy, alert and no distress  NECK: no adenopathy, no asymmetry, masses, or scars noted  RESP: non-labored breating    Urine dip positive for nitrites          "

## 2021-01-20 LAB
ALBUMIN SERPL-MCNC: 3.5 G/DL (ref 3.4–5)
ALP SERPL-CCNC: 63 U/L (ref 40–150)
ALT SERPL W P-5'-P-CCNC: 21 U/L (ref 0–50)
ANION GAP SERPL CALCULATED.3IONS-SCNC: 7 MMOL/L (ref 3–14)
AST SERPL W P-5'-P-CCNC: 23 U/L (ref 0–45)
BILIRUB SERPL-MCNC: 0.4 MG/DL (ref 0.2–1.3)
BUN SERPL-MCNC: 11 MG/DL (ref 7–30)
CALCIUM SERPL-MCNC: 8.9 MG/DL (ref 8.5–10.1)
CHLORIDE SERPL-SCNC: 108 MMOL/L (ref 94–109)
CHOLEST SERPL-MCNC: 178 MG/DL
CO2 SERPL-SCNC: 25 MMOL/L (ref 20–32)
CREAT SERPL-MCNC: 0.67 MG/DL (ref 0.52–1.04)
GFR SERPL CREATININE-BSD FRML MDRD: 88 ML/MIN/{1.73_M2}
GLUCOSE SERPL-MCNC: 109 MG/DL (ref 70–99)
HDLC SERPL-MCNC: 41 MG/DL
LDLC SERPL CALC-MCNC: 84 MG/DL
NONHDLC SERPL-MCNC: 137 MG/DL
POTASSIUM SERPL-SCNC: 4.2 MMOL/L (ref 3.4–5.3)
PROT SERPL-MCNC: 7.6 G/DL (ref 6.8–8.8)
SODIUM SERPL-SCNC: 140 MMOL/L (ref 133–144)
TRIGL SERPL-MCNC: 265 MG/DL

## 2021-01-20 NOTE — PROGRESS NOTES
No changes were made to the INR Referral.  New INR lab order placed to align with INR Referral date.   Martha De La Cruz RN, BSN

## 2021-01-21 ENCOUNTER — TELEPHONE (OUTPATIENT)
Dept: UROLOGY | Facility: CLINIC | Age: 71
End: 2021-01-21

## 2021-01-21 ENCOUNTER — TELEPHONE (OUTPATIENT)
Dept: INTERNAL MEDICINE | Facility: CLINIC | Age: 71
End: 2021-01-21

## 2021-01-21 DIAGNOSIS — R82.90 ABNORMAL URINE FINDING: Primary | ICD-10-CM

## 2021-01-21 DIAGNOSIS — R73.9 HYPERGLYCEMIA: Primary | ICD-10-CM

## 2021-01-21 LAB — HBA1C MFR BLD: 6.4 % (ref 0–5.6)

## 2021-01-21 NOTE — TELEPHONE ENCOUNTER
Received a telephone call from lab that urine culture was never received. They asked if we still had the urine. Per protocol urine was dumped after 24 hours. Called patient to see if she could come in for another cathed sample. She plans to come in tomorrow in early am to BV office. Patient is aware we will call with positive results. A new order has been placed for a urine culture and lab stated they will remove the old order. Patient was not having symptoms of UTI, but is on ABT.     Emely De La Cruz LPN

## 2021-01-21 NOTE — TELEPHONE ENCOUNTER
Spoke with Lokesh in lab and the A1c can be added, he will take care of it   IGNACIA Sommer R.N.

## 2021-01-22 ENCOUNTER — ALLIED HEALTH/NURSE VISIT (OUTPATIENT)
Dept: UROLOGY | Facility: CLINIC | Age: 71
End: 2021-01-22
Payer: COMMERCIAL

## 2021-01-22 DIAGNOSIS — R82.90 ABNORMAL URINE FINDING: ICD-10-CM

## 2021-01-22 LAB
ALBUMIN UR-MCNC: NEGATIVE MG/DL
APPEARANCE UR: CLEAR
BILIRUB UR QL STRIP: NEGATIVE
COLOR UR AUTO: YELLOW
COPATH REPORT: NORMAL
GLUCOSE UR STRIP-MCNC: NEGATIVE MG/DL
HGB UR QL STRIP: NEGATIVE
KETONES UR STRIP-MCNC: NEGATIVE MG/DL
LEUKOCYTE ESTERASE UR QL STRIP: NEGATIVE
NITRATE UR QL: NEGATIVE
PH UR STRIP: 5 PH (ref 5–7)
SOURCE: NORMAL
SP GR UR STRIP: >1.03 (ref 1–1.03)
UROBILINOGEN UR STRIP-ACNC: 0.2 EU/DL (ref 0.2–1)

## 2021-01-22 PROCEDURE — 81003 URINALYSIS AUTO W/O SCOPE: CPT | Mod: QW | Performed by: UROLOGY

## 2021-01-22 PROCEDURE — 99207 PR NO CHARGE NURSE ONLY: CPT

## 2021-01-22 PROCEDURE — 87086 URINE CULTURE/COLONY COUNT: CPT | Performed by: UROLOGY

## 2021-01-22 NOTE — PROGRESS NOTES
Petra Mcnulty comes into clinic today at the request of Dr. Mac Ordering Provider for UA/UC cath specimen.    Cathed urine specimen was collected in sterile fashion without difficulty.    This service provided today was under the supervising provider of the day Dr. Christopher, who was available if needed.    Jeanne Goyal, EMT

## 2021-01-23 LAB
BACTERIA SPEC CULT: NO GROWTH
Lab: NORMAL
SPECIMEN SOURCE: NORMAL

## 2021-01-25 NOTE — PROGRESS NOTES
SUBJECTIVE:   CC: f/u endometrial hyperplasia                                               Petra Mcnulty is a 70 year old  female who presents to clinic today for f/u of endometrial hyperplasia diagnosed 2020. D&C was performed and revealed polyp with simple and focal complex hyperplasia which she opted to treat with local progesterone via mirena IUD. She denies any vaginal bleeding since then. She is here for endometrial biopsy today to confirm appropriate treatment with IUD.     She would also like to discuss vaginal and vulvar irritation that has somewhat improved in the past with application of triamcinolone. She also has an area on the right vaginal side wall that is irritated to palpation which she would like evaluate.     Problem list and histories reviewed & adjusted, as indicated.  Additional history: as documented.            acetaminophen (TYLENOL) 500 MG tablet, Take 1,000 mg by mouth 2 times daily        albuterol (PROAIR HFA/PROVENTIL HFA/VENTOLIN HFA) 108 (90 Base) MCG/ACT inhaler, Inhale 2 puffs into the lungs every 4 hours as needed for shortness of breath / dyspnea or wheezing       [] cephALEXin (KEFLEX) 500 MG capsule, Take 1 capsule (500 mg) by mouth 2 times daily for 5 days       Cholecalciferol (VITAMIN D) 50 MCG (2000 UT) CAPS, Take 2000 international unit(s) daily       Fesoterodine Fumarate 8 MG TB24, Take by mouth daily       gabapentin (NEURONTIN) 300 MG capsule, Take 300 mg by mouth 2 times daily       HYDROcodone-acetaminophen (NORCO) 5-325 MG tablet, Take 1 tablet by mouth every 6 hours as needed for severe pain       levonorgestrel (MIRENA) 20 MCG/24HR IUD, 1 each (20 mcg) by Intrauterine route once       levothyroxine (SYNTHROID/LEVOTHROID) 100 MCG tablet, Take 100 mcg by mouth daily       lisinopril (PRINIVIL/ZESTRIL) 10 MG tablet, Take 10 mg by mouth daily       loratadine (CLARITIN) 10 MG tablet, Take 10 mg by mouth daily       methocarbamol (ROBAXIN) 750 MG  tablet, Take 750 mg by mouth At Bedtime       mirabegron (MYRBETRIQ) 25 MG 24 hr tablet, Take 1 tablet (25 mg) by mouth daily       pravastatin (PRAVACHOL) 40 MG tablet, Take 40 mg by mouth daily       vitamin C (ASCORBIC ACID) 1000 MG TABS, Take 1 tablet (1,000 mg) by mouth daily       warfarin ANTICOAGULANT (COUMADIN) 2 MG tablet, TAKE 2 TABLETS ON MONDAY, WEDNESDAY, FRIDAY, AND 1 1/2 TABLET ON SUN, TUESDAY, THURSDAY, AND SATURDAY OR AS DIRECTED BY INR CLINIC (Patient taking differently: TAKE 1.5 TABLETS ON Monday 2 tabs all the other days OR AS DIRECTED BY INR CLINIC)    No current facility-administered medications on file prior to visit.     Allergies   Allergen Reactions     Atorvastatin      myalgia     Simvastatin Rash       OBJECTIVE:   /70   Wt 135.4 kg (298 lb 8 oz)   BMI 45.39 kg/m     Const: sitting in chair in no acute distress, comfortable  Pulm: no increased work of breathing, no cough  Psych: mood stable, appropriate affect  Neuro: A+Ox3   : External genitalia normal well-estrogenized, healthy tissue.  No obvious excoriations, lesions, or rashes. Bartholins, urethra, normal.  Normal moist pink vaginal mucosa.  SSE: Normal cervix, normal physiologic discharge.   Bimanual: No CMT, small anteverted uterus with smooth contour, mobile. No adnexal masses or tenderness appreciated.     Procedure:      Endometrial biopsy:  Consent was obtained. A speculum was inserted into the vagina and the cervix visualized. The cervix was swabbed with betadine x3. An endometrial biopsy pipelle was then introduced through the cervical os into the uterus, the uterus sounded to 9 cm. The plunger was then deployed with good suction. The pipelle was rotated to collect a thorough sample. Sample was moderate. The cervix was found to be hemostatic and the speculum was removed. The patient tolerated the procedure without difficulty.        ASSESSMENT/PLAN:                                                    Petra MANZANO  [place] is a 70 year old female  here for the following concerns:    1. Lichen sclerosus  Today we discussed that lichen sclerosus is a chronic, remitting and relapsing skin condition that affects the vulvar skin but may also affect other areas of the body. It is a relatively common cause of vulvar itching, irritation, and loss of normal architecture. It is also a frequent cause of painful intercourse. The mainstay of therapy is high potency steroids, used frequently for initial treatment and for relapses, and used twice a week chronically for suppression of symptoms. Because the presence of lichen sclerosus increases the risk of vulvar cancer, she needs an exam of the vulva at least once a year, and perhaps more often as her symptoms warrant. Written information about lichen sclerosus was given to her outlining these instructions.    I would like her to use clobetasol ointment in a thin layer applied to the vulva daily for 4 weeks, every other day for 4 weeks, and then to see me back. I will likely have her use it for an additional 4 weeks on an every other day basis, and then we can discuss decreasing to twice a week for maintenance, with plans to treat any recurrence of symptoms by increasing to twice daily again for 10 days.    Vulvar hygiene measures discussed as well.     - clobetasol (TEMOVATE) 0.05 % external ointment; Apply topically daily  Dispense: 45 g; Refill: 4    2. Vaginal atrophy  Discussed pathophysiology of ATROPHIC VAGINITIS and hormone changes that affect vaginal and urinary tissues. Medication options were discussed (topical estrogen cream vs vaginal tablets vs estrogen ring).  - estradiol (ESTRACE) 0.1 MG/GM vaginal cream; Place 1 g vaginally twice a week Daily for the first 14 days  Dispense: 42.5 g; Refill: 3    3. History of endometrial hyperplasia  - if pathology is negative, plan 1 interval ultrasound in 3 months and then monitor expectantly for symptoms.   - Surgical pathology  exam    4. Hyperglycemia  - **A1C FUTURE anytime       Parisa Goldberg MD  Obstetrics and Gynecology   [facility]

## 2021-01-26 ENCOUNTER — OFFICE VISIT (OUTPATIENT)
Dept: INTERNAL MEDICINE | Facility: CLINIC | Age: 71
End: 2021-01-26
Payer: COMMERCIAL

## 2021-01-26 VITALS
DIASTOLIC BLOOD PRESSURE: 70 MMHG | BODY MASS INDEX: 44.41 KG/M2 | HEIGHT: 68 IN | WEIGHT: 293 LBS | SYSTOLIC BLOOD PRESSURE: 120 MMHG | HEART RATE: 90 BPM | RESPIRATION RATE: 16 BRPM

## 2021-01-26 DIAGNOSIS — B37.2 YEAST INFECTION OF THE SKIN: ICD-10-CM

## 2021-01-26 DIAGNOSIS — M15.0 PRIMARY OSTEOARTHRITIS INVOLVING MULTIPLE JOINTS: ICD-10-CM

## 2021-01-26 DIAGNOSIS — I10 BENIGN ESSENTIAL HYPERTENSION: ICD-10-CM

## 2021-01-26 DIAGNOSIS — Z00.00 ROUTINE GENERAL MEDICAL EXAMINATION AT A HEALTH CARE FACILITY: Primary | ICD-10-CM

## 2021-01-26 DIAGNOSIS — Z86.711 HISTORY OF PULMONARY EMBOLISM: ICD-10-CM

## 2021-01-26 DIAGNOSIS — D22.9 ATYPICAL MOLE: ICD-10-CM

## 2021-01-26 DIAGNOSIS — D68.51 FACTOR V LEIDEN (H): ICD-10-CM

## 2021-01-26 DIAGNOSIS — E78.5 HYPERLIPIDEMIA LDL GOAL <130: ICD-10-CM

## 2021-01-26 DIAGNOSIS — E03.9 ACQUIRED HYPOTHYROIDISM: ICD-10-CM

## 2021-01-26 DIAGNOSIS — H93.13 TINNITUS, BILATERAL: ICD-10-CM

## 2021-01-26 DIAGNOSIS — R73.03 PREDIABETES: ICD-10-CM

## 2021-01-26 PROBLEM — M15.9 OSTEOARTHRITIS OF MULTIPLE JOINTS: Status: ACTIVE | Noted: 2021-01-26

## 2021-01-26 PROCEDURE — 99213 OFFICE O/P EST LOW 20 MIN: CPT | Mod: 25 | Performed by: INTERNAL MEDICINE

## 2021-01-26 PROCEDURE — 99397 PER PM REEVAL EST PAT 65+ YR: CPT | Performed by: INTERNAL MEDICINE

## 2021-01-26 RX ORDER — METFORMIN HCL 500 MG
500 TABLET, EXTENDED RELEASE 24 HR ORAL
Qty: 30 TABLET | Refills: 0 | Status: SHIPPED | OUTPATIENT
Start: 2021-01-26 | End: 2021-02-26

## 2021-01-26 RX ORDER — LISINOPRIL 10 MG/1
10 TABLET ORAL DAILY
Qty: 90 TABLET | Refills: 3 | Status: SHIPPED | OUTPATIENT
Start: 2021-01-26 | End: 2022-01-01

## 2021-01-26 RX ORDER — GABAPENTIN 300 MG/1
300 CAPSULE ORAL 2 TIMES DAILY PRN
Qty: 180 CAPSULE | Refills: 3 | Status: SHIPPED | OUTPATIENT
Start: 2021-01-26 | End: 2021-12-06

## 2021-01-26 RX ORDER — NYSTATIN 100000 [USP'U]/G
POWDER TOPICAL 2 TIMES DAILY PRN
Qty: 45 G | Refills: 0 | Status: SHIPPED | OUTPATIENT
Start: 2021-01-26

## 2021-01-26 RX ORDER — WARFARIN SODIUM 2 MG/1
TABLET ORAL
Qty: 180 TABLET | Refills: 1 | Status: SHIPPED | OUTPATIENT
Start: 2021-01-26 | End: 2021-08-17

## 2021-01-26 RX ORDER — METHOCARBAMOL 750 MG/1
750 TABLET, FILM COATED ORAL AT BEDTIME
Qty: 90 TABLET | Refills: 3 | Status: SHIPPED | OUTPATIENT
Start: 2021-01-26 | End: 2022-01-01

## 2021-01-26 RX ORDER — PRAVASTATIN SODIUM 40 MG
40 TABLET ORAL DAILY
Qty: 90 TABLET | Refills: 3 | Status: SHIPPED | OUTPATIENT
Start: 2021-01-26 | End: 2022-01-01

## 2021-01-26 RX ORDER — LEVOTHYROXINE SODIUM 100 UG/1
TABLET ORAL
Qty: 106 TABLET | Refills: 3 | Status: SHIPPED | OUTPATIENT
Start: 2021-01-26 | End: 2022-01-01

## 2021-01-26 ASSESSMENT — ENCOUNTER SYMPTOMS
HEADACHES: 0
DYSURIA: 0
HEARTBURN: 0
SHORTNESS OF BREATH: 1
NAUSEA: 0
JOINT SWELLING: 0
PARESTHESIAS: 0
FEVER: 0
WEAKNESS: 0
ARTHRALGIAS: 1
FREQUENCY: 0
BREAST MASS: 0
NERVOUS/ANXIOUS: 0
SORE THROAT: 1
DIARRHEA: 0
CONSTIPATION: 0
PALPITATIONS: 0
ABDOMINAL PAIN: 0
DIZZINESS: 0
EYE PAIN: 0
CHILLS: 0
HEMATURIA: 0
COUGH: 1
HEMATOCHEZIA: 0
MYALGIAS: 1

## 2021-01-26 ASSESSMENT — MIFFLIN-ST. JEOR: SCORE: 2037.69

## 2021-01-26 ASSESSMENT — ACTIVITIES OF DAILY LIVING (ADL): CURRENT_FUNCTION: NO ASSISTANCE NEEDED

## 2021-01-26 NOTE — PATIENT INSTRUCTIONS
20 minutes per day  10 minutes in a row    Start metformin for prediabetes  Consider weight watchers

## 2021-01-26 NOTE — PROGRESS NOTES
"SUBJECTIVE:   Petra Mcnulty is a 70 year old female who presents for Preventive Visit.    Patient has been advised of split billing requirements and indicates understanding: Yes   Are you in the first 12 months of your Medicare coverage?  No    Healthy Habits:     In general, how would you rate your overall health?  Fair    Frequency of exercise:  None    Do you usually eat at least 4 servings of fruit and vegetables a day, include whole grains    & fiber and avoid regularly eating high fat or \"junk\" foods?  No    Taking medications regularly:  Yes    Medication side effects:  None    Ability to successfully perform activities of daily living:  No assistance needed    Home Safety:  No safety concerns identified    Hearing Impairment:  No hearing concerns    In the past 6 months, have you been bothered by leaking of urine? Yes    In general, how would you rate your overall mental or emotional health?  Good      PHQ-2 Total Score: 0    Additional concerns today:  Yes    Pre-diabetes.  Patient had a hemoglobin A1c of 6.4%.  Patient has been eating more sugar recently.  Patient has been exercising regularly.     Tinnitus.  Patient has had ringing in her ears and decreased hearing bilaterally.     Rash under breasts.  The patient has had a rash under her breasts intermittently.     Do you feel safe in your environment? Yes    Have you ever done Advance Care Planning? (For example, a Health Directive, POLST, or a discussion with a medical provider or your loved ones about your wishes): Yes, advance care planning is on file.      Fall risk  Fallen 2 or more times in the past year?: No  Any fall with injury in the past year?: No    Cognitive Screening   1) Repeat 3 items (Leader, Season, Table)    2) Clock draw: NORMAL  3) 3 item recall: Recalls 3 objects  Results: 3 items recalled: COGNITIVE IMPAIRMENT LESS LIKELY    Mini-CogTM Copyright STEPHAN Ly. Licensed by the author for use in [facility]; reprinted " with permission ([email or web address]). All rights reserved.      Do you have sleep apnea, excessive snoring or daytime drowsiness?: no    Reviewed and updated as needed this visit by clinical staff  Tobacco  Allergies  Meds  Problems  Med Hx  Surg Hx  Fam Hx  Soc Hx          Reviewed and updated as needed this visit by Provider    Meds  Problems            Social History     Tobacco Use     Smoking status: Former Smoker     Packs/day: 0.50     Types: Cigarettes     Quit date: 1/1/2006     Years since quitting: 15.0     Smokeless tobacco: Never Used   Substance Use Topics     Alcohol use: Yes     Comment: rare     If you drink alcohol do you typically have >3 drinks per day or >7 drinks per week? No    Alcohol Use 1/26/2021   Prescreen: >3 drinks/day or >7 drinks/week? Not Applicable   Prescreen: >3 drinks/day or >7 drinks/week? -     Current providers sharing in care for this patient include:   Patient Care Team:  Mona Diaz MD as PCP - General (Internal Medicine)  Mona Diaz MD as Assigned PCP  Adam Harvey MD as Assigned Sleep Provider  Parisa Goldberg MD as Assigned OBGYN Provider  Leti Mac MD as Assigned Surgical Provider    The following health maintenance items are reviewed in Epic and correct as of today:  Health Maintenance   Topic Date Due     DEXA  1950     HEPATITIS C SCREENING  11/28/1967     DTAP/TDAP/TD IMMUNIZATION (1 - Tdap) 11/28/1974     Pneumococcal Vaccine: 65+ Years (1 of 1 - PPSV23) 11/28/2014     FALL RISK ASSESSMENT  06/09/2021     MEDICARE ANNUAL WELLNESS VISIT  01/26/2022     MAMMO SCREENING  01/27/2022     LIPID  01/19/2026     ADVANCE CARE PLANNING  01/26/2026     COLORECTAL CANCER SCREENING  11/09/2026     PHQ-2  Completed     INFLUENZA VACCINE  Completed     ZOSTER IMMUNIZATION  Completed     Pneumococcal Vaccine: Pediatrics (0 to 5 Years) and At-Risk Patients (6 to 64 Years)  Aged Out     IPV IMMUNIZATION  Aged Out     MENINGITIS IMMUNIZATION   "Aged Out     HEPATITIS B IMMUNIZATION  Aged Out     Labs reviewed in [facility]  Mammogram Screening: Recommended mammography every 1-2 years with patient discussion and risk factor consideration    Review of Systems   Constitutional: Negative for chills and fever.   HENT: Positive for congestion and sore throat. Negative for ear pain and hearing loss.    Eyes: Negative for pain and visual disturbance.   Respiratory: Positive for cough and shortness of breath.    Cardiovascular: Positive for peripheral edema. Negative for chest pain and palpitations.   Gastrointestinal: Negative for abdominal pain, constipation, diarrhea, heartburn, hematochezia and nausea.   Breasts:  Negative for tenderness, breast mass and discharge.   Genitourinary: Positive for genital sores and urgency. Negative for dysuria, frequency, hematuria, pelvic pain, vaginal bleeding and vaginal discharge.   Musculoskeletal: Positive for arthralgias and myalgias. Negative for joint swelling.   Skin: Negative for rash.   Neurological: Negative for dizziness, weakness, headaches and paresthesias.   Psychiatric/Behavioral: Negative for mood changes. The patient is not nervous/anxious.      Constitutional, HEENT, cardiovascular, pulmonary, GI, , musculoskeletal, neuro, skin, endocrine and psych systems are negative, except as otherwise noted.    OBJECTIVE:   /70   Pulse 90   Resp 16   Ht 1.727 m (5' 8\")   Wt 147.4 kg (325 lb)   Breastfeeding No   BMI 49.42 kg/m   Estimated body mass index is 49.42 kg/m  as calculated from the following:    Height as of this encounter: 1.727 m (5' 8\").    Weight as of this encounter: 147.4 kg (325 lb).  Physical Exam  GENERAL: healthy, alert and no distress  EYES: Eyes grossly normal to inspection, PERRL and conjunctivae and sclerae normal  HENT: ear canals and TM's normal, nose and mouth without ulcers or lesions  NECK: no adenopathy, no asymmetry, masses, or scars and thyroid normal to palpation  RESP: lungs " clear to auscultation - no rales, rhonchi or wheezes  BREAST: normal without masses, tenderness or nipple discharge and no palpable axillary masses or adenopathy; some erythema under breasts bilaterally  CV: regular rate and rhythm, normal S1 S2, no S3 or S4, no murmur, click or rub, no peripheral edema and peripheral pulses strong  ABDOMEN: soft, nontender, no hepatosplenomegaly, no masses and bowel sounds normal  MS: no gross musculoskeletal defects noted, no edema  SKIN: no suspicious lesions or rashes  NEURO: Normal strength and tone, mentation intact and speech normal  PSYCH: mentation appears normal, affect normal/bright    Diagnostic Test Results:  Labs reviewed in Epic    ASSESSMENT / PLAN:       ICD-10-CM    1. Routine general medical examination at a health care facility  Z00.00    2. Acquired hypothyroidism  E03.9 TSH with free T4 reflex     levothyroxine (SYNTHROID/LEVOTHROID) 100 MCG tablet   3. Benign essential hypertension  I10 lisinopril (ZESTRIL) 10 MG tablet   4. Hyperlipidemia LDL goal <130  E78.5 pravastatin (PRAVACHOL) 40 MG tablet   5. Primary osteoarthritis involving multiple joints  M89.49 methocarbamol (ROBAXIN) 750 MG tablet     gabapentin (NEURONTIN) 300 MG capsule     Orthopedic & Spine  Referral   6. Prediabetes  R73.03 metFORMIN (GLUCOPHAGE-XR) 500 MG 24 hr tablet   7. Atypical mole  D22.9 DERMATOLOGY ADULT REFERRAL   8. Tinnitus, bilateral  H93.13 OTOLARYNGOLOGY REFERRAL   9. Yeast infection of the skin  B37.2 nystatin (MYCOSTATIN) 580610 UNIT/GM external powder      prediabetes.  Start metformin. Counseled on weight loss, diet, and exercise.    Patient has been advised of split billing requirements and indicates understanding: Yes  COUNSELING:  Reviewed preventive health counseling, as reflected in patient instructions       Regular exercise       Healthy diet/nutrition    Estimated body mass index is 49.42 kg/m  as calculated from the following:    Height as of this  "encounter: 1.727 m (5' 8\").    Weight as of this encounter: 147.4 kg (325 lb).        She reports that she quit smoking about 15 years ago. Her smoking use included cigarettes. She smoked 0.50 packs per day. She has never used smokeless tobacco.      Appropriate preventive services were discussed with this patient, including applicable screening as appropriate for cardiovascular disease, diabetes, osteopenia/osteoporosis, and glaucoma.  As appropriate for age/gender, discussed screening for colorectal cancer, prostate cancer, breast cancer, and cervical cancer. Checklist reviewing preventive services available has been given to the patient.    Reviewed patients plan of care and provided an AVS. The Basic Care Plan (routine screening as documented in Health Maintenance) for Petra meets the Care Plan requirement. This Care Plan has been established and reviewed with the Patient.    Counseling Resources:  ATP IV Guidelines  Pooled Cohorts Equation Calculator  Breast Cancer Risk Calculator  Breast Cancer: Medication to Reduce Risk  FRAX Risk Assessment  ICSI Preventive Guidelines  Dietary Guidelines for Americans, 2010  Agitar's MyPlate  ASA Prophylaxis  Lung CA Screening    Mona Diaz MD  [facility]    Identified Health Risks:  "

## 2021-01-26 NOTE — TELEPHONE ENCOUNTER
Patient calls requesting refill of warfarin.     Warfarin 2 mg      Last Written Prescription Date:  5/28/20  Last Fill Quantity: [phone],   # refills: 0  Last Office Visit: 1/26/21  Future Office visit:    Next 5 appointments (look out 90 days)    Apr 21, 2021  1:00 PM  Office Visit with Parisa Goldberg MD  [facility] ([facility]) [address]  [address]  [facility] [place] [address]  [phone]             Prescription approved per [facility].  Martha De La Cruz RN, BSN

## 2021-01-27 ENCOUNTER — OFFICE VISIT (OUTPATIENT)
Dept: UROLOGY | Facility: CLINIC | Age: 71
End: 2021-01-27
Payer: COMMERCIAL

## 2021-01-27 VITALS
HEIGHT: 68 IN | WEIGHT: 293 LBS | OXYGEN SATURATION: 98 % | BODY MASS INDEX: 44.41 KG/M2 | DIASTOLIC BLOOD PRESSURE: 70 MMHG | HEART RATE: 90 BPM | SYSTOLIC BLOOD PRESSURE: 126 MMHG

## 2021-01-27 DIAGNOSIS — N39.46 MIXED STRESS AND URGE URINARY INCONTINENCE: Primary | ICD-10-CM

## 2021-01-27 LAB
ALBUMIN UR-MCNC: NEGATIVE MG/DL
APPEARANCE UR: CLEAR
BILIRUB UR QL STRIP: NEGATIVE
COLOR UR AUTO: YELLOW
GLUCOSE UR STRIP-MCNC: NEGATIVE MG/DL
HGB UR QL STRIP: NEGATIVE
KETONES UR STRIP-MCNC: NEGATIVE MG/DL
LEUKOCYTE ESTERASE UR QL STRIP: NEGATIVE
NITRATE UR QL: NEGATIVE
PH UR STRIP: 5 PH (ref 5–7)
SOURCE: NORMAL
SP GR UR STRIP: >1.03 (ref 1–1.03)
UROBILINOGEN UR STRIP-ACNC: 0.2 EU/DL (ref 0.2–1)

## 2021-01-27 PROCEDURE — 81003 URINALYSIS AUTO W/O SCOPE: CPT | Performed by: UROLOGY

## 2021-01-27 PROCEDURE — 52000 CYSTOURETHROSCOPY: CPT | Performed by: UROLOGY

## 2021-01-27 RX ORDER — LIDOCAINE HYDROCHLORIDE 20 MG/ML
JELLY TOPICAL ONCE
Status: DISCONTINUED | OUTPATIENT
Start: 2021-01-27 | End: 2021-02-05 | Stop reason: HOSPADM

## 2021-01-27 ASSESSMENT — MIFFLIN-ST. JEOR: SCORE: 2046.76

## 2021-01-27 ASSESSMENT — PAIN SCALES - GENERAL: PAINLEVEL: NO PAIN (0)

## 2021-01-27 NOTE — PROGRESS NOTES
"January 27, 2021    Return visit    Patient returns today for follow up.  No changes in the leakage since the antibiotics. She denies any changes in her health since last visit.    /70   Pulse 90   Ht 1.727 m (5' 8\")   Wt 148.3 kg (327 lb)   SpO2 98%   BMI 49.72 kg/m    She is comfortable, in no distress, non-labored breathing.  Abdomen is soft, non-tender, non-distended.  Normal external female genitalia.  Negative CST even after bladder left full after cystoscopy.  Pelvic exam is limited by body habitus but unremarkable.    Cystoscopy Note: After informed consent was obtained patient was prepped and draped in the standard fashion.  The flexible cystoscope was inserted into a normal appearing urethral meatus.  The urothelium was carefully examined and there were no tumors, masses, stones, foreign bodies, or other urothelial abnormalities noted.  Bilateral ureteral orifices were noted in the normal orthotopic position and both effluxed clear urine.  The cystoscope was retroflexed and the bladder neck was unremarkable.  The urethra was carefully examined upon removing the cystoscope and was unremarkable.  Patient tolerated the procedure without complications noted.      A/P: 70 year old F with mixed urinary incontinence    At this time we have not been able to identify obvious stress incontinence and none of the treatments for the urgency incontinence have helped.  She tried a pessary which did not help and had to be removed because it became uncomfortable    At this time she will do a diary to see if there are any modifiable factors contributing to her leakage      RTC for video visit in about 3 months, sooner if needed    Leti Mac MD MPH  (she/her/hers)   of Urology  [facility]      CC  Patient Care Team:  Mona Diaz MD as PCP - General (Internal Medicine)  Mona Diaz MD as Assigned PCP  Adam Harvey MD as Assigned Sleep Provider  Parisa Goldberg, " MD as Assigned OBGYN Provider  Leti Mac MD as Assigned Surgical Provider

## 2021-01-27 NOTE — LETTER
"1/27/2021       RE: Petra Mcnulty  [address] [place] [address]     Dear Colleague,    Thank you for referring your patient, Petra Mcnulty, to the [facility] [place] [facility]. Please see a copy of my visit note below.    January 27, 2021    Return visit    Patient returns today for follow up.  No changes in the leakage since the antibiotics. She denies any changes in her health since last visit.    /70   Pulse 90   Ht 1.727 m (5' 8\")   Wt 148.3 kg (327 lb)   SpO2 98%   BMI 49.72 kg/m    She is comfortable, in no distress, non-labored breathing.  Abdomen is soft, non-tender, non-distended.  Normal external female genitalia.  Negative CST even after bladder left full after cystoscopy.  Pelvic exam is limited by body habitus but unremarkable.    Cystoscopy Note: After informed consent was obtained patient was prepped and draped in the standard fashion.  The flexible cystoscope was inserted into a normal appearing urethral meatus.  The urothelium was carefully examined and there were no tumors, masses, stones, foreign bodies, or other urothelial abnormalities noted.  Bilateral ureteral orifices were noted in the normal orthotopic position and both effluxed clear urine.  The cystoscope was retroflexed and the bladder neck was unremarkable.  The urethra was carefully examined upon removing the cystoscope and was unremarkable.  Patient tolerated the procedure without complications noted.      A/P: 70 year old F with mixed urinary incontinence    At this time we have not been able to identify obvious stress incontinence and none of the treatments for the urgency incontinence have helped.  She tried a pessary which did not help and had to be removed because it became uncomfortable    At this time she will do a diary to see if there are any modifiable factors contributing to her leakage      RTC for video visit in about 3 " months, sooner if needed    Leti Mac MD MPH  (she/her/hers)   of Urology  [facility]      CC  Patient Care Team:  Mona Diaz MD as PCP - General (Internal Medicine)  Mona Diaz MD as Assigned PCP  Adam Harvey MD as Assigned Sleep Provider  Parisa Goldberg MD as Assigned OBGYN Provider  Leti Mac MD as Assigned Surgical Provider

## 2021-01-27 NOTE — NURSING NOTE
Chief Complaint   Patient presents with     mixed stress and urge urinary incontience     here for a cystoscopy     Prior to the start of the procedure and with procedural staff participation, I verbally confirmed the patient s identity using two indicators, relevant allergies, that the procedure was appropriate and matched the consent or emergent situation, and that the correct equipment/implants were available. Immediately prior to starting the procedure I conducted the Time Out with the procedural staff and re-confirmed the patient s name, procedure, and site/side. I have wiped the patient off with the povidone-Iodine solution, draped them,  used Lidocaine hydrochloride jelly, and instilled sterile water into the bladder. (The Joint Commission universal protocol was followed.)  Yes    Sedation (Moderate or Deep): Urojet    5mL 2% lidocaine hydrochloride Urojet instilled into urethra.    NDC# [address]-4  Lot #: [id number]  Expiration Date:  8-22    Annelise Taveras

## 2021-01-27 NOTE — PATIENT INSTRUCTIONS
"Keep a diary of your intake and the urgency/leakage episodes    Websites with free information:    American Urogynecologic Society patient website: www.voicesforpfd.org    Total Control Program: www.totalcontrolprogram.com    Supplements to prevent UTI to consider  -Probiotics  -Cranberry (for these products let them know a doctor is recommending them)   Ellura: www.[employer] Systemslura.[employer]   Theracran HP by [employer] [facility] [address]  -d-mannose  -Vitamin C 500-1000mg twice a day        It was a pleasure meeting with you today.  Thank you for allowing me and my team the privilege of caring for you today.  YOU are the reason we are here, and I truly hope we provided you with the excellent service you deserve.  Please let us know if there is anything else we can do for you so that we can be sure you are leaving completely satisfied with your care experience.    AFTER YOUR CYSTOSCOPY  ?  ?  You have just completed a cystoscopy, or \"cysto\", which allowed your physician to learn more about your bladder (or to remove a stent placed after surgery). We suggest that you continue to avoid caffeine, fruit juice, and alcohol for the next 24 hours, however, you are encouraged to return to your normal activities.  ?  ?  A few things that are considered normal after your cystoscopy:  ?  * small amount of bleeding (or spotting) that clears within the next 24 hours  ?  * slight burning sensation with urination  ?  * sensation of needing to void (urinate) more frequently  ?  * the feeling of \"air\" in your urine  ?  * mild discomfort that is relieved with Tylenol    * bladder spasms  ?  ?  ?  Please contact our office promptly if you:  ?  * develop a fever above 101 degrees  ?  * are unable to urinate  ?  * develop bright red blood that does not stop  ?  * experience severe pain or swelling  ?  ?  ?  And of course, please contact our office with any concerns or questions [phone]  ?    "

## 2021-02-05 ENCOUNTER — OFFICE VISIT (OUTPATIENT)
Dept: ORTHOPEDICS | Facility: CLINIC | Age: 71
End: 2021-02-05
Payer: COMMERCIAL

## 2021-02-05 ENCOUNTER — ANCILLARY PROCEDURE (OUTPATIENT)
Dept: GENERAL RADIOLOGY | Facility: CLINIC | Age: 71
End: 2021-02-05
Attending: FAMILY MEDICINE
Payer: COMMERCIAL

## 2021-02-05 VITALS
WEIGHT: 293 LBS | BODY MASS INDEX: 44.41 KG/M2 | HEIGHT: 68 IN | DIASTOLIC BLOOD PRESSURE: 52 MMHG | SYSTOLIC BLOOD PRESSURE: 120 MMHG

## 2021-02-05 DIAGNOSIS — S76.011A HIP STRAIN, RIGHT, INITIAL ENCOUNTER: ICD-10-CM

## 2021-02-05 DIAGNOSIS — M25.551 RIGHT HIP PAIN: Primary | ICD-10-CM

## 2021-02-05 DIAGNOSIS — M15.0 PRIMARY OSTEOARTHRITIS INVOLVING MULTIPLE JOINTS: ICD-10-CM

## 2021-02-05 DIAGNOSIS — M25.551 RIGHT HIP PAIN: ICD-10-CM

## 2021-02-05 PROCEDURE — 73502 X-RAY EXAM HIP UNI 2-3 VIEWS: CPT | Mod: RT | Performed by: RADIOLOGY

## 2021-02-05 PROCEDURE — 99204 OFFICE O/P NEW MOD 45 MIN: CPT | Performed by: FAMILY MEDICINE

## 2021-02-05 ASSESSMENT — MIFFLIN-ST. JEOR: SCORE: 2046.76

## 2021-02-05 NOTE — PATIENT INSTRUCTIONS
1. Right hip pain    2. Primary osteoarthritis involving multiple joints    3. Hip strain, right, initial encounter      -Patient has right hip pain due to muscle strains  -Patient will start formal physical therapy and home exercise program  -Patient is on Coumadin and so cannot take oral NSAIDs  -Patient may continue with Tylenol and Robaxin as needed  -Patient will follow up if pain does not  Improve.  To consider a cortisone injection of the right hip if indicated.  -Call direct clinic number [[phone]] at any time with questions or concerns.    Albert Yeo MD [facility]  [facility] [place]

## 2021-02-05 NOTE — LETTER
2/5/2021         RE: Petra Mcnulty  [address] [place] [address]        Dear Colleague,    Thank you for referring your patient, Petra Mcnulty, to the [facility] [place]. Please see a copy of my visit note below.    ASSESSMENT & PLAN  Patient Instructions     1. Right hip pain    2. Primary osteoarthritis involving multiple joints    3. Hip strain, right, initial encounter      -Patient has right hip pain due to muscle strains  -Patient will start formal physical therapy and home exercise program  -Patient is on Coumadin and so cannot take oral NSAIDs  -Patient may continue with Tylenol and Robaxin as needed  -Patient will follow up if pain does not  Improve.  To consider a cortisone injection of the right hip if indicated.  -Call direct clinic number [[phone]] at any time with questions or concerns.    Albert Yeo MD [facility]  [facility]          -----    SUBJECTIVE  Petra Mcnulty is a/an 70 year old female who is seen in consultation at the request of  Mona Diaz M.D. for evaluation of right hip pain. The patient is seen by themselves.    Onset: 1 month(s) ago. Reports insidious onset without acute precipitating event.  Location of Pain: right anterior groin and hip pain  Rating of Pain at worst: 7/10  Rating of Pain Currently: 1/10  Worsened by: getting in and out of car, rotating in bed. Sitting to standing,   Better with: getting up and moving,   Treatments tried: Tylenol  Quality: sharp with initial movement, pain that comes and goes  Associated symptoms: no distal numbness or tingling; denies swelling or warmth  Orthopedic history: YES - Date: left hip replacement, had a bad experience in 2014, had some bleeding afterwards and had to have 2 follow up surgeries  Relevant surgical history: YES - Date: see above, spinal stinosis in 2011 of L4/5 and spinal cord stimulator placed last  year  Social history: social history: retired    Past Medical History:   Diagnosis Date     Arthritis     hands, joints     Coagulation disorder (H)     factor 5 leiden     COPD (chronic obstructive pulmonary disease) (H)      Factor V deficiency (H) 2007     Glaucoma      History of blood transfusion     x2     History of thrombophlebitis      Hypertension      Hypothyroid      Insomnia      Obese      Onychomycosis      Other chronic pain     back     PPD positive     INH TX x 1 year     Pulmonary embolism (H)      Sleep apnea     uses cpap     Uncomplicated asthma      Social History     Socioeconomic History     Marital status:      Spouse name: Not on file     Number of children: Not on file     Years of education: Not on file     Highest education level: Not on file   Occupational History     Not on file   Social Needs     Financial resource strain: Not on file     Food insecurity     Worry: Not on file     Inability: Not on file     Transportation needs     Medical: Not on file     Non-medical: Not on file   Tobacco Use     Smoking status: Former Smoker     Packs/day: 0.50     Types: Cigarettes     Quit date: 1/1/2006     Years since quitting: 15.1     Smokeless tobacco: Never Used   Substance and Sexual Activity     Alcohol use: Yes     Comment: rare     Drug use: Never     Sexual activity: Not Currently   Lifestyle     Physical activity     Days per week: Not on file     Minutes per session: Not on file     Stress: Not on file   Relationships     Social connections     Talks on phone: Not on file     Gets together: Not on file     Attends Temple service: Not on file     Active member of club or organization: Not on file     Attends meetings of clubs or organizations: Not on file     Relationship status: Not on file     Intimate partner violence     Fear of current or ex partner: Not on file     Emotionally abused: Not on file     Physically abused: Not on file     Forced sexual activity: Not on  "file   Other Topics Concern     Parent/sibling w/ CABG, MI or angioplasty before 65F 55M? Not Asked   Social History Narrative     Not on file         Patient's past medical, surgical, social, and family histories were reviewed today and no changes are noted.    REVIEW OF SYSTEMS:  10 point ROS is negative other than symptoms noted above in HPI, Past Medical History or as stated below  Constitutional: NEGATIVE for fever, chills, change in weight  Skin: NEGATIVE for worrisome rashes, moles or lesions  GI/: NEGATIVE for bowel or bladder changes  Neuro: NEGATIVE for weakness, dizziness or paresthesias    OBJECTIVE:  /52   Ht 1.727 m (5' 8\")   Wt 148.3 kg (327 lb)   BMI 49.72 kg/m     General: healthy, alert and in no distress  HEENT: no scleral icterus or conjunctival erythema  Skin: no suspicious lesions or rash. No jaundice.  CV: no pedal edema  Resp: normal respiratory effort without conversational dyspnea   Psych: normal mood and affect  Gait: normal steady gait with appropriate coordination and balance  Neuro: Normal light sensory exam of lower extremity  MSK:  RIGHT HIP  Inspection:    No obvious deformity or asymmetry, level pelvis  Palpation:    Tender about the anterior groin/joint line and hip flexor musculature. Otherwise all other landmarks are nontender.  Active Range of Motion:     Flexion limited by pain, IR limited slightly by pain, ER  limited slightly by pain  Strength:    Flexion painful, adduction grossly intact, abduction grossly intact  Special Tests:    Positive: none    Negative: Logroll, resisted gluteus medius provocation, MARIELLA, anterior impingement (FADIR), posterior impingement (EX/AB/ER)    Independent visualization of the below image:  Recent Results (from the past 24 hour(s))   XR Pelvis and Hip Right 1 View    Narrative    PELVIS AND RIGHT HIP, ONE VIEW   2/5/2021 10:48 AM     HISTORY: Right hip pain.    COMPARISON: None.      Impression    IMPRESSION:   1. Left total hip " arthroplasty in place.    2. Mild to moderate right hip degenerative changes. No evidence of  fracture or AVN. Degenerative changes in the lower lumbar spine.     Personal review of right hip xrays shows mild joint space narrowing.  No acute fracture or dislocation.      Albert Yeo MD [facility]        Again, thank you for allowing me to participate in the care of your patient.        Sincerely,        Albert Yeo, MD

## 2021-02-05 NOTE — PROGRESS NOTES
ASSESSMENT & PLAN  Patient Instructions     1. Right hip pain    2. Primary osteoarthritis involving multiple joints    3. Hip strain, right, initial encounter      -Patient has right hip pain due to muscle strains  -Patient will start formal physical therapy and home exercise program  -Patient is on Coumadin and so cannot take oral NSAIDs  -Patient may continue with Tylenol and Robaxin as needed  -Patient will follow up if pain does not  Improve.  To consider a cortisone injection of the right hip if indicated.  -Call direct clinic number [[phone]] at any time with questions or concerns.    Albert Yeo MD [facility]            -----    SUBJECTIVE  Petra Mcnulty is a/an 70 year old female who is seen in consultation at the request of  Mona Diaz M.D. for evaluation of right hip pain. The patient is seen by themselves.    Onset: 1 month(s) ago. Reports insidious onset without acute precipitating event.  Location of Pain: right anterior groin and hip pain  Rating of Pain at worst: 7/10  Rating of Pain Currently: 1/10  Worsened by: getting in and out of car, rotating in bed. Sitting to standing,   Better with: getting up and moving,   Treatments tried: Tylenol  Quality: sharp with initial movement, pain that comes and goes  Associated symptoms: no distal numbness or tingling; denies swelling or warmth  Orthopedic history: YES - Date: left hip replacement, had a bad experience in 2014, had some bleeding afterwards and had to have 2 follow up surgeries  Relevant surgical history: YES - Date: see above, spinal stinosis in 2011 of L4/5 and spinal cord stimulator placed last year  Social history: social history: retired    Past Medical History:   Diagnosis Date     Arthritis     hands, joints     Coagulation disorder (H)     factor 5 leiden     COPD (chronic obstructive pulmonary disease) (H)      Factor V deficiency (H) 2007     Glaucoma       History of blood transfusion     x2     History of thrombophlebitis      Hypertension      Hypothyroid      Insomnia      Obese      Onychomycosis      Other chronic pain     back     PPD positive     INH TX x 1 year     Pulmonary embolism (H)      Sleep apnea     uses cpap     Uncomplicated asthma      Social History     Socioeconomic History     Marital status:      Spouse name: Not on file     Number of children: Not on file     Years of education: Not on file     Highest education level: Not on file   Occupational History     Not on file   Social Needs     Financial resource strain: Not on file     Food insecurity     Worry: Not on file     Inability: Not on file     Transportation needs     Medical: Not on file     Non-medical: Not on file   Tobacco Use     Smoking status: Former Smoker     Packs/day: 0.50     Types: Cigarettes     Quit date: 1/1/2006     Years since quitting: 15.1     Smokeless tobacco: Never Used   Substance and Sexual Activity     Alcohol use: Yes     Comment: rare     Drug use: Never     Sexual activity: Not Currently   Lifestyle     Physical activity     Days per week: Not on file     Minutes per session: Not on file     Stress: Not on file   Relationships     Social connections     Talks on phone: Not on file     Gets together: Not on file     Attends Yarsanism service: Not on file     Active member of club or organization: Not on file     Attends meetings of clubs or organizations: Not on file     Relationship status: Not on file     Intimate partner violence     Fear of current or ex partner: Not on file     Emotionally abused: Not on file     Physically abused: Not on file     Forced sexual activity: Not on file   Other Topics Concern     Parent/sibling w/ CABG, MI or angioplasty before 65F 55M? Not Asked   Social History Narrative     Not on file         Patient's past medical, surgical, social, and family histories were reviewed today and no changes are noted.    REVIEW OF  "SYSTEMS:  10 point ROS is negative other than symptoms noted above in HPI, Past Medical History or as stated below  Constitutional: NEGATIVE for fever, chills, change in weight  Skin: NEGATIVE for worrisome rashes, moles or lesions  GI/: NEGATIVE for bowel or bladder changes  Neuro: NEGATIVE for weakness, dizziness or paresthesias    OBJECTIVE:  /52   Ht 1.727 m (5' 8\")   Wt 148.3 kg (327 lb)   BMI 49.72 kg/m     General: healthy, alert and in no distress  HEENT: no scleral icterus or conjunctival erythema  Skin: no suspicious lesions or rash. No jaundice.  CV: no pedal edema  Resp: normal respiratory effort without conversational dyspnea   Psych: normal mood and affect  Gait: normal steady gait with appropriate coordination and balance  Neuro: Normal light sensory exam of lower extremity  MSK:  RIGHT HIP  Inspection:    No obvious deformity or asymmetry, level pelvis  Palpation:    Tender about the anterior groin/joint line and hip flexor musculature. Otherwise all other landmarks are nontender.  Active Range of Motion:     Flexion limited by pain, IR limited slightly by pain, ER  limited slightly by pain  Strength:    Flexion painful, adduction grossly intact, abduction grossly intact  Special Tests:    Positive: none    Negative: Logroll, resisted gluteus medius provocation, MARIELLA, anterior impingement (FADIR), posterior impingement (EX/AB/ER)    Independent visualization of the below image:  Recent Results (from the past 24 hour(s))   XR Pelvis and Hip Right 1 View    Narrative    PELVIS AND RIGHT HIP, ONE VIEW   2/5/2021 10:48 AM     HISTORY: Right hip pain.    COMPARISON: None.      Impression    IMPRESSION:   1. Left total hip arthroplasty in place.    2. Mild to moderate right hip degenerative changes. No evidence of  fracture or AVN. Degenerative changes in the lower lumbar spine.     Personal review of right hip xrays shows mild joint space narrowing.  No acute fracture or " dislocation.      Albert Yeo MD [facility]  [facility]

## 2021-02-16 ENCOUNTER — ANTICOAGULATION THERAPY VISIT (OUTPATIENT)
Dept: ANTICOAGULATION | Facility: CLINIC | Age: 71
End: 2021-02-16
Payer: COMMERCIAL

## 2021-02-16 ENCOUNTER — THERAPY VISIT (OUTPATIENT)
Dept: PHYSICAL THERAPY | Facility: CLINIC | Age: 71
End: 2021-02-16
Attending: FAMILY MEDICINE
Payer: COMMERCIAL

## 2021-02-16 DIAGNOSIS — E03.9 ACQUIRED HYPOTHYROIDISM: ICD-10-CM

## 2021-02-16 DIAGNOSIS — Z86.711 HISTORY OF PULMONARY EMBOLISM: ICD-10-CM

## 2021-02-16 DIAGNOSIS — D68.51 HETEROZYGOUS FACTOR V LEIDEN MUTATION (H): ICD-10-CM

## 2021-02-16 DIAGNOSIS — Z79.01 LONG TERM CURRENT USE OF ANTICOAGULANTS WITH INR GOAL OF 2.0-3.0: ICD-10-CM

## 2021-02-16 DIAGNOSIS — S76.011A HIP STRAIN, RIGHT, INITIAL ENCOUNTER: ICD-10-CM

## 2021-02-16 DIAGNOSIS — M25.551 HIP PAIN, RIGHT: ICD-10-CM

## 2021-02-16 LAB — INR PPP: 2.2 (ref 0.86–1.14)

## 2021-02-16 PROCEDURE — 97110 THERAPEUTIC EXERCISES: CPT | Mod: GP | Performed by: PHYSICAL THERAPIST

## 2021-02-16 PROCEDURE — 36416 COLLJ CAPILLARY BLOOD SPEC: CPT | Performed by: INTERNAL MEDICINE

## 2021-02-16 PROCEDURE — 99207 PR NO CHARGE NURSE ONLY: CPT

## 2021-02-16 PROCEDURE — 97162 PT EVAL MOD COMPLEX 30 MIN: CPT | Mod: GP | Performed by: PHYSICAL THERAPIST

## 2021-02-16 PROCEDURE — 85610 PROTHROMBIN TIME: CPT | Performed by: INTERNAL MEDICINE

## 2021-02-16 PROCEDURE — 84443 ASSAY THYROID STIM HORMONE: CPT | Performed by: INTERNAL MEDICINE

## 2021-02-16 ASSESSMENT — ACTIVITIES OF DAILY LIVING (ADL)
WALKING_UP_STEEP_HILLS: MODERATE DIFFICULTY
WALKING_DOWN_STEEP_HILLS: SLIGHT DIFFICULTY
DEEP_SQUATTING: SLIGHT DIFFICULTY
SITTING_FOR_15_MINUTES: NO DIFFICULTY AT ALL
WALKING_15_MINUTES_OR_GREATER: SLIGHT DIFFICULTY
PUTTING_ON_SOCKS_AND_SHOES: SLIGHT DIFFICULTY
WALKING_APPROXIMATELY_10_MINUTES: NO DIFFICULTY AT ALL
HOS_ADL_SCORE(%): 71.88
RECREATIONAL_ACTIVITIES: UNABLE TO DO
TWISTING/PIVOTING_ON_INVOLVED_LEG: NO DIFFICULTY AT ALL
HOS_ADL_ITEM_SCORE_TOTAL: 46
HEAVY_WORK: MODERATE DIFFICULTY
GOING_UP_1_FLIGHT_OF_STAIRS: MODERATE DIFFICULTY
STANDING_FOR_15_MINUTES: NO DIFFICULTY AT ALL
LIGHT_TO_MODERATE_WORK: SLIGHT DIFFICULTY
WALKING_INITIALLY: NO DIFFICULTY AT ALL
ROLLING_OVER_IN_BED: MODERATE DIFFICULTY
HOS_ADL_HIGHEST_POTENTIAL_SCORE: 64
HOS_ADL_COUNT: 16
GETTING_INTO_AND_OUT_OF_AN_AVERAGE_CAR: SLIGHT DIFFICULTY
STEPPING_UP_AND_DOWN_CURBS: NO DIFFICULTY AT ALL
GOING_DOWN_1_FLIGHT_OF_STAIRS: SLIGHT DIFFICULTY

## 2021-02-16 NOTE — PROGRESS NOTES
[facility] Initial Evaluation -- Lower Extremity    Evaluation Date: February 16, 2021  Petra Mcnulty is a 70 year old female with a R hip condition.   Referral: Dr. Yeo  Work mechanical stresses: retired   Employment status: retired  Leisure mechanical stresses: none currently  Functional disability score: see flow sheet  VAS score (0-10): 4/10  Patient goals/expectations:  Worried about not being able to stand more than 5-10 minutes; worried about not being able to get in and out of bed, get in and out of car.     HISTORY:    Present symptoms: groin R, inside of thigh  Pain quality (sharp/shooting/stabbing/aching/burning/cramping):  Sharp, cramping    Present since (onset date): September 2020    Symptoms (improving/unchanging/worsening):  worsening.      Symptoms commenced as a result of: no apparent reason   Condition occurred in the following environment: home     Symptoms at onset: R hip  Paresthesia (yes/no):  no  Spinal history: spinal stenosis/has spine stimulator for pain   Cough/Sneeze (pos/neg):  neg    Constant symptoms:   Intermittent symptoms: R hip    Symptoms are worse with the following: Other - transitional movements (in/out bed, in/out of car)   Symptoms are better with the following: Always Standing, Always When still and Always On the move    Continued use makes the pain (better/worse/no effect): better    Disturbed night (yes/no): no      Pain at rest (yes/no):  no  Site (back/hip/knee/ankle/foot):  R hip    Other questions (swelling/clicking/locking/giving way/falling):  no     Previous episodes: none  Previous treatments: none    Specific Questions:  General health (excellent/good/fair/poor):  fair  Pertinent medical history includes: Asthma, High blood pressure, Osteoarthritis, Overweight, Sleep disorder/apnea and Thyroid problemsL TIANA, bilateral TKA, decompression lumbar L4/L5 2011, cervical fusion 2005  Medications (nil/NSAIDS/analg/steroids/anticoag/other):   NSAIDS, Anticoag and Other - Thyroid, High blood pressure and muscle relaxants  Medical allergies:  See chart  Imaging (none/Xray/MRI/other):  R hip, mild OA  Recent or major surgery (yes/no):  no  Night pain (yes/no):  no  Accidents (yes/no):  no  Unexplained weight loss (yes/no):  no  Barriers at home: none  Other red flags: none    Sites for physical examination (back/hip/knee/ankle/foot/other): R hip    EXAMINATION    Posture:  Sitting (good/fair/poor):     Correction of Posture (better/worse/no effect/NA):   Standing (good/fair/poor):   Other observations:      Neurological: (NA/motor/sensory/reflexes/dural):     Baselines (pain or functional activity): lifting leg off chair, getting out of bed (off plinth)    Extremities (Hip / Knee / Ankle / Foot): R hip    Movement Loss Kurt Mod Min Nil Pain   Flexion   x x    Extension  x x     Abduction        Adduction        Internal Rotation   x  erp   External Rotation   x x    Dorsiflexion        Plantarflexion        Inversion        Eversion          Passive Movement (+/- over pressure)/(PDM/ERP):  Rep passive hip EXT in chair: P/NW/decr concordant pain with simulating transitional movements from chair, off plinth; decr flex Adductors   Resisted Test Response (pain): able to perform SLR Flex w/out pain,   Other Tests:     Spine:  Movement loss:   Effect of repeated movements:   Effect of static positioning:   Spine testing (not relevant/relevant/secondary problem):     Baseline Symptoms:   Repeated Tests Symptom Response Mechanical Response   Active/Passive movement, resisted test, functional test During -  Produce, Abolish, Increase, Decrease, NE After -  Better, Worse, NB, NW, NE Effect -   ? or ? ROM, strength or key functional test No   Effect   Rep passive hip EXT in chair Produce    No Worse    decr pain with simulating transfers                           Effect of static positioning                Provisional Classification (Extremity/Spine):  Extremity -  Derangement      Princicple of Management:   Education:  Therapeutic dose of exercise, traffic light    Equipment provided:    Exercise and dosage:  Rep R hip EXT x 10/3 x day; hip/core strenthening    ASSESSMENT/PLAN:    Patient is a 70 year old female with right side hip complaints.    Patient has the following significant findings with corresponding treatment plan.                Diagnosis 1:  R hip pain  Pain -  manual therapy, self management, education, directional preference exercise and home program  Decreased ROM/flexibility - manual therapy and therapeutic exercise  Decreased strength - therapeutic exercise and therapeutic activities  Decreased function - therapeutic activities    Therapy Evaluation Codes:   1) History comprised of:   Personal factors that impact the plan of care:      None.    Comorbidity factors that impact the plan of care are:      Asthma, High blood pressure, Osteoarthritis, Overweight and Sleep disorder/apnea.     Medications impacting care: Cardiac, High blood pressure, Heparin/coumadin, Muscle relaxant and Thyroid, Tylenol.  2) Examination of Body Systems comprised of:   Body structures and functions that impact the plan of care:      Hip.   Activity limitations that impact the plan of care are:      Driving, Dressing and Standing.  3) Clinical presentation characteristics are:   Evolving/Changing.  4) Decision-Making    Moderate complexity using standardized patient assessment instrument and/or measureable assessment of functional outcome.  Cumulative Therapy Evaluation is: Moderate complexity.    Previous and current functional limitations:  (See Goal Flow Sheet for this information)    Short term and Long term goals: (See Goal Flow Sheet for this information)     Communication ability:  Patient appears to be able to clearly communicate and understand verbal and written communication and follow directions correctly.  Treatment Explanation - The following has been discussed with  the patient:   RX ordered/plan of care  Anticipated outcomes  Possible risks and side effects  This patient would benefit from PT intervention to resume normal activities.   Rehab potential is good.    Frequency:  1 X week, once daily  Duration:  for 6 weeks  Discharge Plan:  Achieve all LTG.  Independent in home treatment program.  Reach maximal therapeutic benefit.    Please refer to the daily flowsheet for treatment today, total treatment time and time spent performing 1:1 timed codes.

## 2021-02-17 LAB — TSH SERPL DL<=0.005 MIU/L-ACNC: 1.93 MU/L (ref 0.4–4)

## 2021-02-23 ENCOUNTER — THERAPY VISIT (OUTPATIENT)
Dept: PHYSICAL THERAPY | Facility: CLINIC | Age: 71
End: 2021-02-23
Payer: COMMERCIAL

## 2021-02-23 DIAGNOSIS — M25.551 HIP PAIN, RIGHT: ICD-10-CM

## 2021-02-23 PROCEDURE — 97110 THERAPEUTIC EXERCISES: CPT | Mod: GP | Performed by: PHYSICAL THERAPIST

## 2021-02-25 ENCOUNTER — TRANSFERRED RECORDS (OUTPATIENT)
Dept: HEALTH INFORMATION MANAGEMENT | Facility: CLINIC | Age: 71
End: 2021-02-25

## 2021-02-26 DIAGNOSIS — R73.03 PREDIABETES: ICD-10-CM

## 2021-02-26 RX ORDER — METFORMIN HCL 500 MG
500 TABLET, EXTENDED RELEASE 24 HR ORAL
Qty: 30 TABLET | Refills: 3 | Status: SHIPPED | OUTPATIENT
Start: 2021-02-26 | End: 2021-06-29

## 2021-03-02 ENCOUNTER — THERAPY VISIT (OUTPATIENT)
Dept: PHYSICAL THERAPY | Facility: CLINIC | Age: 71
End: 2021-03-02
Payer: COMMERCIAL

## 2021-03-02 DIAGNOSIS — M25.551 HIP PAIN, RIGHT: ICD-10-CM

## 2021-03-02 PROCEDURE — 97140 MANUAL THERAPY 1/> REGIONS: CPT | Mod: GP | Performed by: PHYSICAL THERAPIST

## 2021-03-02 PROCEDURE — 97530 THERAPEUTIC ACTIVITIES: CPT | Mod: GP | Performed by: PHYSICAL THERAPIST

## 2021-03-07 DIAGNOSIS — N39.46 MIXED STRESS AND URGE URINARY INCONTINENCE: Primary | ICD-10-CM

## 2021-03-08 NOTE — TELEPHONE ENCOUNTER
Fesoterodine Fumarate 8 MG TB24   Last Written Prescription Date:  ?  Last Fill Quantity: ?,   # refills: ?  Last Office Visit : 1/27/2021  Future Office visit:  427/2021    Routing refill request to provider for review/approval because:  Medication is reported/historical      Tia Castillo RN  Central Triage Red Flags/Med Refills

## 2021-03-08 NOTE — TELEPHONE ENCOUNTER
Called patient to ask if she is still taking this medication  Had to leave a message Jackeline De Oliveira LPN Staff Nurse

## 2021-03-10 RX ORDER — FESOTERODINE FUMARATE 8 MG/1
8 TABLET, FILM COATED, EXTENDED RELEASE ORAL DAILY
Qty: 30 TABLET | Refills: 11 | Status: SHIPPED | OUTPATIENT
Start: 2021-03-10 | End: 2021-12-06

## 2021-03-16 ENCOUNTER — ANTICOAGULATION THERAPY VISIT (OUTPATIENT)
Dept: ANTICOAGULATION | Facility: CLINIC | Age: 71
End: 2021-03-16
Payer: COMMERCIAL

## 2021-03-16 ENCOUNTER — THERAPY VISIT (OUTPATIENT)
Dept: PHYSICAL THERAPY | Facility: CLINIC | Age: 71
End: 2021-03-16
Payer: COMMERCIAL

## 2021-03-16 DIAGNOSIS — D68.51 HETEROZYGOUS FACTOR V LEIDEN MUTATION (H): ICD-10-CM

## 2021-03-16 DIAGNOSIS — Z86.711 HISTORY OF PULMONARY EMBOLISM: ICD-10-CM

## 2021-03-16 DIAGNOSIS — M25.551 HIP PAIN, RIGHT: ICD-10-CM

## 2021-03-16 DIAGNOSIS — Z79.01 LONG TERM CURRENT USE OF ANTICOAGULANTS WITH INR GOAL OF 2.0-3.0: ICD-10-CM

## 2021-03-16 LAB — INR PPP: 3 (ref 0.86–1.14)

## 2021-03-16 PROCEDURE — 85610 PROTHROMBIN TIME: CPT | Performed by: INTERNAL MEDICINE

## 2021-03-16 PROCEDURE — 99207 PR NO CHARGE NURSE ONLY: CPT

## 2021-03-16 PROCEDURE — 36416 COLLJ CAPILLARY BLOOD SPEC: CPT | Performed by: INTERNAL MEDICINE

## 2021-03-16 PROCEDURE — 97140 MANUAL THERAPY 1/> REGIONS: CPT | Mod: GP | Performed by: PHYSICAL THERAPIST

## 2021-03-16 PROCEDURE — 97110 THERAPEUTIC EXERCISES: CPT | Mod: GP | Performed by: PHYSICAL THERAPIST

## 2021-03-16 NOTE — PROGRESS NOTES
ANTICOAGULATION FOLLOW-UP CLINIC VISIT    Patient Name:  Petra Mcnulty  Date:  3/16/2021  Contact Type:  Telephone/ Called patient, she reports bruising and diet changes. Orders discussed with the patient, she agrees with the plan. Lab INR appointment scheduled on 4/6/21.    SUBJECTIVE:  Patient Findings     Positives:  Change in diet/appetite (Patient reports decreased green veggies. ), Bruising (Patient reports she had a bruise from PT )    Comments:  The patient was assessed for medication and activity level changes, missed or extra doses, bleeding, with no problem findings.           Clinical Outcomes     Comments:  The patient was assessed for medication and activity level changes, missed or extra doses, bleeding, with no problem findings.              OBJECTIVE    Recent labs: (last 7 days)     03/16/21  0939   INR 3.00*       ASSESSMENT / PLAN  INR assessment THER    Recheck INR In: 3 WEEKS    INR Location Outside lab      Anticoagulation Summary  As of 3/16/2021    INR goal:  2.0-3.0   TTR:  63.0 % (1 y)   INR used for dosing:  3.00 (3/16/2021)   Warfarin maintenance plan:  3 mg (2 mg x 1.5) every Mon; 4 mg (2 mg x 2) all other days   Full warfarin instructions:  3 mg every Mon; 4 mg all other days   Weekly warfarin total:  27 mg   No change documented:  Martha De La Cruz RN   Plan last modified:  Rae Wing RN (8/4/2020)   Next INR check:  4/6/2021   Priority:  Maintenance   Target end date:  Indefinite    Indications    History of pulmonary embolism [Z86.711]  Heterozygous factor V Leiden mutation (H) [D68.51]  Long term current use of anticoagulants with INR goal of 2.0-3.0 [Z79.01]             Anticoagulation Episode Summary     INR check location:      Preferred lab:      Send INR reminders to:  [facility]    Comments:  Call cell number with results for INR,  INR referral renewed, see 1/19/21 documentation encounter.       Anticoagulation Care Providers     Provider Role Specialty  Phone number    Mona Diaz MD Referring Internal Medicine [phone]            See the Encounter Report to view Anticoagulation Flowsheet and Dosing Calendar (Go to Encounters tab in chart review, and find the Anticoagulation Therapy Visit)    Dosage adjustment made based on physician directed care plan.    Martha De La Cruz RN

## 2021-03-22 ENCOUNTER — OFFICE VISIT (OUTPATIENT)
Dept: DERMATOLOGY | Facility: CLINIC | Age: 71
End: 2021-03-22
Attending: INTERNAL MEDICINE
Payer: COMMERCIAL

## 2021-03-22 VITALS — DIASTOLIC BLOOD PRESSURE: 84 MMHG | SYSTOLIC BLOOD PRESSURE: 132 MMHG | HEART RATE: 86 BPM | OXYGEN SATURATION: 96 %

## 2021-03-22 DIAGNOSIS — L72.0 MILIA: ICD-10-CM

## 2021-03-22 DIAGNOSIS — L21.9 SEBORRHEIC DERMATITIS: ICD-10-CM

## 2021-03-22 DIAGNOSIS — L82.1 SEBORRHEIC KERATOSES: ICD-10-CM

## 2021-03-22 DIAGNOSIS — L82.0 INFLAMED SEBORRHEIC KERATOSIS: ICD-10-CM

## 2021-03-22 DIAGNOSIS — L57.8 SOLAR ELASTOSIS: ICD-10-CM

## 2021-03-22 DIAGNOSIS — D18.01 CHERRY ANGIOMA: ICD-10-CM

## 2021-03-22 DIAGNOSIS — L57.0 ACTINIC KERATOSES: ICD-10-CM

## 2021-03-22 DIAGNOSIS — D22.9 MULTIPLE BENIGN NEVI: ICD-10-CM

## 2021-03-22 DIAGNOSIS — L29.9 LOCALIZED PRURITUS: Primary | ICD-10-CM

## 2021-03-22 PROCEDURE — 17110 DESTRUCTION B9 LES UP TO 14: CPT | Performed by: PHYSICIAN ASSISTANT

## 2021-03-22 PROCEDURE — 17000 DESTRUCT PREMALG LESION: CPT | Mod: 59 | Performed by: PHYSICIAN ASSISTANT

## 2021-03-22 PROCEDURE — 99203 OFFICE O/P NEW LOW 30 MIN: CPT | Mod: 25 | Performed by: PHYSICIAN ASSISTANT

## 2021-03-22 PROCEDURE — 17003 DESTRUCT PREMALG LES 2-14: CPT | Mod: 59 | Performed by: PHYSICIAN ASSISTANT

## 2021-03-22 RX ORDER — CICLOPIROX OLAMINE 7.7 MG/G
CREAM TOPICAL
Qty: 30 G | Refills: 1 | Status: SHIPPED | OUTPATIENT
Start: 2021-03-22

## 2021-03-22 NOTE — PROGRESS NOTES
HPI:  Petra Mcnulty is a 70 year old female patient here today for spots on face .  Patient states this has been present for a while.  Patient reports the following symptoms: scaly .  Patient reports the following previous treatments: ln2 with improvement.  Patient reports the following modifying factors: none.  Associated symptoms: none.  Patient has no other skin complaints today.  Remainder of the HPI, Meds, PMH, Allergies, FH, and SH was reviewed in chart.    Pertinent Hx:   No personal or family history of skin cancer    Past Medical History:   Diagnosis Date     Arthritis     hands, joints     Coagulation disorder (H)     factor 5 leiden     COPD (chronic obstructive pulmonary disease) (H)      Factor V deficiency (H) 2007     Glaucoma      History of blood transfusion     x2     History of thrombophlebitis      Hypertension      Hypothyroid      Insomnia      Obese      Onychomycosis      Other chronic pain     back     PPD positive     INH TX x 1 year     Pulmonary embolism (H)      Sleep apnea     uses cpap     Uncomplicated asthma        Past Surgical History:   Procedure Laterality Date     ARTHROPLASTY HIP      left + 2 I&Ds     ARTHROSCOPY KNEE       AS TOTAL HIP ARTHROPLASTY       BACK SURGERY      c3-c4 discectomy/fusion,     BACK SURGERY      lumbar decompression/lami      BIOPSY      breast     C TOTAL KNEE ARTHROPLASTY      left,right     CHOLECYSTECTOMY       COLONOSCOPY       D & C       HYSTEROSCOPY      d and c     INSERT STIMULATOR DORSAL COLUMN N/A 6/26/2020    Procedure: SPINAL CORD STIMULATOR IMPLANT;  Surgeon: Rosemary Dacosta DO;  Location:  OR     INSERT STIMULATOR DORSAL COLUMN TRIAL N/A 2/6/2020    Procedure: SPINAL CORD STIMULATOR TRIAL;  Surgeon: Karlee Duffy MD;  Location:  OR     OPERATIVE HYSTEROSCOPY WITH MORCELLATOR N/A 6/24/2020    Procedure: 1. Exam under anesthesia 2. Dilation and curettage under direct visualization 3. Operative hysteroscopy 4. polypectomy;   Surgeon: Parisa Goldberg MD;  Location:  OR        Family History   Problem Relation Age of Onset     Diabetes Mother 80        Type II     Hypertension Mother      Cerebrovascular Disease Father      Basal cell carcinoma Sister        Social History     Socioeconomic History     Marital status:      Spouse name: Not on file     Number of children: Not on file     Years of education: Not on file     Highest education level: Not on file   Occupational History     Not on file   Social Needs     Financial resource strain: Not on file     Food insecurity     Worry: Not on file     Inability: Not on file     Transportation needs     Medical: Not on file     Non-medical: Not on file   Tobacco Use     Smoking status: Former Smoker     Packs/day: 0.50     Types: Cigarettes     Quit date: 1/1/2006     Years since quitting: 15.2     Smokeless tobacco: Never Used   Substance and Sexual Activity     Alcohol use: Yes     Comment: rare     Drug use: Never     Sexual activity: Not Currently   Lifestyle     Physical activity     Days per week: Not on file     Minutes per session: Not on file     Stress: Not on file   Relationships     Social connections     Talks on phone: Not on file     Gets together: Not on file     Attends Rastafarian service: Not on file     Active member of club or organization: Not on file     Attends meetings of clubs or organizations: Not on file     Relationship status: Not on file     Intimate partner violence     Fear of current or ex partner: Not on file     Emotionally abused: Not on file     Physically abused: Not on file     Forced sexual activity: Not on file   Other Topics Concern     Parent/sibling w/ CABG, MI or angioplasty before 65F 55M? Not Asked   Social History Narrative     Not on file       Outpatient Encounter Medications as of 3/22/2021   Medication Sig Dispense Refill     acetaminophen (TYLENOL) 500 MG tablet Take 1,000 mg by mouth 2 times daily        albuterol (PROAIR  HFA/PROVENTIL HFA/VENTOLIN HFA) 108 (90 Base) MCG/ACT inhaler Inhale 2 puffs into the lungs every 4 hours as needed for shortness of breath / dyspnea or wheezing 1 Inhaler 3     Cholecalciferol (VITAMIN D) 50 MCG (2000 UT) CAPS Take 2000 international unit(s) daily       clobetasol (TEMOVATE) 0.05 % external ointment Apply topically daily 45 g 4     estradiol (ESTRACE) 0.1 MG/GM vaginal cream Place 1 g vaginally twice a week Daily for the first 14 days 42.5 g 3     Fesoterodine Fumarate 8 MG TB24 Take 1 tablet (8 mg) by mouth daily 30 tablet 11     gabapentin (NEURONTIN) 300 MG capsule Take 1 capsule (300 mg) by mouth 2 times daily as needed (pain) 180 capsule 3     HYDROcodone-acetaminophen (NORCO) 5-325 MG tablet Take 1 tablet by mouth every 6 hours as needed for severe pain       levonorgestrel (MIRENA) 20 MCG/24HR IUD 1 each (20 mcg) by Intrauterine route once       levothyroxine (SYNTHROID/LEVOTHROID) 100 MCG tablet Take 2 tablets on Sunday and 1 tablet on all other days of the week 106 tablet 3     lisinopril (ZESTRIL) 10 MG tablet Take 1 tablet (10 mg) by mouth daily 90 tablet 3     loratadine (CLARITIN) 10 MG tablet Take 10 mg by mouth daily       metFORMIN (GLUCOPHAGE-XR) 500 MG 24 hr tablet TAKE 1 TABLET (500 MG) BY MOUTH DAILY (WITH DINNER) 30 tablet 3     methocarbamol (ROBAXIN) 750 MG tablet Take 1 tablet (750 mg) by mouth At Bedtime 90 tablet 3     mirabegron (MYRBETRIQ) 25 MG 24 hr tablet Take 1 tablet (25 mg) by mouth daily 30 tablet 11     nystatin (MYCOSTATIN) 415612 UNIT/GM external powder Apply topically 2 times daily as needed 45 g 0     pravastatin (PRAVACHOL) 40 MG tablet Take 1 tablet (40 mg) by mouth daily 90 tablet 3     vitamin C (ASCORBIC ACID) 1000 MG TABS Take 1 tablet (1,000 mg) by mouth daily       warfarin ANTICOAGULANT (COUMADIN) 2 MG tablet TAKE 1.5 TABLETS BY MOUTH ON MONDAY,  TAKE 2 TABLETS ALL THE OTHER DAYS OF THE WEEK OR AS DIRECTED BY INR CLINIC 180 tablet 1     No  facility-administered encounter medications on file as of 3/22/2021.        Review Of Systems:  Skin: spots  Eyes: negative  Ears/Nose/Throat: negative  Respiratory: No shortness of breath, dyspnea on exertion, cough, or hemoptysis  Cardiovascular: negative  Gastrointestinal: negative  Genitourinary: negative  Musculoskeletal: negative  Neurologic: negative  Psychiatric: negative  Hematologic/Lymphatic/Immunologic: negative  Endocrine: negative      Objective:     /84   Pulse 86   SpO2 96%   Breastfeeding No   Eyes: Conjunctivae/lids: Normal   ENT: Lips:  Normal  MSK: Normal  Cardiovascular: Peripheral edema none  Pulm: Breathing Normal  Neuro/Psych: Orientation: A/O x 3. Normal; Mood/Affect: Normal, NAD, WDWN  Pt accompanied by: self  Following areas examined: face, neck, back, chest, UE. Pt defers FBE  Morales skin type:i   Findings:  Red smooth well-defined macules on face  Brown, stuck-on scaly appearing papules on face, back, chest, UE  Well circumscribed macules with symmetric color distribution on trunk and U extremities.  Jim WD smooth macules on face, neck, trunk, and U extremities.  Smooth white papules on face  Inflamed brown, stuck-on scaly appearing papules on left arm x 2, chest x 2, right temple x 2  Rhytides, hypo/hyperpigmentation, and atrophy  Pink gritty macule/s on left cheek x 1, right cheek x 2, forehead x 1  Greasy scaly patches nlf  Assessment and Plan:     1) Cherry angiomas, Seborrheic keratoses, Benign nevi, Lentigines, milia     I discussed the specifics of tumor, prognosis, and genetics of benign lesions.  I explained that treatment of these lesions would be purely cosmetic and not medically neccessary.  I discussed with patient different removal options including excision, cryotherapy, cautery and /or laser.  Lesion may recur and/or may not completely resolve. May need additional treatment.     2) Actinic keratoses x 4 and solar elastosis    LN2 for 5 seconds x 2. Discussed  AE include hypopigmentation (white spot) and recurrence. Follow up in 2-3 months to recheck lesions. There is a risk of AKs developing into a SCC.   Treatment options include LN2 vs PDT vs Efudex. Pt elected LN2    3) Seborrheic Dermatitis   Chronic condition that cannot be cured, but it can be managed.     Face:   Apply ciclopirox to affected area on face 2x a day for 4-6 weeks. Tapering with improvement.     4) ISK x 6    Benign etiology and course of lesion.  LN2: Treated with LN2 for 5s for 1-2 cycles. Warned risks of blistering, pain, pigment change, scarring, and incomplete resolution.  Advised patient to return if lesions do not completely resolve within 2-3 months.  Wound care sheet given.    Signs and Symptoms of non-melanoma skin cancer and ABCDEs of melanoma reviewed with patient. Patient encouraged to perform monthly self skin exams and educated on how to perform them. UV precautions reviewed with patient. Patient was asked about new or changing moles/lesions on body.   Wear a sunscreen with at least SPF 30 on your face, ears, neck and V of the chest daily. Wear sunscreen on other areas of the body if those areas are exposed to the sun throughout the day. Sunscreens can contain physical and/or chemical blockers. Physical blockers are less likely to clog pores, these include zinc oxide and titanium dioxide. Reapply every two hour and after swimming. Sunscreen examples include Neutrogena, CeraVe, Blue Lizard, Elta MD and many others.    Proper skin care from [facility]:    -Eliminate harsh soaps as they strip the natural oils from the skin, often resulting in dry itchy skin ( i.e. Dial, Zest, Marie Spring)  -Use mild soaps such as Cetaphil or Dove Sensitive Skin in the shower. You do not need to use soap on arms, legs, and trunk every time you shower unless visibly soiled.   -Avoid hot or cold showers.  -After showering, lightly dry off and apply moisturizing within 2-3 minutes. This will help  trap moisture in the skin.   -Aggressive use of a moisturizer at least 1-2 times a day to the entire body (including -Vanicream, Cetaphil, Aquaphor or Cerave) and moisturize hands after every washing.  -We recommend using moisturizers that come in a tub that needs to be scooped out, not a pump. This has more of an oil base. It will hold moisture in your skin much better than a water base moisturizer. The above recommended are non-pore clogging.         It was a pleasure speaking with Petra Mcnulty today.       Follow up in yearly FBE

## 2021-03-22 NOTE — LETTER
3/22/2021         RE: Petra Mcnulty  [address] [place] [address]        Dear Colleague,    Thank you for referring your patient, Petra Mcnulty, to the [facility]. Please see a copy of my visit note below.    HPI:  Petra Mcnulty is a 70 year old female patient here today for spots on face .  Patient states this has been present for a while.  Patient reports the following symptoms: scaly .  Patient reports the following previous treatments: ln2 with improvement.  Patient reports the following modifying factors: none.  Associated symptoms: none.  Patient has no other skin complaints today.  Remainder of the HPI, Meds, PMH, Allergies, FH, and SH was reviewed in chart.    Pertinent Hx:   No personal or family history of skin cancer    Past Medical History:   Diagnosis Date     Arthritis     hands, joints     Coagulation disorder (H)     factor 5 leiden     COPD (chronic obstructive pulmonary disease) (H)      Factor V deficiency (H) 2007     Glaucoma      History of blood transfusion     x2     History of thrombophlebitis      Hypertension      Hypothyroid      Insomnia      Obese      Onychomycosis      Other chronic pain     back     PPD positive     INH TX x 1 year     Pulmonary embolism (H)      Sleep apnea     uses cpap     Uncomplicated asthma        Past Surgical History:   Procedure Laterality Date     ARTHROPLASTY HIP      left + 2 I&Ds     ARTHROSCOPY KNEE       AS TOTAL HIP ARTHROPLASTY       BACK SURGERY      c3-c4 discectomy/fusion,     BACK SURGERY      lumbar decompression/lami      BIOPSY      breast     C TOTAL KNEE ARTHROPLASTY      left,right     CHOLECYSTECTOMY       COLONOSCOPY       D & C       HYSTEROSCOPY      d and c     INSERT STIMULATOR DORSAL COLUMN N/A 6/26/2020    Procedure: SPINAL CORD STIMULATOR IMPLANT;  Surgeon: Rosemary Dacosta DO;  Location:  OR     INSERT STIMULATOR DORSAL COLUMN TRIAL N/A 2/6/2020    Procedure: SPINAL  CORD STIMULATOR TRIAL;  Surgeon: Karlee Duffy MD;  Location:  OR     OPERATIVE HYSTEROSCOPY WITH MORCELLATOR N/A 6/24/2020    Procedure: 1. Exam under anesthesia 2. Dilation and curettage under direct visualization 3. Operative hysteroscopy 4. polypectomy;  Surgeon: Parisa Goldberg MD;  Location:  OR        Family History   Problem Relation Age of Onset     Diabetes Mother 80        Type II     Hypertension Mother      Cerebrovascular Disease Father      Basal cell carcinoma Sister        Social History     Socioeconomic History     Marital status:      Spouse name: Not on file     Number of children: Not on file     Years of education: Not on file     Highest education level: Not on file   Occupational History     Not on file   Social Needs     Financial resource strain: Not on file     Food insecurity     Worry: Not on file     Inability: Not on file     Transportation needs     Medical: Not on file     Non-medical: Not on file   Tobacco Use     Smoking status: Former Smoker     Packs/day: 0.50     Types: Cigarettes     Quit date: 1/1/2006     Years since quitting: 15.2     Smokeless tobacco: Never Used   Substance and Sexual Activity     Alcohol use: Yes     Comment: rare     Drug use: Never     Sexual activity: Not Currently   Lifestyle     Physical activity     Days per week: Not on file     Minutes per session: Not on file     Stress: Not on file   Relationships     Social connections     Talks on phone: Not on file     Gets together: Not on file     Attends Mosque service: Not on file     Active member of club or organization: Not on file     Attends meetings of clubs or organizations: Not on file     Relationship status: Not on file     Intimate partner violence     Fear of current or ex partner: Not on file     Emotionally abused: Not on file     Physically abused: Not on file     Forced sexual activity: Not on file   Other Topics Concern     Parent/sibling w/ CABG, MI or angioplasty  before 65F 55M? Not Asked   Social History Narrative     Not on file       Outpatient Encounter Medications as of 3/22/2021   Medication Sig Dispense Refill     acetaminophen (TYLENOL) 500 MG tablet Take 1,000 mg by mouth 2 times daily        albuterol (PROAIR HFA/PROVENTIL HFA/VENTOLIN HFA) 108 (90 Base) MCG/ACT inhaler Inhale 2 puffs into the lungs every 4 hours as needed for shortness of breath / dyspnea or wheezing 1 Inhaler 3     Cholecalciferol (VITAMIN D) 50 MCG (2000 UT) CAPS Take 2000 international unit(s) daily       clobetasol (TEMOVATE) 0.05 % external ointment Apply topically daily 45 g 4     estradiol (ESTRACE) 0.1 MG/GM vaginal cream Place 1 g vaginally twice a week Daily for the first 14 days 42.5 g 3     Fesoterodine Fumarate 8 MG TB24 Take 1 tablet (8 mg) by mouth daily 30 tablet 11     gabapentin (NEURONTIN) 300 MG capsule Take 1 capsule (300 mg) by mouth 2 times daily as needed (pain) 180 capsule 3     HYDROcodone-acetaminophen (NORCO) 5-325 MG tablet Take 1 tablet by mouth every 6 hours as needed for severe pain       levonorgestrel (MIRENA) 20 MCG/24HR IUD 1 each (20 mcg) by Intrauterine route once       levothyroxine (SYNTHROID/LEVOTHROID) 100 MCG tablet Take 2 tablets on Sunday and 1 tablet on all other days of the week 106 tablet 3     lisinopril (ZESTRIL) 10 MG tablet Take 1 tablet (10 mg) by mouth daily 90 tablet 3     loratadine (CLARITIN) 10 MG tablet Take 10 mg by mouth daily       metFORMIN (GLUCOPHAGE-XR) 500 MG 24 hr tablet TAKE 1 TABLET (500 MG) BY MOUTH DAILY (WITH DINNER) 30 tablet 3     methocarbamol (ROBAXIN) 750 MG tablet Take 1 tablet (750 mg) by mouth At Bedtime 90 tablet 3     mirabegron (MYRBETRIQ) 25 MG 24 hr tablet Take 1 tablet (25 mg) by mouth daily 30 tablet 11     nystatin (MYCOSTATIN) 701788 UNIT/GM external powder Apply topically 2 times daily as needed 45 g 0     pravastatin (PRAVACHOL) 40 MG tablet Take 1 tablet (40 mg) by mouth daily 90 tablet 3     vitamin C  (ASCORBIC ACID) 1000 MG TABS Take 1 tablet (1,000 mg) by mouth daily       warfarin ANTICOAGULANT (COUMADIN) 2 MG tablet TAKE 1.5 TABLETS BY MOUTH ON MONDAY,  TAKE 2 TABLETS ALL THE OTHER DAYS OF THE WEEK OR AS DIRECTED BY INR CLINIC 180 tablet 1     No facility-administered encounter medications on file as of 3/22/2021.        Review Of Systems:  Skin: spots  Eyes: negative  Ears/Nose/Throat: negative  Respiratory: No shortness of breath, dyspnea on exertion, cough, or hemoptysis  Cardiovascular: negative  Gastrointestinal: negative  Genitourinary: negative  Musculoskeletal: negative  Neurologic: negative  Psychiatric: negative  Hematologic/Lymphatic/Immunologic: negative  Endocrine: negative      Objective:     /84   Pulse 86   SpO2 96%   Breastfeeding No   Eyes: Conjunctivae/lids: Normal   ENT: Lips:  Normal  MSK: Normal  Cardiovascular: Peripheral edema none  Pulm: Breathing Normal  Neuro/Psych: Orientation: A/O x 3. Normal; Mood/Affect: Normal, NAD, WDWN  Pt accompanied by: self  Following areas examined: face, neck, back, chest, UE. Pt defers FBE  Morales skin type:i   Findings:  Red smooth well-defined macules on face  Brown, stuck-on scaly appearing papules on face, back, chest, UE  Well circumscribed macules with symmetric color distribution on trunk and U extremities.  Jim WD smooth macules on face, neck, trunk, and U extremities.  Smooth white papules on face  Inflamed brown, stuck-on scaly appearing papules on left arm x 2, chest x 2, right temple x 2  Rhytides, hypo/hyperpigmentation, and atrophy  Pink gritty macule/s on left cheek x 1, right cheek x 2, forehead x 1  Greasy scaly patches nlf  Assessment and Plan:     1) Cherry angiomas, Seborrheic keratoses, Benign nevi, Lentigines, milia     I discussed the specifics of tumor, prognosis, and genetics of benign lesions.  I explained that treatment of these lesions would be purely cosmetic and not medically neccessary.  I discussed with  patient different removal options including excision, cryotherapy, cautery and /or laser.  Lesion may recur and/or may not completely resolve. May need additional treatment.     2) Actinic keratoses x 4 and solar elastosis    LN2 for 5 seconds x 2. Discussed AE include hypopigmentation (white spot) and recurrence. Follow up in 2-3 months to recheck lesions. There is a risk of AKs developing into a SCC.   Treatment options include LN2 vs PDT vs Efudex. Pt elected LN2    3) Seborrheic Dermatitis   Chronic condition that cannot be cured, but it can be managed.     Face:   Apply ciclopirox to affected area on face 2x a day for 4-6 weeks. Tapering with improvement.     4) ISK x 6    Benign etiology and course of lesion.  LN2: Treated with LN2 for 5s for 1-2 cycles. Warned risks of blistering, pain, pigment change, scarring, and incomplete resolution.  Advised patient to return if lesions do not completely resolve within 2-3 months.  Wound care sheet given.    Signs and Symptoms of non-melanoma skin cancer and ABCDEs of melanoma reviewed with patient. Patient encouraged to perform monthly self skin exams and educated on how to perform them. UV precautions reviewed with patient. Patient was asked about new or changing moles/lesions on body.   Wear a sunscreen with at least SPF 30 on your face, ears, neck and V of the chest daily. Wear sunscreen on other areas of the body if those areas are exposed to the sun throughout the day. Sunscreens can contain physical and/or chemical blockers. Physical blockers are less likely to clog pores, these include zinc oxide and titanium dioxide. Reapply every two hour and after swimming. Sunscreen examples include Neutrogena, CeraVe, Blue Lizard, Elta MD and many others.    Proper skin care from [facility]:    -Eliminate harsh soaps as they strip the natural oils from the skin, often resulting in dry itchy skin ( i.e. Dial, Zest, [place] Spring)  -Use mild soaps such as Cetaphil or  Dove Sensitive Skin in the shower. You do not need to use soap on arms, legs, and trunk every time you shower unless visibly soiled.   -Avoid hot or cold showers.  -After showering, lightly dry off and apply moisturizing within 2-3 minutes. This will help trap moisture in the skin.   -Aggressive use of a moisturizer at least 1-2 times a day to the entire body (including -Vanicream, Cetaphil, Aquaphor or Cerave) and moisturize hands after every washing.  -We recommend using moisturizers that come in a tub that needs to be scooped out, not a pump. This has more of an oil base. It will hold moisture in your skin much better than a water base moisturizer. The above recommended are non-pore clogging.         It was a pleasure speaking with Petra Mcnulty today.       Follow up in yearly FBE        Again, thank you for allowing me to participate in the care of your patient.        Sincerely,        Katie Wren PA-C

## 2021-03-22 NOTE — PATIENT INSTRUCTIONS
Proper skin care from [facility]:    -Eliminate harsh soaps as they strip the natural oils from the skin, often resulting in dry itchy skin ( i.e. Dial, Zest, Marie Spring)  -Use mild soaps such as Cetaphil or Dove Sensitive Skin in the shower. You do not need to use soap on arms, legs, and trunk every time you shower unless visibly soiled.   -Avoid hot or cold showers.  -After showering, lightly dry off and apply moisturizing within 2-3 minutes. This will help trap moisture in the skin.   -Aggressive use of a moisturizer at least 1-2 times a day to the entire body (including -Vanicream, Cetaphil, Aquaphor or Cerave) and moisturize hands after every washing.  -We recommend using moisturizers that come in a tub that needs to be scooped out, not a pump. This has more of an oil base. It will hold moisture in your skin much better than a water base moisturizer. The above recommended are non-pore clogging.      Wear a sunscreen with at least SPF 30 on your face, ears, neck and V of the chest daily. Wear sunscreen on other areas of the body if those areas are exposed to the sun throughout the day. Sunscreens can contain physical and/or chemical blockers. Physical blockers are less likely to clog pores, these include zinc oxide and titanium dioxide. Reapply every two hour and after swimming. Sunscreen examples include Neutrogena, CeraVe, Blue Lizard, Elta MD and many others.    UV radiation  UVA radiation remains constant throughout the day and throughout the year. It is a longer wavelength than UVB and therefore penetrates deeper into the skin leading to immediate and delayed tanning, photoaging, and skin cancer. 70-80% of UVA and UVB radiation occurs between the hours of 10am-2pm.  UVB radiation  UVB radiation causes the most harmful effects and is more significant during the summer months. However, snow and ice can reflect UVB radiation leading to skin damage during the winter months as well. UVB radiation is  responsible for tanning, burning, inflammation, delayed erythema (pinkness), pigmentation (brown spots), and skin cancer.     I recommend self monthly full body exams and yearly full body exams with a dermatology provider. If you develop a new or changing lesion please follow up for examination. Most skin cancers are pink and scaly or pink and pearly. However, we do see blue/brown/black skin cancers.  Consider the ABCDEs of melanoma when giving yourself your monthly full body exam ( don't forget the groin, buttocks, feet, toes, etc). A-asymmetry, B-borders, C-color, D-diameter, E-elevation or evolving. If you see any of these changes please follow up in clinic. If you cannot see your back I recommend purchasing a hand held mirror to use with a larger wall mirror.          WOUND CARE INSTRUCTIONS  FOR CRYOSURGERY        This area treated with liquid nitrogen will form a blister. You do not need to bandage the area until after the blister forms and breaks (which may be a few days).  When the blister breaks, begin daily dressing changes as follows:    1) Clean and dry the area with tap water using clean Q-tip or sterile gauze pad.    2) Apply Aquaphor, Vaseline, Polysporin ointment or Bacitracin ointment over entire wound.  Do NOT use Neosporin ointment.    3) Cover the wound with a band-aid or sterile non-stick gauze pad and micropore paper tape.      REPEAT THESE INSTRUCTIONS AT LEAST ONCE A DAY UNTIL THE WOUND HAS COMPLETELY HEALED.        It is an old wives tale that a wound heals better when it is exposed to air and allowed to dry out. The wound will heal faster with a better cosmetic result if it is kept moist with ointment and covered with a bandage.  Do not let the wound dry out.      Supplies Needed:     *Cotton tipped applicators (Q-tips)   *Aquaphor, Vaseline, Polysporin ointment or Bacitracin ointment (NOT NEOSPORIN)   *Band-aids, or non stick gauze pads and micropore paper tape    PATIENT  INFORMATION    During the healing process you will notice a number of changes. All wounds develop a small halo of redness surrounding the wound.  This means healing is occurring. Severe itching with extensive redness usually indicates sensitivity to the ointment or bandage tape used to dress the wound.  You should call our office if this develops.      Swelling and/or discoloration around your surgical site is common, particularly when performed around the eye.    All wounds normally drain.  The larger the wound the more drainage there will be.  After 7-10 days, you will notice the wound beginning to shrink and new skin will begin to grow.  The wound is healed when you can see skin has formed over the entire area.  A healed wound has a healthy, shiny look to the surface and is red to dark pink in color to normalize.  Wounds may take approximately 4-6 weeks to heal.  Larger wounds may take 6-8 weeks.  After the wound is healed you may discontinue dressing changes.    You may experience a sensation of tightness as your wound heals. This is normal and will gradually subside.    Your healed wound may be sensitive to temperature changes. This sensitivity improves with time, but if you re having a lot of discomfort, try to avoid temperature extremes.    Patients frequently experience itching after their wound appears to have healed because of the continue healing under the skin.  Plain Vaseline will help relieve the itching.

## 2021-04-05 ENCOUNTER — TRANSFERRED RECORDS (OUTPATIENT)
Dept: HEALTH INFORMATION MANAGEMENT | Facility: CLINIC | Age: 71
End: 2021-04-05

## 2021-04-06 ENCOUNTER — ANTICOAGULATION THERAPY VISIT (OUTPATIENT)
Dept: ANTICOAGULATION | Facility: CLINIC | Age: 71
End: 2021-04-06

## 2021-04-06 DIAGNOSIS — Z79.01 LONG TERM CURRENT USE OF ANTICOAGULANTS WITH INR GOAL OF 2.0-3.0: ICD-10-CM

## 2021-04-06 DIAGNOSIS — Z86.711 HISTORY OF PULMONARY EMBOLISM: ICD-10-CM

## 2021-04-06 DIAGNOSIS — D68.51 HETEROZYGOUS FACTOR V LEIDEN MUTATION (H): ICD-10-CM

## 2021-04-06 LAB
CAPILLARY BLOOD COLLECTION: NORMAL
INR PPP: 2.1 (ref 0.86–1.14)

## 2021-04-06 PROCEDURE — 99207 PR NO CHARGE NURSE ONLY: CPT

## 2021-04-06 PROCEDURE — 36416 COLLJ CAPILLARY BLOOD SPEC: CPT | Performed by: INTERNAL MEDICINE

## 2021-04-06 PROCEDURE — 85610 PROTHROMBIN TIME: CPT | Performed by: INTERNAL MEDICINE

## 2021-04-06 NOTE — PROGRESS NOTES
ANTICOAGULATION FOLLOW-UP CLINIC VISIT    Patient Name:  Petra Mcnulty  Date:  2021  Contact Type:  Telephone/ Called patient, she reports she is going to start taking Celebrex. Orders discussed with the patient, she agrees with the plan. Lab INR appointment scheduled on 21.    SUBJECTIVE:  Patient Findings     Positives:  Change in medications (Patient reports she is going to begin Celebrex.Concurrent use of ANTICOAGULANTS and NSAIDS may result in increased risk of bleeding. )    Comments:  The patient was assessed for diet, medication, and activity level changes, missed or extra doses, bruising or bleeding, with no problem findings. Maintenance warfarin dosing was reviewed with patient and will remain on the same dose until next INR check. Patient did not have any questions or concerns.           Clinical Outcomes     Negatives:  Major bleeding event, Thromboembolic event, Anticoagulation-related hospital admission, Anticoagulation-related ED visit, Anticoagulation-related fatality    Comments:  The patient was assessed for diet, medication, and activity level changes, missed or extra doses, bruising or bleeding, with no problem findings. Maintenance warfarin dosing was reviewed with patient and will remain on the same dose until next INR check. Patient did not have any questions or concerns.              OBJECTIVE    Recent labs: (last 7 days)     21  1008   INR 2.10*       ASSESSMENT / PLAN  INR assessment THER    Recheck INR In: 2 WEEKS    INR Location Outside lab      Anticoagulation Summary  As of 2021    INR goal:  2.0-3.0   TTR:  68.8 % (1 y)   INR used for dosin.10 (2021)   Warfarin maintenance plan:  3 mg (2 mg x 1.5) every Mon; 4 mg (2 mg x 2) all other days   Full warfarin instructions:  3 mg every Mon; 4 mg all other days   Weekly warfarin total:  27 mg   No change documented:  Martha De La Cruz, RN   Plan last modified:  Rae Wing RN (2020)   Next INR check:   4/20/2021   Priority:  Maintenance   Target end date:  Indefinite    Indications    History of pulmonary embolism [Z86.711]  Heterozygous factor V Leiden mutation (H) [D68.51]  Long term current use of anticoagulants with INR goal of 2.0-3.0 [Z79.01]             Anticoagulation Episode Summary     INR check location:      Preferred lab:      [facility]    Comments:  Call cell number with results for INR,  INR referral renewed, see 1/19/21 documentation encounter.       Anticoagulation Care Providers     Provider Role Specialty Phone number    Mona Diaz MD Referring Internal Medicine [phone]            See the Encounter Report to view Anticoagulation Flowsheet and Dosing Calendar (Go to Encounters tab in chart review, and find the Anticoagulation Therapy Visit)    Dosage adjustment made based on physician directed care plan.    Martha De La Cruz RN

## 2021-04-08 ENCOUNTER — TRANSFERRED RECORDS (OUTPATIENT)
Dept: HEALTH INFORMATION MANAGEMENT | Facility: CLINIC | Age: 71
End: 2021-04-08

## 2021-04-20 ENCOUNTER — ANTICOAGULATION THERAPY VISIT (OUTPATIENT)
Dept: ANTICOAGULATION | Facility: CLINIC | Age: 71
End: 2021-04-20

## 2021-04-20 DIAGNOSIS — D68.51 HETEROZYGOUS FACTOR V LEIDEN MUTATION (H): ICD-10-CM

## 2021-04-20 DIAGNOSIS — Z79.01 LONG TERM CURRENT USE OF ANTICOAGULANTS WITH INR GOAL OF 2.0-3.0: ICD-10-CM

## 2021-04-20 DIAGNOSIS — Z86.711 HISTORY OF PULMONARY EMBOLISM: ICD-10-CM

## 2021-04-20 LAB
CAPILLARY BLOOD COLLECTION: NORMAL
INR PPP: 2.3 (ref 0.86–1.14)

## 2021-04-20 PROCEDURE — 85610 PROTHROMBIN TIME: CPT | Performed by: INTERNAL MEDICINE

## 2021-04-20 PROCEDURE — 99207 PR NO CHARGE NURSE ONLY: CPT

## 2021-04-20 PROCEDURE — 36416 COLLJ CAPILLARY BLOOD SPEC: CPT | Performed by: INTERNAL MEDICINE

## 2021-04-20 NOTE — PROGRESS NOTES
ANTICOAGULATION FOLLOW-UP CLINIC VISIT    Patient Name:  Petra Mcnulty  Date:  2021  Contact Type:  Telephone/ Called patient, she denies any changes. Orders discussed with the patient, she agrees with the plan. Lab INR apppointment scheduled on 21.    SUBJECTIVE:  Patient Findings     Positives:  Change in medications (Patient reports continues with Celebrex)    Comments:  The patient was assessed for diet, medication, and activity level changes, missed or extra doses, bruising or bleeding, with no problem findings. Maintenance warfarin dosing was reviewed with patient and will remain on the same dose until next INR check. Patient did not have any questions or concerns.           Clinical Outcomes     Negatives:  Major bleeding event, Thromboembolic event, Anticoagulation-related hospital admission, Anticoagulation-related ED visit, Anticoagulation-related fatality    Comments:  The patient was assessed for diet, medication, and activity level changes, missed or extra doses, bruising or bleeding, with no problem findings. Maintenance warfarin dosing was reviewed with patient and will remain on the same dose until next INR check. Patient did not have any questions or concerns.              OBJECTIVE    Recent labs: (last 7 days)     21  1118   INR 2.30*       ASSESSMENT / PLAN  INR assessment THER    Recheck INR In: 3 WEEKS    INR Location Outside lab      Anticoagulation Summary  As of 2021    INR goal:  2.0-3.0   TTR:  70.9 % (1 y)   INR used for dosin.30 (2021)   Warfarin maintenance plan:  3 mg (2 mg x 1.5) every Mon; 4 mg (2 mg x 2) all other days   Full warfarin instructions:  3 mg every Mon; 4 mg all other days   Weekly warfarin total:  27 mg   No change documented:  Martha De La Cruz RN   Plan last modified:  Rae Wing RN (2020)   Next INR check:  2021   Priority:  Maintenance   Target end date:  Indefinite    Indications    History of pulmonary embolism  [Z86.711]  Heterozygous factor V Leiden mutation (H) [D68.51]  Long term current use of anticoagulants with INR goal of 2.0-3.0 [Z79.01]             Anticoagulation Episode Summary     INR check location:      Preferred lab:      Send INR reminders to:  [facility]    Comments:  Call cell number with results for INR,  INR referral renewed, see 1/19/21 documentation encounter.       Anticoagulation Care Providers     Provider Role Specialty Phone number    Mona Diaz MD Referring Internal Medicine [phone]            See the Encounter Report to view Anticoagulation Flowsheet and Dosing Calendar (Go to Encounters tab in chart review, and find the Anticoagulation Therapy Visit)    Dosage adjustment made based on physician directed care plan.    Martha De La Cruz RN

## 2021-04-27 ENCOUNTER — VIRTUAL VISIT (OUTPATIENT)
Dept: UROLOGY | Facility: CLINIC | Age: 71
End: 2021-04-27
Payer: COMMERCIAL

## 2021-04-27 VITALS — WEIGHT: 293 LBS | HEIGHT: 68 IN | BODY MASS INDEX: 44.41 KG/M2

## 2021-04-27 DIAGNOSIS — N39.46 MIXED STRESS AND URGE URINARY INCONTINENCE: Primary | ICD-10-CM

## 2021-04-27 PROCEDURE — 99213 OFFICE O/P EST LOW 20 MIN: CPT | Mod: 95 | Performed by: UROLOGY

## 2021-04-27 RX ORDER — MIRABEGRON 25 MG/1
25 TABLET, FILM COATED, EXTENDED RELEASE ORAL DAILY
Qty: 30 TABLET | Refills: 11 | Status: SHIPPED | OUTPATIENT
Start: 2021-04-27 | End: 2022-01-01

## 2021-04-27 ASSESSMENT — PAIN SCALES - GENERAL: PAINLEVEL: MODERATE PAIN (4)

## 2021-04-27 ASSESSMENT — MIFFLIN-ST. JEOR: SCORE: 2042.23

## 2021-04-27 NOTE — LETTER
4/27/2021       RE: Petra Mcnulty  [address] [place] [address]     Dear Colleague,    Thank you for referring your patient, Petra Mcnulty, to the [facility] [place] at [facility]. Please see a copy of my visit note below.    *SEND LINK TO CELL PHONE*    Petra is a 70 year old who is being evaluated via a billable video visit.      How would you like to obtain your AVS? MyChart  If the video visit is dropped, the invitation should be resent by: Text to cell phone: [phone]  Will anyone else be joining your video visit? No      Video Start Time: 1:20 PM    Assessment & Plan     Mixed stress and urge urinary incontinence  At this time things are stable on the dual medications.  So she will continue fesoterodine and mirabegron, she needs a refill of the mirabegron  - mirabegron (MYRBETRIQ) 25 MG 24 hr tablet; Take 1 tablet (25 mg) by mouth daily      Return in about 1 year (around 4/27/2022) for using a video visit.    Leti Mac MD MPH  (she/her/hers)   of Urology  [facility]      Subjective   Petra is a 70 year old who presents for the following health issues    HPI     Patient is following up for her TAMELA,  At last visit in January we did a cystoscopy that was normal.  She was going to do a bladder diary and then follow up    On mirabegron, fesoterodine which she states that helps the urgency and nocturia.      The voiding diary mostly showed her to cut down her evening fluids        Objective         Vitals:  No vitals were obtained today due to virtual visit.    Physical Exam   GENERAL: Healthy, alert and no distress  EYES: Eyes grossly normal to inspection.  No discharge or erythema, or obvious scleral/conjunctival abnormalities.  RESP: No audible wheeze, cough, or visible cyanosis.  No visible retractions or increased work of breathing.    SKIN: Visible skin clear. No significant rash,  abnormal pigmentation or lesions.  NEURO: Cranial nerves grossly intact.  Mentation and speech appropriate for age.  PSYCH: Mentation appears normal, affect normal/bright, judgement and insight intact, normal speech and appearance well-groomed.                Video-Visit Details    Type of service:  Video Visit    Video End Time:1:28 PM    Originating Location (pt. Location): Home    Distant Location (provider location):  [facility] [place]     Platform used for Video Visit: Data[employer]ot  Patient Care Team:  Mona Diaz MD as PCP - General (Internal Medicine)  Mona Diaz MD as Assigned PCP  Adam Harvey MD as Assigned Sleep Provider  Parisa Goldberg MD as Assigned OBGYN Provider  Leti Mac MD as Assigned Surgical Provider  Yeo, Albert, MD as Assigned Musculoskeletal Provider

## 2021-04-27 NOTE — PROGRESS NOTES
*SEND LINK TO CELL PHONE*    Petra is a 70 year old who is being evaluated via a billable video visit.      How would you like to obtain your AVS? MyChart  If the video visit is dropped, the invitation should be resent by: Text to cell phone: [phone]  Will anyone else be joining your video visit? No      Video Start Time: 1:20 PM    Assessment & Plan     Mixed stress and urge urinary incontinence  At this time things are stable on the dual medications.  So she will continue fesoterodine and mirabegron, she needs a refill of the mirabegron  - mirabegron (MYRBETRIQ) 25 MG 24 hr tablet; Take 1 tablet (25 mg) by mouth daily      Return in about 1 year (around 4/27/2022) for using a video visit.    Leti Mac MD MPH  (she/her/hers)   of Urology  [facility]      Subjective   Petra is a 70 year old who presents for the following health issues    HPI     Patient is following up for her TAMELA,  At last visit in January we did a cystoscopy that was normal.  She was going to do a bladder diary and then follow up    On mirabegron, fesoterodine which she states that helps the urgency and nocturia.      The voiding diary mostly showed her to cut down her evening fluids        Objective         Vitals:  No vitals were obtained today due to virtual visit.    Physical Exam   GENERAL: Healthy, alert and no distress  EYES: Eyes grossly normal to inspection.  No discharge or erythema, or obvious scleral/conjunctival abnormalities.  RESP: No audible wheeze, cough, or visible cyanosis.  No visible retractions or increased work of breathing.    SKIN: Visible skin clear. No significant rash, abnormal pigmentation or lesions.  NEURO: Cranial nerves grossly intact.  Mentation and speech appropriate for age.  PSYCH: Mentation appears normal, affect normal/bright, judgement and insight intact, normal speech and appearance well-groomed.                Video-Visit Details    Type of service:  Video  Visit    Video End Time:1:28 PM    Originating Location (pt. Location): Home    Distant Location (provider location):  [facility]     Platform used for Video Visit: Karyn HOLLAND  Patient Care Team:  Mona Diaz MD as PCP - General (Internal Medicine)  Mona Diaz MD as Assigned PCP  Adam Harvey MD as Assigned Sleep Provider  Parisa Goldberg MD as Assigned OBGYN Provider  Leti Mac MD as Assigned Surgical Provider  Yeo, Albert, MD as Assigned Musculoskeletal Provider

## 2021-04-27 NOTE — PATIENT INSTRUCTIONS
Websites with free information:    [employer] patient website: www.voicesforpfd.org    Total Control Program: www.totalcontrolprogram.com    Continue the medications    It was a pleasure meeting with you today.  Thank you for allowing me and my team the privilege of caring for you today.  YOU are the reason we are here, and I truly hope we provided you with the excellent service you deserve.  Please let us know if there is anything else we can do for you so that we can be sure you are leaving completely satisfied with your care experience.

## 2021-05-11 ENCOUNTER — ANTICOAGULATION THERAPY VISIT (OUTPATIENT)
Dept: ANTICOAGULATION | Facility: CLINIC | Age: 71
End: 2021-05-11

## 2021-05-11 ENCOUNTER — OFFICE VISIT (OUTPATIENT)
Dept: OBGYN | Facility: CLINIC | Age: 71
End: 2021-05-11
Payer: COMMERCIAL

## 2021-05-11 VITALS — SYSTOLIC BLOOD PRESSURE: 128 MMHG | BODY MASS INDEX: 50.27 KG/M2 | DIASTOLIC BLOOD PRESSURE: 74 MMHG | WEIGHT: 293 LBS

## 2021-05-11 DIAGNOSIS — N85.01 ENDOMETRIAL HYPERPLASIA WITHOUT ATYPIA, COMPLEX: ICD-10-CM

## 2021-05-11 DIAGNOSIS — Z86.711 HISTORY OF PULMONARY EMBOLISM: ICD-10-CM

## 2021-05-11 DIAGNOSIS — D68.51 HETEROZYGOUS FACTOR V LEIDEN MUTATION (H): ICD-10-CM

## 2021-05-11 DIAGNOSIS — N90.89 VULVAR IRRITATION: ICD-10-CM

## 2021-05-11 DIAGNOSIS — Z79.01 LONG TERM CURRENT USE OF ANTICOAGULANTS WITH INR GOAL OF 2.0-3.0: ICD-10-CM

## 2021-05-11 DIAGNOSIS — N90.4 LICHEN SCLEROSUS ET ATROPHICUS OF THE VULVA: ICD-10-CM

## 2021-05-11 DIAGNOSIS — N95.0 PMB (POSTMENOPAUSAL BLEEDING): Primary | ICD-10-CM

## 2021-05-11 LAB
CAPILLARY BLOOD COLLECTION: NORMAL
INR PPP: 2.9 (ref 0.86–1.14)

## 2021-05-11 PROCEDURE — 99214 OFFICE O/P EST MOD 30 MIN: CPT | Performed by: OBSTETRICS & GYNECOLOGY

## 2021-05-11 PROCEDURE — 85610 PROTHROMBIN TIME: CPT | Performed by: INTERNAL MEDICINE

## 2021-05-11 PROCEDURE — 36416 COLLJ CAPILLARY BLOOD SPEC: CPT | Performed by: INTERNAL MEDICINE

## 2021-05-11 PROCEDURE — 99207 PR NO CHARGE NURSE ONLY: CPT

## 2021-05-11 NOTE — PROGRESS NOTES
SUBJECTIVE:   CC: f/u lichen sclerosus and complex hyperplaisa                                               Petra Mcnulty is a 70 year old  female who presents to clinic today for f/u. Her vulvar symptoms are under control with clobetasol, however she does have irritation on day 4 when using clobetasol twice weekly.     She reports a single episode of a quarter sized amount of bloody discharge on her pad 2 months ago.     She wears poise pads daily and overnight for urinary incontinence.     Problem list and histories reviewed & adjusted, as indicated.  Additional history: as documented.         acetaminophen (TYLENOL) 500 MG tablet, Take 1,000 mg by mouth 2 times daily   albuterol (PROAIR HFA/PROVENTIL HFA/VENTOLIN HFA) 108 (90 Base) MCG/ACT inhaler, Inhale 2 puffs into the lungs every 4 hours as needed for shortness of breath / dyspnea or wheezing  Cholecalciferol (VITAMIN D) 50 MCG (2000 UT) CAPS, Take 2000 international unit(s) daily  ciclopirox (LOPROX) 0.77 % cream, Apply to affected area on face BID x 4-6 weeks. Tapering with improvement. Restart with flares.  clobetasol (TEMOVATE) 0.05 % external ointment, Apply topically daily  estradiol (ESTRACE) 0.1 MG/GM vaginal cream, Place 1 g vaginally twice a week Daily for the first 14 days  Fesoterodine Fumarate 8 MG TB24, Take 1 tablet (8 mg) by mouth daily  gabapentin (NEURONTIN) 300 MG capsule, Take 1 capsule (300 mg) by mouth 2 times daily as needed (pain)  HYDROcodone-acetaminophen (NORCO) 5-325 MG tablet, Take 1 tablet by mouth every 6 hours as needed for severe pain  levonorgestrel (MIRENA) 20 MCG/24HR IUD, 1 each (20 mcg) by Intrauterine route once  levothyroxine (SYNTHROID/LEVOTHROID) 100 MCG tablet, Take 2 tablets on  and 1 tablet on all other days of the week  lisinopril (ZESTRIL) 10 MG tablet, Take 1 tablet (10 mg) by mouth daily  loratadine (CLARITIN) 10 MG tablet, Take 10 mg by mouth daily  metFORMIN (GLUCOPHAGE-XR) 500 MG 24 hr  tablet, TAKE 1 TABLET (500 MG) BY MOUTH DAILY (WITH DINNER)  methocarbamol (ROBAXIN) 750 MG tablet, Take 1 tablet (750 mg) by mouth At Bedtime  mirabegron (MYRBETRIQ) 25 MG 24 hr tablet, Take 1 tablet (25 mg) by mouth daily  nystatin (MYCOSTATIN) 029873 UNIT/GM external powder, Apply topically 2 times daily as needed  pravastatin (PRAVACHOL) 40 MG tablet, Take 1 tablet (40 mg) by mouth daily  vitamin C (ASCORBIC ACID) 1000 MG TABS, Take 1 tablet (1,000 mg) by mouth daily  warfarin ANTICOAGULANT (COUMADIN) 2 MG tablet, TAKE 1.5 TABLETS BY MOUTH ON MONDAY,  TAKE 2 TABLETS ALL THE OTHER DAYS OF THE WEEK OR AS DIRECTED BY INR CLINIC    No current facility-administered medications on file prior to visit.     Allergies   Allergen Reactions     Atorvastatin      myalgia     Simvastatin Rash       OBJECTIVE:   /74   Wt 150 kg (330 lb 9.6 oz)   BMI 50.27 kg/m     Const: sitting in chair in no acute distress, comfortable  Pulm: no increased work of breathing, no cough  Psych: mood stable, appropriate affect  Neuro: A+Ox3   : External genitalia atrophic with loss of labia minora, mildly hyperemic, no thickening of tissue.  No obvious excoriations, or rashes. Bartholins, urethra, normal.  Normal moist pink vaginal mucosa.      ASSESSMENT/PLAN:                                                    Petra Mcnulty is a 70 year old female  here for f/u endometrial hyperplasia and lichen sclerosus.     1. PMB (postmenopausal bleeding)  - offered endometrial biopsy today vs US and either endometrial biopsy or hysteroscopy w/D&C to follow. Prefers US with interval sampling. Also discussed that with IUD in place, if EMS is < 4mm can consider expectant management vs sampling.   - US Pelvic Complete with Transvaginal; Future    2. 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, complex  - mirena IUD in place.    3. Lichen sclerosus et atrophicus of the vulva  - increase clobetasol to every 3rd day for improved symptom  control    4. Vulvar irritation  - cotton pads  - vaseline to external vulva  - no pads overnight.        Parisa Goldberg MD  Obstetrics and Gynecology   [facility]

## 2021-05-11 NOTE — PROGRESS NOTES
ANTICOAGULATION FOLLOW-UP CLINIC VISIT    Patient Name:  Petra Mcnulty  Date:  2021  Contact Type:  Telephone/ discussed with patient    SUBJECTIVE:  Patient Findings     Positives:  Change in medications (started celebrex about 4-5 weeks ago. Initially she was taking this twice daily, but cut back to once daily about 2 weeks ago.  She plans to stop now since she has not noticed an improvement.)    Comments:  The patient was assessed for diet, activity level changes, missed or extra doses, bruising or bleeding, with no problem findings.          Clinical Outcomes     Negatives:  Major bleeding event, Thromboembolic event, Anticoagulation-related hospital admission, Anticoagulation-related ED visit, Anticoagulation-related fatality    Comments:  The patient was assessed for diet, activity level changes, missed or extra doses, bruising or bleeding, with no problem findings.             OBJECTIVE    Recent labs: (last 7 days)     21  1031   INR 2.90*       ASSESSMENT / PLAN  INR assessment THER    Recheck INR In: 4 WEEKS    INR Location Clinic      Anticoagulation Summary  As of 2021    INR goal:  2.0-3.0   TTR:  73.2 % (1 y)   INR used for dosin.90 (2021)   Warfarin maintenance plan:  3 mg (2 mg x 1.5) every Mon; 4 mg (2 mg x 2) all other days   Full warfarin instructions:  3 mg every Mon; 4 mg all other days   Weekly warfarin total:  27 mg   No change documented:  Rae Wing RN   Plan last modified:  Rae Wing RN (2020)   Next INR check:  2021   Priority:  Maintenance   Target end date:  Indefinite    Indications    History of pulmonary embolism [Z86.711]  Heterozygous factor V Leiden mutation (H) [D68.51]  Long term current use of anticoagulants with INR goal of 2.0-3.0 [Z79.01]             Anticoagulation Episode Summary     INR check location:      Preferred lab:      Send INR reminders to:  [facility]    Comments:  Call cell number with results for  INR,  INR referral renewed, see 1/19/21 documentation encounter.       Anticoagulation Care Providers     Provider Role Specialty Phone number    Mona Diaz MD Referring Internal Medicine [phone]            See the Encounter Report to view Anticoagulation Flowsheet and Dosing Calendar (Go to Encounters tab in chart review, and find the Anticoagulation Therapy Visit)    Dosage adjustment made based on physician directed care plan.    Rae Wing RN

## 2021-05-11 NOTE — NURSING NOTE
"Chief Complaint   Patient presents with     Md Request F/U     3 month follow-up for LS and patient has Mirena IUD for endometrial hyperplasia. No current issues, but does state a month ago, she had one episode of bleeding- literally a  quarter size blood, and that was it.       Initial /74   Wt 150 kg (330 lb 9.6 oz)   BMI 50.27 kg/m   Estimated body mass index is 50.27 kg/m  as calculated from the following:    Height as of 21: 1.727 m (5' 8\").    Weight as of this encounter: 150 kg (330 lb 9.6 oz).  BP completed using cuff size: large    Questioned patient about current smoking habits.  Pt. has never smoked.          The following HM Due: NONE      Jeromy Bhardwaj CMA               "

## 2021-05-25 ENCOUNTER — ANCILLARY PROCEDURE (OUTPATIENT)
Dept: ULTRASOUND IMAGING | Facility: CLINIC | Age: 71
End: 2021-05-25
Attending: OBSTETRICS & GYNECOLOGY
Payer: COMMERCIAL

## 2021-05-25 DIAGNOSIS — N95.0 PMB (POSTMENOPAUSAL BLEEDING): ICD-10-CM

## 2021-05-25 PROCEDURE — 76856 US EXAM PELVIC COMPLETE: CPT | Performed by: OBSTETRICS & GYNECOLOGY

## 2021-05-25 PROCEDURE — 76830 TRANSVAGINAL US NON-OB: CPT | Performed by: OBSTETRICS & GYNECOLOGY

## 2021-05-26 NOTE — RESULT ENCOUNTER NOTE
Please call patient with US revealing an thickened and prominent endometrial stripe. With her history, I recommend moving forward with sampling. I think it is reasonable to try an endometrial biopsy in the clinic. If she prefers to do a hysteroscopy we can set that up as well, I'm happy to review that with her.    Thanks,    Parisa Goldberg MD

## 2021-06-03 ENCOUNTER — OFFICE VISIT (OUTPATIENT)
Dept: OBGYN | Facility: CLINIC | Age: 71
End: 2021-06-03
Payer: COMMERCIAL

## 2021-06-03 VITALS — WEIGHT: 293 LBS | BODY MASS INDEX: 50.28 KG/M2 | SYSTOLIC BLOOD PRESSURE: 124 MMHG | DIASTOLIC BLOOD PRESSURE: 70 MMHG

## 2021-06-03 DIAGNOSIS — N85.01 ENDOMETRIAL HYPERPLASIA WITHOUT ATYPIA, COMPLEX: ICD-10-CM

## 2021-06-03 DIAGNOSIS — N95.0 PMB (POSTMENOPAUSAL BLEEDING): Primary | ICD-10-CM

## 2021-06-03 PROCEDURE — 99213 OFFICE O/P EST LOW 20 MIN: CPT | Mod: 25 | Performed by: OBSTETRICS & GYNECOLOGY

## 2021-06-03 PROCEDURE — 88305 TISSUE EXAM BY PATHOLOGIST: CPT | Performed by: PATHOLOGY

## 2021-06-03 PROCEDURE — 58100 BIOPSY OF UTERUS LINING: CPT | Performed by: OBSTETRICS & GYNECOLOGY

## 2021-06-03 NOTE — PROGRESS NOTES
SUBJECTIVE:   CC: f/u postmenopausal bleeding w/hx endometrial hyperplasia                                               Petra Mcnulty is a 70 year old  female who presents to clinic today for endometrial biopsy in the setting of postmenopausal bleeding. 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 She then reported bleeding in 3/2021 and US revealed a 6.8mm prominent stripe. No further bleeding since that time.    Problem list and histories reviewed & adjusted, as indicated.  Additional history: as documented.       acetaminophen (TYLENOL) 500 MG tablet, Take 1,000 mg by mouth 2 times daily   albuterol (PROAIR HFA/PROVENTIL HFA/VENTOLIN HFA) 108 (90 Base) MCG/ACT inhaler, Inhale 2 puffs into the lungs every 4 hours as needed for shortness of breath / dyspnea or wheezing  Cholecalciferol (VITAMIN D) 50 MCG (2000 UT) CAPS, Take 2000 international unit(s) daily  ciclopirox (LOPROX) 0.77 % cream, Apply to affected area on face BID x 4-6 weeks. Tapering with improvement. Restart with flares.  clobetasol (TEMOVATE) 0.05 % external ointment, Apply topically daily  estradiol (ESTRACE) 0.1 MG/GM vaginal cream, Place 1 g vaginally twice a week Daily for the first 14 days  Fesoterodine Fumarate 8 MG TB24, Take 1 tablet (8 mg) by mouth daily  gabapentin (NEURONTIN) 300 MG capsule, Take 1 capsule (300 mg) by mouth 2 times daily as needed (pain)  HYDROcodone-acetaminophen (NORCO) 5-325 MG tablet, Take 1 tablet by mouth every 6 hours as needed for severe pain  levonorgestrel (MIRENA) 20 MCG/24HR IUD, 1 each (20 mcg) by Intrauterine route once  levothyroxine (SYNTHROID/LEVOTHROID) 100 MCG tablet, Take 2 tablets on  and 1 tablet on all other days of the week  lisinopril (ZESTRIL) 10 MG tablet, Take 1 tablet (10 mg)  by mouth daily  loratadine (CLARITIN) 10 MG tablet, Take 10 mg by mouth daily  metFORMIN (GLUCOPHAGE-XR) 500 MG 24 hr tablet, TAKE 1 TABLET (500 MG) BY MOUTH DAILY (WITH DINNER)  methocarbamol (ROBAXIN) 750 MG tablet, Take 1 tablet (750 mg) by mouth At Bedtime  mirabegron (MYRBETRIQ) 25 MG 24 hr tablet, Take 1 tablet (25 mg) by mouth daily  nystatin (MYCOSTATIN) 536100 UNIT/GM external powder, Apply topically 2 times daily as needed  pravastatin (PRAVACHOL) 40 MG tablet, Take 1 tablet (40 mg) by mouth daily  vitamin C (ASCORBIC ACID) 1000 MG TABS, Take 1 tablet (1,000 mg) by mouth daily  warfarin ANTICOAGULANT (COUMADIN) 2 MG tablet, TAKE 1.5 TABLETS BY MOUTH ON MONDAY,  TAKE 2 TABLETS ALL THE OTHER DAYS OF THE WEEK OR AS DIRECTED BY INR CLINIC    No current facility-administered medications on file prior to visit.     Allergies   Allergen Reactions     Atorvastatin      myalgia     Simvastatin Rash       OBJECTIVE:   /70   Wt (!) 150 kg (330 lb 11.2 oz)   BMI 50.28 kg/m     Const: sitting in chair in no acute distress, comfortable  Pulm: no increased work of breathing, no cough  Psych: mood stable, appropriate affect  Neuro: A+Ox3   : External genitalia normal well-estrogenized, healthy tissue.  No obvious excoriations, lesions, or rashes. Bartholins, urethra, normal.  Normal moist pink vaginal mucosa.  SSE: Normal cervix, normal physiologic discharge.   Bimanual: No CMT, anteverted uterus with smooth contour, mobile. No adnexal masses or tenderness appreciated.     Procedure:      Endometrial biopsy:  Consent was obtained. A speculum was inserted into the vagina and the cervix visualized. The cervix was swabbed with betadine x3. An endometrial biopsy pipelle was then introduced through the cervical os into the uterus, the uterus sounded to 8cm. The plunger was then deployed with good suction. The pipelle was rotated to collect a thorough sample. Sample was moderate and collection was repeated for  increased sample. The cervix was found to be hemostatic and the speculum was removed. The patient tolerated the procedure without difficulty.        ASSESSMENT/PLAN:                                                    Petra Mcnulty is a 70 year old female  here for endometrial biopsy in the setting of a single recurrent episode of postmenopausal bleeding after IUD placement for management of focal endometrial hyperplasia.     Will await biopsy results. If negative, continue current treatment with progesterone IUD. If any evidence of hyperplasia would recommend hysterectomy at that time.     In addition to procedural time, 20 minutes spent on the date of the encounter doing chart review, review of test results, patient visit and documentation    Parisa Goldberg MD  Obstetrics and Gynecology   [facility]

## 2021-06-03 NOTE — NURSING NOTE
"Chief Complaint   Patient presents with     Minor Procedure     patient is here for her follow-up EMB due to complex hyperplasia. She had pelvic US on 21 that showed prominent endometrial stripe.        Initial /70   Wt (!) 150 kg (330 lb 11.2 oz)   BMI 50.28 kg/m   Estimated body mass index is 50.28 kg/m  as calculated from the following:    Height as of 21: 1.727 m (5' 8\").    Weight as of this encounter: 150 kg (330 lb 11.2 oz).  BP completed using cuff size: large    Questioned patient about current smoking habits.  Pt. quit smoking some time ago.          The following HM Due: NONE    Jeromy Bhardwaj CMA             "

## 2021-06-07 LAB — COPATH REPORT: NORMAL

## 2021-06-08 ENCOUNTER — ANTICOAGULATION THERAPY VISIT (OUTPATIENT)
Dept: ANTICOAGULATION | Facility: CLINIC | Age: 71
End: 2021-06-08

## 2021-06-08 DIAGNOSIS — Z79.01 LONG TERM CURRENT USE OF ANTICOAGULANTS WITH INR GOAL OF 2.0-3.0: ICD-10-CM

## 2021-06-08 DIAGNOSIS — D68.51 HETEROZYGOUS FACTOR V LEIDEN MUTATION (H): ICD-10-CM

## 2021-06-08 DIAGNOSIS — Z86.711 HISTORY OF PULMONARY EMBOLISM: ICD-10-CM

## 2021-06-08 LAB
CAPILLARY BLOOD COLLECTION: NORMAL
INR PPP: 2.8 (ref 0.86–1.14)

## 2021-06-08 PROCEDURE — 99207 PR NO CHARGE NURSE ONLY: CPT

## 2021-06-08 PROCEDURE — 36416 COLLJ CAPILLARY BLOOD SPEC: CPT | Performed by: INTERNAL MEDICINE

## 2021-06-08 PROCEDURE — 85610 PROTHROMBIN TIME: CPT | Performed by: INTERNAL MEDICINE

## 2021-06-08 NOTE — PROGRESS NOTES
ANTICOAGULATION FOLLOW-UP CLINIC VISIT    Patient Name:  Petra Mcnulty  Date:  2021  Contact Type:  Telephone/ discussed with patient    SUBJECTIVE:  Patient Findings     Comments:  The patient was assessed for diet, medication, and activity level changes, missed or extra doses, bruising or bleeding, with no problem findings.          Clinical Outcomes     Negatives:  Major bleeding event, Thromboembolic event, Anticoagulation-related hospital admission, Anticoagulation-related ED visit, Anticoagulation-related fatality    Comments:  The patient was assessed for diet, medication, and activity level changes, missed or extra doses, bruising or bleeding, with no problem findings.             OBJECTIVE    Recent labs: (last 7 days)     21  0901   INR 2.80*       ASSESSMENT / PLAN  INR assessment THER    Recheck INR In: 4 WEEKS    INR Location Clinic      Anticoagulation Summary  As of 2021    INR goal:  2.0-3.0   TTR:  75.1 % (1 y)   INR used for dosin.80 (2021)   Warfarin maintenance plan:  3 mg (2 mg x 1.5) every Mon; 4 mg (2 mg x 2) all other days   Full warfarin instructions:  3 mg every Mon; 4 mg all other days   Weekly warfarin total:  27 mg   No change documented:  Rae Wing RN   Plan last modified:  Rae Wing RN (2020)   Next INR check:  2021   Priority:  Maintenance   Target end date:  Indefinite    Indications    History of pulmonary embolism [Z86.711]  Heterozygous factor V Leiden mutation (H) [D68.51]  Long term current use of anticoagulants with INR goal of 2.0-3.0 [Z79.01]             Anticoagulation Episode Summary     INR check location:      Preferred lab:      Send INR reminders to:  [facility]    Comments:  Call cell number with results for INR,  INR referral renewed, see 21 documentation encounter.       Anticoagulation Care Providers     Provider Role Specialty Phone number    Mona Diaz MD Referring Internal Medicine  [phone]            See the Encounter Report to view Anticoagulation Flowsheet and Dosing Calendar (Go to Encounters tab in chart review, and find the Anticoagulation Therapy Visit)    Dosage adjustment made based on physician directed care plan.    Rae Wing RN

## 2021-06-14 ENCOUNTER — OFFICE VISIT (OUTPATIENT)
Dept: INTERNAL MEDICINE | Facility: CLINIC | Age: 71
End: 2021-06-14
Payer: COMMERCIAL

## 2021-06-14 ENCOUNTER — ANCILLARY PROCEDURE (OUTPATIENT)
Dept: GENERAL RADIOLOGY | Facility: CLINIC | Age: 71
End: 2021-06-14
Attending: PHYSICIAN ASSISTANT
Payer: COMMERCIAL

## 2021-06-14 VITALS
DIASTOLIC BLOOD PRESSURE: 80 MMHG | WEIGHT: 293 LBS | RESPIRATION RATE: 16 BRPM | HEART RATE: 95 BPM | SYSTOLIC BLOOD PRESSURE: 128 MMHG | OXYGEN SATURATION: 100 % | BODY MASS INDEX: 44.41 KG/M2 | TEMPERATURE: 98.3 F | HEIGHT: 68 IN

## 2021-06-14 DIAGNOSIS — R07.81 RIB PAIN ON RIGHT SIDE: ICD-10-CM

## 2021-06-14 DIAGNOSIS — R07.81 RIB PAIN ON RIGHT SIDE: Primary | ICD-10-CM

## 2021-06-14 PROCEDURE — 71101 X-RAY EXAM UNILAT RIBS/CHEST: CPT | Mod: RT | Performed by: RADIOLOGY

## 2021-06-14 PROCEDURE — 99213 OFFICE O/P EST LOW 20 MIN: CPT | Performed by: PHYSICIAN ASSISTANT

## 2021-06-14 ASSESSMENT — MIFFLIN-ST. JEOR: SCORE: 2060.37

## 2021-06-14 NOTE — PROGRESS NOTES
Assessment & Plan     Rib pain on right side  Denies any trauma. No CVA tenderness and no urinary symptoms. Former smoker (half pack per day or less for approx 30 years). Quit in late 00's.  Focal tenderness of the right lower lateral rib area. Will check x-ray today.   - XR Ribs & Chest Right G/E 3 Views; Future    Review of the result(s) of each unique test - XR ribs and chest  25 minutes spent on the date of the encounter doing chart review, history and exam, documentation and further activities per the note       See Patient Instructions    No follow-ups on file.    MARIE Dumont [facility] ANTHONY Lambert is a 70 year old who presents for the following health issues: back pain.       HPI   The patient presents for right lower back/rib pain. The patient notes that she has experienced chronic low back pain for many years, approximately 10 years. However Memorial Weekend she noticed a new sore, focal area of tenderness on her right mid-back along the rib cage, laterally.     Notices the pain only with touch. Without any pressure to the area she is entirely symptom free.     Denies any injury or trauma to the area that she knows of.     Takes extra strength Tylenol BID for generalized pain related to arthritis; not sure if this has helped the tender area.     Rates tender area at a 6/10 when pressure is applied. Sitting here is a 0/10. No pain with sneezing, coughing, or deep breaths.     Has spinal cord stimulator which helps with leg/thigh pain. Has norco on hand but has not used this for almost one year.     Admits to difficulty with mobility related to her chronic low back pain but no new changes with this.     Sleeps on right side and this does not bother her.     She notes that she is former smoker and wants to make sure everything looks good.     Had previously noticed a cough with throat congestion where she would get the sensation that she needed to clear her  "throat. She subsequently started taking loratadine and that seemed to help. She only notices the cough intermittently now, approximately once per week. She has also noticed a runny nose at night. Reports shortness of breath for the last few years after exertion where she needs to sit down and catch her breath. It has taken her longer to recover progressively over time.     Also reports some intermittent wheezing in her upper chest that began approximately 5-6 months ago. This improves if she clears her throat or blows her nose.     One episode of dizziness in the middle of the night while she was getting up to use the restroom where she had a sensation like the room was spinning; lasted 15-20 seconds then subsided on its own. Was not straining to have a bowel movement. Did not have syncope or falls.     No headaches, blurred vision, abdominal pain, nausea or vomiting. No recent changes in bowel or bladder habits including diarrhea, constipation, melena, or hematochezia, dysuria, regency, frequency, or hematuria. No new bowel incontinence or bladder incontinence. No numbness or tingling in the legs.     H/o cholecystectomy      Review of Systems   Constitutional, HEENT, cardiovascular, pulmonary, gi and gu systems are negative, except as otherwise noted.      Objective    /80   Pulse 95   Temp 98.3  F (36.8  C) (Oral)   Resp 16   Ht 1.727 m (5' 8\")   Wt 149.7 kg (330 lb)   SpO2 100%   BMI 50.18 kg/m    Body mass index is 50.18 kg/m .  Physical Exam   GENERAL: overweight, alert and no distress  RESP: lungs clear to auscultation - no rales, rhonchi or wheezes  CV: regular rate and rhythm, normal S1 S2, no S3 or S4, no murmur, click or rub  MS: focused examination of the back shows a focal area of tenderness to palpation on the right mid back laterally, overlying a rib. There is no midline tenderness. Pain is not worse with ROM. No flank tenderness. Skin is without erythema or lesions.   ABDOMEN: soft, " non-tender to palpation. No organomegaly. No costovertebral tenderness   NEURO: Normal strength and tone, mentation intact and speech normal    Imaging:   XR ribs and chest: Negative for fracture or lytic lesions per my interpretation. No intraabdominal/intrathoracic masses visualized        Report completed by: Kristina Anne, PA-S2    Physician Attestation   I, Tammie Barkley, was present with the medical/ABIOL[employer] student who participated in the service and in the documentation of the note.  I have verified the history and personally performed the physical exam and medical decision making.  I agree with the assessment and plan of care as documented in the note.      Tammie Barkley, PA-C

## 2021-06-22 NOTE — PROGRESS NOTES
DISCHARGE REPORT    Progress reporting period is from 2-16-21 to 3-16-21.       SUBJECTIVE  Subjective changes noted by patient:  .  Subjective: Feeling overall 75-80% better--notes groin pain is nearly gone;     Current pain level is 0/10  .     Previous pain level was  4/10 Initial Pain level: 4/10.   Changes in function:  Yes (See Goal flowsheet attached for changes in current functional level)  Adverse reaction to treatment or activity: None    OBJECTIVE  Changes noted in objective findings:  Patient has failed to return to therapy so current objective findings are unknown.  Objective: R hip PROM Flex=nil loss, ABD=min loss, erp, IR=min loss; poor endurance due to back pain for standing exercises;      ASSESSMENT/PLAN  Updated problem list and treatment plan: Diagnosis 1:  R hip pain  Pain -  manual therapy, self management, education, directional preference exercise and home program  Decreased joint mobility - manual therapy and therapeutic exercise  Decreased strength - therapeutic exercise and therapeutic activities  Decreased function - therapeutic activities  STG/LTGs have been met or progress has been made towards goals:  Yes (See Goal flow sheet completed today.)  Assessment of Progress: The patient has not returned to therapy. Current status is unknown.  Self Management Plans:  Patient is independent in a home treatment program.  Patient is independent in self management of symptoms.  I have re-evaluated this patient and find that the nature, scope, duration and intensity of the therapy is appropriate for the medical condition of the patient.  Petra continues to require the following intervention to meet STG and LTG's:  PT intervention is no longer required to meet STG/LTG.    Recommendations:  This patient is ready to be discharged from therapy and continue their home treatment program.    Please refer to the daily flowsheet for treatment today, total treatment time and time spent performing 1:1 timed  codes.

## 2021-06-29 DIAGNOSIS — R73.03 PREDIABETES: ICD-10-CM

## 2021-06-29 RX ORDER — METFORMIN HCL 500 MG
500 TABLET, EXTENDED RELEASE 24 HR ORAL
Qty: 30 TABLET | Refills: 3 | Status: SHIPPED | OUTPATIENT
Start: 2021-06-29 | End: 2021-09-27

## 2021-06-29 NOTE — TELEPHONE ENCOUNTER
Patient is leaving out of town Thursday morning and only has 5 tablets left she is hoping she can get a refill asap        Thank you,  Almita [facility]  [phone]

## 2021-07-07 ENCOUNTER — ANTICOAGULATION THERAPY VISIT (OUTPATIENT)
Dept: INTERNAL MEDICINE | Facility: CLINIC | Age: 71
End: 2021-07-07

## 2021-07-07 DIAGNOSIS — Z79.01 LONG TERM CURRENT USE OF ANTICOAGULANTS WITH INR GOAL OF 2.0-3.0: ICD-10-CM

## 2021-07-07 DIAGNOSIS — Z86.711 HISTORY OF PULMONARY EMBOLISM: ICD-10-CM

## 2021-07-07 DIAGNOSIS — D68.51 HETEROZYGOUS FACTOR V LEIDEN MUTATION (H): ICD-10-CM

## 2021-07-07 LAB
CAPILLARY BLOOD COLLECTION: NORMAL
INR PPP: 2.5 (ref 0.86–1.14)

## 2021-07-07 PROCEDURE — 36416 COLLJ CAPILLARY BLOOD SPEC: CPT | Performed by: INTERNAL MEDICINE

## 2021-07-07 PROCEDURE — 85610 PROTHROMBIN TIME: CPT | Performed by: INTERNAL MEDICINE

## 2021-07-07 NOTE — PROGRESS NOTES
ANTICOAGULATION MANAGEMENT     Petra Mcnulty 70 year old female is on warfarin with therapeutic INR result. (Goal INR 2.0-3.0)    Recent labs: (last 7 days)     07/07/21  1316   INR 2.50*       ASSESSMENT     Source(s): Patient/Caregiver Call       Warfarin doses taken: Warfarin taken as instructed    Diet: No new diet changes identified    New illness, injury, or hospitalization: No    Medication/supplement changes: None noted    Signs or symptoms of bleeding or clotting: No    Previous INR: Therapeutic last 2(+) visits    Additional findings: None     PLAN     Recommended plan for no diet, medication or health factor changes affecting INR     Dosing Instructions: Continue your current warfarin dose with next INR in 4-6 weeks       Summary  As of 7/7/2021    Full warfarin instructions:  3 mg every Mon; 4 mg all other days   Next INR check:  8/4/2021             Telephone call with Petra who verbalizes understanding and agrees to plan    Lab visit scheduled    Education provided: Please call back if any changes to your diet, medications or how you've been taking warfarin, Monitoring for bleeding signs and symptoms, Monitoring for clotting signs and symptoms and Importance of notifying clinic for changes in medications; a sooner lab recheck maybe needed.    Plan made per [facility] anticoagulation protocol    Kristina Westfall, RN  Anticoagulation Clinic  7/7/2021    _______________________________________________________________________     Anticoagulation Episode Summary     Current INR goal:  2.0-3.0   TTR:  80.5 % (1 y)   Target end date:  Indefinite   Send INR reminders to:  [facility]    Indications    History of pulmonary embolism [Z86.711]  Heterozygous factor V Leiden mutation (H) [D68.51]  Long term current use of anticoagulants with INR goal of 2.0-3.0 [Z79.01]           Comments:  Call cell number with results for INR,  INR referral renewed, see 1/19/21 documentation encounter.           Anticoagulation Care Providers     Provider Role Specialty Phone number    Mona Diaz MD Referring Internal Medicine [phone]

## 2021-08-04 ENCOUNTER — LAB (OUTPATIENT)
Dept: LAB | Facility: CLINIC | Age: 71
End: 2021-08-04
Payer: COMMERCIAL

## 2021-08-04 ENCOUNTER — ANTICOAGULATION THERAPY VISIT (OUTPATIENT)
Dept: ANTICOAGULATION | Facility: CLINIC | Age: 71
End: 2021-08-04

## 2021-08-04 DIAGNOSIS — Z79.01 LONG TERM CURRENT USE OF ANTICOAGULANTS WITH INR GOAL OF 2.0-3.0: ICD-10-CM

## 2021-08-04 DIAGNOSIS — Z86.711 HISTORY OF PULMONARY EMBOLISM: Primary | ICD-10-CM

## 2021-08-04 DIAGNOSIS — Z86.711 HISTORY OF PULMONARY EMBOLISM: ICD-10-CM

## 2021-08-04 DIAGNOSIS — D68.51 HETEROZYGOUS FACTOR V LEIDEN MUTATION (H): ICD-10-CM

## 2021-08-04 LAB — INR BLD: 2.9 (ref 0.9–1.1)

## 2021-08-04 PROCEDURE — 85610 PROTHROMBIN TIME: CPT

## 2021-08-04 PROCEDURE — 36416 COLLJ CAPILLARY BLOOD SPEC: CPT

## 2021-08-04 NOTE — PROGRESS NOTES
ANTICOAGULATION MANAGEMENT     Petra Mcnulty 70 year old female is on warfarin with therapeutic INR result. (Goal INR 2.0-3.0)    Recent labs: (last 7 days)     08/04/21  0926   INR 2.9*       ASSESSMENT     Source(s): Chart Review, patient      Warfarin doses taken: Warfarin taken as instructed    Diet: No new diet changes identified    New illness, injury, or hospitalization: No    Medication/supplement changes: None noted    Signs or symptoms of bleeding or clotting: No    Previous INR: Therapeutic last 2(+) visits    Additional findings: None     PLAN     Recommended plan for no diet, medication or health factor changes affecting INR     Dosing Instructions: Continue your current warfarin dose with next INR in 4 weeks       Summary  As of 8/4/2021    Full warfarin instructions:  3 mg every Mon; 4 mg all other days   Next INR check:  9/1/2021             Telephone call with Petra who verbalizes understanding and agrees to plan    Lab visit scheduled    Education provided: Please call back if any changes to your diet, medications or how you've been taking warfarin and reminded patient that she needs to establish care with a new provider since previous provider is no longer with [employer].  Patient reports that she plans to schedule with Dr. English.    Plan made per [facility] anticoagulation protocol    Rae Wing, RN  Anticoagulation Clinic  8/4/2021    _______________________________________________________________________     Anticoagulation Episode Summary     Current INR goal:  2.0-3.0   TTR:  84.9 % (1 y)   Target end date:  Indefinite   Send INR reminders to:  [facility]    Indications    History of pulmonary embolism [Z86.711]  Heterozygous factor V Leiden mutation (H) [D68.51]  Long term current use of anticoagulants with INR goal of 2.0-3.0 [Z79.01]           Comments:  Call cell number with results for INR,  INR referral renewed, see 1/19/21 documentation encounter.           Anticoagulation Care Providers     Provider Role Specialty Phone number    Mona Diaz MD Referring Internal Medicine [phone]

## 2021-08-09 ENCOUNTER — TELEPHONE (OUTPATIENT)
Dept: INTERNAL MEDICINE | Facility: CLINIC | Age: 71
End: 2021-08-09

## 2021-08-09 DIAGNOSIS — I10 BENIGN ESSENTIAL HYPERTENSION: ICD-10-CM

## 2021-08-09 DIAGNOSIS — R73.09 ELEVATED GLUCOSE: Primary | ICD-10-CM

## 2021-08-09 NOTE — TELEPHONE ENCOUNTER
Pt called to day to set an est. Care appt and wondering if labs were needed for that visit. Please call pt for scheduling if labs are wanted.     Carlos Reece  Central Scheduling

## 2021-08-10 NOTE — TELEPHONE ENCOUNTER
Routed to Dr. English to review if patient needs labs prior to appointment.     Next 5 appointments (look out 90 days)    Sep 27, 2021  5:40 PM  Office Visit with Shanta English MD  [facility] [address]  [facility] [id number] [phone]

## 2021-08-10 NOTE — TELEPHONE ENCOUNTER
Yes she is due for a couple labs so she should make lab appointment. She does not need to be fasting for them.

## 2021-08-10 NOTE — TELEPHONE ENCOUNTER
Patient advised she will need non-fasting labs prior to appointment. Patient has INR appointment on 9/1/21, would like to have labs done then. Note placed on 9/1/21 appointment to drawn labs for Dr. English as well.

## 2021-08-14 DIAGNOSIS — Z86.711 HISTORY OF PULMONARY EMBOLISM: ICD-10-CM

## 2021-08-14 DIAGNOSIS — D68.51 FACTOR V LEIDEN (H): ICD-10-CM

## 2021-08-17 RX ORDER — WARFARIN SODIUM 2 MG/1
TABLET ORAL
Qty: 180 TABLET | Refills: 0 | Status: SHIPPED | OUTPATIENT
Start: 2021-08-17 | End: 2021-11-08

## 2021-08-17 NOTE — TELEPHONE ENCOUNTER
Prescription approved per [facility] Refill Protocol.  Patient will establish care with Dr English in September 2021.  Martha De La Cruz RN, BSN  Anticoagulation Clinic

## 2021-09-01 ENCOUNTER — LAB (OUTPATIENT)
Dept: LAB | Facility: CLINIC | Age: 71
End: 2021-09-01
Payer: COMMERCIAL

## 2021-09-01 ENCOUNTER — ANTICOAGULATION THERAPY VISIT (OUTPATIENT)
Dept: ANTICOAGULATION | Facility: CLINIC | Age: 71
End: 2021-09-01

## 2021-09-01 DIAGNOSIS — I10 BENIGN ESSENTIAL HYPERTENSION: ICD-10-CM

## 2021-09-01 DIAGNOSIS — R73.09 ELEVATED GLUCOSE: ICD-10-CM

## 2021-09-01 DIAGNOSIS — Z79.01 LONG TERM CURRENT USE OF ANTICOAGULANTS WITH INR GOAL OF 2.0-3.0: ICD-10-CM

## 2021-09-01 DIAGNOSIS — Z86.711 HISTORY OF PULMONARY EMBOLISM: Primary | ICD-10-CM

## 2021-09-01 DIAGNOSIS — D68.51 HETEROZYGOUS FACTOR V LEIDEN MUTATION (H): ICD-10-CM

## 2021-09-01 DIAGNOSIS — Z86.711 HISTORY OF PULMONARY EMBOLISM: ICD-10-CM

## 2021-09-01 LAB
HBA1C MFR BLD: 6.2 % (ref 0–5.6)
INR BLD: 2.8 (ref 0.9–1.1)

## 2021-09-01 PROCEDURE — 36415 COLL VENOUS BLD VENIPUNCTURE: CPT

## 2021-09-01 PROCEDURE — 85610 PROTHROMBIN TIME: CPT

## 2021-09-01 PROCEDURE — 80053 COMPREHEN METABOLIC PANEL: CPT

## 2021-09-01 PROCEDURE — 83036 HEMOGLOBIN GLYCOSYLATED A1C: CPT

## 2021-09-01 NOTE — PROGRESS NOTES
ANTICOAGULATION MANAGEMENT     Petra Mcnulty 70 year old female is on warfarin with therapeutic INR result. (Goal INR 2.0-3.0)    Recent labs: (last 7 days)     09/01/21  0915   INR 2.8*       ASSESSMENT     Source(s): Chart Review and Patient/Caregiver Call       Warfarin doses taken: Warfarin taken as instructed    Diet: No new diet changes identified    New illness, injury, or hospitalization: No    Medication/supplement changes: None noted    Signs or symptoms of bleeding or clotting: No    Previous INR: Therapeutic last 2(+) visits    Additional findings: Patient has an appointment to establish care with Dr. English on 9/27 at 5:40.  Advised patient to have INR checked that day and come in at about 5:15-5:20.  If INR result is critical, then advised that we would try to reach her that day, if it is in range, advised her to continue on her maintenace dose for 9/27 and the INR clinic would contact her on 9/28.     PLAN     Recommended plan for no diet, medication or health factor changes affecting INR     Dosing Instructions: Continue your current warfarin dose with next INR in 4 weeks       Summary  As of 9/1/2021    Full warfarin instructions:  3 mg every Mon; 4 mg all other days   Next INR check:  9/27/2021             Telephone call with Petra who verbalizes understanding and agrees to plan    Check at provider office visit    Education provided: Please call back if any changes to your diet, medications or how you've been taking warfarin    Plan made per [facility] anticoagulation protocol    Rae Wing RN  Anticoagulation Clinic  9/1/2021    _______________________________________________________________________     Anticoagulation Episode Summary     Current INR goal:  2.0-3.0   TTR:  86.0 % (1 y)   Target end date:  Indefinite   Send INR reminders to:  [facility]    Indications    History of pulmonary embolism [Z86.711]  Heterozygous factor V Leiden mutation (H) [D68.51]  Long term current use  of anticoagulants with INR goal of 2.0-3.0 [Z79.01]           Comments:  Call cell number with results for INR,  INR referral renewed, see 1/19/21 documentation encounter.          Anticoagulation Care Providers     Provider Role Specialty Phone number    Mona Diaz MD Referring Internal Medicine [phone]

## 2021-09-02 PROBLEM — N39.46 MIXED STRESS AND URGE URINARY INCONTINENCE: Status: RESOLVED | Noted: 2020-08-13 | Resolved: 2021-09-02

## 2021-09-02 LAB
ALBUMIN SERPL-MCNC: 3.4 G/DL (ref 3.4–5)
ALP SERPL-CCNC: 65 U/L (ref 40–150)
ALT SERPL W P-5'-P-CCNC: 25 U/L (ref 0–50)
ANION GAP SERPL CALCULATED.3IONS-SCNC: 6 MMOL/L (ref 3–14)
AST SERPL W P-5'-P-CCNC: 21 U/L (ref 0–45)
BILIRUB SERPL-MCNC: 0.4 MG/DL (ref 0.2–1.3)
BUN SERPL-MCNC: 10 MG/DL (ref 7–30)
CALCIUM SERPL-MCNC: 8.7 MG/DL (ref 8.5–10.1)
CHLORIDE BLD-SCNC: 107 MMOL/L (ref 94–109)
CO2 SERPL-SCNC: 27 MMOL/L (ref 20–32)
CREAT SERPL-MCNC: 0.6 MG/DL (ref 0.52–1.04)
GFR SERPL CREATININE-BSD FRML MDRD: >90 ML/MIN/1.73M2
GLUCOSE BLD-MCNC: 101 MG/DL (ref 70–99)
POTASSIUM BLD-SCNC: 4 MMOL/L (ref 3.4–5.3)
PROT SERPL-MCNC: 7.4 G/DL (ref 6.8–8.8)
SODIUM SERPL-SCNC: 140 MMOL/L (ref 133–144)

## 2021-09-05 ENCOUNTER — HEALTH MAINTENANCE LETTER (OUTPATIENT)
Age: 71
End: 2021-09-05

## 2021-09-27 ENCOUNTER — LAB (OUTPATIENT)
Dept: LAB | Facility: CLINIC | Age: 71
End: 2021-09-27
Payer: COMMERCIAL

## 2021-09-27 ENCOUNTER — OFFICE VISIT (OUTPATIENT)
Dept: INTERNAL MEDICINE | Facility: CLINIC | Age: 71
End: 2021-09-27
Payer: COMMERCIAL

## 2021-09-27 ENCOUNTER — ANTICOAGULATION THERAPY VISIT (OUTPATIENT)
Dept: ANTICOAGULATION | Facility: CLINIC | Age: 71
End: 2021-09-27

## 2021-09-27 VITALS
SYSTOLIC BLOOD PRESSURE: 123 MMHG | WEIGHT: 293 LBS | DIASTOLIC BLOOD PRESSURE: 69 MMHG | HEART RATE: 88 BPM | TEMPERATURE: 98.3 F | BODY MASS INDEX: 44.41 KG/M2 | OXYGEN SATURATION: 97 % | HEIGHT: 68 IN

## 2021-09-27 DIAGNOSIS — Z86.711 HISTORY OF PULMONARY EMBOLISM: ICD-10-CM

## 2021-09-27 DIAGNOSIS — R05.9 COUGH: Primary | ICD-10-CM

## 2021-09-27 DIAGNOSIS — I10 BENIGN ESSENTIAL HYPERTENSION: ICD-10-CM

## 2021-09-27 DIAGNOSIS — D68.51 HETEROZYGOUS FACTOR V LEIDEN MUTATION (H): ICD-10-CM

## 2021-09-27 DIAGNOSIS — Z79.01 LONG TERM CURRENT USE OF ANTICOAGULANTS WITH INR GOAL OF 2.0-3.0: ICD-10-CM

## 2021-09-27 DIAGNOSIS — Z86.711 HISTORY OF PULMONARY EMBOLISM: Primary | ICD-10-CM

## 2021-09-27 DIAGNOSIS — R73.03 PREDIABETES: ICD-10-CM

## 2021-09-27 DIAGNOSIS — M15.0 PRIMARY OSTEOARTHRITIS INVOLVING MULTIPLE JOINTS: ICD-10-CM

## 2021-09-27 DIAGNOSIS — E66.01 MORBID OBESITY (H): ICD-10-CM

## 2021-09-27 LAB — INR BLD: 2.6 (ref 0.9–1.1)

## 2021-09-27 PROCEDURE — 99214 OFFICE O/P EST MOD 30 MIN: CPT | Performed by: INTERNAL MEDICINE

## 2021-09-27 PROCEDURE — 36416 COLLJ CAPILLARY BLOOD SPEC: CPT

## 2021-09-27 PROCEDURE — 85610 PROTHROMBIN TIME: CPT

## 2021-09-27 RX ORDER — METFORMIN HCL 500 MG
500 TABLET, EXTENDED RELEASE 24 HR ORAL
Qty: 90 TABLET | Refills: 3 | Status: SHIPPED | OUTPATIENT
Start: 2021-09-27 | End: 2022-01-01

## 2021-09-27 RX ORDER — GABAPENTIN 100 MG/1
100 CAPSULE ORAL EVERY MORNING
Qty: 90 CAPSULE | Refills: 3 | Status: SHIPPED | OUTPATIENT
Start: 2021-09-27 | End: 2022-01-01

## 2021-09-27 RX ORDER — METFORMIN HCL 500 MG
500 TABLET, EXTENDED RELEASE 24 HR ORAL
Qty: 30 TABLET | Refills: 3 | Status: SHIPPED | OUTPATIENT
Start: 2021-09-27 | End: 2021-09-27

## 2021-09-27 RX ORDER — ALBUTEROL SULFATE 90 UG/1
2 AEROSOL, METERED RESPIRATORY (INHALATION) EVERY 4 HOURS PRN
Qty: 18 G | Refills: 3 | Status: SHIPPED | OUTPATIENT
Start: 2021-09-27 | End: 2022-01-01

## 2021-09-27 ASSESSMENT — MIFFLIN-ST. JEOR: SCORE: 2053.57

## 2021-09-27 NOTE — PROGRESS NOTES
"    Assessment & Plan     Cough  Possible allergies will try OTC antihistamine     Prediabetes  Labs up to date  - metFORMIN (GLUCOPHAGE-XR) 500 MG 24 hr tablet; Take 1 tablet (500 mg) by mouth daily (with dinner)    Benign essential hypertension  under good control Continue current medications.     Morbid obesity (H)  Continue to encourage wt loss    Primary osteoarthritis involving multiple joints     - gabapentin (NEURONTIN) 100 MG capsule; Take 1 capsule (100 mg) by mouth every morning    History of pulmonary embolism     - albuterol (PROAIR HFA/PROVENTIL HFA/VENTOLIN HFA) 108 (90 Base) MCG/ACT inhaler; Inhale 2 puffs into the lungs every 4 hours as needed for shortness of breath / dyspnea or wheezing             BMI:   Estimated body mass index is 49.95 kg/m  as calculated from the following:    Height as of this encounter: 1.727 m (5' 8\").    Weight as of this encounter: 149 kg (328 lb 8 oz).   Weight management plan: Discussed healthy diet and exercise guidelines      No follow-ups on file.    Shanta English MD  [facility] is a 70 year old who presents for the following health issues     HPI     Increased SOB and cough off and on for  6 months.  Ichthy right ear off and on for one month. Hx neck surgery 2005.    Diabetes Follow-up      How often are you checking your blood sugar? Not at all    What concerns do you have today about your diabetes? None     Do you have any of these symptoms? (Select all that apply)  No numbness or tingling in feet.  No redness, sores or blisters on feet.  No complaints of excessive thirst.  No reports of blurry vision.  No significant changes to weight.      BP Readings from Last 2 Encounters:   09/27/21 123/69   06/14/21 128/80     Hemoglobin A1C (%)   Date Value   09/01/2021 6.2 (H)   01/19/2021 6.4 (H)   09/25/2020 6.2     LDL Cholesterol Calculated (mg/dL)   Date Value   01/19/2021 84               Hypertension " "Follow-up      Do you check your blood pressure regularly outside of the clinic? No     Are you following a low salt diet? Yes    Are your blood pressures ever more than 140 on the top number (systolic) OR more   than 90 on the bottom number (diastolic), for example 140/90? No      How many servings of fruits and vegetables do you eat daily?  2-3    On average, how many sweetened beverages do you drink each day (Examples: soda, juice, sweet tea, etc.  Do NOT count diet or artificially sweetened beverages)?   0    How many days per week do you exercise enough to make your heart beat faster? 3 or less    How many minutes a day do you exercise enough to make your heart beat faster? 9 or less    How many days per week do you miss taking your medication? 0    She says she has been short of breath for years slowly getting worse. The cough started this spring. Will go for days and not cough. Then will start coughing until she coughs up some thick white phlegm. Was worse in the spring and now worse again in the fall. No sore throat, wheezing or plugged ears. Her ears do itch at times.     Review of Systems   Constitutional, HEENT, cardiovascular, pulmonary, GI, , musculoskeletal, neuro, skin, endocrine and psych systems are negative, except as otherwise noted.      Objective    /69 (BP Location: Right arm, Patient Position: Chair, Cuff Size: Adult Large)   Pulse 88   Temp 98.3  F (36.8  C) (Oral)   Ht 1.727 m (5' 8\")   Wt 149 kg (328 lb 8 oz)   SpO2 97%   Breastfeeding No   BMI 49.95 kg/m    Body mass index is 49.95 kg/m .  Physical Exam   GENERAL: healthy, alert and no distress  NECK: no adenopathy, no asymmetry, masses, or scars and thyroid normal to palpation  RESP: lungs clear to auscultation - no rales, rhonchi or wheezes  CV: regular rate and rhythm, normal S1 S2, no S3 or S4, no murmur, click or rub, no peripheral edema and peripheral pulses strong  ABDOMEN: soft, nontender, no hepatosplenomegaly, no " masses and bowel sounds normal  MS: no gross musculoskeletal defects noted, no edema  NEURO: Normal strength and tone, mentation intact and speech normal  PSYCH: mentation appears normal, affect normal/bright

## 2021-09-27 NOTE — PROGRESS NOTES
ANTICOAGULATION MANAGEMENT     Petra Mcnulty 70 year old female is on warfarin with therapeutic INR result. (Goal INR 2.0-3.0)    Recent labs: (last 7 days)     09/27/21  1654   INR 2.6*       ASSESSMENT     Source(s): Chart Review and Patient/Caregiver Call       Warfarin doses taken: Warfarin taken as instructed    Diet: No new diet changes identified    New illness, injury, or hospitalization: No    Medication/supplement changes: None noted    Signs or symptoms of bleeding or clotting: No    Previous INR: Therapeutic last 2(+) visits    Additional findings: None     PLAN     Recommended plan for no diet, medication or health factor changes affecting INR     Dosing Instructions: Continue your current warfarin dose with next INR in 6 weeks       Summary  As of 9/27/2021    Full warfarin instructions:  3 mg every Mon; 4 mg all other days   Next INR check:  11/8/2021             Telephone call with Petra who verbalizes understanding and agrees to plan    Lab visit scheduled    Education provided: Please call back if any changes to your diet, medications or how you've been taking warfarin and Contact [phone]  with any changes, questions or concerns.     Plan made per [facility] anticoagulation protocol    Martha De La Cruz RN  Anticoagulation Clinic  9/27/2021    _______________________________________________________________________     Anticoagulation Episode Summary     Current INR goal:  2.0-3.0   TTR:  88.2 % (1 y)   Target end date:  Indefinite   Send INR reminders to:  [facility]    Indications    History of pulmonary embolism [Z86.711]  Heterozygous factor V Leiden mutation (H) [D68.51]  Long term current use of anticoagulants with INR goal of 2.0-3.0 [Z79.01]           Comments:  Call cell number with results for INR,  INR referral renewed, see 1/19/21 documentation encounter.          Anticoagulation Care Providers     Provider Role Specialty Phone number    Mona Diaz MD Referring  Internal Medicine [phone]

## 2021-10-07 ENCOUNTER — DOCUMENTATION ONLY (OUTPATIENT)
Dept: ANTICOAGULATION | Facility: CLINIC | Age: 71
End: 2021-10-07

## 2021-10-07 VITALS — BODY MASS INDEX: 44.41 KG/M2 | HEIGHT: 68 IN | WEIGHT: 293 LBS

## 2021-10-07 DIAGNOSIS — Z86.711 HISTORY OF PULMONARY EMBOLISM: ICD-10-CM

## 2021-10-07 DIAGNOSIS — D68.51 HETEROZYGOUS FACTOR V LEIDEN MUTATION (H): Primary | ICD-10-CM

## 2021-10-07 DIAGNOSIS — Z79.01 LONG TERM CURRENT USE OF ANTICOAGULANTS WITH INR GOAL OF 2.0-3.0: ICD-10-CM

## 2021-10-07 ASSESSMENT — MIFFLIN-ST. JEOR: SCORE: 2051.3

## 2021-10-07 NOTE — PROGRESS NOTES
Petra is a 70 year old who is being evaluated via a billable video visit.      How would you like to obtain your AVS? MyChart  If the video visit is dropped, the invitation should be resent by: Text to cell phone: [phone]  Will anyone else be joining your video visit? No      Video Start Time: 10:30 AM  Video-Visit Details    Type of service:  Video Visit    Video End Time:11:02 AM    Originating Location (pt. Location): Home    Distant Location (provider location):  [facility]     Platform used for Video Visit: [facility]         Outpatient Sleep Medicine Consultation  October 8, 2021      Name: Petra Mcnulty MRN# [id number]   Age: 70 year old YOB: 1950     Date of Consultation: October 8, 2021  Consultation is requested by: No referring provider defined for this encounter. No ref. 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  This patient is having some mid sleep awakenings in the setting of spending 10 hours in bed with her CPAP on and we have suggested reduce time in bed with CPAP as she may be having unrecognized napping spells contributing to mid sleep awakenings.  She was provided with supplies for her equipment with 1 year follow-up appointment.  We have counseled her on device replacement  which may be be possible within the next few months when she achieves 5-year aging on the machine.          SCALES:  EPWORTH SLEEPINESS SCALE    Sitting and reading 1   Watching TV 1   Sitting, inactive in a public place (theatre or mtg.) 0    As a passenger in a car 0   Lying down to rest in the afternoon when circumstance permit 0   Sitting and talking to someone 0   Sitting quietly after lunch without alcohol 0   In a car, while stopped for a few minutes in traffic 0   TOTAL SCORE 2     INSOMNIA SEVERITY INDEX     Difficulty falling asleep 1   Difficult staying asleep 1   Problems waking up to early 0   How SATISFIED/DISSATISFIED are you with your CURRENT sleep pattern? 3   How NOTICEABLE to others do you think your sleep pattern is in terms of your quality of life? 0   How WORRIED/DISTRESSED are you about your current sleep pattern? 0   To what extent do you consider your sleep problem to INTERFERE with your daily fuctioning(e.g. daytime fatigue, mood, ability to function at work/daily chores, concentration, mood,etc.) CURRENTLY? 0   INSOMNIA SEVERITY INDEX TOTAL SCORE 5      --absence of insomnia (0-7); sub-threshold insomnia (8-14); moderate insomnia (15-21); and severe insomnia (22-28)--               Problem List:     Patient Active Problem List   Diagnosis     Pain in joint, lower leg     Knee pain     Knee joint replacement by other means     Hip pain, right     Heterozygous factor V Leiden mutation (H)     Morbid obesity (H)     History of pulmonary embolism     Postmenopausal bleeding     Mirena IUD inserted 08/11/2020- Remove by 08/11/2025     LOR (obstructive sleep apnea)     Long term current use of anticoagulants with INR goal of 2.0-3.0     Acquired hypothyroidism     Benign essential hypertension     Hyperlipidemia LDL goal <130     Osteoarthritis of multiple joints     Arthritis of foot, degenerative     Body mass index 40 and over, adult     Cataracts, bilateral     Chronic obstructive pulmonary  disease (H)     Daytime sleepiness     Dry eyes     Dyslipidemia     HTN (hypertension)     Insomnia     Musculoskeletal pain, chronic     Other pulmonary embolism without acute cor pulmonale (H)     Pulmonary nodule     Dyspnea on exertion     Type 2 diabetes mellitus without complication, without long-term current use of insulin (H)            Medications:     Current Outpatient Medications   Medication Sig     acetaminophen (TYLENOL) 500 MG tablet Take 1,000 mg by mouth 2 times daily      albuterol (PROAIR HFA/PROVENTIL HFA/VENTOLIN HFA) 108 (90 Base) MCG/ACT inhaler Inhale 2 puffs into the lungs every 4 hours as needed for shortness of breath / dyspnea or wheezing     Cholecalciferol (VITAMIN D) 50 MCG (2000 UT) CAPS Take 2000 international unit(s) daily     ciclopirox (LOPROX) 0.77 % cream Apply to affected area on face BID x 4-6 weeks. Tapering with improvement. Restart with flares.     clobetasol (TEMOVATE) 0.05 % external ointment Apply topically daily     estradiol (ESTRACE) 0.1 MG/GM vaginal cream Place 1 g vaginally twice a week Daily for the first 14 days     Fesoterodine Fumarate 8 MG TB24 Take 1 tablet (8 mg) by mouth daily     gabapentin (NEURONTIN) 100 MG capsule Take 1 capsule (100 mg) by mouth every morning     gabapentin (NEURONTIN) 300 MG capsule Take 1 capsule (300 mg) by mouth 2 times daily as needed (pain)     HYDROcodone-acetaminophen (NORCO) 5-325 MG tablet Take 1 tablet by mouth every 6 hours as needed for severe pain     levonorgestrel (MIRENA) 20 MCG/24HR IUD 1 each (20 mcg) by Intrauterine route once     levothyroxine (SYNTHROID/LEVOTHROID) 100 MCG tablet Take 2 tablets on Sunday and 1 tablet on all other days of the week     lisinopril (ZESTRIL) 10 MG tablet Take 1 tablet (10 mg) by mouth daily     loratadine (CLARITIN) 10 MG tablet Take 10 mg by mouth daily     metFORMIN (GLUCOPHAGE-XR) 500 MG 24 hr tablet Take 1 tablet (500 mg) by mouth daily (with dinner)     methocarbamol (ROBAXIN)  750 MG tablet Take 1 tablet (750 mg) by mouth At Bedtime     mirabegron (MYRBETRIQ) 25 MG 24 hr tablet Take 1 tablet (25 mg) by mouth daily     nystatin (MYCOSTATIN) 704120 UNIT/GM external powder Apply topically 2 times daily as needed     pravastatin (PRAVACHOL) 40 MG tablet Take 1 tablet (40 mg) by mouth daily     vitamin C (ASCORBIC ACID) 1000 MG TABS Take 1 tablet (1,000 mg) by mouth daily     warfarin ANTICOAGULANT (JANTOVEN ANTICOAGULANT) 2 MG tablet TAKE ONE AND ONE-HALF TABLETS BY MOUTH ON MONDAY,  TAKE 2 TABLETS ALL THE OTHER DAYS OF THE WEEK OR AS DIRECTED BY INR CLINIC     No current facility-administered medications for this visit.      Allergies   Allergen Reactions     Atorvastatin      myalgia     Simvastatin Rash            Past Medical History:     Does not need 02 supplement at night   Past Medical History:   Diagnosis Date     Arthritis     hands, joints     Coagulation disorder (H)     factor 5 leiden     COPD (chronic obstructive pulmonary disease) (H)      Factor V deficiency (H) 2007     Glaucoma      History of blood transfusion     x2     History of thrombophlebitis      Hypertension      Hypothyroid      Insomnia      Obese      Onychomycosis      Other chronic pain     back     PPD positive     INH TX x 1 year     Pulmonary embolism (H)      Sleep apnea     uses cpap     Type 2 diabetes mellitus without complication, without long-term current use of insulin (H) 11/15/2017     Uncomplicated asthma              Past Surgical History:         Past Surgical History:   Procedure Laterality Date     ARTHROPLASTY HIP      left + 2 I&Ds     ARTHROSCOPY KNEE       AS TOTAL HIP ARTHROPLASTY       BACK SURGERY      c3-c4 discectomy/fusion,     BACK SURGERY      lumbar decompression/lami      BIOPSY      breast     C TOTAL KNEE ARTHROPLASTY      left,right     CHOLECYSTECTOMY       COLONOSCOPY       D & C       HYSTEROSCOPY      d and c     INSERT STIMULATOR DORSAL COLUMN N/A 6/26/2020    Procedure:  SPINAL CORD STIMULATOR IMPLANT;  Surgeon: Rosemary Dacosta DO;  Location:  OR     INSERT STIMULATOR DORSAL COLUMN TRIAL N/A 2/6/2020    Procedure: SPINAL CORD STIMULATOR TRIAL;  Surgeon: Karlee Duffy MD;  Location:  OR     OPERATIVE HYSTEROSCOPY WITH MORCELLATOR N/A 6/24/2020    Procedure: 1. Exam under anesthesia 2. Dilation and curettage under direct visualization 3. Operative hysteroscopy 4. polypectomy;  Surgeon: Parisa Goldberg MD;  Location:  OR            Social History:     Social History     Tobacco Use     Smoking status: Former Smoker     Packs/day: 0.50     Types: Cigarettes     Quit date: 1/1/2006     Years since quitting: 15.7     Smokeless tobacco: Never Used   Substance Use Topics     Alcohol use: Yes     Comment: rare              Family History:     Family History   Problem Relation Age of Onset     Diabetes Mother 80        Type II     Hypertension Mother      Cerebrovascular Disease Father      Basal cell carcinoma Sister                    Physical Examination:     Objective:    Reported vitals:  There were no vitals taken for this visit.   GENERAL: Healthy, alert and no distress  EYES: Eyes grossly normal to inspection.  No discharge or erythema, or obvious scleral/conjunctival abnormalities.  RESP: No audible wheeze, cough, or visible cyanosis.  No visible retractions or increased work of breathing.    SKIN: Visible skin clear. No significant rash, abnormal pigmentation or lesions.  NEURO: Cranial nerves grossly intact.  Mentation and speech appropriate for age.  PSYCH: Mentation appears normal, affect normal/bright, judgement and insight intact, normal speech and appearance well-groomed.           Data: All pertinent previous laboratory data reviewed     No results found for: PH, PHARTERIAL, PO2, [id number], SAT, PCO2, HCO3, BASEEXCESS, AMRITA, BEB  Lab Results   Component Value Date    TSH 1.93 02/16/2021     Lab Results   Component Value Date     (H) 09/01/2021      (H) 01/19/2021     Lab Results   Component Value Date    HGB 14.4 01/19/2021    HGB 14.4 06/24/2020     Lab Results   Component Value Date    BUN 10 09/01/2021    BUN 11 01/19/2021    CR 0.60 09/01/2021    CR 0.67 01/19/2021     Lab Results   Component Value Date    AST 21 09/01/2021    AST 23 01/19/2021    ALT 25 09/01/2021    ALT 21 01/19/2021    ALKPHOS 65 09/01/2021    ALKPHOS 63 01/19/2021    BILITOTAL 0.4 09/01/2021    BILITOTAL 0.4 01/19/2021     No results found for: UAMP, UBARB, BENZODIAZEUR, UCANN, UCOC, OPIT, UPCP        Total time spent with patient: 15 min >50% counseling and 15 minutes documenting and reviewing records   Copy to: Shanta English MD 10/8/2021

## 2021-10-07 NOTE — PATIENT INSTRUCTIONS
Your BMI is Body mass index is 49.87 kg/m .  Weight management is a personal decision.  If you are interested in exploring weight loss strategies, the following discussion covers the approaches that may be successful. Body mass index (BMI) is one way to tell whether you are at a healthy weight, overweight, or obese. It measures your weight in relation to your height.  A BMI of 18.5 to 24.9 is in the healthy range. A person with a BMI of 25 to 29.9 is considered overweight, and someone with a BMI of 30 or greater is considered obese. More than two-thirds of American adults are considered overweight or obese.  Being overweight or obese increases the risk for further weight gain. Excess weight may lead to heart disease and diabetes.  Creating and following plans for healthy eating and physical activity may help you improve your health.  Weight control is part of healthy lifestyle and includes exercise, emotional health, and healthy eating habits. Careful eating habits lifelong are the mainstay of weight control. Though there are significant health benefits from weight loss, long-term weight loss with diet alone may be very difficult to achieve- studies show long-term success with dietary management in less than 10% of people. Attaining a healthy weight may be especially difficult to achieve in those with severe obesity. In some cases, medications, devices and surgical management might be considered.  What can you do?  If you are overweight or obese and are interested in methods for weight loss, you should discuss this with your provider.     Consider reducing daily calorie intake by 500 calories.     Keep a food journal.     Avoiding skipping meals, consider cutting portions instead.    Diet combined with exercise helps maintain muscle while optimizing fat loss. Strength training is particularly important for building and maintaining muscle mass. Exercise helps reduce stress, increase energy, and improves fitness.  Increasing exercise without diet control, however, may not burn enough calories to loose weight.       Start walking three days a week 10-20 minutes at a time    Work towards walking thirty minutes five days a week     Eventually, increase the speed of your walking for 1-2 minutes at time    In addition, we recommend that you review healthy lifestyles and methods for weight loss available through the National Institutes of Health patient information sites:  http://win.niddk.nih.gov/publications/index.htm    And look into health and wellness programs that may be available through your health insurance provider, employer, local community center, or cori club.    Weight management plan: Patient was referred to their PCP to discuss a diet and exercise plan.

## 2021-10-07 NOTE — PROGRESS NOTES
ANTICOAGULATION MANAGEMENT      Petra Mcnulty due for annual renewal of referral to anticoagulation monitoring. Order pended for your review and signature.      ANTICOAGULATION SUMMARY      Warfarin indication(s)     PE  factor V leiden    Heart valve present?  NO       Current goal range   INR: 2.0-3.0     Goal appropriate for indication? Yes, INR 2-3 appropriate for hx of DVT, PE, hypercoagulable state, Afib, LVAD, or bileaflet AVR without risk factors     Current duration of therapy Indefinite/long term therapy   Time in Therapeutic Range (TTR)  (Goal > 60%) 88.2%       Office visit with referring provider's group within last year yes on 9/27/21       Rae Wing RN

## 2021-10-08 ENCOUNTER — VIRTUAL VISIT (OUTPATIENT)
Dept: SLEEP MEDICINE | Facility: CLINIC | Age: 71
End: 2021-10-08
Payer: COMMERCIAL

## 2021-10-08 DIAGNOSIS — G47.33 OSA (OBSTRUCTIVE SLEEP APNEA): Primary | ICD-10-CM

## 2021-10-08 PROBLEM — E11.9 TYPE 2 DIABETES MELLITUS WITHOUT COMPLICATION, WITHOUT LONG-TERM CURRENT USE OF INSULIN (H): Status: ACTIVE | Noted: 2017-11-15

## 2021-10-08 PROBLEM — G47.00 INSOMNIA: Status: ACTIVE | Noted: 2017-06-06

## 2021-10-08 PROBLEM — M79.18 MUSCULOSKELETAL PAIN, CHRONIC: Status: ACTIVE | Noted: 2017-02-08

## 2021-10-08 PROBLEM — G89.29 MUSCULOSKELETAL PAIN, CHRONIC: Status: ACTIVE | Noted: 2017-02-08

## 2021-10-08 PROBLEM — H04.123 DRY EYES: Status: ACTIVE | Noted: 2019-01-11

## 2021-10-08 PROCEDURE — 99214 OFFICE O/P EST MOD 30 MIN: CPT | Mod: 95 | Performed by: INTERNAL MEDICINE

## 2021-11-08 ENCOUNTER — LAB (OUTPATIENT)
Dept: LAB | Facility: CLINIC | Age: 71
End: 2021-11-08
Payer: COMMERCIAL

## 2021-11-08 ENCOUNTER — ANTICOAGULATION THERAPY VISIT (OUTPATIENT)
Dept: ANTICOAGULATION | Facility: CLINIC | Age: 71
End: 2021-11-08

## 2021-11-08 DIAGNOSIS — D68.51 HETEROZYGOUS FACTOR V LEIDEN MUTATION (H): ICD-10-CM

## 2021-11-08 DIAGNOSIS — Z79.01 LONG TERM CURRENT USE OF ANTICOAGULANTS WITH INR GOAL OF 2.0-3.0: ICD-10-CM

## 2021-11-08 DIAGNOSIS — Z86.711 HISTORY OF PULMONARY EMBOLISM: Primary | ICD-10-CM

## 2021-11-08 DIAGNOSIS — D68.51 FACTOR V LEIDEN (H): ICD-10-CM

## 2021-11-08 DIAGNOSIS — Z86.711 HISTORY OF PULMONARY EMBOLISM: ICD-10-CM

## 2021-11-08 LAB — INR BLD: 3 (ref 0.9–1.1)

## 2021-11-08 PROCEDURE — 36416 COLLJ CAPILLARY BLOOD SPEC: CPT

## 2021-11-08 PROCEDURE — 85610 PROTHROMBIN TIME: CPT

## 2021-11-08 RX ORDER — WARFARIN SODIUM 2 MG/1
TABLET ORAL
Qty: 180 TABLET | Refills: 1 | Status: SHIPPED | OUTPATIENT
Start: 2021-11-08 | End: 2022-01-01

## 2021-11-08 NOTE — PROGRESS NOTES
ANTICOAGULATION MANAGEMENT     Petra Mcnulty 70 year old female is on warfarin with therapeutic INR result. (Goal INR 2.0-3.0)    Recent labs: (last 7 days)     11/08/21  0917   INR 3.0*       ASSESSMENT     Source(s): Chart Review and Patient/Caregiver Call       Warfarin doses taken: Warfarin taken as instructed    Diet: No new diet changes identified--patient states she averages 1 salad every 2 weeks and occasionally eats green beans.    New illness, injury, or hospitalization: No    Medication/supplement changes: None noted    Signs or symptoms of bleeding or clotting: No    Previous INR: Therapeutic last 2(+) visits    Additional findings: None     PLAN     Recommended plan for no diet, medication or health factor changes affecting INR     Dosing Instructions: Continue your current warfarin dose with next INR in 6 weeks       Summary  As of 11/8/2021    Full warfarin instructions:  3 mg every Mon; 4 mg all other days   Next INR check:  12/21/2021             Telephone call with Petra who verbalizes understanding and agrees to plan    Lab visit scheduled / refill e-faxed    Education provided: Importance of consistent vitamin K intake and Contact [phone]  with any changes, questions or concerns.     Plan made per [facility] anticoagulation protocol    Jesenia Vazquez, RN  Anticoagulation Clinic  11/8/2021    _______________________________________________________________________     Anticoagulation Episode Summary     Current INR goal:  2.0-3.0   TTR:  90.7 % (1 y)   Target end date:  Indefinite   [facility]    Indications    History of pulmonary embolism [Z86.711]  Heterozygous factor V Leiden mutation (H) [D68.51]  Long term current use of anticoagulants with INR goal of 2.0-3.0 [Z79.01]           Comments:  Anticoagulated in 2007, also         Anticoagulation Care Providers     Provider Role Specialty Phone number    Mona Diaz MD Referring Internal Medicine  [phone]    Shanta English MD [facility] Internal Medicine [phone]

## 2021-12-06 ENCOUNTER — VIRTUAL VISIT (OUTPATIENT)
Dept: PHARMACY | Facility: CLINIC | Age: 71
End: 2021-12-06
Payer: COMMERCIAL

## 2021-12-06 DIAGNOSIS — Z86.711 HISTORY OF PULMONARY EMBOLISM: ICD-10-CM

## 2021-12-06 DIAGNOSIS — E11.9 TYPE 2 DIABETES MELLITUS WITHOUT COMPLICATION, WITHOUT LONG-TERM CURRENT USE OF INSULIN (H): ICD-10-CM

## 2021-12-06 DIAGNOSIS — E78.5 HYPERLIPIDEMIA LDL GOAL <130: ICD-10-CM

## 2021-12-06 DIAGNOSIS — G89.29 MUSCULOSKELETAL PAIN, CHRONIC: ICD-10-CM

## 2021-12-06 DIAGNOSIS — Z78.9 TAKES DIETARY SUPPLEMENTS: ICD-10-CM

## 2021-12-06 DIAGNOSIS — E03.9 ACQUIRED HYPOTHYROIDISM: Primary | ICD-10-CM

## 2021-12-06 DIAGNOSIS — Z71.89 COUNSELING FOR ESTROGEN REPLACEMENT THERAPY: ICD-10-CM

## 2021-12-06 DIAGNOSIS — Z71.85 VACCINE COUNSELING: ICD-10-CM

## 2021-12-06 DIAGNOSIS — J30.2 SEASONAL ALLERGIC RHINITIS: ICD-10-CM

## 2021-12-06 DIAGNOSIS — M15.0 PRIMARY OSTEOARTHRITIS INVOLVING MULTIPLE JOINTS: ICD-10-CM

## 2021-12-06 DIAGNOSIS — L30.9 DERMATITIS: ICD-10-CM

## 2021-12-06 DIAGNOSIS — J30.89 SEASONAL ALLERGIC RHINITIS DUE TO OTHER ALLERGIC TRIGGER: ICD-10-CM

## 2021-12-06 DIAGNOSIS — R32 URINARY INCONTINENCE, UNSPECIFIED TYPE: ICD-10-CM

## 2021-12-06 DIAGNOSIS — M79.18 MUSCULOSKELETAL PAIN, CHRONIC: ICD-10-CM

## 2021-12-06 DIAGNOSIS — I10 BENIGN ESSENTIAL HYPERTENSION: ICD-10-CM

## 2021-12-06 PROCEDURE — 99607 MTMS BY PHARM ADDL 15 MIN: CPT | Performed by: PHARMACIST

## 2021-12-06 PROCEDURE — 99605 MTMS BY PHARM NP 15 MIN: CPT | Performed by: PHARMACIST

## 2021-12-06 RX ORDER — GABAPENTIN 300 MG/1
300 CAPSULE ORAL DAILY PRN
Qty: 180 CAPSULE | Refills: 3 | COMMUNITY
Start: 2021-12-06 | End: 2022-01-01

## 2021-12-06 NOTE — PROGRESS NOTES
Medication Therapy Management (MTM) Encounter    ASSESSMENT:                            Medication Adherence/Access: No issues identified    Diabetes: Patient is meeting A1c goal of < 7%. Would benefit from continued follow up with PCP.    Hypertension: Patient is meeting BP goal of < 130/80mmHg.  Current treatment plan is effective, no change in therapy.    Hyperlipidemia: Stable.    Hypothyroidism: Stable. TSH within normal limits.    Osteoarthritis/Back Pain: Patient is still experiencing breakthrough pain despite using several different pain medications. Would benefit from making appointment with pain medicine to explore other options.    Urinary Incontinence/ERT: Patient is still experiencing bothersome urinary incontinence symptoms despite being adherent to current medication. Would benefit from trial discontinuation of Myrbetriq to determine if medication is helpful in treating symptoms.    Hx pulmonary embolism: Would benefit from continued follow up with anticoagulation therapy for INR monitoring.     Dematitis: Stable.    Allergies: Patient would benefit from restarting loratadine to help control cough symptom of allergies.    Supplements: Stable.    Vaccines: Patient has completed all needed vaccinations.      PLAN:                            1. I will send a note to Dr. Mac to see if a trial discontinuation from myrbetriq would be helpful for you to determine if it is helping your symptoms. - Dr. Mac agrees with plan to hold myrbetriq for 7-10 days to determine efficacy. Patient informed via phone call.  2. Restart loratadine to help with your cough.    Future considerations:  1. Voltaren gel for knees and back if pain gets worse.    Follow-up: Return in about 6 months (around 6/6/2022).      SUBJECTIVE/OBJECTIVE:                          Petra Mcnulty is a 71 year old female called for an initial visit. She was referred to me from [facility].      Reason for visit: Medication Therapy  Management    Allergies/ADRs: Reviewed in chart  Past Medical History: Reviewed in chart  Tobacco: She reports that she quit smoking about 15 years ago. Her smoking use included cigarettes. She smoked 0.50 packs per day. She has never used smokeless tobacco.  Alcohol: not currently using    Medication Adherence/Access: Patient uses pill box(es).  Patient takes medications 2 time(s) per day.   Per patient, misses medication 0-1 times per week.   Medication barriers: cost of Myrbetriq   The patient fills medications at [facility]: YES.    Diabetes:  Pt currently taking metformin  mg daily.  SMBG Monitoring: None  Symptoms of low blood sugar? none.   Recent symptoms of high blood sugar? none.    Last eye exam was: Due  Last foot exam was: Due  Lab Results   Component Value Date    A1C 6.2 09/01/2021    A1C 6.4 01/19/2021    A1C 6.2 09/25/2020     No results found for: UCRR, MICROL, UMALCR    Hypertension: Current HTN meds include lisinopril 10 mg daily. Pt reports no medication side effects noted.    BP Readings from Last 3 Encounters:   09/27/21 123/69   06/14/21 128/80   06/03/21 124/70     Potassium   Date Value Ref Range Status   09/01/2021 4.0 3.4 - 5.3 mmol/L Final   01/19/2021 4.2 3.4 - 5.3 mmol/L Final       Hyperlipidemia: Currently taking pravastatin 40 mg daily. 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    Recent Labs   Lab Test 01/19/21  0925   CHOL 178   HDL 41*   LDL 84   TRIG 265*     Hypothyroidism: Currently taking levothyroxine 100 mcg daily. No fatigue, unexplained weight gain or loss, heat or cold intolerance. Currently taking levothyroxine 1 hour before food with all other morning medications.  TSH   Date Value Ref Range Status   02/16/2021 1.93 0.40 - 4.00 mU/L Final     Osteoarthritis/Back Pain: Currently taking gabapentin 100 mg every morning and 300 mg at night, Norco 5/325 mg as needed, methocarbamol 750 mg  nightly, Tylenol 1000 mg twice daily. Reports that pain still breaks through. States she can't stand for more than 5 minutes without needing to sit down. Has seen pain management specialist in the past. Reports she has drowsiness from pain medications but no other side effects.    Urinary Incontinence/ERT: Currently taking myrbetriq 25 mg daily and estradiol 0.1 mg/g vaginal cream.   Reports that she is unsure if myrbetriq is helping because she is still having the same incontinence symptoms as before. Also states that myrbetriq is over $100 per month and she has a hard time paying for medication that she isn't sure works.    Hx pulmonary embolism: Currently taking warfarin 3 mg Mondays, 4 mg all other days of the week. Goal INR 2-3. No unexplained bleeding or bruising. States she has her INR checked every six weeks. States last check was 3 but she had a few more salads than usual.     INR   Date Value Ref Range Status   11/08/2021 3.0 (H) 0.9 - 1.1 Final   07/07/2021 2.50 (H) 0.86 - 1.14 Final     Comment:     This test is intended for monitoring Coumadin therapy.  Results are not   accurate in patients with prolonged INR due to factor deficiency.          Dematitis: Currently taking ciclopirox 0.77% cream twice daily as needed, clobetasol 0.05% as needed, nystatin powder twice daily as needed. No concerns, states these are working well for her.     Allergies: Currently taking loratadine 10 mg daily and albuterol inhaler as needed. Reports she uses inhaler when she has tightness in her chest. Notices tightness when working outside around dust and leaves. Uses 1-2 times weekly. Currently having a cough at night that is productive. Reports she stopped using her loratadine a few days ago and has noticed this nighttime cough more after stopping.    Supplements: Currently taking vitamin d 2000 units daily, vitamin C 1000 mg daily. Reports she takes these supplements after hearing they could help prevent covid infection.  No concerns.    Vitamin D Deficiency Screening Results:  No results found for: VITDT    Vaccines: Up to date.       Today's Vitals: There were no vitals taken for this visit.  ----------------    I spent 42 minutes with this patient today (an extra 15 minutes was spent creating the Medication Action Plan). All changes were made via collaborative practice agreement with Shanta English MD. A copy of the visit note was provided to the patient's primary care provider.    The patient was sent via [employer] a summary of these recommendations.     Ms. Mcnulty was seen independently by Dr. Winter. I have reviewed and agree with the resident note and plan of care.  Lori Pitts, PharmD      Britta Winter, PharmD  Medication Therapy Management Resident  Pager: [phone]    Telemedicine Visit Details  Type of service:  Telephone visit  Start Time: 10:00 AM  End Time: 10:42 PM  Originating Location (patient location): [place]  Distant Location (provider location):  [facility]     Medication Therapy Recommendations  Seasonal allergic rhinitis    Rationale: Untreated condition - Needs additional medication therapy - Indication   Recommendation: Start Medication - LORATADINE PO   Status: Accepted - no CPA Needed         Urinary incontinence, unspecified type    Current Medication: mirabegron (MYRBETRIQ) 25 MG 24 hr tablet   Rationale: Patient prefers not to take - Adherence - Adherence   Recommendation: Change Medication   Status: Accepted per Provider

## 2021-12-06 NOTE — PATIENT INSTRUCTIONS
Recommendations from today's MTM visit:                                                       1. I will send a note to Dr. Mac to see if a trial discontinuation from myrbetriq would be helpful for you to determine if it is helping your symptoms.  2. Restart loratadine to help with your cough.    Follow-up: Return in about 6 months (around 6/6/2022).    It was great to speak with you today.  I value your experience and would be very thankful for your time with providing feedback on our clinic survey. You may receive a survey via email or text message in the next few days.     To schedule another MTM appointment, please call the clinic directly or you may call the MTM scheduling line at [phone] or toll-free at [phone].     My Clinical Pharmacist's contact information:                                                      Please feel free to contact me with any questions or concerns you have.      Britta Winter, PharmD  Medication Therapy Management Resident  Pager: [phone]

## 2021-12-06 NOTE — LETTER
"        Date: 2021    Petra Mcnulty  [address] [place] [address]    Dear Ms. Mcnulty,    Thank you for talking with me on 21 about your health and medications. Medicare s MTM (Medication Therapy Management) program helps you understand your medications and use them safely.      This letter includes an action plan (Medication Action Plan) and medication list (Personal Medication List). The action plan has steps you should take to help you get the best results from your medications. The medication list will help you keep track of your medications and how to use them the right way.       Have your action plan and medication list with you when you talk with your doctors, pharmacists, and other healthcare providers in your care team.     Ask your doctors, pharmacists, and other healthcare providers to update the action plan and medication list at every visit.     Take your medication list with you if you go to the hospital or emergency room.     Give a copy of the action plan and medication list to your family or caregivers.     If you want to talk about this letter or any of the papers with it, please call   [phone].We look forward to working with you, your doctors, and other healthcare providers to help you stay healthy through the [employer] [place] MTM program.    Sincerely,  [facility]HAEL BENDER [facility]    Enclosed: Medication Action Plan and Personal Medication List    MEDICATION ACTION PLAN FOR Petra Mcnulty,  1950     This action plan will help you get the best results from your medications if you:   1. Read \"What we talked about.\"   2. Take the steps listed in the \"What I need to do\" boxes.   3. Fill in \"What I did and when I did it.\"   4. Fill in \"My follow-up plan\" and \"Questions I want to ask.\"     Have this action plan with you when you talk with your doctors, pharmacists, and other healthcare providers in your care team. Share this with your family " or caregivers too.  DATE PREPARED: 2021  What we talked about: You currently take your levothyroxine with all your other medications about 1 hour before breakfast.                  What I need to do: Take your levothyroxine alone 1 hour before breakfast and take you other morning medications with breakfast.       What I did and when I did it:                                              What we talked about: You are unsure if your urinary incontinence symptoms have gotten better since starting Myrbetriq or if they have stayed the same.                                                 What I need to do: I will send you instructions once I hear back from Dr. Mac.       What I did and when I did it:                                               What we talked about: You have a nighttime cough that has gotten worse after you stopped taking your allergy medication.                                                  What I need to do: Start taking your loratadine again to help with the cough.       What I did and when I did it:                                               My follow-up plan:                 Questions I want to ask:              If you have any questions about your action plan, call [facility], Phone: [phone] , Monday-Friday 8-4:30pm.           PERSONAL MEDICATION LIST FOR Petra Mcnulty,  1950     This medication list was made for you after we talked. We also used information from your doctor's chart.      Use blank rows to add new medications. Then fill in the dates you started using them.    Cross out medications when you no longer use them. Then write the date and why you stopped using them.    Ask your doctors, pharmacists, and other healthcare providers to update this list at every visit. Keep this list up-to-date with:       Prescription medications    Over the counter drugs     Herbals    Vitamins    Minerals      If you go to the hospital or emergency room, take this  list with you. Share this with your family or caregivers too.     DATE PREPARED: 12/6/2021  Allergies or side effects: Atorvastatin and Simvastatin     Medication:  ACETAMINOPHEN 500 MG PO TABS      How I use it:  Take 1,000 mg by mouth 2 times daily       Why I use it:  Primary osteoarthritis involving multiple joints    Prescriber:  Patient Reported      Date I started using it:       Date I stopped using it:         Why I stopped using it:            Medication:  ALBUTEROL SULFATE  (90 BASE) MCG/ACT IN AERS      How I use it:  Inhale 2 puffs into the lungs every 4 hours as needed for shortness of breath / dyspnea or wheezing      Why I use it: History of pulmonary embolism    Prescriber:  Shanta English MD      Date I started using it:       Date I stopped using it:         Why I stopped using it:            Medication:  CICLOPIROX OLAMINE 0.77 % EX CREA      How I use it:  Apply to affected area on face BID x 4-6 weeks. Tapering with improvement. Restart with flares.      Why I use it: Seborrheic dermatitis    Prescriber:  Katie Wren PA-C      Date I started using it:       Date I stopped using it:         Why I stopped using it:            Medication:  CLOBETASOL PROPIONATE 0.05 % EX OINT      How I use it:  Apply topically daily      Why I use it: Lichen sclerosus    Prescriber:  Parisa Goldberg MD      Date I started using it:       Date I stopped using it:         Why I stopped using it:            Medication:  ESTRADIOL 0.1 MG/GM VA CREA      How I use it:  Place 1 g vaginally twice a week Daily for the first 14 days      Why I use it: Vaginal atrophy    Prescriber:  Parisa Goldberg MD      Date I started using it:       Date I stopped using it:         Why I stopped using it:            Medication:  GABAPENTIN 100 MG PO CAPS      How I use it:  Take 1 capsule (100 mg) by mouth every morning      Why I use it: Primary osteoarthritis involving multiple joints    Prescriber:  Shanta  Sarita English MD      Date I started using it:   Primary osteoarthritis involving multiple joints    Date I stopped using it:         Why I stopped using it:            Medication:  GABAPENTIN 300 MG PO CAPS      How I use it:  Take 1 capsule (300 mg) by mouth daily as needed (pain)      Why I use it: Primary osteoarthritis involving multiple joints    Prescriber:  Shanta English MD      Date I started using it:       Date I stopped using it:         Why I stopped using it:            Medication:  HYDROCODONE-ACETAMINOPHEN 5-325 MG PO TABS      How I use it:  Take 1 tablet by mouth every 6 hours as needed for severe pain      Why I use it:  Primary osteoarthritis involving multiple joints    Prescriber:  Patient Reported      Date I started using it:       Date I stopped using it:         Why I stopped using it:            Medication:  LEVONORGESTREL 20 MCG/24HR IU IUD      How I use it:  1 each (20 mcg) by Intrauterine route once      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, complex    Prescriber:  Parisa Goldberg MD      Date I started using it:       Date I stopped using it:         Why I stopped using it:            Medication:  LEVOTHYROXINE SODIUM 100 MCG PO TABS      How I use it:  Take 2 tablets on Sunday and 1 tablet on all other days of the week      Why I use it: Acquired hypothyroidism    Prescriber:  Mona Diaz MD      Date I started using it:       Date I stopped using it:         Why I stopped using it:            Medication:  LISINOPRIL 10 MG PO TABS      How I use it:  Take 1 tablet (10 mg) by mouth daily      Why I use it: Benign essential hypertension    Prescriber:  Mona Diaz MD      Date I started using it:       Date I stopped using it:         Why I stopped using it:            Medication:  LORATADINE 10 MG PO TABS      How I use it:  Take 10 mg by mouth daily      Why I use it:  Seasonal allergic rhinitis    Prescriber:  Patient Reported      Date I started  using it:       Date I stopped using it:         Why I stopped using it:            Medication:  METFORMIN HCL  MG PO TB24      How I use it:  Take 1 tablet (500 mg) by mouth daily (with dinner)      Why I use it: Prediabetes    Prescriber:  Shanta English MD      Date I started using it:       Date I stopped using it:         Why I stopped using it:            Medication:  METHOCARBAMOL 750 MG PO TABS      How I use it:  Take 1 tablet (750 mg) by mouth At Bedtime      Why I use it: Primary osteoarthritis involving multiple joints    Prescriber:  Mona Diaz MD      Date I started using it:       Date I stopped using it:         Why I stopped using it:            Medication:  MIRABEGRON ER 25 MG PO TB24      How I use it:  Take 1 tablet (25 mg) by mouth daily      Why I use it: Mixed stress and urge urinary incontinence    Prescriber:  Leti Mac MD      Date I started using it:       Date I stopped using it:         Why I stopped using it:            Medication:  NYSTATIN 653223 UNIT/GM EX POWD      How I use it:  Apply topically 2 times daily as needed      Why I use it: Yeast infection of the skin    Prescriber:  Mona Diaz MD      Date I started using it:       Date I stopped using it:         Why I stopped using it:            Medication:  PRAVASTATIN SODIUM 40 MG PO TABS      How I use it:  Take 1 tablet (40 mg) by mouth daily      Why I use it: Hyperlipidemia LDL goal <130    Prescriber:  Mona Diaz MD      Date I started using it:       Date I stopped using it:         Why I stopped using it:            Medication:  VITAMIN C 1000 MG PO TABS      How I use it:  Take 1 tablet (1,000 mg) by mouth daily      Why I use it:  General Health    Prescriber:  Mona Diaz MD      Date I started using it:       Date I stopped using it:         Why I stopped using it:            Medication:  VITAMIN D 50 MCG (2000 UT) PO CAPS      How I use it:  Take 2000  international unit(s) daily      Why I use it:  General Health    Prescriber:  Mona Diaz MD      Date I started using it:       Date I stopped using it:         Why I stopped using it:            Medication:  WARFARIN SODIUM 2 MG PO TABS      How I use it:  TAKE ONE AND ONE-HALF TABLETS BY MOUTH ON MONDAY,  TAKE 2 TABLETS ALL THE OTHER DAYS OF THE WEEK OR AS DIRECTED BY INR CLINIC      Why I use it: History of pulmonary embolism; Factor V Leiden (H)    Prescriber:  Shanta English MD      Date I started using it:       Date I stopped using it:         Why I stopped using it:            Medication:         How I use it:         Why I use it:      Prescriber:         Date I started using it:       Date I stopped using it:         Why I stopped using it:            Medication:         How I use it:         Why I use it:      Prescriber:         Date I started using it:       Date I stopped using it:         Why I stopped using it:            Medication:         How I use it:         Why I use it:      Prescriber:         Date I started using it:       Date I stopped using it:         Why I stopped using it:              Other Information:     If you have any questions about your medication list, call [facility] BENDER [facility], Phone: [phone] , Monday-Friday 8-4:30pm.    According to the Paperwork Reduction Act of 1995, no persons are required to respond to a collection of information unless it displays a valid OMB control number. The valid OMB number for this information collection is [id number]. The time required to complete this information collection is estimated to average 40 minutes per response, including the time to review instructions, searching existing data resources, gather the data needed, and complete and review the information collection. If you have any comments concerning the accuracy of the time estimate(s) or suggestions for improving this form, please write to: CMS, Attn: MARILYN Reports  Clearance Officer, [address] [place] [address], [place], [place] [address].

## 2021-12-13 ENCOUNTER — TELEPHONE (OUTPATIENT)
Dept: PHARMACY | Facility: CLINIC | Age: 71
End: 2021-12-13
Payer: COMMERCIAL

## 2021-12-13 NOTE — TELEPHONE ENCOUNTER
Pt wanted you to know that while cleaning/taking apart her kitchen, she found 30 days worth of the Myrbetriq. She would like to take/finish the 30 Myrbetriq that she has and then put a hold on the Myrbetriq. She will call back to schedule a f/u. If you have questions or concerns, please call her at .

## 2021-12-21 ENCOUNTER — LAB (OUTPATIENT)
Dept: LAB | Facility: CLINIC | Age: 71
End: 2021-12-21
Payer: COMMERCIAL

## 2021-12-21 ENCOUNTER — ANTICOAGULATION THERAPY VISIT (OUTPATIENT)
Dept: ANTICOAGULATION | Facility: CLINIC | Age: 71
End: 2021-12-21

## 2021-12-21 DIAGNOSIS — Z86.711 HISTORY OF PULMONARY EMBOLISM: Primary | ICD-10-CM

## 2021-12-21 DIAGNOSIS — D68.51 HETEROZYGOUS FACTOR V LEIDEN MUTATION (H): ICD-10-CM

## 2021-12-21 DIAGNOSIS — Z79.01 LONG TERM CURRENT USE OF ANTICOAGULANTS WITH INR GOAL OF 2.0-3.0: ICD-10-CM

## 2021-12-21 DIAGNOSIS — Z86.711 HISTORY OF PULMONARY EMBOLISM: ICD-10-CM

## 2021-12-21 LAB — INR BLD: 2.1 (ref 0.9–1.1)

## 2021-12-21 PROCEDURE — 36416 COLLJ CAPILLARY BLOOD SPEC: CPT

## 2021-12-21 PROCEDURE — 85610 PROTHROMBIN TIME: CPT

## 2021-12-21 NOTE — PROGRESS NOTES
ANTICOAGULATION MANAGEMENT     Petra Mcnulty 71 year old female is on warfarin with therapeutic INR result. (Goal INR 2.0-3.0)    Recent labs: (last 7 days)     12/21/21  0923   INR 2.1*       ASSESSMENT     Source(s): Chart Review and Patient/Caregiver Call       Warfarin doses taken: Warfarin taken as instructed    Diet: No new diet changes identified    New illness, injury, or hospitalization: Has had some hip pain    Medication/supplement changes: None noted    Signs or symptoms of bleeding or clotting: No    Previous INR: Therapeutic last 2(+) visits    Additional findings: None     PLAN     Recommended plan for no diet, medication or health factor changes affecting INR     Dosing Instructions: Continue your current warfarin dose with next INR in 6 weeks       Summary  As of 12/21/2021    Full warfarin instructions:  3 mg every Mon; 4 mg all other days   Next INR check:  2/1/2022             Telephone call with Petra who verbalizes understanding and agrees to plan    Lab visit scheduled    Education provided: Please call back if any changes to your diet, medications or how you've been taking warfarin and Contact [phone]  with any changes, questions or concerns.     Plan made per [facility] anticoagulation protocol    Martha De La Cruz RN  Anticoagulation Clinic  12/21/2021    _______________________________________________________________________     Anticoagulation Episode Summary     Current INR goal:  2.0-3.0   TTR:  100.0 % (1 y)   Target end date:  Indefinite   Send INR reminders to:  [facility]    Indications    History of pulmonary embolism [Z86.711]  Heterozygous factor V Leiden mutation (H) [D68.51]  Long term current use of anticoagulants with INR goal of 2.0-3.0 [Z79.01]           Comments:  Anticoagulated in 2007, also         Anticoagulation Care Providers     Provider Role Specialty Phone number    Mnoa Diaz MD Referring Internal Medicine [phone]    Shanta English  MD [facility] Internal Medicine [phone]

## 2022-01-01 ENCOUNTER — PATIENT OUTREACH (OUTPATIENT)
Dept: ONCOLOGY | Facility: CLINIC | Age: 72
End: 2022-01-01

## 2022-01-01 ENCOUNTER — VIRTUAL VISIT (OUTPATIENT)
Dept: PEDIATRICS | Facility: CLINIC | Age: 72
End: 2022-01-01
Payer: COMMERCIAL

## 2022-01-01 ENCOUNTER — TELEPHONE (OUTPATIENT)
Dept: INTERNAL MEDICINE | Facility: CLINIC | Age: 72
End: 2022-01-01

## 2022-01-01 ENCOUNTER — ANTICOAGULATION THERAPY VISIT (OUTPATIENT)
Dept: ANTICOAGULATION | Facility: CLINIC | Age: 72
End: 2022-01-01

## 2022-01-01 ENCOUNTER — VIRTUAL VISIT (OUTPATIENT)
Dept: UROLOGY | Facility: CLINIC | Age: 72
End: 2022-01-01
Payer: COMMERCIAL

## 2022-01-01 ENCOUNTER — LAB (OUTPATIENT)
Dept: LAB | Facility: CLINIC | Age: 72
End: 2022-01-01
Payer: COMMERCIAL

## 2022-01-01 ENCOUNTER — APPOINTMENT (OUTPATIENT)
Dept: GENERAL RADIOLOGY | Facility: CLINIC | Age: 72
DRG: 064 | End: 2022-01-01
Attending: EMERGENCY MEDICINE
Payer: COMMERCIAL

## 2022-01-01 ENCOUNTER — HOSPITAL ENCOUNTER (INPATIENT)
Facility: CLINIC | Age: 72
LOS: 1 days | DRG: 064 | End: 2022-12-25
Attending: EMERGENCY MEDICINE | Admitting: INTERNAL MEDICINE
Payer: COMMERCIAL

## 2022-01-01 ENCOUNTER — VIRTUAL VISIT (OUTPATIENT)
Dept: INTERNAL MEDICINE | Facility: CLINIC | Age: 72
End: 2022-01-01
Payer: COMMERCIAL

## 2022-01-01 ENCOUNTER — TELEPHONE (OUTPATIENT)
Dept: ANTICOAGULATION | Facility: CLINIC | Age: 72
End: 2022-01-01
Payer: COMMERCIAL

## 2022-01-01 ENCOUNTER — OFFICE VISIT (OUTPATIENT)
Dept: INTERNAL MEDICINE | Facility: CLINIC | Age: 72
End: 2022-01-01
Payer: COMMERCIAL

## 2022-01-01 ENCOUNTER — PRE VISIT (OUTPATIENT)
Dept: ONCOLOGY | Facility: CLINIC | Age: 72
End: 2022-01-01

## 2022-01-01 ENCOUNTER — HEALTH MAINTENANCE LETTER (OUTPATIENT)
Age: 72
End: 2022-01-01

## 2022-01-01 ENCOUNTER — TELEPHONE (OUTPATIENT)
Dept: ANTICOAGULATION | Facility: CLINIC | Age: 72
End: 2022-01-01

## 2022-01-01 ENCOUNTER — DOCUMENTATION ONLY (OUTPATIENT)
Dept: ANTICOAGULATION | Facility: CLINIC | Age: 72
End: 2022-01-01

## 2022-01-01 ENCOUNTER — APPOINTMENT (OUTPATIENT)
Dept: CT IMAGING | Facility: CLINIC | Age: 72
DRG: 064 | End: 2022-01-01
Attending: EMERGENCY MEDICINE
Payer: COMMERCIAL

## 2022-01-01 ENCOUNTER — ONCOLOGY VISIT (OUTPATIENT)
Dept: ONCOLOGY | Facility: CLINIC | Age: 72
End: 2022-01-01
Attending: OBSTETRICS & GYNECOLOGY
Payer: COMMERCIAL

## 2022-01-01 ENCOUNTER — VIRTUAL VISIT (OUTPATIENT)
Dept: PHARMACY | Facility: CLINIC | Age: 72
End: 2022-01-01
Payer: COMMERCIAL

## 2022-01-01 ENCOUNTER — TELEPHONE (OUTPATIENT)
Dept: SLEEP MEDICINE | Facility: CLINIC | Age: 72
End: 2022-01-01
Payer: COMMERCIAL

## 2022-01-01 ENCOUNTER — TELEPHONE (OUTPATIENT)
Dept: INTERNAL MEDICINE | Facility: CLINIC | Age: 72
End: 2022-01-01
Payer: COMMERCIAL

## 2022-01-01 ENCOUNTER — VIRTUAL VISIT (OUTPATIENT)
Dept: OBGYN | Facility: CLINIC | Age: 72
End: 2022-01-01
Payer: COMMERCIAL

## 2022-01-01 ENCOUNTER — OFFICE VISIT (OUTPATIENT)
Dept: OBGYN | Facility: CLINIC | Age: 72
End: 2022-01-01
Payer: COMMERCIAL

## 2022-01-01 ENCOUNTER — TRANSFERRED RECORDS (OUTPATIENT)
Dept: HEALTH INFORMATION MANAGEMENT | Facility: CLINIC | Age: 72
End: 2022-01-01
Payer: COMMERCIAL

## 2022-01-01 VITALS
TEMPERATURE: 97.1 F | BODY MASS INDEX: 45.99 KG/M2 | DIASTOLIC BLOOD PRESSURE: 78 MMHG | WEIGHT: 293 LBS | OXYGEN SATURATION: 95 % | SYSTOLIC BLOOD PRESSURE: 143 MMHG | RESPIRATION RATE: 18 BRPM | HEIGHT: 67 IN | HEART RATE: 88 BPM

## 2022-01-01 VITALS
DIASTOLIC BLOOD PRESSURE: 80 MMHG | HEIGHT: 67 IN | SYSTOLIC BLOOD PRESSURE: 138 MMHG | BODY MASS INDEX: 45.99 KG/M2 | RESPIRATION RATE: 16 BRPM | WEIGHT: 293 LBS | OXYGEN SATURATION: 96 % | TEMPERATURE: 98.3 F | HEART RATE: 107 BPM

## 2022-01-01 VITALS
DIASTOLIC BLOOD PRESSURE: 82 MMHG | RESPIRATION RATE: 18 BRPM | OXYGEN SATURATION: 93 % | TEMPERATURE: 97.9 F | WEIGHT: 293 LBS | HEART RATE: 97 BPM | SYSTOLIC BLOOD PRESSURE: 136 MMHG | HEIGHT: 67 IN | BODY MASS INDEX: 45.99 KG/M2

## 2022-01-01 VITALS
OXYGEN SATURATION: 96 % | HEART RATE: 96 BPM | WEIGHT: 293 LBS | RESPIRATION RATE: 20 BRPM | BODY MASS INDEX: 45.99 KG/M2 | TEMPERATURE: 98.7 F | SYSTOLIC BLOOD PRESSURE: 180 MMHG | HEIGHT: 67 IN | DIASTOLIC BLOOD PRESSURE: 86 MMHG

## 2022-01-01 VITALS
WEIGHT: 220.46 LBS | TEMPERATURE: 101 F | RESPIRATION RATE: 26 BRPM | BODY MASS INDEX: 34.53 KG/M2 | DIASTOLIC BLOOD PRESSURE: 55 MMHG | SYSTOLIC BLOOD PRESSURE: 98 MMHG | OXYGEN SATURATION: 51 % | HEART RATE: 92 BPM

## 2022-01-01 VITALS
DIASTOLIC BLOOD PRESSURE: 70 MMHG | BODY MASS INDEX: 45.99 KG/M2 | WEIGHT: 293 LBS | SYSTOLIC BLOOD PRESSURE: 144 MMHG | HEIGHT: 67 IN

## 2022-01-01 VITALS — BODY MASS INDEX: 45.99 KG/M2 | WEIGHT: 293 LBS | HEIGHT: 67 IN

## 2022-01-01 VITALS — HEIGHT: 67 IN | WEIGHT: 293 LBS | BODY MASS INDEX: 45.99 KG/M2

## 2022-01-01 DIAGNOSIS — Z79.01 LONG TERM CURRENT USE OF ANTICOAGULANTS WITH INR GOAL OF 2.0-3.0: ICD-10-CM

## 2022-01-01 DIAGNOSIS — D68.51 HETEROZYGOUS FACTOR V LEIDEN MUTATION (H): ICD-10-CM

## 2022-01-01 DIAGNOSIS — S92.515D CLOSED NONDISPLACED FRACTURE OF PROXIMAL PHALANX OF LESSER TOE OF LEFT FOOT WITH ROUTINE HEALING, SUBSEQUENT ENCOUNTER: Primary | ICD-10-CM

## 2022-01-01 DIAGNOSIS — U07.1 INFECTION DUE TO 2019 NOVEL CORONAVIRUS: Primary | ICD-10-CM

## 2022-01-01 DIAGNOSIS — I10 BENIGN ESSENTIAL HYPERTENSION: ICD-10-CM

## 2022-01-01 DIAGNOSIS — Z86.711 HISTORY OF PULMONARY EMBOLISM: Primary | ICD-10-CM

## 2022-01-01 DIAGNOSIS — Z86.711 HISTORY OF PULMONARY EMBOLISM: ICD-10-CM

## 2022-01-01 DIAGNOSIS — Z00.00 LABORATORY EXAMINATION ORDERED AS PART OF A ROUTINE GENERAL MEDICAL EXAMINATION: ICD-10-CM

## 2022-01-01 DIAGNOSIS — I62.9 INTRACRANIAL HEMORRHAGE (H): ICD-10-CM

## 2022-01-01 DIAGNOSIS — L90.0 LICHEN SCLEROSUS: ICD-10-CM

## 2022-01-01 DIAGNOSIS — M15.0 PRIMARY OSTEOARTHRITIS INVOLVING MULTIPLE JOINTS: ICD-10-CM

## 2022-01-01 DIAGNOSIS — E11.9 TYPE 2 DIABETES MELLITUS WITHOUT COMPLICATION, WITHOUT LONG-TERM CURRENT USE OF INSULIN (H): Primary | ICD-10-CM

## 2022-01-01 DIAGNOSIS — M79.18 MUSCULOSKELETAL PAIN, CHRONIC: ICD-10-CM

## 2022-01-01 DIAGNOSIS — J44.9 CHRONIC OBSTRUCTIVE PULMONARY DISEASE, UNSPECIFIED COPD TYPE (H): ICD-10-CM

## 2022-01-01 DIAGNOSIS — E03.9 ACQUIRED HYPOTHYROIDISM: ICD-10-CM

## 2022-01-01 DIAGNOSIS — Z00.00 PREVENTATIVE HEALTH CARE: Primary | ICD-10-CM

## 2022-01-01 DIAGNOSIS — D68.51 FACTOR V LEIDEN (H): ICD-10-CM

## 2022-01-01 DIAGNOSIS — J96.02 ACUTE RESPIRATORY FAILURE WITH HYPERCAPNIA (H): ICD-10-CM

## 2022-01-01 DIAGNOSIS — Z00.00 LABORATORY EXAMINATION ORDERED AS PART OF A ROUTINE GENERAL MEDICAL EXAMINATION: Primary | ICD-10-CM

## 2022-01-01 DIAGNOSIS — E78.5 HYPERLIPIDEMIA LDL GOAL <130: ICD-10-CM

## 2022-01-01 DIAGNOSIS — E11.9 TYPE 2 DIABETES MELLITUS WITHOUT COMPLICATION, WITHOUT LONG-TERM CURRENT USE OF INSULIN (H): ICD-10-CM

## 2022-01-01 DIAGNOSIS — E66.01 MORBID OBESITY (H): ICD-10-CM

## 2022-01-01 DIAGNOSIS — R60.9 EDEMA, UNSPECIFIED TYPE: ICD-10-CM

## 2022-01-01 DIAGNOSIS — N85.01 COMPLEX ENDOMETRIAL HYPERPLASIA WITHOUT ATYPIA: ICD-10-CM

## 2022-01-01 DIAGNOSIS — R60.9 EDEMA, UNSPECIFIED TYPE: Primary | ICD-10-CM

## 2022-01-01 DIAGNOSIS — J30.2 SEASONAL ALLERGIC RHINITIS, UNSPECIFIED TRIGGER: ICD-10-CM

## 2022-01-01 DIAGNOSIS — N95.0 PMB (POSTMENOPAUSAL BLEEDING): Primary | ICD-10-CM

## 2022-01-01 DIAGNOSIS — N39.46 MIXED STRESS AND URGE URINARY INCONTINENCE: Primary | ICD-10-CM

## 2022-01-01 DIAGNOSIS — G89.29 MUSCULOSKELETAL PAIN, CHRONIC: ICD-10-CM

## 2022-01-01 DIAGNOSIS — E78.5 DYSLIPIDEMIA: ICD-10-CM

## 2022-01-01 DIAGNOSIS — Z11.59 NEED FOR HEPATITIS C SCREENING TEST: ICD-10-CM

## 2022-01-01 DIAGNOSIS — J44.1 COPD EXACERBATION (H): Primary | ICD-10-CM

## 2022-01-01 DIAGNOSIS — Z87.42 HISTORY OF ENDOMETRIAL HYPERPLASIA: Primary | ICD-10-CM

## 2022-01-01 DIAGNOSIS — L30.9 DERMATITIS: ICD-10-CM

## 2022-01-01 DIAGNOSIS — Z78.9 TAKES DIETARY SUPPLEMENTS: ICD-10-CM

## 2022-01-01 DIAGNOSIS — R73.03 PREDIABETES: ICD-10-CM

## 2022-01-01 DIAGNOSIS — D68.51 HETEROZYGOUS FACTOR V LEIDEN MUTATION (H): Primary | ICD-10-CM

## 2022-01-01 DIAGNOSIS — I26.99 OTHER PULMONARY EMBOLISM WITHOUT ACUTE COR PULMONALE, UNSPECIFIED CHRONICITY (H): ICD-10-CM

## 2022-01-01 DIAGNOSIS — N95.2 VAGINAL ATROPHY: ICD-10-CM

## 2022-01-01 DIAGNOSIS — N95.0 PMB (POSTMENOPAUSAL BLEEDING): ICD-10-CM

## 2022-01-01 DIAGNOSIS — J43.9 PULMONARY EMPHYSEMA, UNSPECIFIED EMPHYSEMA TYPE (H): ICD-10-CM

## 2022-01-01 DIAGNOSIS — T45.515A WARFARIN-INDUCED COAGULOPATHY (H): ICD-10-CM

## 2022-01-01 DIAGNOSIS — D68.32 WARFARIN-INDUCED COAGULOPATHY (H): ICD-10-CM

## 2022-01-01 DIAGNOSIS — Z71.89 COUNSELING FOR ESTROGEN REPLACEMENT THERAPY: ICD-10-CM

## 2022-01-01 DIAGNOSIS — R32 URINARY INCONTINENCE, UNSPECIFIED TYPE: ICD-10-CM

## 2022-01-01 DIAGNOSIS — Z12.11 SCREEN FOR COLON CANCER: ICD-10-CM

## 2022-01-01 DIAGNOSIS — Z51.5 COMFORT MEASURES ONLY STATUS: ICD-10-CM

## 2022-01-01 LAB
ABO/RH(D): NORMAL
ALBUMIN SERPL BCG-MCNC: 4.5 G/DL (ref 3.5–5.2)
ALBUMIN SERPL-MCNC: 3.2 G/DL (ref 3.4–5)
ALP SERPL-CCNC: 60 U/L (ref 40–150)
ALP SERPL-CCNC: 94 U/L (ref 35–104)
ALT SERPL W P-5'-P-CCNC: 17 U/L (ref 10–35)
ALT SERPL W P-5'-P-CCNC: 20 U/L (ref 0–50)
ANION GAP BLD CALCULATED.3IONS-SCNC: 13 MMOL/L (ref 3–14)
ANION GAP SERPL CALCULATED.3IONS-SCNC: 14 MMOL/L (ref 7–15)
ANION GAP SERPL CALCULATED.3IONS-SCNC: 4 MMOL/L (ref 3–14)
ANTIBODY SCREEN: NEGATIVE
AST SERPL W P-5'-P-CCNC: 18 U/L (ref 0–45)
AST SERPL W P-5'-P-CCNC: 28 U/L (ref 10–35)
BASOPHILS # BLD AUTO: 0 10E3/UL (ref 0–0.2)
BASOPHILS # BLD AUTO: 0 10E3/UL (ref 0–0.2)
BASOPHILS NFR BLD AUTO: 0 %
BASOPHILS NFR BLD AUTO: 0 %
BILIRUB SERPL-MCNC: 0.3 MG/DL
BILIRUB SERPL-MCNC: 0.3 MG/DL (ref 0.2–1.3)
BUN SERPL-MCNC: 11 MG/DL (ref 7–30)
BUN SERPL-MCNC: 13 MG/DL (ref 7–30)
BUN SERPL-MCNC: 13 MG/DL (ref 8–23)
CA-I BLD-MCNC: 4.7 MG/DL (ref 4.4–5.2)
CALCIUM SERPL-MCNC: 8.9 MG/DL (ref 8.5–10.1)
CALCIUM SERPL-MCNC: 9.3 MG/DL (ref 8.8–10.2)
CHLORIDE BLD-SCNC: 104 MMOL/L (ref 94–109)
CHLORIDE BLD-SCNC: 108 MMOL/L (ref 94–109)
CHLORIDE SERPL-SCNC: 101 MMOL/L (ref 98–107)
CHOLEST SERPL-MCNC: 164 MG/DL
CO2 BLD-SCNC: 29 MMOL/L (ref 20–32)
CO2 SERPL-SCNC: 29 MMOL/L (ref 20–32)
COHGB MFR BLD: 95 % (ref 92–100)
COHGB MFR BLD: 97 % (ref 92–100)
COHGB MFR BLD: 97 % (ref 92–100)
CREAT BLD-MCNC: 0.6 MG/DL (ref 0.5–1)
CREAT SERPL-MCNC: 0.58 MG/DL (ref 0.51–0.95)
CREAT SERPL-MCNC: 0.68 MG/DL (ref 0.52–1.04)
CREAT UR-MCNC: 153 MG/DL
DEPRECATED HCO3 PLAS-SCNC: 26 MMOL/L (ref 22–29)
EOSINOPHIL # BLD AUTO: 0.1 10E3/UL (ref 0–0.7)
EOSINOPHIL # BLD AUTO: 0.2 10E3/UL (ref 0–0.7)
EOSINOPHIL NFR BLD AUTO: 1 %
EOSINOPHIL NFR BLD AUTO: 3 %
ERYTHROCYTE [DISTWIDTH] IN BLOOD BY AUTOMATED COUNT: 12.8 % (ref 10–15)
ERYTHROCYTE [DISTWIDTH] IN BLOOD BY AUTOMATED COUNT: 13.2 % (ref 10–15)
FASTING STATUS PATIENT QL REPORTED: YES
GFR SERPL CREATININE-BSD FRML MDRD: >90 ML/MIN/1.73M2
GFR SERPL CREATININE-BSD FRML MDRD: >90 ML/MIN/1.73M2
GLUCOSE BLD-MCNC: 176 MG/DL (ref 70–99)
GLUCOSE BLD-MCNC: 99 MG/DL (ref 70–99)
GLUCOSE SERPL-MCNC: 179 MG/DL (ref 70–99)
HBA1C MFR BLD: 5.4 % (ref 0–5.6)
HCO3 BLDA-SCNC: 24 MMOL/L (ref 21–28)
HCO3 BLDA-SCNC: 24 MMOL/L (ref 21–28)
HCO3 BLDA-SCNC: 26 MMOL/L (ref 21–28)
HCO3 BLDV-SCNC: 27 MMOL/L (ref 21–28)
HCT VFR BLD AUTO: 45.2 % (ref 35–47)
HCT VFR BLD AUTO: 48 % (ref 35–47)
HCT VFR BLD CALC: 48 % (ref 35–47)
HCV AB SERPL QL IA: NONREACTIVE
HDLC SERPL-MCNC: 46 MG/DL
HGB BLD-MCNC: 14.3 G/DL (ref 11.7–15.7)
HGB BLD-MCNC: 15.4 G/DL (ref 11.7–15.7)
HGB BLD-MCNC: 16.3 G/DL (ref 11.7–15.7)
HOLD SPECIMEN: NORMAL
IMM GRANULOCYTES # BLD: 0.1 10E3/UL
IMM GRANULOCYTES NFR BLD: 1 %
INR BLD: 1.7 (ref 0.9–1.1)
INR BLD: 1.8 (ref 0.9–1.1)
INR BLD: 1.9 (ref 0.9–1.1)
INR BLD: 2 (ref 0.9–1.1)
INR BLD: 2.1 (ref 0.9–1.1)
INR BLD: 2.2 (ref 0.9–1.1)
INR BLD: 2.3 (ref 0.9–1.1)
INR BLD: 2.3 (ref 0.9–1.1)
INR BLD: 2.4 (ref 0.9–1.1)
INR BLD: 2.5 (ref 0.9–1.1)
INR BLD: 2.5 (ref 0.9–1.1)
INR BLD: 2.6 (ref 0.9–1.1)
INR BLD: 2.7 (ref 0.9–1.1)
INR BLD: 3.3 (ref 0.9–1.1)
INR BLD: 3.7 (ref 0.9–1.1)
INR BLD: 3.7 (ref 0.9–1.1)
INR PPP: 2.36 (ref 0.85–1.15)
LACTATE BLD-SCNC: 1.6 MMOL/L
LACTATE BLD-SCNC: 1.7 MMOL/L
LACTATE BLD-SCNC: 1.9 MMOL/L
LACTATE BLD-SCNC: 2 MMOL/L
LDLC SERPL CALC-MCNC: 68 MG/DL
LYMPHOCYTES # BLD AUTO: 1.6 10E3/UL (ref 0.8–5.3)
LYMPHOCYTES # BLD AUTO: 2.6 10E3/UL (ref 0.8–5.3)
LYMPHOCYTES NFR BLD AUTO: 21 %
LYMPHOCYTES NFR BLD AUTO: 28 %
MCH RBC QN AUTO: 30.4 PG (ref 26.5–33)
MCH RBC QN AUTO: 30.6 PG (ref 26.5–33)
MCHC RBC AUTO-ENTMCNC: 31.6 G/DL (ref 31.5–36.5)
MCHC RBC AUTO-ENTMCNC: 32.1 G/DL (ref 31.5–36.5)
MCV RBC AUTO: 95 FL (ref 78–100)
MCV RBC AUTO: 97 FL (ref 78–100)
MICROALBUMIN UR-MCNC: 21 MG/L
MICROALBUMIN/CREAT UR: 13.73 MG/G CR (ref 0–25)
MONOCYTES # BLD AUTO: 0.4 10E3/UL (ref 0–1.3)
MONOCYTES # BLD AUTO: 0.7 10E3/UL (ref 0–1.3)
MONOCYTES NFR BLD AUTO: 6 %
MONOCYTES NFR BLD AUTO: 7 %
NEUTROPHILS # BLD AUTO: 3.6 10E3/UL (ref 1.6–8.3)
NEUTROPHILS # BLD AUTO: 8.8 10E3/UL (ref 1.6–8.3)
NEUTROPHILS NFR BLD AUTO: 62 %
NEUTROPHILS NFR BLD AUTO: 71 %
NONHDLC SERPL-MCNC: 118 MG/DL
NRBC # BLD AUTO: 0 10E3/UL
NRBC BLD AUTO-RTO: 0 /100
PATH REPORT.COMMENTS IMP SPEC: NORMAL
PATH REPORT.COMMENTS IMP SPEC: NORMAL
PATH REPORT.FINAL DX SPEC: NORMAL
PATH REPORT.GROSS SPEC: NORMAL
PATH REPORT.MICROSCOPIC SPEC OTHER STN: NORMAL
PATH REPORT.RELEVANT HX SPEC: NORMAL
PCO2 BLDA: 39 MM HG (ref 35–45)
PCO2 BLDA: 44 MM HG (ref 35–45)
PCO2 BLDA: 49 MM HG (ref 35–45)
PCO2 BLDV: 60 MM HG (ref 40–50)
PH BLDA: 7.33 [PH] (ref 7.35–7.45)
PH BLDA: 7.35 [PH] (ref 7.35–7.45)
PH BLDA: 7.39 [PH] (ref 7.35–7.45)
PH BLDV: 7.26 [PH] (ref 7.32–7.43)
PHOTO IMAGE: NORMAL
PLATELET # BLD AUTO: 237 10E3/UL (ref 150–450)
PLATELET # BLD AUTO: 299 10E3/UL (ref 150–450)
PO2 BLDA: 101 MM HG (ref 80–105)
PO2 BLDA: 79 MM HG (ref 80–105)
PO2 BLDA: 88 MM HG (ref 80–105)
PO2 BLDV: 77 MM HG (ref 25–47)
POTASSIUM BLD-SCNC: 3.8 MMOL/L (ref 3.4–5.3)
POTASSIUM BLD-SCNC: 4.2 MMOL/L (ref 3.4–5.3)
POTASSIUM SERPL-SCNC: 3.9 MMOL/L (ref 3.4–5.3)
PROT SERPL-MCNC: 7.1 G/DL (ref 6.8–8.8)
PROT SERPL-MCNC: 7.8 G/DL (ref 6.4–8.3)
RADIOLOGIST FLAGS: ABNORMAL
RBC # BLD AUTO: 4.67 10E6/UL (ref 3.8–5.2)
RBC # BLD AUTO: 5.06 10E6/UL (ref 3.8–5.2)
RETINOPATHY: NEGATIVE
SAO2 % BLDV: 93 % (ref 94–100)
SODIUM BLD-SCNC: 142 MMOL/L (ref 133–144)
SODIUM SERPL-SCNC: 141 MMOL/L (ref 133–144)
SODIUM SERPL-SCNC: 141 MMOL/L (ref 136–145)
SPECIMEN EXPIRATION DATE: NORMAL
TRIGL SERPL-MCNC: 250 MG/DL
TROPONIN T SERPL HS-MCNC: 7 NG/L
TSH SERPL DL<=0.005 MIU/L-ACNC: 1.32 MU/L (ref 0.4–4)
WBC # BLD AUTO: 12.3 10E3/UL (ref 4–11)
WBC # BLD AUTO: 5.8 10E3/UL (ref 4–11)

## 2022-01-01 PROCEDURE — 36416 COLLJ CAPILLARY BLOOD SPEC: CPT

## 2022-01-01 PROCEDURE — 86850 RBC ANTIBODY SCREEN: CPT | Performed by: EMERGENCY MEDICINE

## 2022-01-01 PROCEDURE — 82043 UR ALBUMIN QUANTITATIVE: CPT

## 2022-01-01 PROCEDURE — 96375 TX/PRO/DX INJ NEW DRUG ADDON: CPT

## 2022-01-01 PROCEDURE — 02HV33Z INSERTION OF INFUSION DEVICE INTO SUPERIOR VENA CAVA, PERCUTANEOUS APPROACH: ICD-10-PCS | Performed by: EMERGENCY MEDICINE

## 2022-01-01 PROCEDURE — 85610 PROTHROMBIN TIME: CPT

## 2022-01-01 PROCEDURE — 96366 THER/PROPH/DIAG IV INF ADDON: CPT | Mod: 59

## 2022-01-01 PROCEDURE — 250N000011 HC RX IP 250 OP 636: Performed by: EMERGENCY MEDICINE

## 2022-01-01 PROCEDURE — 31500 INSERT EMERGENCY AIRWAY: CPT

## 2022-01-01 PROCEDURE — 99204 OFFICE O/P NEW MOD 45 MIN: CPT | Performed by: OBSTETRICS & GYNECOLOGY

## 2022-01-01 PROCEDURE — G0378 HOSPITAL OBSERVATION PER HR: HCPCS

## 2022-01-01 PROCEDURE — 99607 MTMS BY PHARM ADDL 15 MIN: CPT | Performed by: PHARMACIST

## 2022-01-01 PROCEDURE — 99213 OFFICE O/P EST LOW 20 MIN: CPT | Mod: 95 | Performed by: INTERNAL MEDICINE

## 2022-01-01 PROCEDURE — 82803 BLOOD GASES ANY COMBINATION: CPT

## 2022-01-01 PROCEDURE — 250N000009 HC RX 250: Performed by: EMERGENCY MEDICINE

## 2022-01-01 PROCEDURE — 250N000009 HC RX 250: Performed by: STUDENT IN AN ORGANIZED HEALTH CARE EDUCATION/TRAINING PROGRAM

## 2022-01-01 PROCEDURE — 99214 OFFICE O/P EST MOD 30 MIN: CPT | Performed by: FAMILY MEDICINE

## 2022-01-01 PROCEDURE — 86901 BLOOD TYPING SEROLOGIC RH(D): CPT | Performed by: EMERGENCY MEDICINE

## 2022-01-01 PROCEDURE — 250N000011 HC RX IP 250 OP 636: Performed by: STUDENT IN AN ORGANIZED HEALTH CARE EDUCATION/TRAINING PROGRAM

## 2022-01-01 PROCEDURE — 58100 BIOPSY OF UTERUS LINING: CPT | Performed by: OBSTETRICS & GYNECOLOGY

## 2022-01-01 PROCEDURE — 96375 TX/PRO/DX INJ NEW DRUG ADDON: CPT | Mod: 59

## 2022-01-01 PROCEDURE — 999N000065 XR CHEST PORT 1 VIEW

## 2022-01-01 PROCEDURE — 70450 CT HEAD/BRAIN W/O DYE: CPT

## 2022-01-01 PROCEDURE — 96365 THER/PROPH/DIAG IV INF INIT: CPT | Mod: 59

## 2022-01-01 PROCEDURE — 99225 PR SUBSEQUENT OBSERVATION CARE,LEVEL II: CPT | Performed by: STUDENT IN AN ORGANIZED HEALTH CARE EDUCATION/TRAINING PROGRAM

## 2022-01-01 PROCEDURE — 96376 TX/PRO/DX INJ SAME DRUG ADON: CPT

## 2022-01-01 PROCEDURE — 99292 CRITICAL CARE ADDL 30 MIN: CPT

## 2022-01-01 PROCEDURE — 85610 PROTHROMBIN TIME: CPT | Performed by: INTERNAL MEDICINE

## 2022-01-01 PROCEDURE — 80061 LIPID PANEL: CPT

## 2022-01-01 PROCEDURE — 99291 CRITICAL CARE FIRST HOUR: CPT | Mod: 25

## 2022-01-01 PROCEDURE — 258N000003 HC RX IP 258 OP 636: Performed by: EMERGENCY MEDICINE

## 2022-01-01 PROCEDURE — 83605 ASSAY OF LACTIC ACID: CPT

## 2022-01-01 PROCEDURE — 99397 PER PM REEVAL EST PAT 65+ YR: CPT | Performed by: INTERNAL MEDICINE

## 2022-01-01 PROCEDURE — 30283B1 TRANSFUSION OF NONAUTOLOGOUS 4-FACTOR PROTHROMBIN COMPLEX CONCENTRATE INTO VEIN, PERCUTANEOUS APPROACH: ICD-10-PCS | Performed by: EMERGENCY MEDICINE

## 2022-01-01 PROCEDURE — 250N000015 HC RX FACTOR IP MED 636 OP 636: Performed by: EMERGENCY MEDICINE

## 2022-01-01 PROCEDURE — 51702 INSERT TEMP BLADDER CATH: CPT

## 2022-01-01 PROCEDURE — G0463 HOSPITAL OUTPT CLINIC VISIT: HCPCS

## 2022-01-01 PROCEDURE — 99605 MTMS BY PHARM NP 15 MIN: CPT

## 2022-01-01 PROCEDURE — 76937 US GUIDE VASCULAR ACCESS: CPT

## 2022-01-01 PROCEDURE — 99213 OFFICE O/P EST LOW 20 MIN: CPT | Mod: 95 | Performed by: UROLOGY

## 2022-01-01 PROCEDURE — 5A1945Z RESPIRATORY VENTILATION, 24-96 CONSECUTIVE HOURS: ICD-10-PCS | Performed by: EMERGENCY MEDICINE

## 2022-01-01 PROCEDURE — 88305 TISSUE EXAM BY PATHOLOGIST: CPT | Performed by: PATHOLOGY

## 2022-01-01 PROCEDURE — 99607 MTMS BY PHARM ADDL 15 MIN: CPT

## 2022-01-01 PROCEDURE — 36620 INSERTION CATHETER ARTERY: CPT

## 2022-01-01 PROCEDURE — 85025 COMPLETE CBC W/AUTO DIFF WBC: CPT

## 2022-01-01 PROCEDURE — 999N000157 HC STATISTIC RCP TIME EA 10 MIN

## 2022-01-01 PROCEDURE — 80053 COMPREHEN METABOLIC PANEL: CPT

## 2022-01-01 PROCEDURE — 99233 SBSQ HOSP IP/OBS HIGH 50: CPT | Performed by: STUDENT IN AN ORGANIZED HEALTH CARE EDUCATION/TRAINING PROGRAM

## 2022-01-01 PROCEDURE — 80053 COMPREHEN METABOLIC PANEL: CPT | Performed by: EMERGENCY MEDICINE

## 2022-01-01 PROCEDURE — 36415 COLL VENOUS BLD VENIPUNCTURE: CPT

## 2022-01-01 PROCEDURE — 999N000158 HC STATISTIC RCP TIME ED VENT EA 10 MIN

## 2022-01-01 PROCEDURE — 99214 OFFICE O/P EST MOD 30 MIN: CPT | Mod: 25 | Performed by: OBSTETRICS & GYNECOLOGY

## 2022-01-01 PROCEDURE — 99220 PR INITIAL OBSERVATION CARE,LEVEL III: CPT | Performed by: INTERNAL MEDICINE

## 2022-01-01 PROCEDURE — 272N000017 HC KIT MONITOR CVP

## 2022-01-01 PROCEDURE — 85025 COMPLETE CBC W/AUTO DIFF WBC: CPT | Performed by: EMERGENCY MEDICINE

## 2022-01-01 PROCEDURE — 36416 COLLJ CAPILLARY BLOOD SPEC: CPT | Performed by: INTERNAL MEDICINE

## 2022-01-01 PROCEDURE — 250N000011 HC RX IP 250 OP 636: Performed by: INTERNAL MEDICINE

## 2022-01-01 PROCEDURE — 84484 ASSAY OF TROPONIN QUANT: CPT | Performed by: EMERGENCY MEDICINE

## 2022-01-01 PROCEDURE — 86803 HEPATITIS C AB TEST: CPT

## 2022-01-01 PROCEDURE — 36415 COLL VENOUS BLD VENIPUNCTURE: CPT | Performed by: EMERGENCY MEDICINE

## 2022-01-01 PROCEDURE — 99214 OFFICE O/P EST MOD 30 MIN: CPT | Mod: 95 | Performed by: OBSTETRICS & GYNECOLOGY

## 2022-01-01 PROCEDURE — 99606 MTMS BY PHARM EST 15 MIN: CPT | Performed by: PHARMACIST

## 2022-01-01 PROCEDURE — 03HY32Z INSERTION OF MONITORING DEVICE INTO UPPER ARTERY, PERCUTANEOUS APPROACH: ICD-10-PCS | Performed by: EMERGENCY MEDICINE

## 2022-01-01 PROCEDURE — 250N000011 HC RX IP 250 OP 636

## 2022-01-01 PROCEDURE — 94002 VENT MGMT INPAT INIT DAY: CPT

## 2022-01-01 PROCEDURE — 84443 ASSAY THYROID STIM HORMONE: CPT

## 2022-01-01 PROCEDURE — 99212 OFFICE O/P EST SF 10 MIN: CPT | Mod: 95 | Performed by: UROLOGY

## 2022-01-01 PROCEDURE — 83036 HEMOGLOBIN GLYCOSYLATED A1C: CPT

## 2022-01-01 PROCEDURE — 99213 OFFICE O/P EST LOW 20 MIN: CPT | Mod: CS | Performed by: PHYSICIAN ASSISTANT

## 2022-01-01 PROCEDURE — 80047 BASIC METABLC PNL IONIZED CA: CPT

## 2022-01-01 PROCEDURE — 85610 PROTHROMBIN TIME: CPT | Performed by: EMERGENCY MEDICINE

## 2022-01-01 PROCEDURE — 120N000001 HC R&B MED SURG/OB

## 2022-01-01 PROCEDURE — 93005 ELECTROCARDIOGRAM TRACING: CPT

## 2022-01-01 PROCEDURE — 99221 1ST HOSP IP/OBS SF/LOW 40: CPT | Performed by: PHYSICIAN ASSISTANT

## 2022-01-01 PROCEDURE — 36556 INSERT NON-TUNNEL CV CATH: CPT

## 2022-01-01 RX ORDER — SCOLOPAMINE TRANSDERMAL SYSTEM 1 MG/1
1 PATCH, EXTENDED RELEASE TRANSDERMAL
Status: DISCONTINUED | OUTPATIENT
Start: 2022-01-01 | End: 2022-01-01 | Stop reason: HOSPADM

## 2022-01-01 RX ORDER — NALOXONE HYDROCHLORIDE 0.4 MG/ML
0.4 INJECTION, SOLUTION INTRAMUSCULAR; INTRAVENOUS; SUBCUTANEOUS
Status: DISCONTINUED | OUTPATIENT
Start: 2022-01-01 | End: 2022-01-01 | Stop reason: HOSPADM

## 2022-01-01 RX ORDER — HEPARIN SODIUM 5000 [USP'U]/ML
5000 INJECTION, SOLUTION INTRAVENOUS; SUBCUTANEOUS EVERY 12 HOURS
Qty: 28 ML | Refills: 0 | Status: SHIPPED | OUTPATIENT
Start: 2022-01-01 | End: 2022-01-01

## 2022-01-01 RX ORDER — FUROSEMIDE 20 MG
20 TABLET ORAL DAILY
Qty: 90 TABLET | Refills: 3 | Status: SHIPPED | OUTPATIENT
Start: 2022-01-01

## 2022-01-01 RX ORDER — GLYCOPYRROLATE 0.2 MG/ML
0.1 INJECTION, SOLUTION INTRAMUSCULAR; INTRAVENOUS 3 TIMES DAILY PRN
Status: DISCONTINUED | OUTPATIENT
Start: 2022-01-01 | End: 2022-01-01

## 2022-01-01 RX ORDER — NALOXONE HYDROCHLORIDE 0.4 MG/ML
0.2 INJECTION, SOLUTION INTRAMUSCULAR; INTRAVENOUS; SUBCUTANEOUS
Status: DISCONTINUED | OUTPATIENT
Start: 2022-01-01 | End: 2022-01-01 | Stop reason: HOSPADM

## 2022-01-01 RX ORDER — MORPHINE SULFATE 2 MG/ML
2-4 INJECTION, SOLUTION INTRAMUSCULAR; INTRAVENOUS EVERY 30 MIN PRN
Status: DISCONTINUED | OUTPATIENT
Start: 2022-01-01 | End: 2022-01-01 | Stop reason: HOSPADM

## 2022-01-01 RX ORDER — GLYCOPYRROLATE 0.2 MG/ML
0.3 INJECTION, SOLUTION INTRAMUSCULAR; INTRAVENOUS 3 TIMES DAILY PRN
Status: DISCONTINUED | OUTPATIENT
Start: 2022-01-01 | End: 2022-01-01 | Stop reason: HOSPADM

## 2022-01-01 RX ORDER — FUROSEMIDE 10 MG/ML
40 INJECTION INTRAMUSCULAR; INTRAVENOUS ONCE
Status: COMPLETED | OUTPATIENT
Start: 2022-01-01 | End: 2022-01-01

## 2022-01-01 RX ORDER — FLUTICASONE PROPIONATE 110 UG/1
1 AEROSOL, METERED RESPIRATORY (INHALATION) 2 TIMES DAILY
Qty: 12 G | Refills: 5 | Status: SHIPPED | OUTPATIENT
Start: 2022-01-01

## 2022-01-01 RX ORDER — PROPOFOL 10 MG/ML
5-75 INJECTION, EMULSION INTRAVENOUS CONTINUOUS
Status: DISCONTINUED | OUTPATIENT
Start: 2022-01-01 | End: 2022-01-01 | Stop reason: HOSPADM

## 2022-01-01 RX ORDER — SODIUM CHLORIDE 234 MG/ML
30 SOLUTION, CONCENTRATE INTRAVENOUS ONCE
Status: COMPLETED | OUTPATIENT
Start: 2022-01-01 | End: 2022-01-01

## 2022-01-01 RX ORDER — GINSENG 100 MG
1 CAPSULE ORAL DAILY
COMMUNITY

## 2022-01-01 RX ORDER — ETOMIDATE 2 MG/ML
30 INJECTION INTRAVENOUS ONCE
Status: COMPLETED | OUTPATIENT
Start: 2022-01-01 | End: 2022-01-01

## 2022-01-01 RX ORDER — AZITHROMYCIN 250 MG/1
TABLET, FILM COATED ORAL
Qty: 6 TABLET | Refills: 0 | Status: SHIPPED | OUTPATIENT
Start: 2022-01-01 | End: 2022-01-01

## 2022-01-01 RX ORDER — LORAZEPAM 2 MG/ML
0.5 INJECTION INTRAMUSCULAR EVERY 6 HOURS PRN
Status: DISCONTINUED | OUTPATIENT
Start: 2022-01-01 | End: 2022-01-01 | Stop reason: HOSPADM

## 2022-01-01 RX ORDER — GABAPENTIN 300 MG/1
CAPSULE ORAL
Qty: 180 CAPSULE | Refills: 3 | Status: SHIPPED | OUTPATIENT
Start: 2022-01-01

## 2022-01-01 RX ORDER — FENTANYL CITRATE 50 UG/ML
100 INJECTION, SOLUTION INTRAMUSCULAR; INTRAVENOUS ONCE
Status: COMPLETED | OUTPATIENT
Start: 2022-01-01 | End: 2022-01-01

## 2022-01-01 RX ORDER — METHOCARBAMOL 750 MG/1
750 TABLET, FILM COATED ORAL AT BEDTIME
Qty: 90 TABLET | Refills: 3 | Status: SHIPPED | OUTPATIENT
Start: 2022-01-01

## 2022-01-01 RX ORDER — IOPAMIDOL 755 MG/ML
120 INJECTION, SOLUTION INTRAVASCULAR ONCE
Status: COMPLETED | OUTPATIENT
Start: 2022-01-01 | End: 2022-01-01

## 2022-01-01 RX ORDER — CODEINE PHOSPHATE AND GUAIFENESIN 10; 100 MG/5ML; MG/5ML
1-2 SOLUTION ORAL
Qty: 118 ML | Refills: 0 | Status: SHIPPED | OUTPATIENT
Start: 2022-01-01

## 2022-01-01 RX ORDER — PROPOFOL 10 MG/ML
INJECTION, EMULSION INTRAVENOUS
Status: COMPLETED
Start: 2022-01-01 | End: 2022-01-01

## 2022-01-01 RX ORDER — METFORMIN HCL 500 MG
TABLET, EXTENDED RELEASE 24 HR ORAL
Qty: 90 TABLET | Refills: 3 | Status: SHIPPED | OUTPATIENT
Start: 2022-01-01

## 2022-01-01 RX ORDER — WARFARIN SODIUM 2 MG/1
TABLET ORAL
Qty: 180 TABLET | Refills: 1 | Status: SHIPPED | OUTPATIENT
Start: 2022-01-01

## 2022-01-01 RX ORDER — PRAVASTATIN SODIUM 40 MG
40 TABLET ORAL DAILY
Qty: 90 TABLET | Refills: 3 | Status: SHIPPED | OUTPATIENT
Start: 2022-01-01

## 2022-01-01 RX ORDER — METHYLPREDNISOLONE 4 MG
TABLET, DOSE PACK ORAL
Qty: 21 TABLET | Refills: 0 | Status: SHIPPED | OUTPATIENT
Start: 2022-01-01 | End: 2022-01-01

## 2022-01-01 RX ORDER — CLOBETASOL PROPIONATE 0.5 MG/G
OINTMENT TOPICAL
Qty: 45 G | Refills: 4 | Status: SHIPPED | OUTPATIENT
Start: 2022-01-01

## 2022-01-01 RX ORDER — FUROSEMIDE 20 MG
20 TABLET ORAL DAILY
Qty: 30 TABLET | Refills: 1 | Status: SHIPPED | OUTPATIENT
Start: 2022-01-01 | End: 2022-01-01

## 2022-01-01 RX ORDER — FENTANYL CITRATE 50 UG/ML
100 INJECTION, SOLUTION INTRAMUSCULAR; INTRAVENOUS
Status: COMPLETED | OUTPATIENT
Start: 2022-01-01 | End: 2022-01-01

## 2022-01-01 RX ORDER — LABETALOL HYDROCHLORIDE 5 MG/ML
20 INJECTION, SOLUTION INTRAVENOUS ONCE
Status: COMPLETED | OUTPATIENT
Start: 2022-01-01 | End: 2022-01-01

## 2022-01-01 RX ORDER — FENTANYL CITRATE 50 UG/ML
INJECTION, SOLUTION INTRAMUSCULAR; INTRAVENOUS
Status: COMPLETED
Start: 2022-01-01 | End: 2022-01-01

## 2022-01-01 RX ORDER — ALBUTEROL SULFATE 90 UG/1
2 AEROSOL, METERED RESPIRATORY (INHALATION) EVERY 4 HOURS PRN
Qty: 18 G | Refills: 3 | Status: SHIPPED | OUTPATIENT
Start: 2022-01-01

## 2022-01-01 RX ORDER — HALOPERIDOL 5 MG/ML
1-2 INJECTION INTRAMUSCULAR EVERY 6 HOURS PRN
Status: DISCONTINUED | OUTPATIENT
Start: 2022-01-01 | End: 2022-01-01 | Stop reason: HOSPADM

## 2022-01-01 RX ORDER — WARFARIN SODIUM 2 MG/1
TABLET ORAL
Qty: 180 TABLET | Refills: 1 | Status: SHIPPED | OUTPATIENT
Start: 2022-01-01 | End: 2022-01-01

## 2022-01-01 RX ORDER — MORPHINE SULFATE 2 MG/ML
1-2 INJECTION, SOLUTION INTRAMUSCULAR; INTRAVENOUS
Status: DISCONTINUED | OUTPATIENT
Start: 2022-01-01 | End: 2022-01-01

## 2022-01-01 RX ORDER — LOSARTAN POTASSIUM 50 MG/1
50 TABLET ORAL DAILY
Qty: 90 TABLET | Refills: 3 | Status: SHIPPED | OUTPATIENT
Start: 2022-01-01

## 2022-01-01 RX ORDER — GABAPENTIN 100 MG/1
CAPSULE ORAL
Qty: 90 CAPSULE | Refills: 3 | Status: SHIPPED | OUTPATIENT
Start: 2022-01-01

## 2022-01-01 RX ADMIN — PROTHROMBIN, COAGULATION FACTOR VII HUMAN, COAGULATION FACTOR IX HUMAN, COAGULATION FACTOR X HUMAN, PROTEIN C, PROTEIN S HUMAN, AND WATER 2647 UNITS: KIT at 16:50

## 2022-01-01 RX ADMIN — NICARDIPINE HYDROCHLORIDE 2.5 MG/HR: 0.2 INJECTION INTRAVENOUS at 15:48

## 2022-01-01 RX ADMIN — HALOPERIDOL LACTATE 2 MG: 5 INJECTION, SOLUTION INTRAMUSCULAR at 21:32

## 2022-01-01 RX ADMIN — MORPHINE SULFATE 2 MG: 2 INJECTION, SOLUTION INTRAMUSCULAR; INTRAVENOUS at 20:59

## 2022-01-01 RX ADMIN — MORPHINE SULFATE 4 MG: 2 INJECTION, SOLUTION INTRAMUSCULAR; INTRAVENOUS at 00:08

## 2022-01-01 RX ADMIN — SODIUM CHLORIDE 30 ML: 234 INJECTION INTRAMUSCULAR; INTRAVENOUS; SUBCUTANEOUS at 15:53

## 2022-01-01 RX ADMIN — PHYTONADIONE 10 MG: 10 INJECTION, EMULSION INTRAMUSCULAR; INTRAVENOUS; SUBCUTANEOUS at 16:51

## 2022-01-01 RX ADMIN — MORPHINE SULFATE 4 MG: 2 INJECTION, SOLUTION INTRAMUSCULAR; INTRAVENOUS at 22:21

## 2022-01-01 RX ADMIN — GLYCOPYRROLATE 0.1 MG: 0.2 INJECTION, SOLUTION INTRAMUSCULAR; INTRAVENOUS at 07:31

## 2022-01-01 RX ADMIN — FENTANYL CITRATE 100 MCG: 50 INJECTION, SOLUTION INTRAMUSCULAR; INTRAVENOUS at 15:51

## 2022-01-01 RX ADMIN — FENTANYL CITRATE 100 MCG: 50 INJECTION, SOLUTION INTRAMUSCULAR; INTRAVENOUS at 16:40

## 2022-01-01 RX ADMIN — LABETALOL HYDROCHLORIDE 20 MG: 5 INJECTION, SOLUTION INTRAVENOUS at 16:53

## 2022-01-01 RX ADMIN — MORPHINE SULFATE 4 MG: 2 INJECTION, SOLUTION INTRAMUSCULAR; INTRAVENOUS at 18:01

## 2022-01-01 RX ADMIN — MORPHINE SULFATE 4 MG: 2 INJECTION, SOLUTION INTRAMUSCULAR; INTRAVENOUS at 21:04

## 2022-01-01 RX ADMIN — MORPHINE SULFATE 4 MG: 2 INJECTION, SOLUTION INTRAMUSCULAR; INTRAVENOUS at 14:14

## 2022-01-01 RX ADMIN — PROPOFOL 40 MCG/KG/MIN: 10 INJECTION, EMULSION INTRAVENOUS at 15:50

## 2022-01-01 RX ADMIN — FENTANYL CITRATE 100 MCG: 50 INJECTION, SOLUTION INTRAMUSCULAR; INTRAVENOUS at 16:05

## 2022-01-01 RX ADMIN — PROPOFOL 40 MCG/KG/MIN: 10 INJECTION, EMULSION INTRAVENOUS at 19:24

## 2022-01-01 RX ADMIN — MORPHINE SULFATE 4 MG: 2 INJECTION, SOLUTION INTRAMUSCULAR; INTRAVENOUS at 10:38

## 2022-01-01 RX ADMIN — ETOMIDATE 30 MG: 2 INJECTION, SOLUTION INTRAVENOUS at 15:45

## 2022-01-01 RX ADMIN — SCOPALAMINE 1 PATCH: 1 PATCH, EXTENDED RELEASE TRANSDERMAL at 09:29

## 2022-01-01 RX ADMIN — MORPHINE SULFATE 4 MG: 2 INJECTION, SOLUTION INTRAMUSCULAR; INTRAVENOUS at 00:53

## 2022-01-01 RX ADMIN — MORPHINE SULFATE 4 MG: 2 INJECTION, SOLUTION INTRAMUSCULAR; INTRAVENOUS at 04:47

## 2022-01-01 RX ADMIN — GLYCOPYRROLATE 0.1 MG: 0.2 INJECTION, SOLUTION INTRAMUSCULAR; INTRAVENOUS at 18:05

## 2022-01-01 RX ADMIN — FENTANYL CITRATE 100 MCG: 50 INJECTION, SOLUTION INTRAMUSCULAR; INTRAVENOUS at 15:44

## 2022-01-01 RX ADMIN — SUCCINYLCHOLINE CHLORIDE 200 MG: 20 INJECTION, SOLUTION INTRAMUSCULAR; INTRAVENOUS at 15:45

## 2022-01-01 RX ADMIN — HYDROMORPHONE HYDROCHLORIDE 1 MG: 1 INJECTION, SOLUTION INTRAMUSCULAR; INTRAVENOUS; SUBCUTANEOUS at 17:01

## 2022-01-01 RX ADMIN — MORPHINE SULFATE 4 MG: 2 INJECTION, SOLUTION INTRAMUSCULAR; INTRAVENOUS at 06:06

## 2022-01-01 RX ADMIN — GLYCOPYRROLATE 0.1 MG: 0.2 INJECTION, SOLUTION INTRAMUSCULAR; INTRAVENOUS at 11:43

## 2022-01-01 RX ADMIN — GLYCOPYRROLATE 0.1 MG: 0.2 INJECTION, SOLUTION INTRAMUSCULAR; INTRAVENOUS at 00:53

## 2022-01-01 RX ADMIN — LORAZEPAM 0.5 MG: 2 INJECTION INTRAMUSCULAR; INTRAVENOUS at 04:41

## 2022-01-01 RX ADMIN — FUROSEMIDE 40 MG: 10 INJECTION, SOLUTION INTRAMUSCULAR; INTRAVENOUS at 11:49

## 2022-01-01 RX ADMIN — LORAZEPAM 0.5 MG: 2 INJECTION INTRAMUSCULAR; INTRAVENOUS at 21:03

## 2022-01-01 RX ADMIN — MORPHINE SULFATE 4 MG: 2 INJECTION, SOLUTION INTRAMUSCULAR; INTRAVENOUS at 11:00

## 2022-01-01 ASSESSMENT — ACTIVITIES OF DAILY LIVING (ADL)
ADLS_ACUITY_SCORE: 31
ADLS_ACUITY_SCORE: 35
ADLS_ACUITY_SCORE: 31
ADLS_ACUITY_SCORE: 35
ADLS_ACUITY_SCORE: 31
ADLS_ACUITY_SCORE: 35
ADLS_ACUITY_SCORE: 31
ADLS_ACUITY_SCORE: 35
ADLS_ACUITY_SCORE: 31
ADLS_ACUITY_SCORE: 35
ADLS_ACUITY_SCORE: 31
CURRENT_FUNCTION: NO ASSISTANCE NEEDED
ADLS_ACUITY_SCORE: 31
ADLS_ACUITY_SCORE: 35
ADLS_ACUITY_SCORE: 31
ADLS_ACUITY_SCORE: 31

## 2022-01-01 ASSESSMENT — ENCOUNTER SYMPTOMS
CONSTIPATION: 0
NAUSEA: 0
ABDOMINAL PAIN: 0
HEARTBURN: 0
SORE THROAT: 1
NERVOUS/ANXIOUS: 0
SHORTNESS OF BREATH: 1
DYSURIA: 0
JOINT SWELLING: 0
EYE PAIN: 0
DIARRHEA: 0
DIZZINESS: 0
MYALGIAS: 1
BREAST MASS: 0
ARTHRALGIAS: 1
HEADACHES: 0
HEMATURIA: 0
COUGH: 1
CHILLS: 0
PALPITATIONS: 0
WEAKNESS: 0
PARESTHESIAS: 0
HEMATOCHEZIA: 0
FREQUENCY: 0
FEVER: 0

## 2022-01-01 ASSESSMENT — PAIN SCALES - GENERAL
PAINLEVEL: MILD PAIN (2)
PAINLEVEL: MILD PAIN (2)
PAINLEVEL: NO PAIN (1)

## 2022-01-01 ASSESSMENT — MIFFLIN-ST. JEOR: SCORE: 2034.96

## 2022-01-30 NOTE — PROGRESS NOTES
Medication Therapy Management (MTM) Encounter    ASSESSMENT:                            Medication Adherence/Access: No issues identified    Diabetes: Patient is meeting A1c goal of < 7%. Pt would benefit from no changes.    Hypertension: Patient is meeting BP goal of < 130/80mmHg. Patient's persistent cough was not relieved by addition of allergy medication. Cough may be due to lisinopril side effect. Would benefit from switching to losartan 25 mg daily to eliminate possible side effect and maintain blood pressure control.    Hyperlipidemia: Patient is due for repeat lipid panel.    Hypothyroidism: Stable. Last TSH is within normal limits.     Osteoarthritis/Back Pain: Patient previously used ibuprofen for pain management and would like to restart this medication to help treat bursitis. Per [employer], warfarin interacts with ibuprofen and increases bleeding risk. Would benefit from follow up with PCP to evaluate bursitis and other appropriate referrals for management such as PT. Advised patient not to start ibuprofen again without first speaking with anticoagulation nurse and PCP.    Urinary Incontinence/ERT: Patient completed a trial discontinuation of Myrbetriq and noticed no change in symptoms. Patient would prefer to permanently discontinue Mybetriq due to cost and lack of effectiveness. Would benefit from follow up with urology if treatment is desired again in the future.    Hx pulmonary embolism: Last INR in goal range.    Dematitis: Stable.    Allergies: Cough symptoms were not relieved by addition of antihistamine. Cough may not be allergy related and is likely a side effect from lisinopril. Would benefit from reevaluating need for antihistamine after switching blood pressure medications.    Supplements: Stable.    PLAN:                            1. I will talk to Dr. English about changing your lisinopril 10 mg to losartan 25 mg to help eliminate the cough.  2. If you want to restart the ibuprofen make sure  you speak with Dr. English and your anticoagulation nurse first.    Future considerations:  1. Complete repeat lipid panel.    Follow-up: Return in 23 weeks (on 7/11/2022) for MTM Pharmacist Visit, using a phone visit. or sooner if needed    SUBJECTIVE/OBJECTIVE:                          Petra Mcnulty is a 71 year old female called for an initial visit for 2022. She was referred to me from Shanta English.      Reason for visit: Myrbetriq hold follow up    Allergies/ADRs: Reviewed in chart  Past Medical History: Reviewed in chart  Tobacco: She reports that she quit smoking about 16 years ago. Her smoking use included cigarettes. She smoked 0.50 packs per day. She has never used smokeless tobacco.  Alcohol: not currently using    Medication Adherence/Access: Patient uses pill box(es).  Patient takes medications 2 time(s) per day.   Per patient, misses medication 0-1 times per week.   Medication barriers: cost of Myrbetriq   The patient fills medications at [facility]: YES.    Diabetes:  Pt currently taking metformin  mg daily. No side effects reported.  SMBG Monitoring: None  Symptoms of low blood sugar? none.   Recent symptoms of high blood sugar? none.    Last eye exam was: Due  Last foot exam was: Due  Lab Results   Component Value Date    A1C 6.2 09/01/2021    A1C 6.4 01/19/2021    A1C 6.2 09/25/2020     No results found for: UCRR, MICROL, UMALCR    Hypertension: Current HTN meds include lisinopril 10 mg daily. Pt reports irritation that causes a cough that comes and goes throughout the day. States she has been on this medication for a long time but only starting having this cough feeling within the last 6 months. Reports she thought it was from allergies and drainage at last visit.    BP Readings from Last 3 Encounters:   09/27/21 123/69   06/14/21 128/80   06/03/21 124/70     Potassium   Date Value Ref Range Status   09/01/2021 4.0 3.4 - 5.3 mmol/L Final   01/19/2021 4.2 3.4 - 5.3 mmol/L Final      Creatinine   Date Value Ref Range Status   09/01/2021 0.60 0.52 - 1.04 mg/dL Final   01/19/2021 0.67 0.52 - 1.04 mg/dL Final     Hyperlipidemia: Currently taking pravastatin 40 mg daily. 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 No concerns.    Recent Labs   Lab Test 01/19/21  0925   CHOL 178   HDL 41*   LDL 84   TRIG 265*     Hypothyroidism: Currently taking levothyroxine 200 mcg on Sundays, 100 mcg daily all other days. No fatigue, unexplained weight gain or loss, heat or cold intolerance. Currently taking levothyroxine 1 hour before food with all other morning medications.  TSH   Date Value Ref Range Status   02/16/2021 1.93 0.40 - 4.00 mU/L Final     Osteoarthritis/Back Pain: Currently taking gabapentin 100 mg every morning and 300 mg at night, Norco 5/325 mg as needed (has not taken in over a month), methocarbamol 750 mg nightly (out of refills, has not been taking the last few days), Tylenol 1000 mg twice daily. Reports that she had new bursitis in her hip about a month ago that caused her to walk with a cane. At that time she took ibuprofen 800 mg twice daily, and Norco nightly but now has tapered off these medications. Feels that the bursitis may be coming back and would like to know if she could take the ibuprofen again. Has seen pain management specialist in the past. Reports she has drowsiness from pain medications but no other side effects.    Urinary Incontinence/ERT: Currently taking estradiol 0.1 mg/g vaginal cream (uses weekly). Reports she stopped the myrbetriq 2 weeks ago and has not noticed any major difference in symptoms. States she is making an appointment with urology and if her symptoms worsen she will talk about it at the appointment. Reports she is a poor water drinker so she has been pushing herself to drink more water, which is causing her to use the bathroom more often. Notices diet soda and coffee also  "increase urination. Reports she only wakes up once per night to go to the bathroom and does not find it bothersome as long as she falls back asleep.    Hx pulmonary embolism: Currently taking warfarin 3 mg Mondays, 4 mg all other days of the week. Goal INR 2-3. No unexplained bleeding or bruising. States she has her INR checked every six weeks.     Lab Results   Component Value Date    INR 2.1 12/21/2021    INR 3.0 11/08/2021    INR 2.6 09/27/2021     Dematitis: Currently taking ciclopirox 0.77% cream twice daily as needed, clobetasol 0.05% as needed (at least twice weekly), nystatin powder twice daily as needed. No concerns, states these are working well for her.     Allergies: Currently taking loratadine 10 mg daily and albuterol inhaler as needed. Reports she uses inhaler when she has tightness in her chest. Notices tightness most when working outside around dust and leaves. Uses 1-2 times weekly. Reports she restarted her loratadine and she still has a cough. States it is a productive cough but explained further that she has to cough hard for 15-20 seconds to get any mucus out. Describes cough as a \"tickle\" that comes and goes all day. Reports she is not sure if the loratadine made it better or not.    Supplements: Currently taking vitamin D 2000 units daily, vitamin C 1000 mg daily. Reports she takes these supplements after hearing they could help prevent covid infection. No concerns.    Today's Vitals: There were no vitals taken for this visit.  ----------------  I spent 27 minutes with this patient today. I offer these suggestions for consideration by Dr. English. A copy of the visit note was provided to the patient's provider(s).    The patient was sent via [employer] a summary of these recommendations.     Ms Mcnulty was seen independently by Dr. Winter. I have reviewed and agree with the resident note and plan of care.  Lori Pitts, PharmD      Britta Winter, PharmD  Medication Therapy Management " Resident  Pager: [phone]    Telemedicine Visit Details  Type of service:  Telephone visit  Start Time: 10:00 AM  End Time: 10:27 AM  Originating Location (patient location): Home  Distant Location (provider location):  [facility]     Medication Therapy Recommendations  Benign essential hypertension    Current Medication: lisinopril (ZESTRIL) 10 MG tablet   Rationale: Undesirable effect - Adverse medication event - Safety   Recommendation: Change Medication - losartan 25 MG tablet   Status: Contact Provider - Awaiting Response

## 2022-01-31 NOTE — Clinical Note
Hi Dr. English    Petra has been complaining of a cough during our last couple of visits and the addition of allergy medications has not made it any better. I am thinking this is most likely a side effect of her lisinopril. I would recommend discontinuing lisinopril and starting losartan 25 mg to see if it resolves this cough. Please let me know your thoughts on this. I can send in a prescription for you if it is helpful.    Thank you!    Britta Winter, PharmD  Medication Therapy Management Resident  Pager: [phone]

## 2022-01-31 NOTE — LETTER
January 31, 2022  Petra Mcnulty  [address] [place] [address]    [facility]        Thank you for talking with me on Jan 31, 2022 about your health and medications. As a follow-up to our conversation, I have included two documents:      1. Your Recommended To-Do List has steps you should take to get the best results from your medications.  2. Your Medication List will help you keep track of your medications and how to take them.    If you want to talk about these documents, please call MICHAEL BENDER RPH at phone: [phone], Monday-Friday 8-4:30pm.    I look forward to working with you and your doctors to make sure your medications work well for you.    Sincerely,    MICHAEL BENDER RPH

## 2022-01-31 NOTE — LETTER
"Recommended To-Do List      Prepared on: 1/31/2022     You can get the best results from your medications by completing the items on this \"To-Do List.\"      Bring your To-Do List when you go to your doctor. And, share it with your family or caregivers.    My To-Do List:  What we talked about: What I should do:   An issue with your medication    I will talk to Dr. English about changing your blood pressure medication from lisinopril (ZESTRIL) to losartan (COZAAR) to help with the cough          What we talked about: What I should do:                     "

## 2022-01-31 NOTE — LETTER
_  Medication List        Prepared on: 1/31/2022     Bring your Medication List when you go to the doctor, hospital, or   emergency room. And, share it with your family or caregivers.     Note any changes to how you take your medications.  Cross out medications when you no longer use them.    Medication How I take it Why I use it Prescriber   acetaminophen (TYLENOL) 500 MG tablet Take 1,000 mg by mouth 2 times daily  Pain Patient Reported   albuterol (PROAIR HFA/PROVENTIL HFA/VENTOLIN HFA) 108 (90 Base) MCG/ACT inhaler Inhale 2 puffs into the lungs every 4 hours as needed for shortness of breath / dyspnea or wheezing History of Pulmonary Embolism Shanta English MD   Cholecalciferol (VITAMIN D) 50 MCG (2000 UT) CAPS Take 2000 international unit(s) daily [facility] Mona Diaz MD   ciclopirox (LOPROX) 0.77 % cream Apply to affected area on face BID x 4-6 weeks. Tapering with improvement. Restart with flares. Seborrheic Dermatitis Katie Wren PA-C   clobetasol (TEMOVATE) 0.05 % external ointment Apply topically daily Lichen Sclerosus Parisa Goldberg MD   estradiol (ESTRACE) 0.1 MG/GM vaginal cream Place 1 g vaginally twice a week Daily for the first 14 days Vaginal Atrophy Parisa Golbderg MD   gabapentin (NEURONTIN) 100 MG capsule Take 1 capsule (100 mg) by mouth every morning Primary osteoarthritis involving multiple joints Shanta English MD   gabapentin (NEURONTIN) 300 MG capsule Take 1 capsule (300 mg) by mouth daily as needed (pain) Primary osteoarthritis involving multiple joints Shanta English MD   HYDROcodone-acetaminophen (NORCO) 5-325 MG tablet Take 1 tablet by mouth every 6 hours as needed for severe pain Primary osteoarthritis involving multiple joints Patient Reported   levonorgestrel (MIRENA) 20 MCG/24HR IUD 1 each (20 mcg) by Intrauterine route once 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, complex Parisa Goldberg MD   levothyroxine (SYNTHROID/LEVOTHROID) 100 MCG tablet  TAKE 2 TABLETS BY MOUTH ON SUNDAY AND 1 TABLET ON ALL OTHER DAYS OF THE WEEK Acquired Hypothyroidism Mona Diaz MD   lisinopril (ZESTRIL) 10 MG tablet Take 1 tablet (10 mg) by mouth daily Benign Essential Hypertension Mona Diaz MD   loratadine (CLARITIN) 10 MG tablet Take 10 mg by mouth daily Seasonal Allergies Patient Reported   metFORMIN (GLUCOPHAGE-XR) 500 MG 24 hr tablet Take 1 tablet (500 mg) by mouth daily (with dinner) Prediabetes Shanta English MD   methocarbamol (ROBAXIN) 750 MG tablet Take 1 tablet (750 mg) by mouth At Bedtime Primary osteoarthritis involving multiple joints Mona Diaz MD   nystatin (MYCOSTATIN) 190147 UNIT/GM external powder Apply topically 2 times daily as needed Yeast Infection of the Skin Mona Diaz MD   pravastatin (PRAVACHOL) 40 MG tablet Take 1 tablet (40 mg) by mouth daily Hyperlipidemia LDL Goal <130 Mona Diaz MD   vitamin C (ASCORBIC ACID) 1000 MG TABS Take 1 tablet (1,000 mg) by mouth daily General Health Mona Diaz MD   warfarin ANTICOAGULANT (JANTOVEN ANTICOAGULANT) 2 MG tablet TAKE ONE AND ONE-HALF TABLETS BY MOUTH ON MONDAY,  TAKE 2 TABLETS ALL THE OTHER DAYS OF THE WEEK OR AS DIRECTED BY INR CLINIC History of Pulmonary Embolism; Factor V Leiden (H) Shanta English MD         Add new medications, over-the-counter drugs, herbals, vitamins, or  minerals in the blank rows below.    Medication How I take it Why I use it Prescriber                          Allergies:      atorvastatin; simvastatin        Side effects I have had:              Other Information:             My notes and questions:

## 2022-02-01 NOTE — PATIENT INSTRUCTIONS
Recommendations from today's MTM visit:                                                       1. I will talk to Dr. English about changing your lisinopril 10 mg to losartan 25 mg to help eliminate the cough.  2. If you want to restart the ibuprofen make sure you speak with Dr. English and your anticoagulation nurse first.    Follow-up: Return in 23 weeks (on 7/11/2022) for MTM Pharmacist Visit, using a phone visit. or sooner if needed    It was great to speak with you today.  I value your experience and would be very thankful for your time with providing feedback on our clinic survey. You may receive a survey via email or text message in the next few days.     To schedule another MTM appointment, please call the clinic directly or you may call the MTM scheduling line at [phone] or toll-free at [phone].     My Clinical Pharmacist's contact information:                                                      Please feel free to contact me with any questions or concerns you have.      Britta Winter, PharmD  Medication Therapy Management Resident  Pager: [phone]

## 2022-02-01 NOTE — PROGRESS NOTES
ANTICOAGULATION MANAGEMENT     Petra Mcnulty 71 year old female is on warfarin with supratherapeutic INR result. (Goal INR 2.0-3.0)    Recent labs: (last 7 days)     02/01/22  1017   INR 3.7*       ASSESSMENT     Source(s): Chart Review and Patient/Caregiver Call       Warfarin doses taken: Warfarin taken as instructed    Diet: No new diet changes identified    New illness, injury, or hospitalization: Yes: bursitis symptoms have worsened over the past couple weeks.  Patient plans to discuss starting ibuprofen again.  Has appointment on 2/7 with PCP    Medication/supplement changes: None noted    Signs or symptoms of bleeding or clotting: No    Previous INR: Therapeutic last 2(+) visits.  INR increased significantly since last INR check so maintenance dose was adjusted today.    Additional findings: None     PLAN     Recommended plan for ongoing changes affecting INR     Dosing Instructions: Partial hold then Decrease your warfarin dose (7.4% change) with next INR in 1 week       Summary  As of 2/1/2022    Full warfarin instructions:  2/1: 2 mg; Otherwise 3 mg every Mon, Wed, Fri; 4 mg all other days   Next INR check:  2/7/2022             Telephone call with Petra who agrees to plan and repeated back plan correctly    Check at provider office visit    Education provided: Please call back if any changes to your diet, medications or how you've been taking warfarin, Potential interaction between warfarin and anti inflamatories and Monitoring for bleeding signs and symptoms    Plan made per [facility] anticoagulation protocol    Rae Wing RN  Anticoagulation Clinic  2/1/2022    _______________________________________________________________________     Anticoagulation Episode Summary     Current INR goal:  2.0-3.0   TTR:  95.0 % (1 y)   Target end date:  Indefinite   Send INR reminders to:  [facility]    Indications    History of pulmonary embolism [Z86.711]  Heterozygous factor V Leiden mutation (H)  [D68.51]  Long term current use of anticoagulants with INR goal of 2.0-3.0 [Z79.01]           Comments:           Anticoagulation Care Providers     Provider Role Specialty Phone number    Shanta English MD Referring Internal Medicine [phone]

## 2022-02-07 NOTE — PROGRESS NOTES
"   SUBJECTIVE:   CC: Petra Mcnulty is an 71 year old woman who presents for preventive health visit.         Healthy Habits:     In general, how would you rate your overall health?  Fair    Frequency of exercise:  None    Do you usually eat at least 4 servings of fruit and vegetables a day, include whole grains    & fiber and avoid regularly eating high fat or \"junk\" foods?  No    Taking medications regularly:  Yes    Medication side effects:  None    Ability to successfully perform activities of daily living:  No assistance needed    Home Safety:  No safety concerns identified    Hearing Impairment:  No hearing concerns    In the past 6 months, have you been bothered by leaking of urine? Yes    In general, how would you rate your overall mental or emotional health?  Excellent      PHQ-2 Total Score: 0    Additional concerns today:  No          Today's PHQ-2 Score:   PHQ-2 (  [employer]) 6/3/2021   Q1: Little interest or pleasure in doing things 0   Q2: Feeling down, depressed or hopeless 0   PHQ-2 Score 0   PHQ-2 Total Score (12-17 Years)- Positive if 3 or more points; Administer PHQ-A if positive 0   Q1: Little interest or pleasure in doing things -   Q2: Feeling down, depressed or hopeless -   PHQ-2 Score -       Abuse: Current or Past (Physical, Sexual or Emotional) - No  Do you feel safe in your environment? Yes        Social History     Tobacco Use     Smoking status: Former Smoker     Packs/day: 0.50     Types: Cigarettes     Quit date: 2006     Years since quittin.1     Smokeless tobacco: Never Used   Substance Use Topics     Alcohol use: Yes     Comment: rare     If you drink alcohol do you typically have >3 drinks per day or >7 drinks per week? Yes      Alcohol Use 2021   Prescreen: >3 drinks/day or >7 drinks/week? Not Applicable   Prescreen: >3 drinks/day or >7 drinks/week? -       Reviewed orders with patient.  Reviewed health maintenance and updated orders accordingly - Yes  BP Readings " from Last 3 Encounters:   02/07/22 (!) 150/80   09/27/21 123/69   06/14/21 128/80    Wt Readings from Last 3 Encounters:   02/07/22 149.2 kg (329 lb)   10/07/21 148.8 kg (328 lb)   09/27/21 149 kg (328 lb 8 oz)                    Breast Cancer Screening:        History of abnormal Pap smear: NO - age 65 - see link Cervical Cytology Screening Guidelines     Reviewed and updated as needed this visit by clinical staff   Allergies  Meds   Med Hx           Reviewed and updated as needed this visit by Provider                   Review of Systems   Constitutional: Negative for chills and fever.   HENT: Positive for sore throat. Negative for congestion, ear pain and hearing loss.    Eyes: Negative for pain and visual disturbance.   Respiratory: Positive for cough and shortness of breath.    Cardiovascular: Positive for peripheral edema. Negative for chest pain and palpitations.   Gastrointestinal: Negative for abdominal pain, constipation, diarrhea, heartburn, hematochezia and nausea.   Breasts:  Negative for tenderness, breast mass and discharge.   Genitourinary: Positive for urgency. Negative for dysuria, frequency, genital sores, hematuria, pelvic pain, vaginal bleeding and vaginal discharge.   Musculoskeletal: Positive for arthralgias and myalgias. Negative for joint swelling.   Skin: Negative for rash.   Neurological: Negative for dizziness, weakness, headaches and paresthesias.   Psychiatric/Behavioral: Negative for mood changes. The patient is not nervous/anxious.         OBJECTIVE:   There were no vitals taken for this visit.  Physical Exam  GENERAL: healthy, alert and no distress  EYES: Eyes grossly normal to inspection, PERRL and conjunctivae and sclerae normal  NECK: no adenopathy, no asymmetry, masses, or scars and thyroid normal to palpation  RESP: lungs clear to auscultation - no rales, rhonchi or wheezes  CV: regular rate and rhythm, normal S1 S2, no S3 or S4, no murmur, click or rub, no peripheral edema  "and peripheral pulses strong  ABDOMEN: soft, nontender, no hepatosplenomegaly, no masses and bowel sounds normal  MS: no gross musculoskeletal defects noted, trace bilateral edema  SKIN: no suspicious lesions or rashes  NEURO: Normal strength and tone, mentation intact and speech normal  PSYCH: mentation appears normal, affect normal/bright      ASSESSMENT/PLAN:   (Z00.00) Preventative health care  (primary encounter diagnosis)  Comment:    Plan:      (Z12.11) Screen for colon cancer  Comment:    Plan: Adult Gastro Ref - Procedure Only             (M89.49) Primary osteoarthritis involving multiple joints  Comment:    Plan: methocarbamol (ROBAXIN) 750 MG tablet             (I10) Benign essential hypertension  Comment: under good control but cough on Lisinopril. Will change to  Plan: losartan (COZAAR) 50 MG tablet             (E78.5) Hyperlipidemia LDL goal <130  Comment: refilled   Plan: pravastatin (PRAVACHOL) 40 MG tablet             (I26.99) Other pulmonary embolism without acute cor pulmonale, unspecified chronicity (H)  Comment: on coumadin  Plan:      (J43.9) Pulmonary emphysema, unspecified emphysema type (H)  Comment:    Plan: quit smoking 16 years ago.     (E66.01) Morbid obesity (H)  Comment:  Continue to encourage wt loss  Plan:      (D68.51) Heterozygous factor V Leiden mutation (H)  Comment:     Plan:        COUNSELING:  Reviewed preventive health counseling, as reflected in patient instructions       Regular exercise       Healthy diet/nutrition    Estimated body mass index is 49.87 kg/m  as calculated from the following:    Height as of 10/7/21: 1.727 m (5' 8\").    Weight as of 10/7/21: 148.8 kg (328 lb).    Weight management plan: Discussed healthy diet and exercise guidelines    She reports that she quit smoking about 16 years ago. Her smoking use included cigarettes. She smoked 0.50 packs per day. She has never used smokeless tobacco.      Counseling Resources:  ATP IV Guidelines  Pooled Cohorts " Equation Calculator  Breast Cancer Risk Calculator  BRCA-Related Cancer Risk Assessment: FHS-7 Tool  FRAX Risk Assessment  ICSI Preventive Guidelines  Dietary Guidelines for Americans, 2010  USDA's MyPlate  ASA Prophylaxis  Lung CA Screening    Shanta English MD  [facility]

## 2022-02-07 NOTE — PROGRESS NOTES
ANTICOAGULATION MANAGEMENT     Petra Mcnulty 71 year old female is on warfarin with therapeutic INR result. (Goal INR 2.0-3.0)    Recent labs: (last 7 days)     02/07/22  1620   INR 2.6*       ASSESSMENT     Source(s): Chart Review and Patient/Caregiver Call       Warfarin doses taken: Warfarin taken as instructed    Diet: No new diet changes identified    New illness, injury, or hospitalization: No    Medication/supplement changes: Lisinopril stopped on 2/7/22 No interaction anticipated    Losartan started on 2/8/22 No interaction anticipated    Signs or symptoms of bleeding or clotting: Patient reports she had some bruising on her belly, this has decreased    Previous INR: Supratherapeutic    Additional findings: None     PLAN     Recommended plan for no diet, medication or health factor changes affecting INR     Dosing Instructions: Continue your current warfarin dose with next INR in 1 week       Summary  As of 2/7/2022    Full warfarin instructions:  3 mg every Mon, Wed, Fri; 4 mg all other days   Next INR check:  2/15/2022             Telephone call with Petra who verbalizes understanding and agrees to plan    Lab visit scheduled    Education provided: Please call back if any changes to your diet, medications or how you've been taking warfarin and Contact [phone]  with any changes, questions or concerns.     Plan made per [facility] anticoagulation protocol    Martha De La Cruz RN  Anticoagulation Clinic  2/7/2022    _______________________________________________________________________     Anticoagulation Episode Summary     Current INR goal:  2.0-3.0   TTR:  93.9 % (1 y)   Target end date:  Indefinite   Send INR reminders to:  [facility]    Indications    History of pulmonary embolism [Z86.711]  Heterozygous factor V Leiden mutation (H) [D68.51]  Long term current use of anticoagulants with INR goal of 2.0-3.0 [Z79.01]           Comments:           Anticoagulation Care Providers     Provider  Role Specialty Phone number    Shanta English MD Referring Internal Medicine [phone]

## 2022-02-15 NOTE — PROGRESS NOTES
ANTICOAGULATION MANAGEMENT     Petra Mcnulty 71 year old female is on warfarin with therapeutic INR result. (Goal INR 2.0-3.0)    Recent labs: (last 7 days)     02/15/22  1451   INR 2.4*       ASSESSMENT     Source(s): Chart Review and Patient/Caregiver Call       Warfarin doses taken: Warfarin taken as instructed    Diet: No new diet changes identified    New illness, injury, or hospitalization: No    Medication/supplement changes: None noted but then stated that she may take Ibuprofen 2x a day for a couple days for her bursitis then not take any for about week    Signs or symptoms of bleeding or clotting: No    Previous INR: Therapeutic last visit; previously outside of goal range    Additional findings: None     PLAN     Recommended plan for no diet, medication or health factor changes affecting INR     Dosing Instructions: Continue your current warfarin dose with next INR in 2 weeks       Summary  As of 2/15/2022    Full warfarin instructions:  3 mg every Mon, Wed, Fri; 4 mg all other days   Next INR check:  3/1/2022             Telephone call with Petra who verbalizes understanding and agrees to plan    Lab visit scheduled    Education provided: Please call back if any changes to your diet, medications or how you've been taking warfarin and Contact [phone]  with any changes, questions or concerns.     Plan made per [facility] anticoagulation protocol    Martha De La Cruz RN  Anticoagulation Clinic  2/15/2022    _______________________________________________________________________     Anticoagulation Episode Summary     Current INR goal:  2.0-3.0   TTR:  93.9 % (1 y)   Target end date:  Indefinite   Send INR reminders to:  [facility]    Indications    History of pulmonary embolism [Z86.711]  Heterozygous factor V Leiden mutation (H) [D68.51]  Long term current use of anticoagulants with INR goal of 2.0-3.0 [Z79.01]           Comments:           Anticoagulation Care Providers     Provider Role  Specialty Phone number    Shanta English MD Referring Internal Medicine [phone]

## 2022-03-01 NOTE — PROGRESS NOTES
ANTICOAGULATION MANAGEMENT     Petra Mcnulty 71 year old female is on warfarin with therapeutic INR result. (Goal INR 2.0-3.0)    Recent labs: (last 7 days)     03/01/22  1025   INR 2.1*       ASSESSMENT       Source(s): Chart Review and Patient/Caregiver Call       Warfarin doses taken: Warfarin taken as instructed    Diet: No new diet changes identified    New illness, injury, or hospitalization: No    Medication/supplement changes: None noted    Signs or symptoms of bleeding or clotting: No    Previous INR: Therapeutic last 2(+) visits    Additional findings: None       PLAN     Recommended plan for no diet, medication or health factor changes affecting INR     Dosing Instructions: Continue your current warfarin dose with next INR in 3 weeks       Summary  As of 3/1/2022    Full warfarin instructions:  3 mg every Mon, Wed, Fri; 4 mg all other days   Next INR check:  3/22/2022             Telephone call with Petra who agrees to plan and repeated back plan correctly    Lab visit scheduled    Education provided: Please call back if any changes to your diet, medications or how you've been taking warfarin    Plan made per [facility] anticoagulation protocol    Rae Wing RN  Anticoagulation Clinic  3/1/2022    _______________________________________________________________________     Anticoagulation Episode Summary     Current INR goal:  2.0-3.0   TTR:  93.9 % (1 y)   Target end date:  Indefinite   Send INR reminders to:  [facility]    Indications    History of pulmonary embolism [Z86.711]  Heterozygous factor V Leiden mutation (H) [D68.51]  Long term current use of anticoagulants with INR goal of 2.0-3.0 [Z79.01]           Comments:           Anticoagulation Care Providers     Provider Role Specialty Phone number    Shanta English MD Referring Internal Medicine [phone]

## 2022-03-22 NOTE — PROGRESS NOTES
ANTICOAGULATION MANAGEMENT     Petra Mcnulty 71 year old female is on warfarin with subtherapeutic INR result. (Goal INR 2.0-3.0)    Recent labs: (last 7 days)     03/22/22  0934   INR 1.7*       ASSESSMENT       Source(s): Chart Review and Patient/Caregiver Call       Warfarin doses taken: Warfarin taken as instructed    Diet: No new diet changes identified    New illness, injury, or hospitalization: No    Medication/supplement changes: Switched from lisinopril to losartan on 2/7.  Cough has improved since then.  Per Up to Date, neither of these medications effects INR.    Signs or symptoms of bleeding or clotting: No    Previous INR: Therapeutic last 2(+) visits.  INR has been slowly trending down over the last few INR checks.  Patient prefers to adjust maintenance dose today.    Additional findings: Upcoming surgery/procedure colonoscopy scheduled on 6/20/22. Will need to bridge and patient prefers to use heparin.       PLAN     Recommended plan for ongoing change(s) affecting INR     Dosing Instructions:  Increase your warfarin dose (8% change) with next INR in 1 week       Summary  As of 3/22/2022    Full warfarin instructions:  3 mg every Mon; 4 mg all other days   Next INR check:  3/31/2022             Telephone call with Petra who agrees to plan and repeated back plan correctly    Lab visit scheduled    Education provided: Please call back if any changes to your diet, medications or how you've been taking warfarin    Plan made per [facility] anticoagulation protocol    Rae Wing RN  Anticoagulation Clinic  3/22/2022    _______________________________________________________________________     Anticoagulation Episode Summary     Current INR goal:  2.0-3.0   TTR:  89.6 % (1 y)   Target end date:  Indefinite   Send INR reminders to:  [facility]    Indications    History of pulmonary embolism [Z86.711]  Heterozygous factor V Leiden mutation (H) [D68.51]  Long term current use of anticoagulants  with INR goal of 2.0-3.0 [Z79.01]           Comments:           Anticoagulation Care Providers     Provider Role Specialty Phone number    Shanta English MD Referring Internal Medicine [phone]

## 2022-03-31 NOTE — PROGRESS NOTES
ANTICOAGULATION MANAGEMENT     Petra Mcnulty 71 year old female is on warfarin with subtherapeutic INR result. (Goal INR 2.0-3.0)    Recent labs: (last 7 days)     03/31/22  0950   INR 1.9*       ASSESSMENT       Source(s): Chart Review and Patient/Caregiver Call       Warfarin doses taken: Warfarin taken as instructed    Diet: No new diet changes identified    New illness, injury, or hospitalization: No    Medication/supplement changes: None noted    Signs or symptoms of bleeding or clotting: No    Previous INR: Subtherapeutic.  Maintenance dose was increased by 8% at last check and INR continues to be sub therapeutic.    Additional findings: None       PLAN     Recommended plan for no diet, medication or health factor changes affecting INR     Dosing Instructions: Increase your warfarin dose (4% change) with next INR in 2 weeks       Summary  As of 3/31/2022    Full warfarin instructions:  4 mg every day   Next INR check:  4/11/2022             Telephone call with Petra who verbalizes understanding and agrees to plan    Lab visit scheduled    Education provided: Please call back if any changes to your diet, medications or how you've been taking warfarin    Plan made per [facility]agulation protocol    Rae Wing RN  Anticoagulation Clinic  3/31/2022    _______________________________________________________________________     Anticoagulation Episode Summary     Current INR goal:  2.0-3.0   TTR:  87.1 % (1 y)   Target end date:  Indefinite   Send INR reminders to:  [facility]    Indications    History of pulmonary embolism [Z86.711]  Heterozygous factor V Leiden mutation (H) [D68.51]  Long term current use of anticoagulants with INR goal of 2.0-3.0 [Z79.01]           Comments:           Anticoagulation Care Providers     Provider Role Specialty Phone number    Shanta English MD Referring Internal Medicine [phone]

## 2022-04-07 NOTE — TELEPHONE ENCOUNTER
Per chart, a GI referral for a colonoscopy placed in chart 2/7/22 to [facility]--Dr. Malik Nelson.    This RN faxed that order to PN @ [phone].    Patient informed.

## 2022-04-07 NOTE — TELEPHONE ENCOUNTER
Patient calls to ask for Order to be faxed to GI at [facility] for Colonoscopy. She and Dr English have discussed this.   Fax to [phone] and Specifically for Dr Jaylyn Nelson    Patient asked to be notified when this had been faxed.    Her number [phone]

## 2022-04-11 NOTE — PROGRESS NOTES
ANTICOAGULATION MANAGEMENT     Petra Mcnulty 71 year old female is on warfarin with therapeutic INR result. (Goal INR 2.0-3.0)    Recent labs: (last 7 days)     04/11/22  0923   INR 2.4*       ASSESSMENT       Source(s): Chart Review and Patient/Caregiver Call       Warfarin doses taken: Warfarin taken as instructed    Diet: No new diet changes identified    New illness, injury, or hospitalization: No    Medication/supplement changes: None noted    Signs or symptoms of bleeding or clotting: No    Previous INR: Subtherapeutic    Additional findings: Upcoming surgery/procedure Colonoscopy 6/20/22       PLAN     Recommended plan for no diet, medication or health factor changes affecting INR     Dosing Instructions: continue your current warfarin dose with next INR in 2 weeks. Patient has had 2 recent warfarin maintenance dose increases.       Summary  As of 4/11/2022    Full warfarin instructions:  4 mg every day   Next INR check:  4/25/2022             Telephone call with Petra who verbalizes understanding and agrees to plan    Lab visit scheduled    Education provided: Please call back if any changes to your diet, medications or how you've been taking warfarin and Contact [phone]  with any changes, questions or concerns.     Plan made per [facility] anticoagulation protocol    Martha De La Cruz RN  Anticoagulation Clinic  4/11/2022    _______________________________________________________________________     Anticoagulation Episode Summary     Current INR goal:  2.0-3.0   TTR:  86.5 % (1 y)   Target end date:  Indefinite   Send INR reminders to:  [facility]    Indications    History of pulmonary embolism [Z86.711]  Heterozygous factor V Leiden mutation (H) [D68.51]  Long term current use of anticoagulants with INR goal of 2.0-3.0 [Z79.01]           Comments:           Anticoagulation Care Providers     Provider Role Specialty Phone number    Shanta English MD Referring Internal Medicine  [phone]

## 2022-04-25 NOTE — PROGRESS NOTES
ANTICOAGULATION MANAGEMENT     Petra Mcnulty 71 year old female is on warfarin with therapeutic INR result. (Goal INR 2.0-3.0)    Recent labs: (last 7 days)     04/25/22  0849   INR 2.7*       ASSESSMENT       Source(s): Chart Review and Patient/Caregiver Call       Warfarin doses taken: Warfarin taken as instructed    Diet: No new diet changes identified however, patient states she will try to increase green veggies while taking ibuprofen    New illness, injury, or hospitalization: Yes: Increase in hip pain    Medication/supplement changes: Patient reports she took ibuprofen 4/22/22, patient states she plans to take 800 mg BID until him pain decreases.    Signs or symptoms of bleeding or clotting: No    Previous INR: Therapeutic last visit; previously outside of goal range    Additional findings: Upcoming surgery/procedure Colonoscopy 6/20/22       PLAN     Recommended plan for temporary change(s) affecting INR     Dosing Instructions: partial hold then continue your current warfarin dose with next INR in 2 weeks       Summary  As of 4/25/2022    Full warfarin instructions:  4/25: 2 mg; Otherwise 4 mg every day   Next INR check:  5/9/2022             Telephone call with Petra who agrees to plan and repeated back plan correctly    Lab visit scheduled    Education provided: Please call back if any changes to your diet, medications or how you've been taking warfarin, Potential interaction between warfarin and ibuprofen, Monitoring for bleeding signs and symptoms and Contact [phone]  with any changes, questions or concerns.     Plan made per [facility] anticoagulation protocol    Martha De La Cruz RN  Anticoagulation Clinic  4/25/2022    _______________________________________________________________________     Anticoagulation Episode Summary     Current INR goal:  2.0-3.0   TTR:  86.5 % (1 y)   Target end date:  Indefinite   Send INR reminders to:  [facility]    Indications    History of pulmonary  embolism [Z86.711]  Heterozygous factor V Leiden mutation (H) [D68.51]  Long term current use of anticoagulants with INR goal of 2.0-3.0 [Z79.01]           Comments:           Anticoagulation Care Providers     Provider Role Specialty Phone number    Shanta English MD Referring Internal Medicine [phone]

## 2022-04-26 PROBLEM — N39.46 MIXED STRESS AND URGE URINARY INCONTINENCE: Status: ACTIVE | Noted: 2020-08-13

## 2022-04-26 NOTE — PATIENT INSTRUCTIONS
Websites with free information:    [employer] patient website: www.voicesforpfd.org    Total Control Program: www.totalcontrolprogram.com  It was a pleasure meeting with you today.  Thank you for allowing me and my team the privilege of caring for you today.  YOU are the reason we are here, and I truly hope we provided you with the excellent service you deserve.  Please let us know if there is anything else we can do for you so that we can be sure you are leaving completely satisfied with your care experience.

## 2022-04-26 NOTE — PROGRESS NOTES
"*SEND LINK TO CELL PHONE*  PT HAS HIP PAIN WHEN SHE GETS UP TO WALK    Petra is a 71 year old who is being evaluated via a billable video visit.      How would you like to obtain your AVS? MyChart  If the video visit is dropped, the invitation should be resent by: Text to cell phone: [phone]  Will anyone else be joining your video visit? No      Video Start Time: 1:18 PM     April 26, 2022    Petra was seen today for other.    Diagnoses and all orders for this visit:    Mixed stress and urge urinary incontinence    Heterozygous factor V Leiden mutation (H)     At this time her symptoms are stable off of the medications and she is not interested in any other treatments.      RTC 6 months, sooner if needed    7 minutes were spent today on the day of the encounter in reviewing the EMR, direct patient care, coordination of care and documentation    Leti Mac MD MPH  (she/her/hers)   of Urology  [facility]      Subjective    Her urinary symptoms have been stable.  She stopped the medications and did not note much difference so did not restart.  She denies any changes in health since last visit    Ht 1.702 m (5' 7\")   Wt 149.2 kg (329 lb)   BMI 51.53 kg/m    GENERAL: healthy, alert and no distress  EYES: Eyes grossly normal to inspection, conjunctivae and sclerae normal  HENT: normal cephalic/atraumatic.  External ears, nose and mouth without ulcers or lesions.  RESP: no audible wheeze, cough, or visible cyanosis.  No visible retractions or increased work of breathing.  Able to speak fully in complete sentences.  NEURO: Cranial nerves grossly intact, mentation intact and speech normal  PSYCH: mentation appears normal, affect normal/bright, judgement and insight intact, normal speech and appearance well-groomed    CC  Patient Care Team:  Shanta English MD as PCP - General (Internal Medicine)  Adam Harvey MD as Assigned Sleep Provider  Parisa Goldberg MD as Assigned OBGYN " Provider  Leti Mac MD as Assigned Surgical Provider  Yeo, Albert, MD as Assigned Musculoskeletal Provider  Shanta English MD as Assigned PCP  Lori Pitts [facility] as Pharmacist (Pharmacist)  Britta Wniter RPH as Pharmacist (Pharmacist)  SELF, REFERRED        [facility]    Video-Visit Details    Type of service:  Video Visit    Video End Time:1:26 PM    Originating Location (pt. Location): Home    Distant Location (provider location):  [facility] [place]     Platform used for Video Visit: [employer]

## 2022-04-26 NOTE — LETTER
"4/26/2022       RE: Petra Mcnulty  [address] [place] [address]     Dear Colleague,    Thank you for referring your patient, Petra Mcnulty, to the [facility] [place] at [facility]. Please see a copy of my visit note below.    *SEND LINK TO CELL PHONE*  PT HAS HIP PAIN WHEN SHE GETS UP TO WALK    Petra is a 71 year old who is being evaluated via a billable video visit.      How would you like to obtain your AVS? MyChart  If the video visit is dropped, the invitation should be resent by: Text to cell phone: [phone]  Will anyone else be joining your video visit? No      Video Start Time: 1:18 PM     April 26, 2022    Petra was seen today for other.    Diagnoses and all orders for this visit:    Mixed stress and urge urinary incontinence    Heterozygous factor V Leiden mutation (H)     At this time her symptoms are stable off of the medications and she is not interested in any other treatments.      RTC 6 months, sooner if needed    7 minutes were spent today on the day of the encounter in reviewing the EMR, direct patient care, coordination of care and documentation    Leti Mac MD MPH  (she/her/hers)   of Urology  [facility]      Subjective    Her urinary symptoms have been stable.  She stopped the medications and did not note much difference so did not restart.  She denies any changes in health since last visit    Ht 1.702 m (5' 7\")   Wt 149.2 kg (329 lb)   BMI 51.53 kg/m    GENERAL: healthy, alert and no distress  EYES: Eyes grossly normal to inspection, conjunctivae and sclerae normal  HENT: normal cephalic/atraumatic.  External ears, nose and mouth without ulcers or lesions.  RESP: no audible wheeze, cough, or visible cyanosis.  No visible retractions or increased work of breathing.  Able to speak fully in complete sentences.  NEURO: Cranial nerves grossly intact, mentation intact and " speech normal  PSYCH: mentation appears normal, affect normal/bright, judgement and insight intact, normal speech and appearance well-groomed    CC  Patient Care Team:  Shanta English MD as PCP - General (Internal Medicine)  Adam Harvey MD as Assigned Sleep Provider  Parisa Goldberg MD as Assigned OBGYN Provider  Leti Mac MD as Assigned Surgical Provider  Yeo, Albert, MD as Assigned Musculoskeletal Provider  Shanta English MD as Assigned PCP  Lori Pitts [facility] as Pharmacist (Pharmacist)  Britta Winter [facility] as Pharmacist (Pharmacist)  SELF, REFERRED        [facility]    Video-Visit Details    Type of service:  Video Visit    Video End Time:1:26 PM    Originating Location (pt. Location): Home    Distant Location (provider location):  [facility] [place]     Platform used for Video Visit: [employer]

## 2022-05-09 NOTE — PROGRESS NOTES
ANTICOAGULATION MANAGEMENT     Petra Mcnulty 71 year old female is on warfarin with therapeutic INR result. (Goal INR 2.0-3.0)    Recent labs: (last 7 days)     05/09/22  1047   INR 2.3*       ASSESSMENT       Source(s): Chart Review and Patient/Caregiver Call       Warfarin doses taken: Warfarin taken as instructed    Diet: Increased greens/vitamin K in diet; plans to resume previous intake    New illness, injury, or hospitalization: Yes: R hip bursitis is very painful, she is planning to call for an appt.    Medication/supplement changes: Voltaren cream to R hip and pt reports she took Ibuprofen x 1 week. Patient states she hasn't taken any for a few days and plans to resume at 2 tablets per day.  Patient understands to watch for signs of bleeding, especially in her stools.    Signs or symptoms of bleeding or clotting: No    Previous INR: Therapeutic last 2(+) visits    Additional findings: Pt had urology follow up on 4/26/22 for urge/stress incontinence--no treatment at this time and no other changes/concerns noted.       PLAN     Recommended plan for ongoing change(s) affecting INR     Dosing Instructions: continue your current warfarin dose with next INR in 3 weeks       Summary  As of 5/9/2022    Full warfarin instructions:  4 mg every day   Next INR check:  6/2/2022             Telephone call with Petra who verbalizes understanding and agrees to plan    Lab visit scheduled    Education provided: Importance of consistent vitamin K intake, Impact of vitamin K foods on INR, Monitoring for bleeding signs and symptoms and Contact [phone]  with any changes, questions or concerns.     Plan made per [facility] anticoagulation protocol    Jesenia Vazquez RN  Anticoagulation Clinic  5/9/2022    _______________________________________________________________________     Anticoagulation Episode Summary     Current INR goal:  2.0-3.0   TTR:  86.5 % (1 y)   Target end date:  Indefinite   Send INR reminders to:  [facility]    Indications    History of pulmonary embolism [Z86.711]  Heterozygous factor V Leiden mutation (H) [D68.51]  Long term current use of anticoagulants with INR goal of 2.0-3.0 [Z79.01]           Comments:           Anticoagulation Care Providers     Provider Role Specialty Phone number    Shanta English MD Referring Internal Medicine [phone]

## 2022-05-17 NOTE — TELEPHONE ENCOUNTER
ANTICOAGULATION MANAGEMENT:  Medication Refill    Anticoagulation Summary  As of 5/9/2022    Warfarin maintenance plan:  4 mg (2 mg x 2) every day   Next INR check:  6/2/2022   Target end date:  Indefinite    Indications    History of pulmonary embolism [Z86.711]  Heterozygous factor V Leiden mutation (H) [D68.51]  Long term current use of anticoagulants with INR goal of 2.0-3.0 [Z79.01]             Anticoagulation Care Providers     Provider Role Specialty Phone number    Shanta English MD Referring Internal Medicine [phone]          Visit with referring provider/group within last year: Yes    [facility] referral signed within last year: Yes    Petra meets all criteria for refill (current [facility] referral, office visit with referring provider/group in last year, lab monitoring up to date or not exceeding 2 weeks overdue). Rx instructions and quantity supplied updated to match patient's current dosing plan. Warfarin 90 day supply with 1 refill granted per [facility] protocol     Rae Wing RN  Anticoagulation Clinic

## 2022-05-17 NOTE — TELEPHONE ENCOUNTER
Jantoven 2 mg Tabs  Routing refill request to provider for review/approval because:  Managed by Anticoagulation Clinic

## 2022-05-31 NOTE — CONFIDENTIAL NOTE
"TORY-PROCEDURAL ANTICOAGULATION  MANAGEMENT    ASSESSMENT     Warfarin interruption plan for colonoscopy on 6/20/22.    Indication for Anticoagulation: PE and Heterozygous Factor V Leiden       It is noted that there was a prolonged return to therapeutic INR in June-July 2020 after procedure (~ 12 days). Petra bridged with prophylactic dose, subcutaneous, unfractionated heparin at this time.       Tory-Procedure Risk stratification for thromboembolism: moderate (2018 American Society of Hematology guideline)    VTE: 2018 American Society of Hematology recommends against bridging in low and moderate risk VTE patients holding warfarin.    2012 CHEST Perioperative Management guidelines discuss that clinicians may consider prophylactic dose enoxaparin or heparin for warfarin treated patients with the indication of prior VTE at moderate risk for thromboembolism because low-dose anticoagulation reduces the incidence of postoperative VTE in non-bridging clinical settings. The guideline explains, for many patients with prior VTE, \"prophylaxis dose of enoxaparin/heparin is likely to achieve much of the benefits of therapeutic-dose anticoagulation while minimizing the risk of postoperative major bleeding, especially for patients undergoing major surgery.       RECOMMENDATION       Pre-Procedure:  o Hold warfarin for 4 days, until after procedure starting: June 16th, 2022       Post-Procedure:  o Resume warfarin dose if okay with provider doing procedure on night of procedure:   - Take 8 mg on 6/20/22 and on 8 mg  6/21/22 then resume home dose of 4 mg/day thereafter.  o At 24 hrs post procedure or when okay with provider doing procedure, start a heparin bridge.   -  heparin  5000 units subcutaneously every 12 hours.  -  Continue until INR >= 2.0  o Recheck INR 7 days after resuming warfarin        Plan routed to referring provider for approval  ?   Rimma Moy [facility]    SUBJECTIVE/OBJECTIVE     yosi Young 71 " "year old female    Goal INR Range: 2.0-3.0     Patient bridged in past: Yes: with subcutaneous, prophylactic dose heparin prior to gynecological procedure    Pertinent History:     Wt Readings from Last 3 Encounters:   04/26/22 149.2 kg (329 lb)   02/07/22 149.2 kg (329 lb)   10/07/21 148.8 kg (328 lb)      Ideal body weight: 61.6 kg (135 lb 12.9 oz)  Adjusted ideal body weight: 96.7 kg (213 lb 1.3 oz)     Estimated body mass index is 51.53 kg/m  as calculated from the following:    Height as of 4/26/22: 1.702 m (5' 7\").    Weight as of 4/26/22: 149.2 kg (329 lb).    Lab Results   Component Value Date    INR 2.3 (H) 05/09/2022    INR 2.7 (H) 04/25/2022    INR 2.4 (H) 04/11/2022     Lab Results   Component Value Date    HGB 14.3 02/01/2022    HGB 14.4 01/19/2021    HCT 45.2 02/01/2022    HCT 45.2 01/19/2021     02/01/2022     01/19/2021     Lab Results   Component Value Date    CR 0.68 02/01/2022    CR 0.60 09/01/2021    CR 0.67 01/19/2021     CrCl cannot be calculated (Patient's most recent lab result is older than the maximum 30 days allowed.).  "

## 2022-06-03 NOTE — PROGRESS NOTES
ANTICOAGULATION MANAGEMENT     Petra Mcnulty 71 year old female is on warfarin with therapeutic INR result. (Goal INR 2.0-3.0)    Recent labs: (last 7 days)     06/03/22  0947   INR 2.3*       ASSESSMENT       Source(s): Chart Review and Patient/Caregiver Call       Warfarin doses taken: Warfarin taken as instructed    Diet: No new diet changes identified    New illness, injury, or hospitalization: Yes: 5/27/22 ED visit while out of town for fractured toe    Medication/supplement changes: None noted    Signs or symptoms of bleeding or clotting: Yes: bruising on toe    Previous INR: Therapeutic last 2(+) visits    Additional findings: Upcoming surgery/procedure Colonoscopy 6/20/22, hold plan is being reviewed        PLAN     Recommended plan for no diet, medication or health factor changes affecting INR     Dosing Instructions: continue your current warfarin dose Then begin warfarin hold plan 6/16/22 with next INR in 3 weeks       Summary  As of 6/3/2022    Full warfarin instructions:  6/16: Hold; 6/17: Hold; 6/18: Hold; 6/19: Hold; 6/20: 8 mg; 6/21: 8 mg; Otherwise 4 mg every day   Next INR check:  6/27/2022             Telephone call with Petra who verbalizes understanding and agrees to plan    Lab visit scheduled    Education provided: Please call back if any changes to your diet, medications or how you've been taking warfarin and Contact [phone]  with any changes, questions or concerns.     Plan made per [facility] anticoagulation protocol    Martha De La Cruz RN  Anticoagulation Clinic  6/3/2022    _______________________________________________________________________     Anticoagulation Episode Summary     Current INR goal:  2.0-3.0   TTR:  86.5 % (1 y)   Target end date:  Indefinite   Send INR reminders to:  [facility]    Indications    History of pulmonary embolism [Z86.711]  Heterozygous factor V Leiden mutation (H) [D68.51]  Long term current use of anticoagulants with INR goal of 2.0-3.0  [Z79.01]           Comments:           Anticoagulation Care Providers     Provider Role Specialty Phone number    Shanta English MD Referring Internal Medicine [phone]

## 2022-06-06 NOTE — TELEPHONE ENCOUNTER
Petra is scheduled for colonoscopy on 6/20/22 so will need to hold Warfarin x 4-5 days.    Patient prefers Heparin bridge versus enoxaparin    Patient is currently on Warfarin for PE and heterozygous Factor V Leiden.    Procedure is scheduled with [facility] Dr. Malik Nelson??    Thank you,  Jesenia Vazquez RN        
Plan approved as written with post-procedure heparin bridge only.     You  Martha De La Cruz, RN Just now (11:52 AM)     LF    A colonoscopy is not a high thrombotic risk procedure so post procedure only bridging is sufficient given her indication for warfarin.     Message text       Martha De La Cruz, RN routed conversation to You 3 days ago     Martha De La Cruz, RN 3 days ago     JJ       Please review pre-procedure, should patient take heparin prior to procedure?  Pharmacy is selected.  Martha De La Cruz RN, BSN  Anticoagulation Clinic            Documentation      Martha De La Cruz RN routed conversation to You 3 days ago     Kenya Mathews APRN CNP  [facility] 4 days ago     LC    This is not my patient an dI see she was given heparin in past so plan sounds reasonable   You will be communicating to patient I assume?    Message text       Rimma Moy RPH  You; Shanta English MD; Rimma Moy RPH 6 days ago     EB    Covering providers:     I recommend a 4-day warfarin hold prior to Petra's colonoscopy on 6/20/22, then starting a prophylactic-dose heparin bridge afterwards until her INR is >/= 2. Please see note for full recommendation. Do you agree with this plan?     Thank you,   Rimma Moy RPH on 5/31/2022 at 4:14 PM    Routing comment        
Please review pre-procedure, should patient take heparin prior to procedure?  Pharmacy is selected.  Martha De La Cruz RN, BSN  Anticoagulation Clinic    
Statement Selected

## 2022-06-07 NOTE — PROGRESS NOTES
"  (H42.425M) Closed nondisplaced fracture of proximal phalanx of lesser toe of left foot with routine healing, subsequent encounter  (primary encounter diagnosis)  Comment:   She has a fracture of the left fourth toe.  Alignment looks okay.  Sensation intact.  No significant swelling.  Plan: Tape until comfortable and advance activity as tolerates.      (J44.9) Chronic obstructive pulmonary disease, unspecified COPD type (H)  Comment:   History of COPD.  Using albuterol inhaler.  I  Plan: XR Chest 2 Views        Will review.      (R60.9) Edema, unspecified type  Comment:   More so in the last couple of weeks.  Plan: furosemide (LASIX) 20 MG tablet        Follow-up in 2 to 3 weeks advised.      (Z86.701) History of pulmonary embolism  Comment: Refill.  Plan: albuterol (PROAIR HFA/PROVENTIL HFA/VENTOLIN         HFA) 108 (90 Base) MCG/ACT inhaler                Penelope Lambert is a 71 year old who presents for the following health issues     HPI     ED/UC Followup:    Facility:  [facility]  Date of visit: 05/27/22  Reason for visit: stubbed her toe, pain  Current Status: follow up       Patient has a left fourth toe fracture.  It was initially repositioned.  She has been taping.  Basically comfortable.  Not a great deal of swelling or pain.  Typically uses a walker for any distance.    Expresses additional concern that she has had edema especially of left leg and slightly on right.    More short of breath and coughing in the last 6 weeks.  Has history of COPD.  Using as needed albuterol.        Review of Systems     No fever or malaise.  No chest pains.  No nausea or abdominal pain.  No calf pain.  No unusual weakness.      Objective    BP (!) 180/86 (BP Location: Right arm, Patient Position: Chair, Cuff Size: Adult Large)   Pulse 96   Temp 98.7  F (37.1  C) (Tympanic)   Resp 20   Ht 1.702 m (5' 7\")   Wt (!) 151.6 kg (334 lb 4.8 oz)   LMP  (LMP Unknown)   SpO2 96%   Breastfeeding No   BMI 52.36 kg/m    There " is no height or weight on file to calculate BMI.  Physical Exam     Large woman with elevated BMI.  HEENT unremarkable.  Neck without mass or thyromegaly.  Chest few expiratory wheezes bilaterally but basically moving air well.  Bases relatively clear.  Cardiac rhythm regular without murmur.  Extremities left leg 1+ edema, right leg trace.  No calf tenderness.  Left foot shows left fourth toe taped to neighbor, no significant swelling, sensation intact, color normal.

## 2022-06-07 NOTE — PATIENT INSTRUCTIONS
Start diuretic.    Albuterol Inhaler 4 times daily for now.      Re check in 2-3 weeks - Dr English or Dr Rosales.

## 2022-06-16 NOTE — TELEPHONE ENCOUNTER
Called patient, reviewed below resumption of warfarin and Heparin after colonoscopy 6/20/22. See 5/9/22 Telephone encounter for full warfarin hold plan. Patient agrees with the plan.      Post-Procedure:  ? Resume warfarin dose if okay with provider doing procedure on night of procedure:     Take 8 mg on 6/20/22 and on 8 mg  6/21/22 then resume home dose of 4 mg/day thereafter.  ? At 24 hrs post procedure or when okay with provider doing procedure, start a heparin bridge.      heparin  5000 units subcutaneously every 12 hours.     Continue until INR >= 2.0  ? Recheck INR 7 days after resuming warfarin    Martha De La Cruz RN, BSN  Anticoagulation Clinic

## 2022-06-16 NOTE — TELEPHONE ENCOUNTER
Reason for Call:  Other call back    Detailed comments: PT needs a call back from INR nurse to discuss when she should begin the injections that she was prescribed.     Phone Number Patient can be reached at: Cell number on file:    Telephone Information:   Mobile [phone]       Best Time: Any time    Can we leave a detailed message on this number? YES    Call taken on 6/16/2022 at 2:01 PM by Maggy Peña

## 2022-06-20 NOTE — TELEPHONE ENCOUNTER
Called patient, reports she had 12 polyps removed 1 was clipped, GI suggested to resume warfarin 6/21/22, and resume heparin on 6/22/22. Patient will monitor for rectal.  Martha De La Cruz RN, BSN  Anticoagulation Clinic

## 2022-06-27 NOTE — PROGRESS NOTES
ANTICOAGULATION MANAGEMENT     Petra Mcnulty 71 year old female is on warfarin with subtherapeutic INR result. (Goal INR 2.0-3.0)    Recent labs: (last 7 days)     06/27/22  0821   INR 1.8*       ASSESSMENT       Source(s): Chart Review and Patient/Caregiver Call       Warfarin doses taken: Warfarin recently held for colonoscopy which may be affecting INR--patient was instructed by GI doctor to hold warfarin an additional day due to polypectomies. Patient confirmed that she did take booster doses x 2 as instructed--see calendar.    Diet: No new diet changes identified    New illness, injury, or hospitalization: No    Medication/supplement changes: None noted    Signs or symptoms of bleeding or clotting: Yes: bruises on abdomen from Heparin injections.    Previous INR: Therapeutic last 2(+) visits    Additional findings: Bridging with Heparin until INR >= 2.0 per procedure plan encounter dated 5/9/22.     Patient verbalized understanding to continue bridging with Heparin until >=2.0.       PLAN     Recommended plan for temporary change(s) affecting INR     Dosing Instructions: booster dose then continue your current warfarin dose with next INR in 3 days       Summary  As of 6/27/2022    Full warfarin instructions:  6/27: 6 mg; Otherwise 4 mg every day   Next INR check:  6/30/2022             Telephone call with Petra who agrees to plan and repeated back plan correctly    Lab visit scheduled    Education provided: Monitoring for bleeding signs and symptoms and Contact [phone]  with any changes, questions or concerns.     Plan made per [facility] anticoagulation protocol    Jesenia Vazquez RN  Anticoagulation Clinic  6/27/2022    _______________________________________________________________________     Anticoagulation Episode Summary     Current INR goal:  2.0-3.0   TTR:  83.9 % (1 y)   Target end date:  Indefinite   Send INR reminders to:  [facility]    Indications    History of pulmonary embolism  [Z86.711]  Heterozygous factor V Leiden mutation (H) [D68.51]  Long term current use of anticoagulants with INR goal of 2.0-3.0 [Z79.01]           Comments:           Anticoagulation Care Providers     Provider Role Specialty Phone number    Shanta English MD Referring Internal Medicine [phone]

## 2022-06-28 NOTE — PROGRESS NOTES
(R60.9) Edema, unspecified type  (primary encounter diagnosis)  Comment:   Improved swelling and actually some improvement in her breathing.  Plan: furosemide (LASIX) 20 MG tablet        Continue.      (J44.9) Chronic obstructive pulmonary disease, unspecified COPD type (H)  Comment:   Having marked morning cough.  Plan: fluticasone (FLOVENT HFA) 110 MCG/ACT inhaler            (Z79.01) Long term current use of anticoagulants with INR goal of 2.0-3.0  Comment:   Colonoscopy 1 week ago.  Plan:   On heparin and Coumadin pending INR therapeutic.        Subjective       Follow-up toe fracture and edema.  Cough/COPD.    History of Present Illness       COPD:  She presents for follow up of COPD.  Overall, COPD symptoms are better since last visit. She has more than usual fatigue or shortness of breath with exertion and less than usual shortness of breath at rest.  She often coughs and does not have change in sputum. No recent fever. She can walk less than 1 block without stopping to rest. She can walk 1 flights of stairs without resting.The patient has had no ED, urgent care, or hospital admissions because of COPD since the last visit.     She eats 2-3 servings of fruits and vegetables daily.She consumes 0 sweetened beverage(s) daily.She exercises with enough effort to increase her heart rate 9 or less minutes per day.  She exercises with enough effort to increase her heart rate 3 or less days per week.   She is taking medications regularly.       ED/UC Followup:    Facility:  [facility]  Date of visit: 6/7/22  Reason for visit: Fx left ft toe (3rd)  Current Status: stable    I saw her 6-7.  She had broken a left fourth toe.  Had lower extremity edema.  We started 20 mg of Lasix daily.  Toe not especially painful at this time.  She does report improved edema and actually some lessening of shortness of breath.    She is bothered by a morning cough which she calls a smoker's cough.  She has a history  "of COPD.  Primarily using albuterol.      Review of Systems     No fevers or chills.  No chest pain.  No palpitations or syncope.  No nausea or abdominal pain.  No localized weakness.  No rashes.      Objective    /82   Pulse 97   Temp 97.9  F (36.6  C) (Temporal)   Resp 18   Ht 1.702 m (5' 7\")   Wt 149.8 kg (330 lb 3.2 oz)   LMP  (LMP Unknown)   SpO2 93%   BMI 51.72 kg/m    Body mass index is 51.72 kg/m .  Physical Exam     HEENT unremarkable.  Nonlabored breathing, no observed cough.  Cardiac rhythm regular, rate normal.  Abdomen nontender.  Extremities trace edema bilateral.  Left fourth toe minimally tender without swelling        .  ..  "

## 2022-06-30 NOTE — PROGRESS NOTES
ANTICOAGULATION MANAGEMENT     Petra Mcnulty 71 year old female is on warfarin with therapeutic INR result. (Goal INR 2.0-3.0)    Recent labs: (last 7 days)     06/30/22  1214   INR 2.5*       ASSESSMENT       Source(s): Chart Review and Patient/Caregiver Call       Warfarin doses taken: Warfarin taken as instructed    Diet: No new diet changes identified    New illness, injury, or hospitalization: No    Medication/supplement changes: Flovent started on 6/28/22, patient states she feels it is already improving asthma symptoms.    Signs or symptoms of bleeding or clotting: Yes: small bruises on abdomen from Heparin injections; however, patient states they are nothing like the bruises she used to get with Lovenox.    Previous INR: Subtherapeutic due to intentional hold for colonoscopy.    Additional findings: Bridging with Heparin until INR >= 2.3 or INR >= 2.0 x 2 ([facility] protocol goal INR 2-3) INR 2.5 today so OK to discontinue Heparin.    Patient saw provider on 6/28/22--she will continue Lasix as this has slightly improved swelling in lower extremities, left > right.       PLAN     Recommended plan for temporary change(s) affecting INR     Dosing Instructions: continue your current warfarin dose with next INR in 3 weeks       Summary  As of 6/30/2022    Full warfarin instructions:  4 mg every day   Next INR check:  7/21/2022             Telephone call with Petra who verbalizes understanding and agrees to plan    Lab visit scheduled    Education provided: Contact [phone]  with any changes, questions or concerns.     Plan made per [facility] anticoagulation protocol    Jesenia Vazquez RN  Anticoagulation Clinic  6/30/2022    _______________________________________________________________________     Anticoagulation Episode Summary     Current INR goal:  2.0-3.0   TTR:  83.6 % (1 y)   Target end date:  Indefinite   Send INR reminders to:  [facility]    Indications    History of pulmonary embolism  [Z86.711]  Heterozygous factor V Leiden mutation (H) [D68.51]  Long term current use of anticoagulants with INR goal of 2.0-3.0 [Z79.01]           Comments:           Anticoagulation Care Providers     Provider Role Specialty Phone number    Shanta English MD Referring Internal Medicine [phone]

## 2022-07-11 NOTE — PROGRESS NOTES
Medication Therapy Management (MTM) Encounter    ASSESSMENT:                            Medication Adherence/Access: No issues identified    Diabetes:  stable    Hypertension/Edema: blood pressure at goal <140/80.  She is concerned it has been higher than usual.  Doesn't want to increase losartan at this time, will follow-up with primary care provider.     COPD: symptoms improved since starting ICS but still requiring KATHERINE 1-3 times per week.  Recommend f/u with primary care provider to review therapy and possible adjust dose if needed.    Osteoarthritis/Back Pain: stable    Hx pulmonary embolism: stable    PLAN:                            1.  Continue current regimen  2.  Schedule follow-up with primary care provider     Follow-up: Return in about 6 months (around 1/11/2023) for Medication Therapy Management.    SUBJECTIVE/OBJECTIVE:                          Petra Mcnulty is a 71 year old female called for a follow-up visit.  Today's visit is a follow-up MTM visit from 1/31/22.     Reason for visit: follow-up on medications    Allergies/ADRs: Reviewed in chart  Tobacco: She reports that she quit smoking about 16 years ago. Her smoking use included cigarettes. She smoked 0.50 packs per day. She has never used smokeless tobacco.  Alcohol: not currently using    Medication Adherence/Access: no issues reported    Diabetes:  Pt currently taking metformin  mg daily. Reports some diarrhea but controllable - seems to be worse with milk products.  SMBG Monitoring: None  Symptoms of low blood sugar? none.   Recent symptoms of high blood sugar? none.    Last eye exam was: up to date  Last foot exam was: Due  Lab Results   Component Value Date    A1C 5.4 02/01/2022    A1C 6.2 09/01/2021    A1C 6.4 01/19/2021    A1C 6.2 09/25/2020       No results found for: UCRR, MICROL, UMALCR    Hypertension/Edema: Current HTN meds include losartan 50 mg daily and furosemide 20 mg daily (started 6/28).  Changed from lisinopril due to  cough.  Since changing to losartan, the cough changed and has reduced frequency.  The furosemide has helped with the edema as well.      Blood pressure at home 130-140.    BP Readings from Last 3 Encounters:   06/28/22 136/82   06/07/22 (!) 180/86   02/07/22 138/80     Potassium   Date Value Ref Range Status   02/01/2022 4.2 3.4 - 5.3 mmol/L Final   01/19/2021 4.2 3.4 - 5.3 mmol/L Final       COPD: Current medications: ICS - Flovent 1 puff(s) twice daily (started by Dr. Rosales on 6/28/22).   Short-Acting Bronchodilator: Albuterol MDI 1-3 per day which is down for her since starting the Flovent.  Wheezing also dramatically improved since starting the Flovent.     Patient rinses their mouth after using steroid inhaler.    Patient is not experiencing side effects.   Patient reports the following symptoms: none.  Patient does not have an COPD Action Plan on file.   Has spirometry been completed: No    Osteoarthritis/Back Pain: Currently taking gabapentin 100 mg every morning and 300 mg at night, methocarbamol 750 mg nightly, Tylenol 1000 mg twice daily.  No pain in hip in past few months.  No ibuprofen for months.      Hx pulmonary embolism: Currently taking warfarin 4 mg daily. Goal INR 2-3. No unexplained bleeding or bruising. Colonoscopy late June.   Scheduled for another INR on 7/21.    Lab Results   Component Value Date    INR 2.5 06/30/2022    INR 1.8 06/27/2022    INR 2.3 06/03/2022    INR 2.3 05/09/2022    INR 2.7 04/25/2022    INR 2.4 04/11/2022         Today's Vitals: LMP  (LMP Unknown)   ----------------      I spent 20 minutes with this patient today. All changes were made via collaborative practice agreement with Shanta English MD. A copy of the visit note was provided to the patient's provider(s).    The patient was sent via [employer] a summary of these recommendations.     Lori Pitts , Pharm D  [phone] (phone)  Medication Therapy Management Pharmacist     Telemedicine Visit Details  Type of  service:  Telephone visit  Start Time: 1000am  End Time: 1020am  Originating Location (patient location): Home  Distant Location (provider location):  [facility]     Medication Therapy Recommendations  No medication therapy recommendations to display

## 2022-07-11 NOTE — PATIENT INSTRUCTIONS
"Recommendations from today's MTM visit:                                                       1.  Continue current regimen  2.  Schedule follow-up with primary care provider     Follow-up: Return in about 6 months (around 1/11/2023) for Medication Therapy Management.    It was great speaking with you today.  I value your experience and would be very thankful for your time in providing feedback in our clinic survey. In the next few days, you may receive an email or text message from [facility] with a link to a survey related to your  clinical pharmacist.\"     To schedule another MTM appointment, please call the clinic directly or you may call the MTM scheduling line at [phone] or toll-free at [phone].     My Clinical Pharmacist's contact information:                                                      Please feel free to contact me with any questions or concerns you have.   Lori Pitts , Pharm D  [phone] (phone)  Medication Therapy Management Pharmacist       "

## 2022-07-21 NOTE — PROGRESS NOTES
ANTICOAGULATION MANAGEMENT     Petra Mcnulty 71 year old female is on warfarin with therapeutic INR result. (Goal INR 2.0-3.0)    Recent labs: (last 7 days)     07/21/22  0920   INR 2.6*       ASSESSMENT       Source(s): Chart Review and Patient/Caregiver Call       Warfarin doses taken: Warfarin taken as instructed    Diet: No new diet changes identified    New illness, injury, or hospitalization: No    Medication/supplement changes: None noted    Signs or symptoms of bleeding or clotting: No    Previous INR: Therapeutic last visit; previously outside of goal range    Additional findings: None       PLAN     Recommended plan for no diet, medication or health factor changes affecting INR     Dosing Instructions: continue your current warfarin dose with next INR in 4 weeks       Summary  As of 7/21/2022    Full warfarin instructions:  4 mg every day   Next INR check:  8/18/2022             Telephone call with Petra who verbalizes understanding and agrees to plan    Lab visit scheduled    Education provided: Contact [phone]  with any changes, questions or concerns.     Plan made per [facility] anticoagulation protocol    Jesenia Vazquez RN  Anticoagulation Clinic  7/21/2022    _______________________________________________________________________     Anticoagulation Episode Summary     Current INR goal:  2.0-3.0   TTR:  83.6 % (1 y)   Target end date:  Indefinite   Send INR reminders to:  [facility]    Indications    History of pulmonary embolism [Z86.711]  Heterozygous factor V Leiden mutation (H) [D68.51]  Long term current use of anticoagulants with INR goal of 2.0-3.0 [Z79.01]           Comments:           Anticoagulation Care Providers     Provider Role Specialty Phone number    Shanta English MD Referring Internal Medicine [phone]

## 2022-08-04 NOTE — PROGRESS NOTES
"Petra is a 71 year old who is being evaluated via a billable telephone visit.      What phone number would you like to be contacted at? [phone]  How would you like to obtain your AVS? Mail a copy    Assessment & Plan     COPD exacerbation (H)  Home COVID test is negative x3, most likely triggered by some other viral infection.  Discussed to first try Flonase daily and Flovent 2 sprays twice a day.  If that does not help with symptoms and she will escalate to Medrol Dosepak and azithromycin.  Cough syrup for nighttime use only.  She does have sleep apnea and uses CPAP machine.    Discussed to check INR next week to make sure its not too high if she is taking azithromycin.    Also large carbohydrate intake while on Medrol Dosepak due to history of diabetes.  A1c in February was 5.4.    - methylPREDNISolone (MEDROL DOSEPAK) 4 MG tablet therapy pack; Follow Package Directions  - azithromycin (ZITHROMAX) 250 MG tablet; Take 2 tablets (500 mg) by mouth daily for 1 day, THEN 1 tablet (250 mg) daily for 4 days.  - guaiFENesin-codeine (ROBITUSSIN AC) 100-10 MG/5ML solution; Take 5-10 mLs by mouth nightly as needed for cough  0956}     BMI:   Estimated body mass index is 51.72 kg/m  as calculated from the following:    Height as of 6/28/22: 1.702 m (5' 7\").    Weight as of 6/28/22: 149.8 kg (330 lb 3.2 oz).       No follow-ups on file.    Dariela Pfeiffer MD  [facility]    Subjective   Petra is a 71 year old, presenting for the following health issues:  OTHER (Thinks she has bronchitis-cough, burning throat-symptoms when had in the past, 3 home COVID tests negative, [place] been going on a week )      EVELYN     Is a 71-year-old female with history of type 2 diabetes, hypertension, hypothyroidism, history of factor V Leiden deficiency and previous PE, on Coumadin, also history of COPD.  She is currently calling with acute symptoms off persistent cough for 1 " week.    Symptoms started a week ago.  Respiratory infections including still smoke, sinus congestion, cough and burning in her chest.  Currently cough is more productive.  Mucus is white-yellowish.  She does feel slightly more short of breath.  Oxygen at home is 96%, no.  She did check COVID test 3 times and was negative.  No chills.  Occasional mild wheezing of the end of the day and at night which improved after she coughs.  Mild sinus congestion and postnasal drainage.  Over-the-counter she has been taking Tylenol.  She does use Flovent 1 puff twice a day and has needed albuterol twice a day recently.    Review of Systems   As above, no GI symptoms      Objective           Vitals:  No vitals were obtained today due to virtual visit.    Physical Exam       Pleasant female, awake alert and oriented in no acute distress.  No shortness of breath, cough wheezing or respiratory difficulty while talking to me.      Phone call duration: 10 minutes    .  ..

## 2022-08-05 NOTE — TELEPHONE ENCOUNTER
ANTICOAGULATION  MANAGEMENT     Interacting Medication Review    Interacting medication(s): Azithromycin (Zithromax, Z-kelly) with warfarin.  Medrol Dosepak also prescribed    Duration: 5 days (8/4 to 8/9/22)    Indication: COPD exacerbation    New medication?: Yes, interaction may increase INR and risk of bleeding. Closer INR monitoring recommended.        PLAN     Continue your current warfarin dose with next INR in 3 days              Telephone call with Petra who verbalizes understanding and agrees to plan. I also informed patient that we may need to check her INR again on 8/12/22 due to 1/2 life of Zithromax.    New recheck date updated on anticoagulation calendar     Plan made per [facility] anticoagulation protocol    Jesenia Vazquez, RN  Anticoagulation Clinic

## 2022-08-08 NOTE — PROGRESS NOTES
ANTICOAGULATION MANAGEMENT     Petra Mcnulty 71 year old female is on warfarin with therapeutic INR result. (Goal INR 2.0-3.0)    Recent labs: (last 7 days)     08/08/22  1335   INR 2.0*       ASSESSMENT       Source(s): Chart Review and Patient/Caregiver Call       Warfarin doses taken: Warfarin taken as instructed    Diet: No new diet changes identified    New illness, injury, or hospitalization: Yes: URI is slowly improving.  Still has a slight cough.    Medication/supplement changes: Medrol dose pack and azithromycin (5 days) started on 8/4/22.    Signs or symptoms of bleeding or clotting: No    Previous INR: Therapeutic last 2(+) visits    Additional findings: warfarin refilled per protocol.         PLAN     Recommended plan for temporary change(s) affecting INR     Dosing Instructions: Continue your current warfarin dose with next INR in 2 weeks       Summary  As of 8/8/2022    Full warfarin instructions:  4 mg every day   Next INR check:  8/22/2022             Telephone call with Petra who agrees to plan and repeated back plan correctly    Lab visit scheduled    Education provided: Please call back if any changes to your diet, medications or how you've been taking warfarin    Plan made per [facility] anticoagulation protocol    Rae Wing RN  Anticoagulation Clinic  8/8/2022    _______________________________________________________________________     Anticoagulation Episode Summary     Current INR goal:  2.0-3.0   TTR:  83.6 % (1 y)   Target end date:  Indefinite   Send INR reminders to:  [facility]    Indications    History of pulmonary embolism [Z86.711]  Heterozygous factor V Leiden mutation (H) [D68.51]  Long term current use of anticoagulants with INR goal of 2.0-3.0 [Z79.01]           Comments:           Anticoagulation Care Providers     Provider Role Specialty Phone number    Shanta English MD Referring Internal Medicine [phone]

## 2022-08-22 NOTE — PROGRESS NOTES
"ANTICOAGULATION MANAGEMENT     Petra Mcnulty 71 year old female is on warfarin with supratherapeutic INR result. (Goal INR 2.0-3.0)    Recent labs: (last 7 days)     08/22/22  0947   INR 3.7*       ASSESSMENT       Source(s): Chart Review and Patient/Caregiver Call       Warfarin doses taken: Warfarin taken as instructed    Diet: Decreased greens/vitamin K in diet; plans to resume previous intake    New illness, injury, or hospitalization: Yes: patient recently heard a \"pop\" in her calf when she was walking down her hallway at home. Patient denies, red, warm skin or swelling and feels it is slowly improving. Patient was also recently treated for COPD exacerbation, no residual symptoms after finishing Z-pack and Medrol dose pack.    Medication/supplement changes: Patient reports she took 800mg of Ibuprofen x 4 days as Tylenol was not alleviating her calf pain.    Signs or symptoms of bleeding or clotting: No    Previous INR: Therapeutic last 2(+) visits    Additional findings: None       PLAN     Recommended plan for temporary change(s) affecting INR     Dosing Instructions: partial hold then continue your current warfarin dose with next INR in 10 days       Summary  As of 8/22/2022    Full warfarin instructions:  8/22: 2 mg; Otherwise 4 mg every day   Next INR check:  9/2/2022             Telephone call with Petra who verbalizes understanding and agrees to plan    Lab visit scheduled    Education provided: Importance of consistent vitamin K intake, Goal range and significance of current result, Monitoring for bleeding signs and symptoms, Monitoring for clotting signs and symptoms and Contact [phone]  with any changes, questions or concerns.     Plan made per [facility] anticoagulation protocol    Jesenia Vazquez RN  Anticoagulation Clinic  8/22/2022    _______________________________________________________________________     Anticoagulation Episode Summary     Current INR goal:  2.0-3.0   TTR:  82.1 % (1 y) "   Target end date:  Indefinite   Send INR reminders to:  [facility]    Indications    History of pulmonary embolism [Z86.711]  Heterozygous factor V Leiden mutation (H) [D68.51]  Long term current use of anticoagulants with INR goal of 2.0-3.0 [Z79.01]           Comments:           Anticoagulation Care Providers     Provider Role Specialty Phone number    Shanta English MD Referring Internal Medicine [phone]

## 2022-08-24 NOTE — TELEPHONE ENCOUNTER
Routing refill request to provider for review/approval because:  Drug not on the [facility] refill protocol & Historical    Tia Davidson RN

## 2022-09-02 NOTE — PROGRESS NOTES
ANTICOAGULATION MANAGEMENT     Petra Mcnulty 71 year old female is on warfarin with therapeutic INR result. (Goal INR 2.0-3.0)    Recent labs: (last 7 days)     09/02/22  1031   INR 2.5*       ASSESSMENT       Source(s): Chart Review and Patient/Caregiver Call       Warfarin doses taken: Warfarin taken as instructed    Diet: No new diet changes identified    New illness, injury, or hospitalization: No - patient reports right calf now the same as left calf reported at 8/22/22 INR states right calf became painful about 8/25/22, no redness, no discoloration, no warmth, no swelling, tender to touch. Patient reports becomes painful with walking, patient will call to schedule appointment if not better by 9/6/22    Medication/supplement changes: Patient reports it has been several days since she has taken ibuprofen    Signs or symptoms of bleeding or clotting: No    Previous INR: Supratherapeutic    Additional findings: None       PLAN     Recommended plan for no diet, medication or health factor changes affecting INR     Dosing Instructions: Continue your current warfarin dose with next INR in 2 weeks       Summary  As of 9/2/2022    Full warfarin instructions:  4 mg every day   Next INR check:  9/15/2022             Telephone call with Petra who agrees to plan and repeated back plan correctly    Lab visit scheduled    Education provided: Please call back if any changes to your diet, medications or how you've been taking warfarin and Contact [phone]  with any changes, questions or concerns.     Plan made per [facility] anticoagulation protocol    Martha De La Cruz RN  Anticoagulation Clinic  9/2/2022    _______________________________________________________________________     Anticoagulation Episode Summary     Current INR goal:  2.0-3.0   TTR:  80.3 % (1 y)   Target end date:  Indefinite   Send INR reminders to:  [facility]    Indications    History of pulmonary embolism [Z86.711]  Heterozygous factor V  Leiden mutation (H) [D68.51]  Long term current use of anticoagulants with INR goal of 2.0-3.0 [Z79.01]           Comments:           Anticoagulation Care Providers     Provider Role Specialty Phone number    Shanta English MD Referring Internal Medicine [phone]

## 2022-09-13 NOTE — PROGRESS NOTES
ANTICOAGULATION CLINIC REFERRAL RENEWAL REQUEST       An annual renewal order is required for all patients referred to [facility].?  Please review and sign the pended referral order for Petra Mcnulty.       ANTICOAGULATION SUMMARY      Warfarin indication(s)   PE and Heterozygous Factor V Leiden    Mechanical heart valve present?  NO       Current goal range   INR: 2.0-3.0     Goal appropriate for indication? Goal INR 2-3, standard for indication(s) above     Time in Therapeutic Range (TTR)  (Goal > 60%) 80.3%       Office visit with referring provider's group within last year yes on 6/28/22       Rae Wing RN  [facility]

## 2022-09-15 NOTE — PROGRESS NOTES
ANTICOAGULATION MANAGEMENT     Petra Mcnulty 71 year old female is on warfarin with therapeutic INR result. (Goal INR 2.0-3.0)    Recent labs: (last 7 days)     09/15/22  0914   INR 2.2*       ASSESSMENT       Source(s): Chart Review    Previous INR was Therapeutic last 2(+) visits    Medication, diet, health changes since last INR chart reviewed; none identified     Patient noted some calf pain previously and was advised to follow up with a provider if pain persisted.  No appointments have been scheduled at this time.           PLAN     Recommended plan for no diet, medication or health factor changes affecting INR     Dosing Instructions: Continue your current warfarin dose with next INR in 3 weeks       Summary  As of 9/15/2022    Full warfarin instructions:  4 mg every day   Next INR check:  10/6/2022             Detailed voice message left for Petra with dosing instructions and follow up date.   Sent [employer] 2000 message with dosing and follow up instructions    Advised to call INR clinic to schedule next lab appointment.    Education provided: Please call back if any changes to your diet, medications or how you've been taking warfarin    Plan made per [facility] anticoagulation protocol    Rae Wing RN  Anticoagulation Clinic  9/15/2022    _______________________________________________________________________     Anticoagulation Episode Summary     Current INR goal:  2.0-3.0   TTR:  80.3 % (1 y)   Target end date:  Indefinite   Send INR reminders to:  [facility]    Indications    History of pulmonary embolism [Z86.711]  Heterozygous factor V Leiden mutation (H) [D68.51]  Long term current use of anticoagulants with INR goal of 2.0-3.0 [Z79.01]           Comments:           Anticoagulation Care Providers     Provider Role Specialty Phone number    Shanta English MD Referring Internal Medicine [phone]

## 2022-09-22 NOTE — PROGRESS NOTES
ANTICOAGULATION FOLLOW-UP CLINIC VISIT    Patient Name:  Petra Mcnulty  Date:  2021  Contact Type:  Telephone/ Called patient, she denies any changes. Orders discussed with the patient, she agrees with the plan. Lab INR appointment scheduled on 3/16/21.    SUBJECTIVE:  Patient Findings     Comments:  The patient was assessed for diet, medication, and activity level changes, missed or extra doses, bruising or bleeding, with no problem findings. Maintenance warfarin dosing was reviewed with patient and will remain on the same dose until next INR check. Patient did not have any questions or concerns.           Clinical Outcomes     Negatives:  Major bleeding event, Thromboembolic event, Anticoagulation-related hospital admission, Anticoagulation-related ED visit, Anticoagulation-related fatality    Comments:  The patient was assessed for diet, medication, and activity level changes, missed or extra doses, bruising or bleeding, with no problem findings. Maintenance warfarin dosing was reviewed with patient and will remain on the same dose until next INR check. Patient did not have any questions or concerns.              OBJECTIVE    Recent labs: (last 7 days)     21  0924   INR 2.20*       ASSESSMENT / PLAN  INR assessment THER    Recheck INR In: 4 WEEKS    INR Location Outside lab      Anticoagulation Summary  As of 2021    INR goal:  2.0-3.0   TTR:  58.6 % (11.8 mo)   INR used for dosin.20 (2021)   Warfarin maintenance plan:  3 mg (2 mg x 1.5) every Mon; 4 mg (2 mg x 2) all other days   Full warfarin instructions:  3 mg every Mon; 4 mg all other days   Weekly warfarin total:  27 mg   No change documented:  Martha De La Cruz RN   Plan last modified:  Rae Wing RN (2020)   Next INR check:  3/16/2021   Priority:  Maintenance   Target end date:  Indefinite    Indications    History of pulmonary embolism [Z86.711]  Heterozygous factor V Leiden mutation (H) [D68.51]  Long term current  use of anticoagulants with INR goal of 2.0-3.0 [Z79.01]             Anticoagulation Episode Summary     INR check location:      Preferred lab:      Send INR reminders to:  [facility]    Comments:  Call cell number with results for INR,  INR referral renewed, see 1/19/21 documentation encounter.       Anticoagulation Care Providers     Provider Role Specialty Phone number    Mona Diaz MD Referring Internal Medicine [phone]            See the Encounter Report to view Anticoagulation Flowsheet and Dosing Calendar (Go to Encounters tab in chart review, and find the Anticoagulation Therapy Visit)    Dosage adjustment made based on physician directed care plan.    Martha De La Cruz RN                  Poor oral intake within the last 24 hours

## 2022-09-29 NOTE — PROGRESS NOTES
Petra is a 71 year old who is being evaluated via a billable telephone visit.      Assessment & Plan     Infection due to 2019 novel coronavirus  Begin treatment as directed. Discussed decreased effectiveness of hormones.   - nirmatrelvir and ritonavir (PAXLOVID) therapy pack; Take 3 tablets by mouth 2 times daily for 5 days (Take 2 Nirmatrelvir tablets and 1 Ritonavir tablet twice daily for 5 days)    Long term current use of anticoagulants with INR goal of 2.0-3.0  Recheck INR in four days. Patient in agreement with plan.    MARIE Pabon [facility] NAKIA Lambert is a 71 year old presenting for the following health issues:  No chief complaint on file.      HPI     COVID-19 Symptom Review  How many days ago did these symptoms start? Three days ago  Had bivalent injection two weeks ago  In [place] and just returned    Are any of the following symptoms significant for you?    New or worsening difficulty breathing? No    Worsening cough? Yes, I am coughing up mucus.    Fever or chills? Yes, I felt feverish or had chills.    Headache: YES    Sore throat: No    Chest pain: No    Diarrhea: No    Body aches? YES    Fatigue        What treatments has patient tried? None    Review of Systems   Constitutional, HEENT, cardiovascular, pulmonary, gi and gu systems are negative, except as otherwise noted.      Objective           Vitals:  No vitals were obtained today due to virtual visit.    Physical Exam   alert and no distress  PSYCH: Alert and oriented times 3; coherent speech, normal   rate and volume, able to articulate logical thoughts, able   to abstract reason, no tangential thoughts, no hallucinations   or delusions  Her affect is normal  RESP: No cough, no audible wheezing, able to talk in full sentences  Remainder of exam unable to be completed due to telephone visits    Phone call duration: 12 minutes

## 2022-10-03 NOTE — PROGRESS NOTES
ANTICOAGULATION MANAGEMENT     Petra Mcnulty 71 year old female is on warfarin with subtherapeutic INR result. (Goal INR 2.0-3.0)    Recent labs: (last 7 days)     10/03/22  1427   INR 1.8*       ASSESSMENT       Source(s): Chart Review and Patient/Caregiver Call       Warfarin doses taken: Warfarin taken as instructed    Diet: Increased greens/vitamin K in diet; plans to resume previous intake, patient reports calf still has pain, but is improving    New illness, injury, or hospitalization: Yes: COVID+ 9/29/22, treating with Paxlovid    Medication/supplement changes: Paxlovid started on 9/29/22 Per [employer]: Concurrent use of NIRMATRELVIR/ RITONAVIR and WARFARIN may result in variable plasma warfarin concentration.    Signs or symptoms of bleeding or clotting: No    Previous INR: Therapeutic last 2(+) visits    Additional findings: None       PLAN     Recommended plan for temporary change(s) affecting INR     Dosing Instructions: booster dose then continue your current warfarin dose with next INR in 8 days       Summary  As of 10/3/2022    Full warfarin instructions:  10/3: 6 mg; Otherwise 4 mg every day   Next INR check:  10/11/2022             Telephone call with Petra who agrees to plan and repeated back plan correctly    Lab visit scheduled    Education provided: Please call back if any changes to your diet, medications or how you've been taking warfarin and Contact [phone]  with any changes, questions or concerns.     Plan made per [facility] anticoagulation protocol    Martha De La Cruz RN  Anticoagulation Clinic  10/3/2022    _______________________________________________________________________     Anticoagulation Episode Summary     Current INR goal:  2.0-3.0   TTR:  77.8 % (1 y)   Target end date:  Indefinite   Send INR reminders to:  [facility]    Indications    History of pulmonary embolism [Z86.711]  Heterozygous factor V Leiden mutation (H) [D68.51]  Long term current use of  anticoagulants with INR goal of 2.0-3.0 [Z79.01]           Comments:           Anticoagulation Care Providers     Provider Role Specialty Phone number    Shanta English MD Referring Internal Medicine [phone]

## 2022-10-06 NOTE — TELEPHONE ENCOUNTER
Metformin Hcl Er 500 mg tab  Routing refill request to provider for review/approval because:  Labs not current:  Hgb A1C    Lab Results   Component Value Date    A1C 5.4 02/01/2022    A1C 6.2 09/01/2021    A1C 6.4 01/19/2021    A1C 6.2 09/25/2020

## 2022-10-11 NOTE — PROGRESS NOTES
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 She had a mirena IUD placed 2020 for management with follow up endometrial biopsy 20 that was benign, and a subsequent biopsy 6/3/21. At that time we found papillary proliferation which we discussed is a rare finding that needs close follow up. She was somewhat los to follow up in the interim but would like to proceed with endometrial biopsy today and consider hysterectomy with bilateral salpingo-oophorectomy if persistently abnormal. She reports rare grey discharge, but no pink, red, or brown discharge or soo bleeding.    Past Medical History:   Diagnosis Date     Arthritis     hands, joints     Coagulation disorder (H)     factor 5 leiden     COPD (chronic obstructive pulmonary disease) (H)      Factor V deficiency (H)      Glaucoma      History of blood transfusion     x2     History of thrombophlebitis      Hypertension      Hypothyroid      Insomnia      Obese      Onychomycosis      Other chronic pain     back     PPD positive     INH TX x 1 year     Pulmonary embolism (H)      Sleep apnea     uses cpap     Type 2 diabetes mellitus without complication, without long-term current use of insulin (H) 11/15/2017     Uncomplicated asthma       Past Surgical History:   Procedure Laterality Date     ARTHROPLASTY HIP      left + 2 I&Ds     ARTHROSCOPY KNEE       AS TOTAL HIP ARTHROPLASTY       BACK SURGERY      c3-c4 discectomy/fusion,     BACK SURGERY      lumbar decompression/lami      BIOPSY      breast     CHOLECYSTECTOMY       COLONOSCOPY       D & C       HYSTEROSCOPY      d and c     INSERT STIMULATOR DORSAL COLUMN N/A 2020    Procedure: SPINAL CORD STIMULATOR IMPLANT;   Surgeon: Rosemary Dacosta DO;  Location:  OR     INSERT STIMULATOR DORSAL COLUMN TRIAL N/A 2/6/2020    Procedure: SPINAL CORD STIMULATOR TRIAL;  Surgeon: Karlee Duffy MD;  Location:  OR     OPERATIVE HYSTEROSCOPY WITH MORCELLATOR N/A 6/24/2020    Procedure: 1. Exam under anesthesia 2. Dilation and curettage under direct visualization 3. Operative hysteroscopy 4. polypectomy;  Surgeon: Parisa Goldberg MD;  Location:  OR     ZZC TOTAL KNEE ARTHROPLASTY      left,right        acetaminophen (TYLENOL) 500 MG tablet, Take 1,000 mg by mouth 2 times daily   albuterol (PROAIR HFA/PROVENTIL HFA/VENTOLIN HFA) 108 (90 Base) MCG/ACT inhaler, Inhale 2 puffs into the lungs every 4 hours as needed for shortness of breath / dyspnea or wheezing  Cholecalciferol (VITAMIN D) 50 MCG (2000 UT) CAPS, Take 2000 international unit(s) daily  ciclopirox (LOPROX) 0.77 % cream, Apply to affected area on face BID x 4-6 weeks. Tapering with improvement. Restart with flares.  clobetasol (TEMOVATE) 0.05 % external ointment, APPLY TO AFFECTED AREA(S) TOPICALLY DAILY  estradiol (ESTRACE) 0.1 MG/GM vaginal cream, Place 1 g vaginally twice a week Daily for the first 14 days  fluticasone (FLOVENT HFA) 110 MCG/ACT inhaler, Inhale 1 puff into the lungs 2 times daily  furosemide (LASIX) 20 MG tablet, Take 1 tablet (20 mg) by mouth daily  gabapentin (NEURONTIN) 100 MG capsule, Take 1 capsule (100 mg) by mouth every morning  gabapentin (NEURONTIN) 300 MG capsule, TAKE 1 CAPSULE (300 MG) BY MOUTH 2 TIMES DAILY AS NEEDED (PAIN)  guaiFENesin-codeine (ROBITUSSIN AC) 100-10 MG/5ML solution, Take 5-10 mLs by mouth nightly as needed for cough  levonorgestrel (MIRENA) 20 MCG/24HR IUD, 1 each (20 mcg) by Intrauterine route once  levothyroxine (SYNTHROID/LEVOTHROID) 100 MCG tablet, TAKE 2 TABLETS BY MOUTH ON SUNDAY AND 1 TABLET ON ALL OTHER DAYS OF THE WEEK  loratadine (CLARITIN) 10 MG tablet, Take 10 mg by mouth daily  losartan (COZAAR) 50 MG tablet, Take  "1 tablet (50 mg) by mouth daily  metFORMIN (GLUCOPHAGE XR) 500 MG 24 hr tablet, TAKE ONE TABLET BY MOUTH ONCE DAILY WITH DINNER  methocarbamol (ROBAXIN) 750 MG tablet, Take 1 tablet (750 mg) by mouth At Bedtime  nystatin (MYCOSTATIN) 268869 UNIT/GM external powder, Apply topically 2 times daily as needed  pravastatin (PRAVACHOL) 40 MG tablet, Take 1 tablet (40 mg) by mouth daily  vitamin C (ASCORBIC ACID) 1000 MG TABS, Take 1 tablet (1,000 mg) by mouth daily  warfarin ANTICOAGULANT (JANTOVEN ANTICOAGULANT) 2 MG tablet, Take 2 tablets (4 mg) by mouth daily or as directed by INR Clinic    No current facility-administered medications on file prior to visit.    Allergies   Allergen Reactions     Atorvastatin      myalgia     Simvastatin Rash       OBJECTIVE:   BP (!) 144/70   Ht 1.702 m (5' 7\")   Wt (!) 150.4 kg (331 lb 9.6 oz)   LMP  (LMP Unknown)   BMI 51.94 kg/m     Const: sitting in chair in no acute distress, comfortable  Pulm: no increased work of breathing, no cough  Psych: mood stable, appropriate affect  Neuro: A+Ox3   : External genitalia with loss of labia minora, no skin thickening or discoloration.  No obvious excoriations, lesions, or rashes. Bartholins, urethra, normal.  Normal moist pink vaginal mucosa.  SSE: Normal cervix, normal physiologic discharge, IUD strings visualized.   Bimanual: No CMT, exam limited by body habitus. No adnexal masses or tenderness appreciated.     Procedure:      Endometrial biopsy:  Consent was obtained. A speculum was inserted into the vagina and the cervix visualized. The cervix was swabbed with betadine x3. An endometrial biopsy pipelle was then introduced through the cervical os into the uterus, the uterus sounded to 8cm. The plunger was then deployed with good suction. The pipelle was rotated to collect a thorough sample. Sample was moderate. The cervix was found to be hemostatic and the speculum was removed. The patient tolerated the procedure without difficulty. "     ASSESSMENT/PLAN:                                                    Petra Mcnulty is a 71 year old female  here for follow up endometrial hyperplasia and new diagnosis of papillary proliferation at time of last sampling. Plan had been repeat sample in 3-6 months, but she was lost to follow up until now. Repeat sampling vs hysteroscopic sampling for improved visualization and more thorough sample discussed. She opted for endometrial biopsy today to prevent delay. We did discuss that even a negative biopsy is not definitive, and at that time she will need to decide whether she prefers to proceed with expectant management, serial sampling, operative sampling, vs hysterectomy. Given her medical complications and uncommon pathology, I have offered Gyn/Onc referral either for consultation to discuss management or if she desires hysterectomy. She will consider these options and would like to discuss further during a phone visit when results are back.     1. History of endometrial hyperplasia  - repeat endometrial biopsy today.     2. Vaginal atrophy  She also requests a refill of vaginal estrogen for vulvovaginal atrophy. She uses it rarely but feels it does make a difference.     3. Lichen sclerosus  - continue clobetasol twice weekly for maintenance     Due to numerous mychart reminders, she is concerned about results getting lost in her inbox. She would prefer a phone call with future results. Plan for return phone visit at the end of the week to discuss results and next steps.     In addition to procedural time, 30 minutes spent on the date of the encounter doing chart review, review of test results, patient visit and documentation      Parisa Goldberg MD  Obstetrics and Gynecology   [facility]

## 2022-10-11 NOTE — NURSING NOTE
"Chief Complaint   Patient presents with     RECHECK       Initial BP (!) 144/70   Ht 1.702 m (5' 7\")   Wt (!) 150.4 kg (331 lb 9.6 oz)   LMP  (LMP Unknown)   BMI 51.94 kg/m   Estimated body mass index is 51.94 kg/m  as calculated from the following:    Height as of this encounter: 1.702 m (5' 7\").    Weight as of this encounter: 150.4 kg (331 lb 9.6 oz).  BP completed using cuff size: large    Questioned patient about current smoking habits.  Former smoker        The following HM Due: NONE         "

## 2022-10-11 NOTE — PROGRESS NOTES
ANTICOAGULATION MANAGEMENT     Petra Mcnulty 71 year old female is on warfarin with subtherapeutic INR result. (Goal INR 2.0-3.0)    Recent labs: (last 7 days)     10/11/22  1049   INR 1.8*       ASSESSMENT       Source(s): Chart Review and Patient/Caregiver Call       Warfarin doses taken: Warfarin taken as instructed    Diet: No new diet changes identified    New illness, injury, or hospitalization: No - still some congestion from Covid. Occasional coughing. Finished paxlovid course last week - potential to be a factor in today's INR so will boost once more and recheck in 8 days.    Medication/supplement changes: Started to take zinc. No interaction anticipated per [employer] [facility]. Also inquired about quercetin supplement, moderate interaction per [employer] so has elected not to take at this time and will discuss with Dr. English at next appointment.    Signs or symptoms of bleeding or clotting: No    Previous INR: Subtherapeutic    Additional findings: None       PLAN     Recommended plan for temporary change(s) affecting INR     Dosing Instructions: booster dose then continue your current warfarin dose with next INR in 1 week       Summary  As of 10/11/2022    Full warfarin instructions:  10/11: 8 mg; Otherwise 4 mg every day   Next INR check:  10/19/2022             Telephone call with Petra who verbalizes understanding and agrees to plan    Lab visit scheduled    Education provided: Please call back if any changes to your diet, medications or how you've been taking warfarin, Importance of consistent vitamin K intake, Potential interaction between warfarin and quercetin and No interaction anticipated between warfarin and zinc    Plan made per [facility] anticoagulation protocol    Martha De La Cruz RN  Anticoagulation Clinic  10/11/2022    _______________________________________________________________________     Anticoagulation Episode Summary     Current INR goal:  2.0-3.0   TTR:  75.7 % (1 y)    Target end date:  Indefinite   Send INR reminders to:  [facility]    Indications    History of pulmonary embolism [Z86.711]  Heterozygous factor V Leiden mutation (H) [D68.51]  Long term current use of anticoagulants with INR goal of 2.0-3.0 [Z79.01]           Comments:           Anticoagulation Care Providers     Provider Role Specialty Phone number    Shanta English MD Referring Internal Medicine [phone]

## 2022-10-14 NOTE — PROGRESS NOTES
Petra Mcnulty is a 71 year old female who is being evaluated via a billable telephone visit.      What phone number would you like to be contacted at? [phone]  How would you like to obtain your AVS? Acacia      Petra Mcnulty is a 71 year old female who presents for virtual visit today for the following health issue(s):  Hx postmenopausal bleeding and hyperplasia      Additional information:   Petra underwent endometrial biopsy on 10/11/22 to follow up papillary proliferation on prior endometrial biopsy. No interval bleeding since prior biopsy. This most recent biopsy was benign. Petra is doing well following her biopsy. Today we again reviewed her clinical course:    7/2019: postmenopausal bleeding (reported in 4/2020)  6/24/20: hysteroscopic polypectomy and directed D&C   - simple hyperplasia with focal complex hyperplasia without atypia  8/11/20: Mirena IUD placement  1/19/20: endometrial biopsy negative for hyperplasia, atypia, and malignancy  3/2021: single episode of postmenopausal spotting  6/3/21: endometrial biopsy with papillary proliferation without atypia or hyperplasia  Interval loss to follow up  10/11/22: endometrial biopsy benign    In light of prolonged history of papillary proliferation and hx of complex hyperplasia we discussed the following potential courses of action for ongoing surveillance. 1) serial office based endometrial biopsy 2) return to OR for hysteroscopic guided D&C to ensure sampling a greater portion of the uterus 3) referral to Gyn/Oncology for evaluation and recommendations for ongoing surveillance. As papillary proliferation is a lesser known process we agreed that referral to Gyn/Onc for guidance on ongoing surveillance vs definitive management with hysterectomy by their team was a reasonable next step. Referral placed.     Total duration of telephone call: 7 min 30 seconds  30 minutes spent on the date of the encounter doing chart review, review of test results,  patient visit and documentation

## 2022-10-14 NOTE — RESULT ENCOUNTER NOTE
Results discussed directly with patient in clinic. Further details documented in the note.     Parisa Goldberg MD

## 2022-10-17 NOTE — PROGRESS NOTES
"New Patient Hematology / Oncology Nurse Navigator Note     Referral Date: 10/16/22    Referring provider: Dr. Goldberg    Referring Clinic/Organization: [facility]     Referred to: GynOnc    Requested provider (if applicable): First available - did not specify     Evaluation for :   \"?surveillance: hx focal complex hyperplasia w/o atypia 2yrs ago, mirena IUD placed, 1 f/u neg EMB, recurrent bleed -> papillary proliferation. lost to f/up for 1yr (planned 3 month interval bx), now no further bleeding and benign biopsy.\"     Clinical History (per Nurse review of records provided):      10/14 Virtual Visit with referring MD (OBGYN): -- BOOKMARKED     10/11 Path showing:  Final Diagnosis   Endometrium, biopsy-  Decidualized endometrial mucosa, no evidence of hyperplasia or malignancy    -- BOOKMARKED    6/3/21 Path -- BOOKMARKED    1/19/21 Path--BOOKMARKED    5/25/21 Pelvic US--BOOKMARKED    Clinical Assessment / Barriers to Care (Per Nurse):  Pt lives in [place], [place]    Records Location: [facility]     Records Needed:   N/A    Additional testing needed prior to consult:   N/A    Referral updates and Plan:   OUTGOING CALL to pt:  Introduced my role as nurse navigator with [facility]ematology/Oncology dept and that we have recd the referral from Dr Goldberg.  Pt confirms they are aware of the referral and ready to schedule. [place] location is preferred and discussed openings with Dr. Umana 10/18.  Provided contact information if future questions arise.     -Transferred pt to NPS line [phone] to schedule appt per scheduling instructions.      Martha Muñoz, LIANAN, RN, PHN, OCN  Hematology/Oncology Nurse Navigator  [facility]  [phone]      "

## 2022-10-17 NOTE — TELEPHONE ENCOUNTER
"Imaging Received  October 18, 2022 8:09 AM ABT   Action: Images from [facility] received and resolved to PACS.     Action October 17, 2022 2:35 PM ABT   Action Taken Called and spoke to patient she states that she was seen in [id number] with [facility]. Patient gave verbal consent to update .     RECORDS STATUS - ALL OTHER DIAGNOSIS      RECORDS RECEIVED FROM: [facility]   DATE RECEIVED: 10/18/22   NOTES STATUS DETAILS   OFFICE NOTE from referring provider EPIC 10/14/22: Dr. Parisa Goldberg   DISCHARGE SUMMARY from hospital [facility] 06/24/20: [facility]   OPERATIVE REPORT [facility] 06/24/20: Dilation and curettage under direct visualization; Operative hysteroscopy    05/14/13: Hysteroscopy D&C   MEDICATION LIST [facility]    LABS     PATHOLOGY REPORTS Reports in Epic, [facility] Epic:  10/11/22: [id number]  06/03/21: [id number]  01/19/21:   06/24/20: [id number]    [facility]:  08/16/19: [id number]  \"Negative for malignancy\"  05/14/13: [id number]  \"Negative for malignancy\"    Reports only (available in [facility])  09/20/01: [id number]  08/01/96: [id number]  04/01/93: [id number]   ANYTHING RELATED TO DIAGNOSIS EPIC Most recent 10/11/22   IMAGING (NEED IMAGES & REPORT)     CT SCANS PACS [facility]:  02/10/15: CT Pel  01/29/08, 10/30/07: CT CAP   ULTRASOUND PACS PACS:  05/25/21: US Pelvic    [facility]:  08/09/19-10/30/07: US Pelvic     "

## 2022-10-18 NOTE — PROGRESS NOTES
Consult Notes on Referred Patient        Dr. Parisa Goldberg MD  [address] [place] [address]       RE: Petra Mcunlty  : 1950  GOLDIE: Oct 18, 2022    Dear Dr. Parisa Goldberg:    I had the pleasure of seeing your patient Petra Mcnulty here at the Gynecologic Cancer Clinic at the [facility] on 10/18/2022.  As you know she is a very pleasant 71 year old woman with a diagnosis of endometrial hyperplasia.  Given these findings she was subsequently sent to the Gynecologic Cancer Clinic for new patient consultation.  She is unaccompanied today.    Patient initially presented several years ago with an episode of PMB.  She underwent a hysteroscopy in which a large polyp with endometrial hyperplasia without atypia was found.  She had a Mirena placed at that time.  She again had an episode of bleeding in  at which time she had a repeat biopsy that was without hyperplasia or atypia.  She no longer has had any bleeding since the Mirena was placed except for the several days following and EMB.    20:  EMB:  Endometrial polyp with simple and complex hyperplasia without definitive atypia    20:  Placement of Mirena IUD    21:  EMB:  Negative for hyperplasia or malignancy    21:  US Pelvis (personally reviewed):  Uterus: 5.7 x 3.9 x 4.4 cm Endo cav: 6.8 mm Prominent Cervix: Wnl Normal uterus Normal right ovary; left ovary not visualized. Prominent endometrial lining. Appropriately positioned IUD, though difficult to visualize. This is best seen on image 17 of transvaginal images. No free fluid in the cul de sac    6/3/21:  EMB:  Negative for hyperplasia or malignancy, Focal papillary proliferation of surface endometrial epithelium, without atypia.    10/11/22:  EMB:  decidualized endometrial mucosa without evidence of hyperplasia or malignancy      Obstetrics and Gynecology History:  ,  x 2  Menopause in early 40's, no HRT use      Past Medical History:  Past  Medical History:   Diagnosis Date     COPD (chronic obstructive pulmonary disease) (H)      Factor V deficiency (H) 2007     Glaucoma      Hypertension      Hypothyroid      Obese      PPD positive     INH TX x 1 year     Pulmonary embolism (H)      Sleep apnea     uses cpap     Type 2 diabetes mellitus without complication, without long-term current use of insulin (H) 11/15/2017     Uncomplicated asthma        Past Surgical History:  Past Surgical History:   Procedure Laterality Date     ARTHROPLASTY HIP      left + 2 I&Ds     ARTHROSCOPY KNEE       AS TOTAL HIP ARTHROPLASTY       BACK SURGERY      c3-c4 discectomy/fusion,     BACK SURGERY      lumbar decompression/lami      BIOPSY      breast     CHOLECYSTECTOMY       COLONOSCOPY       D & C       HYSTEROSCOPY      d and c     INSERT STIMULATOR DORSAL COLUMN N/A 6/26/2020    Procedure: SPINAL CORD STIMULATOR IMPLANT;  Surgeon: Rosemary Dacosta DO;  Location:  OR     INSERT STIMULATOR DORSAL COLUMN TRIAL N/A 2/6/2020    Procedure: SPINAL CORD STIMULATOR TRIAL;  Surgeon: Karlee Duffy MD;  Location:  OR     OPERATIVE HYSTEROSCOPY WITH MORCELLATOR N/A 6/24/2020    Procedure: 1. Exam under anesthesia 2. Dilation and curettage under direct visualization 3. Operative hysteroscopy 4. polypectomy;  Surgeon: Parisa Goldberg MD;  Location:  OR     [facility] TOTAL KNEE ARTHROPLASTY      left,right       Health Maintenance:  Last Pap Smear: Age 64              Result: normal  She has not had a history of abnormal Pap smears.    Last Mammogram: 4/19/22              Result: normal      She has not had a history of abnormal mammograms.    Last Colonoscopy: 6/20/22              Result: Polyps repeat 3 years       Social History:  Lives with her , feels safe at home.  Retired RN.  Enjoys scrap booking, knitting.  Does have an advanced directive on file and would like her  to be her POA.  Would like full resuscitation if reversible cause is identified, however  "would not like to be kept on life sustaining measures long-term.     Family History:   The patient's family history is significant for.  Family History   Problem Relation Age of Onset     Diabetes Mother 80        Type II     Hypertension Mother      Cerebrovascular Disease Father      Basal cell carcinoma Sister      Breast Cancer Sister      Bladder Cancer Brother      Breast Cancer Niece      Colon Cancer Maternal Aunt          Physical Exam:   BP (!) 143/78 (Cuff Size: Adult Large)   Pulse 88   Temp 97.1  F (36.2  C) (Tympanic)   Resp 18   Ht 1.702 m (5' 7\")   Wt (!) 151 kg (333 lb)   LMP  (LMP Unknown)   SpO2 95%   BMI 52.16 kg/m    Body mass index is 52.16 kg/m .  GENERAL: Healthy, alert and no distress  EYES: Eyes grossly normal to inspection.  No discharge or erythema, or obvious scleral/conjunctival abnormalities.  RESP: No audible wheeze, cough, or visible cyanosis.  No visible retractions or increased work of breathing.    SKIN: Visible skin clear. No significant rash, abnormal pigmentation or lesions.  NEURO: Cranial nerves grossly intact.  Mentation and speech appropriate for age.  PSYCH: Mentation appears normal, affect normal/bright, judgement and insight intact, normal speech and appearance well-groomed.     Labs:  None    Assessment:    Petra Mcnulty is a 71 year old woman with a diagnosis of endometrial hyperplasia.     A total of 40 minutes was spent on patient care today.       Plan:     1.)    Endometrial hyperplasia-We reviewed the natural history and progression of disease.  We reviewed her treatment course and biopsies to date.  She has fortunately, responded well to her Mirena progestin treatment with complete resolution of hyperplasia.  I did note the one EMB which noted a papillary proliferation, however there was no atypia noted and thus this is a benign finding.  She and I did discuss the option of definitive hysterectomy, however given her significant co-morbidities including " her need for anticoagulation, I believe the risks outweigh the benefits at this time.  I did advise that should she develop recurrent episodes of bleeding despite Mirena, that I would recommend hysteroscopy rather than office EMB as she is definitely at risk for recurrent polyp formation.  I provided her with my contact information and asked her to reach out if she develops any further episodes of bleeding.  In the absence of further bleeding, I do not feel any further endometrial sampling is warranted.     2.) Genetic risk factors were assessed and the patient does not meet the qualifications for a referral.      3.) Labs and/or tests ordered include:  None.     4.) Health maintenance issues addressed today include pt is up to date.      Thank you for allowing us to participate in the care of your patient.         Sincerely,    Gina Umana MD  Gynecologic Oncology  [facility]       SORIN MOTT

## 2022-10-18 NOTE — NURSING NOTE
"Oncology Rooming Note    October 18, 2022 11:00 AM   Petra Mcnulty is a 71 year old female who presents for:    Chief Complaint   Patient presents with     Oncology Clinic Visit     PMB (postmenopausal bleeding)      Initial Vitals: BP (!) 143/78 (Cuff Size: Adult Large)   Pulse 88   Temp 97.1  F (36.2  C) (Tympanic)   Resp 18   Ht 1.702 m (5' 7\")   Wt (!) 151 kg (333 lb)   LMP  (LMP Unknown)   SpO2 95%   BMI 52.16 kg/m   Estimated body mass index is 52.16 kg/m  as calculated from the following:    Height as of this encounter: 1.702 m (5' 7\").    Weight as of this encounter: 151 kg (333 lb). Body surface area is 2.67 meters squared.  Mild Pain (2) Comment: Data Unavailable   No LMP recorded (lmp unknown). Patient is postmenopausal.  Allergies reviewed: Yes  Medications reviewed: Yes    Medications: Medication refills not needed today.  Pharmacy name entered into [facility]: [place], [place] - [address] [facility]    Clinical concerns: new patient       Gina Barbour CMA              "

## 2022-10-18 NOTE — LETTER
10/18/2022         RE: Petra Mcnulty  [address] [place] [address]        Dear Colleague,    Thank you for referring your patient, Petra Mcnulty, to the [facility]. Please see a copy of my visit note below.    Consult Notes on Referred Patient        Dr. Parisa Goldberg MD  [address] [place] [address]       RE: Petra Mcnulty  : 1950  GOLDIE: Oct 18, 2022    Dear Dr. Parisa Goldberg:    I had the pleasure of seeing your patient Petra Mcnulty here at the Gynecologic Cancer Clinic at the [facility] on 10/18/2022.  As you know she is a very pleasant 71 year old woman with a diagnosis of endometrial hyperplasia.  Given these findings she was subsequently sent to the Gynecologic Cancer Clinic for new patient consultation.  She is unaccompanied today.    Patient initially presented several years ago with an episode of PMB.  She underwent a hysteroscopy in which a large polyp with endometrial hyperplasia without atypia was found.  She had a Mirena placed at that time.  She again had an episode of bleeding in  at which time she had a repeat biopsy that was without hyperplasia or atypia.  She no longer has had any bleeding since the Mirena was placed except for the several days following and EMB.    20:  EMB:  Endometrial polyp with simple and complex hyperplasia without definitive atypia    20:  Placement of Mirena IUD    21:  EMB:  Negative for hyperplasia or malignancy    21:  US Pelvis (personally reviewed):  Uterus: 5.7 x 3.9 x 4.4 cm Endo cav: 6.8 mm Prominent Cervix: Wnl Normal uterus Normal right ovary; left ovary not visualized. Prominent endometrial lining. Appropriately positioned IUD, though difficult to visualize. This is best seen on image 17 of transvaginal images. No free fluid in the cul de sac    6/3/21:  EMB:  Negative for hyperplasia or malignancy, Focal papillary proliferation of surface  endometrial epithelium, without atypia.    10/11/22:  EMB:  decidualized endometrial mucosa without evidence of hyperplasia or malignancy      Obstetrics and Gynecology History:  ,  x 2  Menopause in early 40's, no HRT use      Past Medical History:  Past Medical History:   Diagnosis Date     COPD (chronic obstructive pulmonary disease) (H)      Factor V deficiency (H)      Glaucoma      Hypertension      Hypothyroid      Obese      PPD positive     INH TX x 1 year     Pulmonary embolism (H)      Sleep apnea     uses cpap     Type 2 diabetes mellitus without complication, without long-term current use of insulin (H) 11/15/2017     Uncomplicated asthma        Past Surgical History:  Past Surgical History:   Procedure Laterality Date     ARTHROPLASTY HIP      left + 2 I&Ds     ARTHROSCOPY KNEE       AS TOTAL HIP ARTHROPLASTY       BACK SURGERY      c3-c4 discectomy/fusion,     BACK SURGERY      lumbar decompression/lami      BIOPSY      breast     CHOLECYSTECTOMY       COLONOSCOPY       D & C       HYSTEROSCOPY      d and c     INSERT STIMULATOR DORSAL COLUMN N/A 2020    Procedure: SPINAL CORD STIMULATOR IMPLANT;  Surgeon: Rosemary Dacosta DO;  Location:  OR     INSERT STIMULATOR DORSAL COLUMN TRIAL N/A 2020    Procedure: SPINAL CORD STIMULATOR TRIAL;  Surgeon: Karlee Duffy MD;  Location:  OR     OPERATIVE HYSTEROSCOPY WITH MORCELLATOR N/A 2020    Procedure: 1. Exam under anesthesia 2. Dilation and curettage under direct visualization 3. Operative hysteroscopy 4. polypectomy;  Surgeon: Parisa Goldberg MD;  Location:  OR     [facility] TOTAL KNEE ARTHROPLASTY      left,right       Health Maintenance:  Last Pap Smear: Age 64              Result: normal  She has not had a history of abnormal Pap smears.    Last Mammogram: 21              Result: normal      She has not had a history of abnormal mammograms.    Last Colonoscopy: 21              Result: Polyps repeat 3 years      "  Social History:  Lives with her , feels safe at home.  Retired RN.  Enjoys scrap booking, knitting.  Does have an advanced directive on file and would like her  to be her POA.  Would like full resuscitation if reversible cause is identified, however would not like to be kept on life sustaining measures long-term.     Family History:   The patient's family history is significant for.  Family History   Problem Relation Age of Onset     Diabetes Mother 80        Type II     Hypertension Mother      Cerebrovascular Disease Father      Basal cell carcinoma Sister      Breast Cancer Sister      Bladder Cancer Brother      Breast Cancer Niece      Colon Cancer Maternal Aunt          Physical Exam:   BP (!) 143/78 (Cuff Size: Adult Large)   Pulse 88   Temp 97.1  F (36.2  C) (Tympanic)   Resp 18   Ht 1.702 m (5' 7\")   Wt (!) 151 kg (333 lb)   LMP  (LMP Unknown)   SpO2 95%   BMI 52.16 kg/m    Body mass index is 52.16 kg/m .  GENERAL: Healthy, alert and no distress  EYES: Eyes grossly normal to inspection.  No discharge or erythema, or obvious scleral/conjunctival abnormalities.  RESP: No audible wheeze, cough, or visible cyanosis.  No visible retractions or increased work of breathing.    SKIN: Visible skin clear. No significant rash, abnormal pigmentation or lesions.  NEURO: Cranial nerves grossly intact.  Mentation and speech appropriate for age.  PSYCH: Mentation appears normal, affect normal/bright, judgement and insight intact, normal speech and appearance well-groomed.     Labs:  None    Assessment:    Petra Mcnulty is a 71 year old woman with a diagnosis of endometrial hyperplasia.     A total of 40 minutes was spent on patient care today.       Plan:     1.)    Endometrial hyperplasia-We reviewed the natural history and progression of disease.  We reviewed her treatment course and biopsies to date.  She has fortunately, responded well to her Mirena progestin treatment with complete " resolution of hyperplasia.  I did note the one EMB which noted a papillary proliferation, however there was no atypia noted and thus this is a benign finding.  She and I did discuss the option of definitive hysterectomy, however given her significant co-morbidities including her need for anticoagulation, I believe the risks outweigh the benefits at this time.  I did advise that should she develop recurrent episodes of bleeding despite Mirena, that I would recommend hysteroscopy rather than office EMB as she is definitely at risk for recurrent polyp formation.  I provided her with my contact information and asked her to reach out if she develops any further episodes of bleeding.  In the absence of further bleeding, I do not feel any further endometrial sampling is warranted.     2.) Genetic risk factors were assessed and the patient does not meet the qualifications for a referral.      3.) Labs and/or tests ordered include:  None.     4.) Health maintenance issues addressed today include pt is up to date.      Thank you for allowing us to participate in the care of your patient.         Sincerely,    Gina Umana MD  Gynecologic Oncology  [facility]       S[place]IN MOTT        Again, thank you for allowing me to participate in the care of your patient.        Sincerely,        Miko Umana MD

## 2022-10-19 NOTE — TELEPHONE ENCOUNTER
Pending Prescriptions:                       Disp   Refills    gabapentin (NEURONTIN) 100 MG capsule [Pha*90 cap*3        Sig: TAKE ONE CAPSULE BY MOUTH EVERY MORNING    Routing refill request to provider for review/approval because:  Drug not on the [facility] refill protocol

## 2022-10-19 NOTE — PROGRESS NOTES
ANTICOAGULATION MANAGEMENT     Petra Mcnulty 71 year old female is on warfarin with therapeutic INR result. (Goal INR 2.0-3.0)    Recent labs: (last 7 days)     10/19/22  0940   INR 2.4*       ASSESSMENT       Source(s): Chart Review and Patient/Caregiver Call       Warfarin doses taken: Warfarin taken as instructed    Diet: No new diet changes identified    New illness, injury, or hospitalization: Yes: Pt had an endometrial bx on 10/11/22. Pt was on paxlovid for COVID. Pt is feeling back to baseline now.    Medication/supplement changes: None noted    Signs or symptoms of bleeding or clotting: No    Previous INR: Subtherapeutic    Additional findings: None       PLAN       Dosing Instructions: Continue your current warfarin dose with next INR in 2 weeks       Summary  As of 10/19/2022    Full warfarin instructions:  4 mg every day   Next INR check:  11/2/2022             Telephone call with Petra who verbalizes understanding and agrees to plan    Lab visit scheduled    Education provided: Importance of notifying clinic for changes in medications; a sooner lab recheck maybe needed. and Contact [phone]  with any changes, questions or concerns.     Plan made per [facility] anticoagulation protocol    Sabina Khalil RN  Anticoagulation Clinic  10/19/2022    _______________________________________________________________________     Anticoagulation Episode Summary     Current INR goal:  2.0-3.0   TTR:  75.0 % (1 y)   Target end date:  Indefinite   Send INR reminders to:  [facility]    Indications    History of pulmonary embolism [Z86.711]  Heterozygous factor V Leiden mutation (H) [D68.51]  Long term current use of anticoagulants with INR goal of 2.0-3.0 [Z79.01]           Comments:           Anticoagulation Care Providers     Provider Role Specialty Phone number    Shanta English MD Referring Internal Medicine [phone]

## 2022-10-24 NOTE — TELEPHONE ENCOUNTER
Patient Quality Outreach    Patient is due for the following:   Diabetes -  A1C and Foot Exam  Hypertension -  BP check  Colon Cancer Screening      Topic Date Due     Flu Vaccine (1) 09/01/2022     Diptheria Tetanus Pertussis (DTAP/TDAP/TD) Vaccine (2 - Td or Tdap) 10/02/2022       Next Steps:   Schedule a Annual Wellness Visit and as above.    Type of outreach:    Phone, left message for patient/parent to call back.      Questions for provider review:    None     Armida Sargent LPN

## 2022-10-25 NOTE — PATIENT INSTRUCTIONS
Websites with free information:    [employer] patient website: www.voicesforpfd.org    Total Control Program: www.totalcontrolprogram.com    [place] VERAS Care Coordinator [phone]    Surgery Schedule [phone]    Please don't forget to ask for financial counseling about the cost    [facility]  [address] [place] [address]  [phone]    It was a pleasure meeting with you today.  Thank you for allowing me and my team the privilege of caring for you today.  YOU are the reason we are here, and I truly hope we provided you with the excellent service you deserve.  Please let us know if there is anything else we can do for you so that we can be sure you are leaving completely satisfied with your care experience.

## 2022-10-25 NOTE — PROGRESS NOTES
"SEND LINK TO CELL PHONE    Petra is a 71 year old who is being evaluated via a billable video visit.      How would you like to obtain your AVS? MyChart  If the video visit is dropped, the invitation should be resent by: Text to cell phone: [phone]  Will anyone else be joining your video visit? No    October 25, 2022    Petra was seen today for other.    Diagnoses and all orders for this visit:    Mixed stress and urge urinary incontinence    Heterozygous factor V Leiden mutation (H)    History of pulmonary embolism    Morbid obesity (H)       Considering botox.  Discussed the procedure and its risks.  She is considering it and will let us know    On coumadin for PE so increases risk of bleeding, will need to do the procedure in OR    RTC 6 months if chooses not to do botox    25 minutes were spent today on the day of the encounter in reviewing the EMR, direct patient care inculding surgical counseling, coordination of care and documentation    Leti Mac MD MPH  (she/her/hers)   of Urology  [facility]    Subjective    Here for 6 month follow up.  She has tried pessary, medications and PT without much help in her urinary symptoms. Hip ain is much better than it was six months ago.  She continues to have variable urinary incontinence-days where only one pad a day others where she has to change clothes two to three times a day.  She denies UTIs, gross hematuria. She denies any changes in health since last visit    Ht 1.702 m (5' 7\")   Wt (!) 150.1 kg (331 lb)   LMP  (LMP Unknown)   BMI 51.84 kg/m    GENERAL: healthy, alert and no distress  EYES: Eyes grossly normal to inspection, conjunctivae and sclerae normal  HENT: normal cephalic/atraumatic.  External ears, nose and mouth without ulcers or lesions.  RESP: no audible wheeze, cough, or visible cyanosis.  No visible retractions or increased work of breathing.  Abl  e to speak fully in complete sentences.  NEURO: Cranial " nerves grossly intact, mentation intact and speech normal  PSYCH: mentation appears normal, affect normal/bright, judgement and insight intact, normal speech and appearance well-groomed    CC  Patient Care Team:  Shanta English MD as PCP - General (Internal Medicine)  Adam Harvey MD as Assigned Sleep Provider  Parisa Goldberg MD as Assigned OBGYN Provider  Leti Mac MD as Assigned Surgical Provider  Shanta English MD as Assigned PCP  Lori Pitts [facility] as Pharmacist (Pharmacist)  Naya Moya [facility] (Pharmacist)  Lori Pitts [facility] as Assigned [place] Pharmacist  Gina Rojas MD as MD (Gynecologic Oncology)  Parisa Goldberg MD as MD (OB/Gyn)    Video-Visit Details    Video Start Time: 1:14 PM    Type of service:  Video Visit    Video End Time:1:37 PM    Originating Location (pt. Location): Home        Distant Location (provider location):  Off-site    Platform used for Video Visit: Karyn

## 2022-10-25 NOTE — LETTER
"10/25/2022       RE: Petra Mcnulty  [address] [place] [address]     Dear Colleague,    Thank you for referring your patient, Petra Mcnulty, to the [facility] at [facility]. Please see a copy of my visit note below.    SEND LINK TO CELL PHONE    Petra is a 71 year old who is being evaluated via a billable video visit.      How would you like to obtain your AVS? MyChart  If the video visit is dropped, the invitation should be resent by: Text to cell phone: [phone]  Will anyone else be joining your video visit? No    October 25, 2022    Petra was seen today for other.    Diagnoses and all orders for this visit:    Mixed stress and urge urinary incontinence    Heterozygous factor V Leiden mutation (H)    History of pulmonary embolism    Morbid obesity (H)       Considering botox.  Discussed the procedure and its risks.  She is considering it and will let us know    On coumadin for PE so increases risk of bleeding, will need to do the procedure in OR    RTC 6 months if chooses not to do botox    25 minutes were spent today on the day of the encounter in reviewing the EMR, direct patient care inculding surgical counseling, coordination of care and documentation    Leti Mac MD MPH  (she/her/hers)   of Urology  [facility]    Subjective    Here for 6 month follow up.  She has tried pessary, medications and PT without much help in her urinary symptoms. Hip ain is much better than it was six months ago.  She continues to have variable urinary incontinence-days where only one pad a day others where she has to change clothes two to three times a day.  She denies UTIs, gross hematuria. She denies any changes in health since last visit    Ht 1.702 m (5' 7\")   Wt (!) 150.1 kg (331 lb)   LMP  (LMP Unknown)   BMI 51.84 kg/m    GENERAL: healthy, alert and no distress  EYES: Eyes grossly normal to " inspection, conjunctivae and sclerae normal  HENT: normal cephalic/atraumatic.  External ears, nose and mouth without ulcers or lesions.  RESP: no audible wheeze, cough, or visible cyanosis.  No visible retractions or increased work of breathing.  Abl  e to speak fully in complete sentences.  NEURO: Cranial nerves grossly intact, mentation intact and speech normal  PSYCH: mentation appears normal, affect normal/bright, judgement and insight intact, normal speech and appearance well-groomed    CC  Patient Care Team:  Shanta English MD as PCP - General (Internal Medicine)  Adam Harvey MD as Assigned Sleep Provider  Parisa Goldberg MD as Assigned OBGYN Provider  Leti Mac MD as Assigned Surgical Provider  Shanta English MD as Assigned PCP  Lori Pitts RP as Pharmacist (Pharmacist)  Naya Moya RPH (Pharmacist)  Lori Pitts RPH as Assigned MTM Pharmacist  Gina Rojas MD as MD (Gynecologic Oncology)  Parisa Goldberg MD as MD (OB/Gyn)    Video-Visit Details  Video Start Time: 1:14 PM  Type of service:  Video Visit  Video End Time:1:37 PM  Originating Location (pt. Location): Home    Distant Location (provider location):  Off-site    Platform used for Video Visit: Karyn

## 2022-11-02 NOTE — PROGRESS NOTES
ANTICOAGULATION MANAGEMENT     Petra Mcnulty 71 year old female is on warfarin with therapeutic INR result. (Goal INR 2.0-3.0)    Recent labs: (last 7 days)     11/02/22  1051   INR 2.6*       ASSESSMENT       Source(s): Chart Review and Patient/Caregiver Call       Warfarin doses taken: Warfarin taken as instructed    Diet: No new diet changes identified    New illness, injury, or hospitalization: No    Medication/supplement changes: None noted    Signs or symptoms of bleeding or clotting: No    Previous INR: Therapeutic last visit; previously outside of goal range    Additional findings: May have botox injection to the bladder done in the future.  She will need to be off her warfarin for this procedure.  Patient still has not decided if she wants to proceed with the procedure.       PLAN     Recommended plan for no diet, medication or health factor changes affecting INR     Dosing Instructions: Continue your current warfarin dose with next INR in 3 weeks       Summary  As of 11/2/2022    Full warfarin instructions:  4 mg every day; Starting 11/2/2022   Next INR check:  11/23/2022             Telephone call with Petra who agrees to plan and repeated back plan correctly    Lab visit scheduled    Education provided:     Please call back if any changes to your diet, medications or how you've been taking warfarin    Plan made per [facility] anticoagulation protocol    Rae Wing RN  Anticoagulation Clinic  11/2/2022    _______________________________________________________________________     Anticoagulation Episode Summary     Current INR goal:  2.0-3.0   TTR:  75.0 % (1 y)   Target end date:  Indefinite   Send INR reminders to:  [facility]    Indications    History of pulmonary embolism [Z86.711]  Heterozygous factor V Leiden mutation (H) [D68.51]  Long term current use of anticoagulants with INR goal of 2.0-3.0 [Z79.01]           Comments:           Anticoagulation Care Providers     Provider Role  Specialty Phone number    Shanta English MD Referring Internal Medicine [phone]

## 2022-11-23 NOTE — PROGRESS NOTES
ANTICOAGULATION MANAGEMENT     Petra Mcnulty 71 year old female is on warfarin with supratherapeutic INR result. (Goal INR 2.0-3.0)    Recent labs: (last 7 days)     11/23/22  0948   INR 3.3*       ASSESSMENT       Source(s): Chart Review and Patient/Caregiver Call       Warfarin doses taken: Warfarin taken as instructed    Diet: No new diet changes identified    New illness, injury, or hospitalization: Yes: continues to have some SOB s/p COVID.    Medication/supplement changes: None noted    Signs or symptoms of bleeding or clotting: No    Previous INR: Therapeutic last 2(+) visits    Additional findings: None       PLAN     Recommended plan for no changes in health, diet or medications affecting INR     Dosing Instructions: partial hold then continue your current warfarin dose with next INR in 2 weeks       Summary  As of 11/23/2022    Full warfarin instructions:  11/23: 2 mg; Otherwise 4 mg every day; Starting 11/23/2022   Next INR check:  12/7/2022             Telephone call with Petra who agrees to plan and repeated back plan correctly    Lab visit scheduled    Education provided:     Please call back if any changes to your diet, medications or how you've been taking warfarin    Plan made per [facility] anticoagulation protocol    Rae Wing RN  Anticoagulation Clinic  11/23/2022    _______________________________________________________________________     Anticoagulation Episode Summary     Current INR goal:  2.0-3.0   TTR:  72.5 % (1 y)   Target end date:  Indefinite   Send INR reminders to:  [facility]    Indications    History of pulmonary embolism [Z86.711]  Heterozygous factor V Leiden mutation (H) [D68.51]  Long term current use of anticoagulants with INR goal of 2.0-3.0 [Z79.01]           Comments:           Anticoagulation Care Providers     Provider Role Specialty Phone number    Shanta English MD Referring Internal Medicine [phone]

## 2022-11-23 NOTE — PROGRESS NOTES
ANTICOAGULATION MANAGEMENT     Petra Mcnulty 71 year old female is on warfarin with supratherapeutic INR result. (Goal INR 2.0-3.0)    Recent labs: (last 7 days)     11/23/22  0948   INR 3.3*       ASSESSMENT       Source(s): Chart Review    Previous INR was Therapeutic last 2(+) visits    Medication, diet, health changes since last INR chart reviewed; none identified           PLAN     Unable to reach Petra today.    Left message for patient requesting that she call INR Clinic to discuss results.    Follow up required to confirm warfarin dose taken and assess for changes    Rae Wing RN  Anticoagulation Clinic  11/23/2022

## 2022-12-07 NOTE — PROGRESS NOTES
ANTICOAGULATION MANAGEMENT     Petar Mcnulty 72 year old female is on warfarin with therapeutic INR result. (Goal INR 2.0-3.0)    Recent labs: (last 7 days)     12/07/22  1047   INR 2.4*       ASSESSMENT       Source(s): Chart Review and Patient/Caregiver Call       Warfarin doses taken: Warfarin taken as instructed    Diet: No new diet changes identified    New illness, injury, or hospitalization: No    Medication/supplement changes: None noted    Signs or symptoms of bleeding or clotting: No    Previous INR: Supratherapeutic    Additional findings: None       PLAN     Recommended plan for no diet, medication or health factor changes affecting INR     Dosing Instructions: Continue your current warfarin dose with next INR in 3 weeks       Summary  As of 12/7/2022    Full warfarin instructions:  4 mg every day; Starting 12/7/2022   Next INR check:  12/28/2022             Telephone call with Petra who verbalizes understanding and agrees to plan    Lab visit scheduled    Education provided:     Please call back if any changes to your diet, medications or how you've been taking warfarin    Contact [phone]  with any changes, questions or concerns.     Plan made per [facility] anticoagulation protocol    Martha De La Cruz RN  Anticoagulation Clinic  12/7/2022    _______________________________________________________________________     Anticoagulation Episode Summary     Current INR goal:  2.0-3.0   TTR:  71.2 % (1 y)   Target end date:  Indefinite   Send INR reminders to:  [facility]    Indications    History of pulmonary embolism [Z86.711]  Heterozygous factor V Leiden mutation (H) [D68.51]  Long term current use of anticoagulants with INR goal of 2.0-3.0 [Z79.01]           Comments:           Anticoagulation Care Providers     Provider Role Specialty Phone number    Shanta English MD Referring Internal Medicine [phone]

## 2022-12-23 NOTE — PROGRESS NOTES
RT Note  Patient with agonal respirations upon arrival.  EMS using BVM.  Patient intubated with 7.5 ETT, 25 at teeth.  CXR confirmed good placement.  Patient placed on ventilator and transferred to head CT without complication.    Will continue to monitor.    Katie De La Cruz, RT

## 2022-12-23 NOTE — ED NOTES
Assisted with patient triage (ambulance). Applied monitoring devices (EKG, BP, and pulse ox) onto Patient.

## 2022-12-23 NOTE — ED NOTES
Arterial Line Insertion      Procedure: Arterial Line Placement     Indication Critical care blood pressure monitoring    Consent:   Unable/Emergent     Location: Left Radial     Preparation: Chlorhexidine     Anesthesia/Sedation:  None     Procedure Detail: Self-contained Guidewire Catheter (Arrow Arterial Catheterization Kit)  The patient's extremity was properly positioned. The skin was prepped and draped with sterile technique. Local anesthesia was infiltrated. The artery was identified by ultrasound. The needle/catheter/wire assembly was advanced until a flash of arterial blood confirmed position within the lumen of the artery. The needle was stabilized and the guidewire was advanced. The catheter was then advanced and the needle/guidewire were removed.    Pressure tubing was attached to the catheter.  The catheter was secured.  A sterile dressing was placed.    Patient Status: The patient tolerated the procedure well: Yes. There were no complications.       Damián Domínguez MD  12/23/22 1293

## 2022-12-23 NOTE — ED PROVIDER NOTES
"  History   Chief Complaint:  Headache, Loss of Consciousness     The history is provided by the EMS personnel. History limited by: acuity of condition and unresonsiveness.      Petra Mcnulty is a 72 year old female, on Warfarin for PE, with other history of hypertension, hyperlipidemia, non-insulin dependent diabetes mellitus, hypothyroidism, COPD, and Factor 5 Leiden who presents via EMS from home with sudden onset of sharp, diffuse headache 3 hours ago. With onset of the above symptoms, her  at home called EMS who arrived and transported her to ED at this time. On their arrival, she was able to answer all questions and they noted equal strength in all extremities. However en route as they prompted her about other symptoms, the patient endorsed generalized weakness, nausea, abdominal pain (no vomiting), and feeling overall \"sick\" before quickly decompensating into unconsciousness, only responsive to painful stimuli. While initially blood pressure was elevated at 238 systolic, repeat showed hypertension at 197 systolic. A 20 gauge was placed in the right AC and BVM ventilation was initiated. BGL measured 179. No medications were administered en route.     Review of Systems   Unable to perform ROS: Acuity of condition (and unresponsiveness)      Allergies:  Atorvastatin  Simvastatin    Medications:  Albuterol  Cholecalciferol  Ciclopirox  Clobetasol  Estradiol  Fluticasone  Furosemide  Gabapentin  Robitussin  Mirena  Levothyroxine  Loratadine  Losartan  Metformin  Methocarbamol  Nystatin  Pravastatin  Ascorbic acid  Warfarin  Zinc     Past Medical History:     Heterozygous factor 5 Leiden mutation  Morbid obesity  PE  Postmenopausal bleeding  Pyogenic granuloma  Intertrigo  Radiculopathy, lumbar region  LOR  Acquired hypothyroidism  Benign essential hypertension  Hyperlipidemia  Osteoarthritis of multiple joints  Arthritis of food, degenerative  Cataracts, bilateral  COPD  Insomnia  Dyslipidemia   Chronic " musculoskeletal pain  Pulmonary nodule  Type 2 diabetes mellitus     Past Surgical History:    Left hip arthoplasty  Knee arthroscopy, left  C3-4 discectomy/fusion  Lumbar decompression  Breast biopsy  Cholecystectomy  Colonoscopy  D&C  Hysteroscopy  Insert stimulator dorsal column  Insert stimulator dorsal column trial     Family History:    Type 2 diabetes mellitus  Hypertension  Cerebrovascular disease  Basal cell carcinoma  Breast cancer  Bladder cancer    Social History:  The patient presents to the ED with her .  The patient arrived via EMS.  PCP: Shanta English     Physical Exam     Patient Vitals for the past 24 hrs:   BP Temp Temp src Pulse Resp SpO2 Weight   12/23/22 2102 (!) 180/95 -- -- 61 (!) 35 (!) 51 % --   12/23/22 2057 (!) 150/98 -- -- 69 17 100 % --   12/23/22 2052 (!) 148/83 -- -- 66 24 100 % --   12/23/22 2047 (!) 147/81 -- -- 67 24 100 % --   12/23/22 2034 (!) 140/78 -- -- 63 23 100 % --   12/23/22 2029 137/73 -- -- 63 24 100 % --   12/23/22 2024 132/72 -- -- 61 23 100 % --   12/23/22 2019 131/67 -- -- 61 24 100 % --   12/23/22 2014 117/72 -- -- 62 24 100 % --   12/23/22 2009 117/69 -- -- 61 23 99 % --   12/23/22 2004 120/64 -- -- 61 24 100 % --   12/23/22 1959 113/65 -- -- 61 23 100 % --   12/23/22 1954 115/62 -- -- 61 24 100 % --   12/1949 117/62 -- -- 61 23 100 % --   12/23/22 1944 118/64 -- -- 62 23 100 % --   12/23/22 1939 114/64 -- -- 62 23 100 % --   12/23/22 1935 119/62 -- -- 62 23 100 % --   12/23/22 1930 115/60 -- -- 61 23 100 % --   12/23/22 1926 111/63 -- -- 61 23 100 % --   12/23/22 1921 111/64 -- -- 62 23 100 % --   12/23/22 1916 114/63 -- -- 63 23 100 % --   12/23/22 1911 119/63 -- -- 63 23 100 % --   12/23/22 1906 121/65 -- -- 64 23 100 % --   12/23/22 1901 114/70 -- -- 64 24 100 % --   12/23/22 1856 124/65 -- -- 67 23 100 % --   12/23/22 1851 126/67 -- -- 69 24 99 % --   12/23/22 1846 123/70 -- -- 70 23 99 % --   12/23/22 1841 124/66 -- -- 69 24 100 % --    12/23/22 1836 132/67 -- -- 68 23 99 % --   12/23/22 1831 138/74 -- -- 71 23 100 % --   12/23/22 1826 (!) 140/76 -- -- 74 23 99 % --   12/23/22 1821 (!) 149/77 -- -- 75 23 99 % --   12/23/22 1816 (!) 149/79 -- -- 74 24 99 % --   12/23/22 1813 (!) 149/79 -- -- 87 24 99 % --   12/23/22 1808 (!) 152/86 -- -- 91 24 99 % --   12/23/22 1803 (!) 158/84 -- -- 92 24 99 % --   12/23/22 1758 (!) 156/88 -- -- 92 23 98 % --   12/23/22 1753 (!) 154/86 -- -- 92 24 98 % --   12/23/22 1748 (!) 157/83 -- -- 92 24 98 % --   12/23/22 1743 (!) 148/85 -- -- 93 24 97 % --   12/23/22 1739 (!) 144/82 -- -- 93 23 96 % --   12/23/22 1734 (!) 143/75 -- -- 93 24 94 % --   12/23/22 1729 135/78 -- -- 94 24 95 % --   12/23/22 1724 135/73 -- -- 94 23 94 % --   12/23/22 1723 -- 96.9  F (36.1  C) Axillary -- -- -- --   12/23/22 1720 (!) 140/80 -- -- 95 23 94 % --   12/23/22 1710 -- -- -- -- 24 -- --   12/23/22 1706 (!) 151/82 -- -- 93 27 96 % --   12/23/22 1701 (!) 153/78 -- -- 93 19 97 % --   12/23/22 1656 (!) 142/80 -- -- 98 23 97 % --   12/23/22 1651 (!) 171/87 -- -- 101 (!) 31 98 % --   12/23/22 1646 (!) 186/93 -- -- 102 19 98 % --   12/23/22 1641 (!) 199/97 -- -- 100 20 98 % --   12/23/22 1617 (!) 162/94 -- -- -- -- -- --   12/23/22 1608 (!) 158/75 -- -- -- -- -- --   12/23/22 1602 (!) 179/102 -- -- -- -- -- --   12/23/22 1601 -- -- -- -- -- -- 100 kg (220 lb 7.4 oz)   12/23/22 1555 (!) 199/106 -- -- -- -- -- --   12/23/22 1548 (!) 252/148 -- -- 101 -- 95 % --     Physical Exam  Vitals and nursing note reviewed.   Constitutional:       General: She is in acute distress.      Appearance: She is obese. She is ill-appearing and toxic-appearing.      Comments: Obtunded, unresponsive   HENT:      Head: Normocephalic and atraumatic.      Right Ear: External ear normal.      Left Ear: External ear normal.      Nose: Nose normal.      Mouth/Throat:      Mouth: Mucous membranes are moist.   Eyes:      Conjunctiva/sclera: Conjunctivae normal.       Pupils: Pupils are unequal (L>>R).   Cardiovascular:      Rate and Rhythm: Normal rate and regular rhythm.      Heart sounds: No murmur heard.  Pulmonary:      Effort: Bradypnea (agonal breathing) and respiratory distress present.      Breath sounds: Normal breath sounds. No wheezing, rhonchi or rales.      Comments: Patient being ventilated by bag valve mask on arrival  Abdominal:      General: Abdomen is flat. Bowel sounds are normal. There is no distension.      Palpations: Abdomen is soft.   Musculoskeletal:         General: No deformity or signs of injury.      Cervical back: Normal range of motion and neck supple.   Skin:     General: Skin is warm and dry.      Findings: No rash.   Neurological:      Mental Status: She is unresponsive.      GCS: GCS eye subscore is 1. GCS verbal subscore is 1. GCS motor subscore is 1.      Comments: Patient unresponsive, no purposeful movements           Emergency Department Course   ECG  ECG taken at 1544, ECG read at 1544  Normal sinus rhythm.  Nonspecific ST and T wave abnormality.  Abnormal ECG.  No significant change as compared to prior EKG dated 2/6/20   Rate 84 bpm. ID interval * ms. QRS duration 96 ms. QT/QTc 380/449 ms. P-R-T axis * 66 7.     Imaging:  XR Chest Port 1 View   Final Result   IMPRESSION: Endotracheal tube terminates 2.8 cm above the doroteo. Right-sided central venous catheter terminates over the distal SVC. Nasogastric tube courses into the upper abdomen, its tip not included. Partially visualized spinal stimulator lead.    Heart size is stable. Mild basilar atelectasis. No pneumothorax. No acute bony abnormality.      XR Chest Port 1 View   Final Result   IMPRESSION: Endotracheal tube tip is about 2.6 cm above the doroteo. NG   tube coursing below the left hemidiaphragm, tip outside the field of   view. Pacer pads over the chest. Spinal stimulator. No focal airspace   opacities, pleural effusion or pneumothorax. Normal heart size.      PHUONG TRIPP MD             SYSTEM ID:  [id number]      Head CT w/o contrast   Final Result   Abnormal   IMPRESSION:    1. Parenchymal hematoma centered between the left basal ganglia and   anterior left temporal lobe measuring 4.3 x 3.0 cm in size.   2. Intraventricular extension of hemorrhage into the bilateral   lateral, third and fourth ventricles. Minimal prominence of the   lateral and third ventricles likely represents developing   hydrocephalus. Short-term repeat evaluation recommended.   3. Subarachnoid extension of hemorrhage into the left sylvian fissure.   4. 0.9 cm rightward midline shift of the septum pellucidum. No   definite evidence for subfalcine or transtentorial herniation.   5. Diffuse cerebral volume loss and cerebral white matter changes   consistent with chronic small vessel ischemic disease.        [Critical Result: Intracranial hemorrhage]      Finding was identified on 12/23/2022 4:19 PM.       KALYANI TORRE was contacted by Dr. Becerra on 12/23/2022 4:23 PM   and verbalized understanding of the critical result.          Radiation dose for this scan was reduced using automated exposure   control, adjustment of the mA and/or kV according to patient size, or   iterative reconstruction technique      MELODY BECERRA MD            SYSTEM ID:  [id number]        Report per radiology    Laboratory:  Labs Ordered and Resulted from Time of ED Arrival to Time of ED Departure   COMPREHENSIVE METABOLIC PANEL - Abnormal       Result Value    Sodium 141      Potassium 3.9      Chloride 101      Carbon Dioxide (CO2) 26      Anion Gap 14      Urea Nitrogen 13.0      Creatinine 0.58      Calcium 9.3      Glucose 179 (*)     Alkaline Phosphatase 94      AST 28      ALT 17      Protein Total 7.8      Albumin 4.5      Bilirubin Total 0.3      GFR Estimate >90     CBC WITH PLATELETS AND DIFFERENTIAL - Abnormal    WBC Count 12.3 (*)     RBC Count 5.06      Hemoglobin 15.4      Hematocrit 48.0 (*)     MCV 95      MCH 30.4       MCHC 32.1      RDW 12.8      Platelet Count 299      % Neutrophils 71      % Lymphocytes 21      % Monocytes 6      % Eosinophils 1      % Basophils 0      % Immature Granulocytes 1      NRBCs per 100 WBC 0      Absolute Neutrophils 8.8 (*)     Absolute Lymphocytes 2.6      Absolute Monocytes 0.7      Absolute Eosinophils 0.1      Absolute Basophils 0.0      Absolute Immature Granulocytes 0.1      Absolute NRBCs 0.0     ISTAT GASES LACTATE VENOUS POCT - Abnormal    Lactic Acid POCT 1.7      Bicarbonate Venous POCT 27      O2 Sat, Venous POCT 93 (*)     pCO2V Venous POCT 60 (*)     pH Venous POCT 7.26 (*)     pO2 Venous POCT 77 (*)    ISTAT BASIC CHEM ICA HEMATOCRIT POCT - Abnormal    Chloride POCT 104      Potassium POCT 3.8      Sodium POCT 142      UREA NITROGEN POCT 13      Calcium, Ionized Whole Blood POCT 4.7      Glucose Whole Blood POCT 176 (*)     Anion Gap POCT 13.0      Hemoglobin POCT 16.3 (*)     Hematocrit POCT 48 (*)     Creatinine POCT 0.6      TOTAL CO2 POCT 29     INR - Abnormal    INR 2.36 (*)    ISTAT GASES LACTATE ARTERIAL POCT - Abnormal    Bicarbonate Arterial POCT 26      Lactic Acid POCT 1.9      O2 Sat, Arterial POCT 97      pCO2 Arterial POCT 49 (*)     PH Arterial POCT 7.33 (*)     pO2 Arterial POCT 101     ISTAT GASES LACTATE ARTERIAL POCT - Abnormal    Bicarbonate Arterial POCT 24      Lactic Acid POCT 2.0      O2 Sat, Arterial POCT 95      pCO2 Arterial POCT 44      PH Arterial POCT 7.35      pO2 Arterial POCT 79 (*)    TROPONIN T, HIGH SENSITIVITY - Normal    Troponin T, High Sensitivity 7     ISTAT GASES LACTATE ARTERIAL POCT - Normal    Bicarbonate Arterial POCT 24      Lactic Acid POCT 1.6      O2 Sat, Arterial POCT 97      pCO2 Arterial POCT 39      PH Arterial POCT 7.39      pO2 Arterial POCT 88     BLOOD GAS ARTERIAL WITH OXYHEMOGLOBIN   TYPE AND SCREEN, ADULT    ABO/RH(D) A POS      Antibody Screen Negative      SPECIMEN EXPIRATION DATE [id number]     ABO/RH TYPE AND SCREEN  "     Procedures     Intubation      INDICATION: Acute respiratory failure.    PERFORMED BY: Dr. Ross Rush MD    CONSENT: The procedure was performed in an emergent situation.    TIMEOUT: Immediately prior to procedure a \"time out\" was called to verify the correct patient, procedure, equipment, support staff and site/side marked as required.    INTUBATION METHOD: Glidescope    PATIENT STATUS: RSI    PREOXYGENATION: Mask    PRETREATMENT MEDICATIONS: Fentanyl    SEDATIVES: Etomidate    PARALYTIC: Succinylcholine    LARYNGOSCOPE SIZE: Mac 3    TUBE SIZE: 7.5 cuffed with cuff inflated after placement  Number of attempts: 1  Cricoid pressure: no  Cords visualized: yes    POST-PROCEDURE ASSESSMENT: Breath sounds equal bilaterally with chest rise and absent over the epigastrium, Chest x-ray interpreted by me demonstrating endotracheal tube in appropriate position and CO2 detector.    ETT TO TEETH: 25 cm  Tube secured with: ETT deluna    Patient tolerated the procedure well with no immediate complications.  COMPLICATIONS:  None      Central Line Placement     Procedure:  Central Venous Catheter Insertion     Indication: Vascular access    Consent:  The procedure was performed in an emergent situation.  Risk Discussed: Arterial puncture & stroke, incorrect placement, failure to place, bleeding or infection, nerve damage, pneumothorax and alternative treatment with no central line    Universal Protocol: Universal protocol was followed and time out conducted just prior to starting procedure, confirming patient identity, site/side, procedure, patient position, and availability of correct equipment and implants.     Procedure Detail:    Central line bundle elements were complete including preparatory hand cleansing, donning full barrier precautions, use of a full body drape and chlorhexidine gluconate skin prep.   The Right internal jugular vein was selected as the optimal location for patient s condition.   Ultrasound was " utilized for guidance: Yes, see separate procedural guidance POCUS note   A finder needle was used to enter the vein, through which a guidewire was inserted. The finder needle was then removed and the tract was dilated.   A triple lumen central venous catheter was inserted over the guidewire without difficulty. The guidewire was removed and the catheter was sutured in place and covered with an appropriate dressing.   A post-procedure chest radiograph was performed.     Patient Status:  The patient tolerated the procedure well: Yes. There were no complications.    Procedure Note: ED Limited Bedside US  Performed by: Ross Rush MD  INDICATION: Central line placement  LOCATION: Right internal jugular  FINDINGS: Compressible right internal jugular vein accessed with 18 gauge needle under US guidance  INTERPRETATION: Successful placement of right-sided central line    Emergency Department Course:      Reviewed:  I reviewed nursing notes, vitals, past medical history and Care Everywhere.    Assessments/Consults/Interventions:  1538 I obtained history from EMS and examined the patient as noted above. Ventilation by BVM in progress by EMS.  1540 An 18 gauge was placed in the right AC.  1544 Fentanyl 100 mcg IV  1545 Etomidate 30 mg IV  1545 Succinylcholine 200 mg IV  1546 I intubated the patient (see procedure note above).  1548 Cardene 2.5 mg/hr IV  1550 Propofol 40 mcg/kg/min IV  1551 Fentanyl 100 mcg IV  1556 XR imaging arrived at patient bedside.  1557 Blood pressure 199/106.  1559 I accompanied the patient over to CT for imaging.  1603 I spoke with the patient's , explained findings, and obtained additional history.  1605 Fentanyl 100 mcg IV  1617 I rechecked the patient. Discussed plan for arterial line and central line placements with family.  1619 I spoke with radiology.  1622 I performed central line placement (see procedure note above) and Dr. Damián Domínguez MD performed arterial line placement (see  his separate procedure note).  1625 Cardene 2.5 mg/hr IV restarted  1632 Cardene 7.5 mg/hr IV Rate/Dose Change  1632 Blood pressure 227/116.  1633 Cardene 10 mg/hr IV Rate/Dose Change  1636 Blood pressure 221/112.  1640 Fentanyl 100 mcg IV  1640 Cardene 15 mg/hr IV Rate/Dose Change  1645 I spoke with Behzad Choudhury PA-C neurosurgery at [facility], regarding the patient's history and presentation in the emergency department. He is at [facility] so will come down to ED for patient assessment.  1650 XR imaging arrived at patient bedside.  1650 KCentra 2,647 Units IV  1651 Phytonadione 10 mg IV  1651 Blood pressure 171/87.  1653 Labetalol 20 mg IV  1653 I consulted with Behzad Choudhury PA-C neurosurgery. He arrived at patient bedside for assessment. Recommended transfer to [facility] over [facility].  1655 Cardene 7.5 mg/hr IV Rate/Dose Change  1701 Dilaudid 1 mg IV  1710 Cardene 10 mg/hr IV Rate/Dose Change  1718 Cardene 12.5 mg/hr IV Rate/Dose Change  1718 Dr. Domínguez called and spoke with [facility] and [facility] regarding possible bed availabilities and patient transfer. Both declined immediate availability but if all else fails, [facility] should have a bed in 1-2 hours.  1718 I called and spoke with Dr. Beaver, ED physician at [facility], regarding possible bed availability and patient transfer. Declined availability.  1723 I consulted with [facility] - Dr. Cameron, intensivist, and Dr. Rome, neurosurgery, at [facility], regarding the patient's history and presentation here in the emergency department. Declined transfer. Stated there is no intervention they could do to treat the patient and recommended transition to comfort care.  1727 Cardene 10 mg/hr IV Rate/Dose Change  1729 I rechecked the patient and updated family.  1732 Cardene 7.5 mg/hr IV Rate/Dose Change  1738    I consulted with Behzad Choudhury PA-C neurosurgery, and provided update on consults and plan for patient's continued care.  1755 Cardene 10 mg/hr  IV Rate/Dose Change  180 Cardene 12.5 mg/hr IV Rate/Dose Change  1820 I consulted with Dr. Naranjo, neurosurgery at [facility], regarding the patient's history and presentation here in the emergency department. Declined transfer. Stated the condition of patient is likely not survivable and further recommended comfort care.  183 I rechecked the patient and updated family. Discussed options for her continued care and they confirmed wish for comfort care. Will admit and family agree.  184 Cardene 10 mg/hr IV Rate/Dose Change  184 Cardene 7.5 mg/hr IV Rate/Dose Change  190 I consulted with Dr. Pinto, hospitalist, regarding the patient's history and presentation here in the emergency department.   Propofol 40 mcg/kg/min IV   I spoke with the hospital  who arrived in ED. She has spoken with family at patient bedside.   I performed recheck. Patient extubated.   Morphine 2 mg IV   Ativan 0.5 mg IV   Haldol 2 mg IV   I consulted with Dr. Pinto, hospitalist, who accepted the patient for admission.    Disposition:  The patient was admitted to the hospital under the care of Dr. Pinto.    Impression & Plan     Medical Decision Makin-year-old female presenting with sudden onset headache and subsequent decrease in mental status.  She arrives obtunded with GCS 3 being ventilated by bag valve mask.  Worrisome for acute intracranial hemorrhage.  She does have unequal pupils as well so possible the patient could be herniating.  Patient was intubated soon after arrival to the ED.  She was pretreated with 100 mcg of fentanyl and given etomidate and succinylcholine.  Patient was given a dose of hypertonic saline and was started on nicardipine infusion for blood pressure control.  She was quickly taken to CT where she was found to have a large intracranial hemorrhage with 9 mm of midline shift.  Patient's INR was elevated so she was given a dose of Kcentra as well.  A central line was placed  by myself as noted above.  An arterial line was placed by Dr. Domínguez.  Continued blood pressure control with nicardipine and labetalol as needed.  She is also on propofol infusion and given IV pain medications.  I discussed with neurosurgery,PEE Oneil.  He came to the ED and evaluated the patient.  He reviewed the case with his attending Dr. Crouch who recommended that the patient be transferred to the [facility] given that he is suspicious for a possible aneurysmal rupture as the etiology of her bleed.  There was a delay to response by the [facility] regarding whether the patient could be transferred or not so multiple other facilities were contacted.  [facility] was contacted but was unable to to take any transfers.  We also discussed with neurosurgery and neuro critical care, Arely Soto and Latricia, at [facility].  Based on my description of the patient's findings and clinical presentation, they both recommend that the patient be transition to comfort care and that they would not perform any interventions.  I was able to get a hold of Dr. Naranjo with neurosurgery at the [facility].  He was able to review the patient's CT.  He advised that the patient's prognosis is extremely grim.  He did note that there were procedures that could be done to coiled the aneurysm and possibly keep the patient alive, however he does not feel that there is a strong likelihood that patient would have meaningful neurologic recovery based on her presentation.  Given these findings I had a long discussion with the patient's family.  Given the poor prognosis and likelihood the patient will never have full neurologic recovery, the family prefers to transition to comfort care.  Patient was maintained on the ventilator until family could all arrive and the hospital  could also spend time with the family.  At 2052, the family notify me that they were ready for the patient to be extubated.  Patient was extubated  and the OG tube was removed.  Patient was taken off of all drips other than those providing comfort.  I discussed with Dr. Pinto, who will put in comfort care orders and assist with comfort measures.  If patient survives beyond her immediate emergency department stay, he will admit to comfort care upstairs.    Critical Care Time: was 90 minutes for this patient excluding procedures    Diagnosis:    ICD-10-CM    1. Intracranial hemorrhage (H)  I62.9       2. Warfarin-induced coagulopathy (H)  D68.32     T45.515A       3. Acute respiratory failure with hypercapnia (H)  J96.02       4. Comfort measures only status  Z51.5         Scribe Disclosure:  I, Iram Fonseca, am serving as a scribe at 3:41 PM on 12/23/2022 to document services personally performed by Ross Rush MD based on my observations and the provider's statements to me.     Ross Rush MD  12/23/22 [id number]

## 2022-12-23 NOTE — ED TRIAGE NOTES
Pt arrives via EMS from home. EMS reports that pt called 911 at 1515 today for headache x3 hours. When EMS arrived, pt was alert and able to answer questions appropriately. While EMS had pt in the ambulance, pt c/o sudden onset severe R-sided head pain and became unresponsive. EMS using BVM to ventilate pt upon arrival. Pt is unresponsive upon arrival.  per EMS. EMS states that pt BP was 238 systolic initially. EMS did not give any medications in route. 20G IV in place in R hand.

## 2022-12-23 NOTE — CONSULTS
Neurosurgery Consult    HPI    Ms. Mcnulty is a 72-year-old female on warfarin with a history of hypertension hyperlipidemia type 2 diabetes, PE, hypothyroidism, COPD and factor V Leiden.  She presented to the ED with sudden onset of sharp diffuse headache which began around 1 PM.  EMS was called to transport the patient, and route she quickly decompensated into unconsciousness.  Upon her arrival to the ER she was reportedly not responsive to painful stimuli, and was intubated.    Head CT scan demonstrated acute intracranial hemorrhage and intraventricular hemorrhage in a pattern concerning for aneurysmal bleeding.  CTA of the head and neck was not performed.    Past Medical History:     Heterozygous factor 5 leiden mutation  Morbid obesity  PE  Postmenopausal bleeding  Pyogenic granuloma  Intertrigo  Radiculopathy, lumbar region  LOR  Acquired hypothyroidism  Benign essential hypertension  Hyperlipidemia  Osteoarthritis of multiple joints  Arthritis of food, degenerative  Cataracts, bilateral  COPD  Insomnia  Dyslipidemia   Chronic musculoskeletal pain  Pulmonary nodule  Type 2 diabetes mellitus     Past Surgical History:    Left hip arthoplasty  Knee arthroscopy, left  C3-4 discectomy/fusion  Lumbar decompression  Breast biopsy  Cholecystectomy  Colonoscopy  D&C  Hysteroscopy  Insert stimulator dorsal column  Insert stimulator dorsal column trial     Family History:    Type 2 diabetes mellitus  Hypertension  Cerebrovascular disease  Basal cell carcinoma  Breast cancer  Bladder cancer     Social History:  The patient presents to the ED with her .  The patient arrived via EMS.  PCP: Shanta English       B/P: 151/82, T: Data Unavailable, P: 93, R: 24       Exam    Intubated, sedated on propofol  Left pupil fixed and dilated  Right pupil fixed and constricted  No response to painful stimuli in the extremities    Imaging    IMPRESSION:   1. Parenchymal hematoma centered between the left basal ganglia  and  anterior left temporal lobe measuring 4.3 x 3.0 cm in size.  2. Intraventricular extension of hemorrhage into the bilateral  lateral, third and fourth ventricles. Minimal prominence of the  lateral and third ventricles likely represents developing  hydrocephalus. Short-term repeat evaluation recommended.  3. Subarachnoid extension of hemorrhage into the left sylvian fissure.  4. 0.9 cm rightward midline shift of the septum pellucidum. No  definite evidence for subfalcine or transtentorial herniation.  5. Diffuse cerebral volume loss and cerebral white matter changes  consistent with chronic small vessel ischemic disease.     Assessment    Probable aneurysmal intracranial hemorrhage with intraventricular extension and possible developing hydrocephalus.    Plan:      Exam and imaging was reviewed with Dr. Crouch who would recommend the patient be transferred to the [facility], or another care center that can manage a patient with aneurysmal bleeding.  The patient is not appropriate to be transferred to [facility].    Blood pressure should be maintained below 140.  The patient's anticoagulation should be reversed.

## 2022-12-24 NOTE — PROGRESS NOTES
SPIRITUAL HEALTH SERVICES Progress Note  [facility] - ED    Referral: Paged for support of family - Ptnt compassionate extubation.    I visited with family of Ptnt Petra, offering words of support and encouragement as they shared stories and reflections, assisted with keeping vences, and I led an end-of-life ritual with kathy.    SH remains available.    Rev Pippa Mcarthur  [facility] Phone Line [phone]  [place] (Tuesdays & Thursdays) [phone]

## 2022-12-24 NOTE — PROGRESS NOTES
SPIRITUAL HEALTH SERVICES Progress Note  [facility]    Follow-up visit with pt Petra's family to assess further needs.  Her son Kee was present as well as her sister-in-law and brother-in-law (Alfonso's sister).  Kee narrated the events surrounding Petra's admission and reflected that Petra would not have wanted surgery for the aneurysm and be likely dependent on 24-hour care for the remainder of her life based on conversations she has had with her family regarding her wishes at EOL.  During the conversation Petra's spouse Alfonso returned from home after taking a break and getting cleaned up.  More family arrived and were about to share a meal together.    Plan: Reviewed with pt's spouse how family can request further  support as needs arise.    Maldonado Funk M.Div., [facility]  Staff     [facility] routine referrals *[phone]  [facility] available 24/7 for emergent requests/referrals, either by paging the on-call  or by entering an ASAP/STAT consult in Epic (this will also page the on-call ).

## 2022-12-24 NOTE — PROVIDER NOTIFICATION
Upon safety check, pt noted to have increased oral secretions.  Page sent to Dr. carroll to request PRN Atropine drops to help with secretions.

## 2022-12-24 NOTE — ED NOTES
[facility]  ED Nurse Handoff Report    Petra Mcnulty is a 72 year old female   ED Chief complaint: Headache and Altered Mental Status  . ED Diagnosis:   Final diagnoses:   Intracranial hemorrhage (H)   Warfarin-induced coagulopathy (H)   Acute respiratory failure with hypercapnia (H)   Comfort measures only status     Allergies:   Allergies   Allergen Reactions     Atorvastatin      myalgia     Simvastatin Rash       Code Status: Comfort Care  Activity level - Baseline/Home:  Total Care. Activity Level - Current:   Total Care. Lift room needed: No. Bariatric: No   Needed: No   Isolation: No. Infection: Not Applicable.     Vital Signs:   Vitals:    12/23/22 2047 12/23/22 2052 12/23/22 2057 12/23/22 2102   BP: (!) 147/81 (!) 148/83 (!) 150/98 (!) 180/95   Pulse: 67 66 69 61   Resp: 24 24 17 (!) 35   Temp:       TempSrc:       SpO2: 100% 100% 100% (!) 51%   Weight:           Cardiac Rhythm:  ,      Pain level:    Patient confused:  unresponsive . Patient Falls Risk: Yes.   Elimination Status: Urethral catheter (israel) in place; refer to orders to discontinue as per protocol    Patient Report - Initial Complaint: headache/AMS. Focused Assessment: severe head bleed. Pt remained unresponsive.  Tests Performed:   Labs Ordered and Resulted from Time of ED Arrival to Time of ED Departure   COMPREHENSIVE METABOLIC PANEL - Abnormal       Result Value    Sodium 141      Potassium 3.9      Chloride 101      Carbon Dioxide (CO2) 26      Anion Gap 14      Urea Nitrogen 13.0      Creatinine 0.58      Calcium 9.3      Glucose 179 (*)     Alkaline Phosphatase 94      AST 28      ALT 17      Protein Total 7.8      Albumin 4.5      Bilirubin Total 0.3      GFR Estimate >90     CBC WITH PLATELETS AND DIFFERENTIAL - Abnormal    WBC Count 12.3 (*)     RBC Count 5.06      Hemoglobin 15.4      Hematocrit 48.0 (*)     MCV 95      MCH 30.4      MCHC 32.1      RDW 12.8      Platelet Count 299      % Neutrophils 71       % Lymphocytes 21      % Monocytes 6      % Eosinophils 1      % Basophils 0      % Immature Granulocytes 1      NRBCs per 100 WBC 0      Absolute Neutrophils 8.8 (*)     Absolute Lymphocytes 2.6      Absolute Monocytes 0.7      Absolute Eosinophils 0.1      Absolute Basophils 0.0      Absolute Immature Granulocytes 0.1      Absolute NRBCs 0.0     ISTAT GASES LACTATE VENOUS POCT - Abnormal    Lactic Acid POCT 1.7      Bicarbonate Venous POCT 27      O2 Sat, Venous POCT 93 (*)     pCO2V Venous POCT 60 (*)     pH Venous POCT 7.26 (*)     pO2 Venous POCT 77 (*)    ISTAT BASIC CHEM ICA HEMATOCRIT POCT - Abnormal    Chloride POCT 104      Potassium POCT 3.8      Sodium POCT 142      UREA NITROGEN POCT 13      Calcium, Ionized Whole Blood POCT 4.7      Glucose Whole Blood POCT 176 (*)     Anion Gap POCT 13.0      Hemoglobin POCT 16.3 (*)     Hematocrit POCT 48 (*)     Creatinine POCT 0.6      TOTAL CO2 POCT 29     INR - Abnormal    INR 2.36 (*)    ISTAT GASES LACTATE ARTERIAL POCT - Abnormal    Bicarbonate Arterial POCT 26      Lactic Acid POCT 1.9      O2 Sat, Arterial POCT 97      pCO2 Arterial POCT 49 (*)     PH Arterial POCT 7.33 (*)     pO2 Arterial POCT 101     ISTAT GASES LACTATE ARTERIAL POCT - Abnormal    Bicarbonate Arterial POCT 24      Lactic Acid POCT 2.0      O2 Sat, Arterial POCT 95      pCO2 Arterial POCT 44      PH Arterial POCT 7.35      pO2 Arterial POCT 79 (*)    TROPONIN T, HIGH SENSITIVITY - Normal    Troponin T, High Sensitivity 7     ISTAT GASES LACTATE ARTERIAL POCT - Normal    Bicarbonate Arterial POCT 24      Lactic Acid POCT 1.6      O2 Sat, Arterial POCT 97      pCO2 Arterial POCT 39      PH Arterial POCT 7.39      pO2 Arterial POCT 88     BLOOD GAS ARTERIAL WITH OXYHEMOGLOBIN   TYPE AND SCREEN, ADULT    ABO/RH(D) A POS      Antibody Screen Negative      SPECIMEN EXPIRATION DATE [id number]     ABO/RH TYPE AND SCREEN     XR Chest Port 1 View   Final Result   IMPRESSION: Endotracheal tube  terminates 2.8 cm above the odroteo. Right-sided central venous catheter terminates over the distal SVC. Nasogastric tube courses into the upper abdomen, its tip not included. Partially visualized spinal stimulator lead.    Heart size is stable. Mild basilar atelectasis. No pneumothorax. No acute bony abnormality.      XR Chest Port 1 View   Final Result   IMPRESSION: Endotracheal tube tip is about 2.6 cm above the doroteo. NG   tube coursing below the left hemidiaphragm, tip outside the field of   view. Pacer pads over the chest. Spinal stimulator. No focal airspace   opacities, pleural effusion or pneumothorax. Normal heart size.      PHUONG TRIPP MD            SYSTEM ID:  [id number]      Head CT w/o contrast   Final Result   Abnormal   IMPRESSION:    1. Parenchymal hematoma centered between the left basal ganglia and   anterior left temporal lobe measuring 4.3 x 3.0 cm in size.   2. Intraventricular extension of hemorrhage into the bilateral   lateral, third and fourth ventricles. Minimal prominence of the   lateral and third ventricles likely represents developing   hydrocephalus. Short-term repeat evaluation recommended.   3. Subarachnoid extension of hemorrhage into the left sylvian fissure.   4. 0.9 cm rightward midline shift of the septum pellucidum. No   definite evidence for subfalcine or transtentorial herniation.   5. Diffuse cerebral volume loss and cerebral white matter changes   consistent with chronic small vessel ischemic disease.        [Critical Result: Intracranial hemorrhage]      Finding was identified on 12/23/2022 4:19 PM.       KALYANI AVENDAÑO YELITZA was contacted by Dr. Mcelroy on 12/23/2022 4:23 PM   and verbalized understanding of the critical result.          Radiation dose for this scan was reduced using automated exposure   control, adjustment of the mA and/or kV according to patient size, or   iterative reconstruction technique      MELODY MCELROY MD            SYSTEM ID:  [id number]          Abnormal Results: see above.   Treatments provided: see MAR  Family Comments: family at bedside  OBS brochure/video discussed/provided to patient:  Yes  ED Medications:   Medications   propofol (DIPRIVAN) infusion (0 mcg/kg/min × 100 kg Intravenous Stopped 12/23/22 2057)   morphine (PF) injection 1-2 mg (2 mg Intravenous Given 12/23/22 2059)   haloperidol lactate (HALDOL) injection 1-2 mg (2 mg Intravenous Given 12/23/22 2132)   LORazepam (ATIVAN) injection 0.5 mg (0.5 mg Intravenous Given 12/23/22 2103)   etomidate (AMIDATE) injection 30 mg (30 mg Intravenous Given 12/23/22 1545)   succinylcholine (ANECTINE) injection 200 mg (200 mg Intravenous Given 12/23/22 1545)   fentaNYL (PF) (SUBLIMAZE) injection 100 mcg (100 mcg Intravenous Given 12/23/22 1605)   iopamidol (ISOVUE-370) solution 120 mL (75 mLs Intravenous Not Given 12/23/22 1605)   Saline CT scan flush (80 mLs Intravenous Not Given 12/23/22 1606)   phytonadione 10 mg in sodium chloride 0.9 % 50 mL intermittent infusion (0 mg Intravenous Stopped 12/23/22 1722)   prothrombin 4 factor complex concentrate (KCENTRA) infusion 2,647 Units (2,647 Units Intravenous Given 12/23/22 1650)   sodium chloride 23.4% (4 mEq/mL) intermittent infusion 30 mL (0 mLs Intravenous Central line Stopped 12/23/22 1608)   fentaNYL (PF) (SUBLIMAZE) injection 100 mcg (100 mcg Intravenous Given 12/23/22 1640)   labetalol (NORMODYNE/TRANDATE) injection 20 mg (20 mg Intravenous Given 12/23/22 1653)   HYDROmorphone (DILAUDID) injection 1 mg (1 mg Intravenous Given 12/23/22 1701)     Drips infusing:  No  For the majority of the shift, the patient's behavior Green. Interventions performed were N/A.    Sepsis treatment initiated: No     Patient tested for COVID 19 prior to admission: NO    ED Nurse Name/Phone Number: Wandy Villalba RN,   9:46 PM  RECEIVING UNIT ED HANDOFF REVIEW    Above ED Nurse Handoff Report was reviewed: Yes  Reviewed by: Maru Gomez RN on December 23, 2022 at 11:09  PM

## 2022-12-24 NOTE — PROGRESS NOTES
SPIRITUAL HEALTH SERVICES Progress Note  [facility]    Saw pt Petra's family per [facility] consult; staff requested  support for family as pt is imminently dying.  Petra was not responsive.  Her spouse Alfonso, son Rene, and daughter-in-law were at the bedside.  Family requested prayer, which was provided.  Afterwards, family engaged in memory sharing and reflecting on pt's personality.      Plan: Will return later today to assess further needs and provide emotional support.    Maldonado Funk M.Div., [facility]  Staff     [facility] routine referrals *[phone]  [facility] available 24/7 for emergent requests/referrals, either by paging the on-call  or by entering an ASAP/STAT consult in Epic (this will also page the on-call ).

## 2022-12-24 NOTE — PHARMACY-ADMISSION MEDICATION HISTORY
Admission medication history interview status for this patient is complete. See [facility] admission navigator for allergy information, prior to admission medications and immunization status.     Medication history interview done, indicate source(s): Family  Medication history resources (including written lists, pill bottles, clinic record):None  Pharmacy: N/a    Patient is in critical condition, family declines med rec    Prior to Admission medications    Medication Sig Last Dose Taking? Auth Provider Long Term End Date   acetaminophen (TYLENOL) 500 MG tablet Take 1,000 mg by mouth 2 times daily    Reported, Patient     albuterol (PROAIR HFA/PROVENTIL HFA/VENTOLIN HFA) 108 (90 Base) MCG/ACT inhaler Inhale 2 puffs into the lungs every 4 hours as needed for shortness of breath / dyspnea or wheezing   Julius Rosales MD Yes    Cholecalciferol (VITAMIN D) 50 MCG (2000 UT) CAPS Take 2000 international unit(s) daily   Mona Diaz MD     ciclopirox (LOPROX) 0.77 % cream Apply to affected area on face BID x 4-6 weeks. Tapering with improvement. Restart with flares.   Katie Wren PA-C     clobetasol (TEMOVATE) 0.05 % external ointment APPLY TO AFFECTED AREA(S) TOPICALLY DAILY   Parias Goldberg MD     estradiol (ESTRACE) 0.1 MG/GM vaginal cream Place 1 g vaginally twice a week Daily for the first 14 days   Parisa Goldebrg MD     fluticasone (FLOVENT HFA) 110 MCG/ACT inhaler Inhale 1 puff into the lungs 2 times daily   Julius Rosales MD Yes    furosemide (LASIX) 20 MG tablet Take 1 tablet (20 mg) by mouth daily   Julius Rosales MD Yes    gabapentin (NEURONTIN) 100 MG capsule TAKE ONE CAPSULE BY MOUTH EVERY MORNING   Shanta English MD Yes    gabapentin (NEURONTIN) 300 MG capsule TAKE 1 CAPSULE (300 MG) BY MOUTH 2 TIMES DAILY AS NEEDED (PAIN)   Shanta English MD Yes    guaiFENesin-codeine (ROBITUSSIN AC) 100-10 MG/5ML solution Take 5-10 mLs by mouth nightly as needed for cough   Kentrell  MD Dariela     levonorgestrel (MIRENA) 20 MCG/24HR IUD 1 each (20 mcg) by Intrauterine route once   Parisa Goldberg MD Yes    levothyroxine (SYNTHROID/LEVOTHROID) 100 MCG tablet TAKE 2 TABLETS BY MOUTH ON SUNDAY AND 1 TABLET ON ALL OTHER DAYS OF THE WEEK   Donnie Moran MD Yes    loratadine (CLARITIN) 10 MG tablet Take 10 mg by mouth daily   Reported, Patient     losartan (COZAAR) 50 MG tablet Take 1 tablet (50 mg) by mouth daily   Shanta English MD Yes    metFORMIN (GLUCOPHAGE XR) 500 MG 24 hr tablet TAKE ONE TABLET BY MOUTH ONCE DAILY WITH DINNER   Shanta English MD Yes    methocarbamol (ROBAXIN) 750 MG tablet Take 1 tablet (750 mg) by mouth At Bedtime   Shanta English MD     nystatin (MYCOSTATIN) 701054 UNIT/GM external powder Apply topically 2 times daily as needed   Mona Diaz MD     pravastatin (PRAVACHOL) 40 MG tablet Take 1 tablet (40 mg) by mouth daily   Shanta English MD Yes    vitamin C (ASCORBIC ACID) 1000 MG TABS Take 1 tablet (1,000 mg) by mouth daily   Mona Diaz MD     warfarin ANTICOAGULANT (JANTOVEN ANTICOAGULANT) 2 MG tablet Take 2 tablets (4 mg) by mouth daily or as directed by INR Clinic   Shanta English MD     zinc 50 MG TABS Take 1 tablet by mouth daily   Reported, Patient

## 2022-12-24 NOTE — PLAN OF CARE
Pt arrived to unit around 2330 accompanied by family (, 2 sons and daughter in law). Pt sedated with some labored breathing. Per family request 4mg IV Morphine x2 and Ativan x1  given. CVC in place right side of the neck. Right wrist A line removed by RN flyer. Griffin in place. Line patent without good urine output. Offered to repositioned but family declined. Will continue to provide supportive care.

## 2022-12-24 NOTE — PLAN OF CARE
Pt remains unresponsive. Morphine given x2 for labored breathing. Notable congestion from secretions, but improved a bit as the day went on. Oral cares/suction provided as needed. Griffin in place. Skin warm to touch, pulse regular. No mottling noted at this time.  visited with family. Family remains at bedside.

## 2022-12-24 NOTE — PROGRESS NOTES
[facility]    Medicine Progress Note - Hospitalist Service    Date of Admission:  12/23/2022    Assessment & Plan   72-year-old female with history of sleep apnea, diabetes mellitus type 2, hypertension, hypothyroidism, COPD, factor V Leiden deficiency, and previous pulmonary embolism (on Coumadin) was admitted with large intracranial hemorrhage due to suspected intracranial aneurysm rupture.  She was initially intubated and sedated and neurosurgery was consulted and family decided to proceed with comfort cares.  She was compassionately extubated and is currently breathing spontaneously.  She is unarousable    I talked to the patient's family including , son, daughter-in-law and sister at bedside.  Patient has sonorous breathing with excessive secretions and was placed on scopolamine patch with as needed Robinul.       Diet:  N.p.o.  DVT Prophylaxis: Not indicated  Griffin Catheter: PRESENT, indication: End of Life  Central Lines: PRESENT     Cardiac Monitoring: None  Code Status: No CPR- Do NOT Intubate      Disposition Plan      Expected Discharge Date: 12/24/2022                The patient's care was discussed with the Patient.    Rafat Blackman MD  Hospitalist Service  [facility]  Securely message with the Vocera Web Console (learn more here)  Text page via [employer] Paging/Directory         Clinically Significant Risk Factors Present on Admission               # Drug Induced Coagulation Defect: home medication list includes an anticoagulant medication    # Hypertension: home medication list includes antihypertensive(s)   # Acute Respiratory Failure: Documented O2 saturation < 91%.  Continue supplemental oxygen as needed             ______________________________________________________________________    Interval History   Remains unarousable.    Data reviewed today: I reviewed all medications, new labs and imaging results over the last 24 hours.     Physical Exam    Vital Signs: Temp: 96.9  F (36.1  C) Temp src: Axillary BP: (!) 180/95 Pulse: 61   Resp: 24 SpO2: (!) 51 %      Weight: 220 lbs 7.36 oz  Unarousable.

## 2022-12-24 NOTE — ED NOTES
Patient transferred to floor,A line in right  Wrist remains.  Family present in room.  Patient unresponsive, with labored breathing.

## 2022-12-24 NOTE — H&P
[facility]    History and Physical - Hospitalist Service       Date of Admission:  12/23/2022    Assessment & Plan      Petra Mcnulty is a 72 year old female admitted on 12/23/2022. She has a past medical history significant for sleep apnea, diabetes mellitus type 2, hypertension, hypothyroidism, COPD, factor V Leiden deficiency, and previous pulmonary embolism.  She is on anticoagulation with warfarin.  She presented to emergency room after developing sudden onset severe headache.  She lost consciousness during transportation to the emergency room.  She was found to have large intracranial hemorrhage due to suspected intracranial aneurysm rupture.  She currently is intubated and sedated.  Has been seen in consultation by neurosurgery.  Initially was recommended that she should transfer to a another facility for potential surgical intervention.  However, family has decided to make patient comfort care at this time.  I was asked to help with comfort medications when she is extubated.  Currently awaiting arrival of  before extubation.    Large intracranial hemorrhage due to suspected intracranial aneurysm rupture.  -Appreciate neurosurgery input.  -Family has now decided to transition patient to comfort care only.  -Currently, still intubated and sedated on propofol.  -Plan is for extubation once  arrives.  -Have ordered comfort medications with IV morphine, haloperidol, and lorazepam as needed.  -Consider adding oral alternatives to be available once extubated.    COPD.  Hypertension.  Hyperlipidemia.  Factor V Leiden deficiency.  Chronic coagulopathy due to warfarin.  Diabetes mellitus type 2.  -Now transitioning to comfort care only.  -No plan to treat chronic medical issues at this point.       Diet:  NPO.  DVT Prophylaxis: Comfort care only.  Griffin Catheter: PRESENT, indication:    Central Lines: PRESENT     Cardiac Monitoring: None  Code Status:  DNR/DNI.    Clinically  Significant Risk Factors Present on Admission               # Drug Induced Coagulation Defect: home medication list includes an anticoagulant medication    # Hypertension: home medication list includes antihypertensive(s)              Disposition Plan          Yunior Pinto DO  Hospitalist Service  [facility]  Securely message with the Vocera Web Console (learn more here)  Text page via [email or web address] Paging/Directory         ______________________________________________________________________    Chief Complaint   Headache.    History is obtained from the patient and emergency department physician and multiple family members at bedside.    History of Present Illness   Petra Mcnulty is a 72 year old female who has a past medical history significant for sleep apnea, diabetes mellitus type 2, hypertension, hypothyroidism, COPD, factor V Leiden deficiency, and previous pulmonary embolism.  She developed sudden onset headache earlier tonight.  EMS was called.  In route to the emergency room, patient became unresponsive.  Remained unresponsive at time of presentation to the ER.  She is currently intubated and sedated.  Work-up has shown large intracranial hemorrhage.  Family has now decided to make patient comfort care only.    Review of Systems    Review of systems not obtained due to patient factors - intubation    Past Medical History    I have reviewed this patient's medical history and updated it with pertinent information if needed.   Past Medical History:   Diagnosis Date     COPD (chronic obstructive pulmonary disease) (H)      Factor V deficiency (H) 2007     Glaucoma      Hypertension      Hypothyroid      Obese      PPD positive     INH TX x 1 year     Pulmonary embolism (H)      Sleep apnea     uses cpap     Type 2 diabetes mellitus without complication, without long-term current use of insulin (H) 11/15/2017     Uncomplicated asthma        Past Surgical History   I have reviewed this  patient's surgical history and updated it with pertinent information if needed.  Past Surgical History:   Procedure Laterality Date     ARTHROPLASTY HIP      left + 2 I&Ds     ARTHROSCOPY KNEE       AS TOTAL HIP ARTHROPLASTY       BACK SURGERY      c3-c4 discectomy/fusion,     BACK SURGERY      lumbar decompression/lami      BIOPSY      breast     CHOLECYSTECTOMY       COLONOSCOPY       D & C       HYSTEROSCOPY      d and c     INSERT STIMULATOR DORSAL COLUMN N/A 2020    Procedure: SPINAL CORD STIMULATOR IMPLANT;  Surgeon: Rosemary Dacosta DO;  Location:  OR     INSERT STIMULATOR DORSAL COLUMN TRIAL N/A 2020    Procedure: SPINAL CORD STIMULATOR TRIAL;  Surgeon: Karlee Duffy MD;  Location:  OR     OPERATIVE HYSTEROSCOPY WITH MORCELLATOR N/A 2020    Procedure: 1. Exam under anesthesia 2. Dilation and curettage under direct visualization 3. Operative hysteroscopy 4. polypectomy;  Surgeon: Parisa Goldberg MD;  Location:  OR     [facility] TOTAL KNEE ARTHROPLASTY      left,right       Social History   I have reviewed this patient's social history and updated it with pertinent information if needed.  Social History     Tobacco Use     Smoking status: Former     Packs/day: 0.50     Types: Cigarettes     Quit date: 2006     Years since quittin.9     Smokeless tobacco: Never   Vaping Use     Vaping Use: Never used   Substance Use Topics     Alcohol use: Yes     Comment: rare     Drug use: Never       Family History   I have reviewed this patient's family history and updated it with pertinent information if needed.  Family History   Problem Relation Age of Onset     Diabetes Mother 80        Type II     Hypertension Mother      Cerebrovascular Disease Father      Basal cell carcinoma Sister      Breast Cancer Sister      Bladder Cancer Brother      Breast Cancer Niece      Colon Cancer Maternal Aunt        Prior to Admission Medications   Prior to Admission Medications   Prescriptions Last Dose  Informant Patient Reported? Taking?   Cholecalciferol (VITAMIN D) 50 MCG (2000 UT) CAPS   Yes No   Sig: Take 2000 international unit(s) daily   acetaminophen (TYLENOL) 500 MG tablet   Yes No   Sig: Take 1,000 mg by mouth 2 times daily    albuterol (PROAIR HFA/PROVENTIL HFA/VENTOLIN HFA) 108 (90 Base) MCG/ACT inhaler   No No   Sig: Inhale 2 puffs into the lungs every 4 hours as needed for shortness of breath / dyspnea or wheezing   ciclopirox (LOPROX) 0.77 % cream   No No   Sig: Apply to affected area on face BID x 4-6 weeks. Tapering with improvement. Restart with flares.   clobetasol (TEMOVATE) 0.05 % external ointment   No No   Sig: APPLY TO AFFECTED AREA(S) TOPICALLY DAILY   estradiol (ESTRACE) 0.1 MG/GM vaginal cream   No No   Sig: Place 1 g vaginally twice a week Daily for the first 14 days   fluticasone (FLOVENT HFA) 110 MCG/ACT inhaler   No No   Sig: Inhale 1 puff into the lungs 2 times daily   furosemide (LASIX) 20 MG tablet   No No   Sig: Take 1 tablet (20 mg) by mouth daily   gabapentin (NEURONTIN) 100 MG capsule   No No   Sig: TAKE ONE CAPSULE BY MOUTH EVERY MORNING   gabapentin (NEURONTIN) 300 MG capsule   No No   Sig: TAKE 1 CAPSULE (300 MG) BY MOUTH 2 TIMES DAILY AS NEEDED (PAIN)   guaiFENesin-codeine (ROBITUSSIN AC) 100-10 MG/5ML solution   No No   Sig: Take 5-10 mLs by mouth nightly as needed for cough   levonorgestrel (MIRENA) 20 MCG/24HR IUD   Yes No   Si each (20 mcg) by Intrauterine route once   levothyroxine (SYNTHROID/LEVOTHROID) 100 MCG tablet   No No   Sig: TAKE 2 TABLETS BY MOUTH ON  AND 1 TABLET ON ALL OTHER DAYS OF THE WEEK   loratadine (CLARITIN) 10 MG tablet   Yes No   Sig: Take 10 mg by mouth daily   losartan (COZAAR) 50 MG tablet   No No   Sig: Take 1 tablet (50 mg) by mouth daily   metFORMIN (GLUCOPHAGE XR) 500 MG 24 hr tablet   No No   Sig: TAKE ONE TABLET BY MOUTH ONCE DAILY WITH DINNER   methocarbamol (ROBAXIN) 750 MG tablet   No No   Sig: Take 1 tablet (750 mg) by mouth  At Bedtime   nystatin (MYCOSTATIN) 992829 UNIT/GM external powder   No No   Sig: Apply topically 2 times daily as needed   pravastatin (PRAVACHOL) 40 MG tablet   No No   Sig: Take 1 tablet (40 mg) by mouth daily   vitamin C (ASCORBIC ACID) 1000 MG TABS   Yes No   Sig: Take 1 tablet (1,000 mg) by mouth daily   warfarin ANTICOAGULANT (JANTOVEN ANTICOAGULANT) 2 MG tablet   No No   Sig: Take 2 tablets (4 mg) by mouth daily or as directed by INR Clinic   zinc 50 MG TABS   Yes No   Sig: Take 1 tablet by mouth daily      Facility-Administered Medications: None     Allergies   Allergies   Allergen Reactions     Atorvastatin      myalgia     Simvastatin Rash       Physical Exam   Vital Signs: Temp: 96.9  F (36.1  C) Temp src: Axillary BP: 119/62 Pulse: 62   Resp: 23 SpO2: 100 %      Weight: 220 lbs 7.36 oz    Gen: Debated and sedated.  OP: ET tube in place.  CV:  Regular, no loud murmurs.  Lung:  CTA b/l, normal effort.  Ab:  +BS, soft.  Skin:  Warm, dry to touch.  No rash.  Ext:  No pitting edema LE b/l.      Data   Data reviewed today: I reviewed all medications, new labs and imaging results over the last 24 hours.    Recent Labs   Lab 12/23/22  1544   WBC 12.3*   HGB 15.4  16.3*   MCV 95      INR 2.36*     142   POTASSIUM 3.8  3.9   CHLORIDE 104  101   CO2 26   BUN 13  13.0   CR 0.58  0.6   ANIONGAP 14   GEORGE 9.3   *  179*   ALBUMIN 4.5   PROTTOTAL 7.8   BILITOTAL 0.3   ALKPHOS 94   ALT 17   AST 28

## 2022-12-25 PROBLEM — I62.9 INTRACRANIAL HEMORRHAGE (H): Status: ACTIVE | Noted: 2022-01-01

## 2022-12-25 PROBLEM — Z51.5 COMFORT MEASURES ONLY STATUS: Status: ACTIVE | Noted: 2022-01-01

## 2022-12-25 PROBLEM — J96.02 ACUTE RESPIRATORY FAILURE WITH HYPERCAPNIA (H): Status: ACTIVE | Noted: 2022-01-01

## 2022-12-25 PROBLEM — D68.32 WARFARIN-INDUCED COAGULOPATHY (H): Status: ACTIVE | Noted: 2022-01-01

## 2022-12-25 PROBLEM — T45.515A WARFARIN-INDUCED COAGULOPATHY (H): Status: ACTIVE | Noted: 2022-01-01

## 2022-12-25 NOTE — PLAN OF CARE
Family at bedside. Pt continues to be unresponsive. labored breathing with accessory muscle use noted. PRN Morphine given x3 and Robinul x1. Transderm-scop Patch in place. Griffin in place with 200 ml tushar color urine output. IV line patent. Suctioned as needed and cleaned face with wash cloth. Family continues to decline repositioning patient, otherwise patient looks comfortable in bed. No changes from previous night. Will continue to provide supportive care.

## 2022-12-25 NOTE — PROGRESS NOTES
RN called to pt room by family that pt had stopped breathing.  Upon assessment, pt unresponsive, no respirations or pulse.  Page sent to Dr. Blackman to notify and confirm.

## 2022-12-25 NOTE — PROVIDER NOTIFICATION
Patient still having a lot of secretions, both oral and nasal this AM.  Page sent to Dr. carroll to request any other meds or if Robinul can be increased.

## 2022-12-25 NOTE — CONSULTS
Care Management Initial Consult    General Information  Assessment completed with: Children, Spouse or significant other,    Type of CM/SW Visit: Initial Assessment    Primary Care Provider verified and updated as needed:     Readmission within the last 30 days:        Reason for Consult: discharge planning, end of life/hospice  Advance Care Planning:            Communication Assessment  Patient's communication style: spoken language (English or Bilingual)    Hearing Difficulty or Deaf: no        Cognitive  Cognitive/Neuro/Behavioral: .WDL except  Level of Consciousness: unresponsive  Arousal Level: unresponsive                Living Environment:   People in home: spouse     Current living Arrangements: house      Able to return to prior arrangements: no       Family/Social Support:  Care provided by:    Provides care for:    Marital Status:   , Children          Description of Support System: Supportive, Involved         Current Resources:   Patient receiving home care services:       Community Resources:    Equipment currently used at home:    Supplies currently used at home:      Employment/Financial:  Employment Status:          Financial Concerns:             Lifestyle & Psychosocial Needs:  Social Determinants of Health     Tobacco Use: Medium Risk     Smoking Tobacco Use: Former     Smokeless Tobacco Use: Never     Passive Exposure: Not on file   Alcohol Use: Not on file   Financial Resource Strain: Not on file   Food Insecurity: Not on file   Transportation Needs: Not on file   Physical Activity: Not on file   Stress: Not on file   Social Connections: Not on file   Intimate Partner Violence: Not At Risk     Fear of Current or Ex-Partner: No     Emotionally Abused: No     Physically Abused: No     Sexually Abused: No   Depression: Not at risk     PHQ-2 Score: 0   Housing Stability: Not on file       Functional Status:  Prior to admission patient needed assistance:              Mental Health  Status:          Chemical Dependency Status:                Values/Beliefs:  Spiritual, Cultural Beliefs, Buddhism Practices, Values that affect care:                 Additional Information:  Spoke with MD regarding discharge planning for hospice.  SW met with pt's , 2 sons and dtr-n-law regarding discharge planning.  They report pt will not return home for end of life. SW explained what hospice was. Provided them with a list of NH choices and hospice residential, also explained the deposit that is required for these two placements.  Pt's son appeared to be upset that pt would not stay here until she passes. SW will f/u with family later this afternoon to discuss their preferences for placement and who they would like to use for hospice services.     Lavinia REYES, [facility]  Inpatient Care Coordination   [facility]   [phone]

## 2022-12-25 NOTE — DISCHARGE SUMMARY
[facility]  Hospitalist Discharge Summary      Date of Admission:  12/23/2022  Date of death:  12/25/2022  Time of death: 12:30 PM    Diagnosis      Intracranial hemorrhage due to suspected intracranial aneurysm.    COPD    Hypertension    Hyperlipidemia    Factor V Leyden    Chronic coagulopathy    Diabetes mellitus type 2        Hospital Course   72-year-old female with history of sleep apnea, diabetes mellitus type 2, hypertension, hypothyroidism, COPD, factor V Leiden deficiency, and previous pulmonary embolism (on Coumadin) was admitted with large intracranial hemorrhage due to suspected intracranial aneurysm rupture.  She was initially intubated and sedated and neurosurgery was consulted and family decided to proceed with comfort cares.  She was compassionately extubated.    She passed away peacefully on 12/25 at 12:30 PM with multiple family members at bedside.    Consultations This Hospital Stay   SPIRITUAL HEALTH SERVICES IP CONSULT  CARE MANAGEMENT / SOCIAL WORK IP CONSULT    Code Status   No CPR- Do NOT Intubate      Rafat Blackman MD  [facility] ORTHO SPINE  [address] [facility] [address]  Phone: [phone]  Fax: [phone]

## 2022-12-25 NOTE — PROGRESS NOTES
"SPIRITUAL HEALTH SERVICES Progress Note  [facility]    Referral: Paged for death of Ptnt.    I visited with Ptnt Petra's  [place], their sons Rene and Kee, and other family members.    They shared reflections about having been present at her last breath, other similar circumstances within the family, and named the \"blessing\" of Petra's dying on Sean Day.    I offered emotional support, affirmed experiences, encouraged self-care, and sang a blessing.    SH remains available.    Rev Pippa Mcarthur  [facility] Phone Line [phone]  [place] (Tuesdays & Thursdays) [phone]    "

## 2022-12-26 ENCOUNTER — DOCUMENTATION ONLY (OUTPATIENT)
Dept: ANTICOAGULATION | Facility: CLINIC | Age: 72
End: 2022-12-26

## 2022-12-26 LAB
ATRIAL RATE - MUSE: 68 BPM
DIASTOLIC BLOOD PRESSURE - MUSE: NORMAL MMHG
INTERPRETATION ECG - MUSE: NORMAL
P AXIS - MUSE: NORMAL DEGREES
PR INTERVAL - MUSE: NORMAL MS
QRS DURATION - MUSE: 96 MS
QT - MUSE: 380 MS
QTC - MUSE: 449 MS
R AXIS - MUSE: 66 DEGREES
SYSTOLIC BLOOD PRESSURE - MUSE: NORMAL MMHG
T AXIS - MUSE: 7 DEGREES
VENTRICULAR RATE- MUSE: 84 BPM

## 2022-12-26 NOTE — PROGRESS NOTES
ANTICOAGULATION  MANAGEMENT    Petra Mcnulty is being discharged from the [facility] Anticoagulation Management Program ([facility]).    Reason for discharge:     Anticoagulation episode resolved, [facility] referral closed and Standing order discontinued    patient     Jesenia Vazquez RN

## 2024-11-14 NOTE — PROGRESS NOTES
I reviewed the pathology report in Dr. Goldberg's absence showing the following    Fragments of endometrium and endometrial polyp with endometrial   hyperplasia (simple and focal complex   hyperplasia) without definitive atypia.  Negative for malignancy in   submitted sample.    Please contact the patient and ask her to see Dr. Goldberg next week to review this path report and develop an appropriate therapy plan  Thank you  Vishal Lemus MD FACOG   (4) no limitation
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
## (undated) DEVICE — DRSG KERLIX FLUFFS X5

## (undated) DEVICE — SUCTION CANISTER MEDIVAC LINER 3000ML W/LID 65651-530

## (undated) DEVICE — SYR 10ML FINGER CONTROL W/O NDL 309695

## (undated) DEVICE — POSITIONER PT PRONESAFE HEAD REST W/DERMAPROX INSERT 40599

## (undated) DEVICE — PACK MINOR SBA15MIFSE

## (undated) DEVICE — GLOVE PROTEXIS BLUE W/NEU-THERA 6.0  2D73EB60

## (undated) DEVICE — DRAPE COVER C-ARM SEAMLESS SNAP-KAP 03-KP26 LATEX FREE

## (undated) DEVICE — SOL NACL 0.9% IRRIG 1000ML BOTTLE 2F7124

## (undated) DEVICE — DRSG STERI STRIP 1/2X4" R1547

## (undated) DEVICE — DRAPE LEGGINGS 8421

## (undated) DEVICE — ESU PENCIL W/SMOKE EVAC CVPLP2000

## (undated) DEVICE — LINEN HALF SHEET 5512

## (undated) DEVICE — DRAPE SHEET REV FOLD 3/4 9349

## (undated) DEVICE — SOL WATER IRRIG 1000ML BOTTLE 2F7114

## (undated) DEVICE — GLOVE PROTEXIS POWDER FREE SMT 6.5  2D72PT65X

## (undated) DEVICE — NDL ACCESSORY KIT 14GA 6" CVD TIP 3550-43

## (undated) DEVICE — SYR EAR BULB 3OZ 0035830

## (undated) DEVICE — LINEN FULL SHEET 5511

## (undated) DEVICE — CONTROLLER

## (undated) DEVICE — DRSG TEGADERM 2 1/2X 2 3/4"

## (undated) DEVICE — PACK UNIVERSAL SPLIT 29131

## (undated) DEVICE — LINEN DRAPE 54X72" 5467

## (undated) DEVICE — SOL NACL 0.9% IRRIG 3000ML BAG 2B7477

## (undated) DEVICE — DRAPE STERI TOWEL LG 1010

## (undated) DEVICE — BASIN SET MINOR DISP

## (undated) DEVICE — PREP DURAPREP 26ML APL 8630

## (undated) DEVICE — PACK MINOR LITHOTOMY RIDGES

## (undated) DEVICE — STIMULATOR CHARGER NEUROSTIM INTELLIS 97755

## (undated) DEVICE — DRSG TEGADERM 4X4 3/4" 1626

## (undated) DEVICE — GLOVE PROTEXIS MICRO 6.0  2D73PM60

## (undated) DEVICE — GLOVE PROTEXIS BLUE W/NEU-THERA 6.5  2D73EB65

## (undated) DEVICE — BAG CLEAR TRASH 1.3M 39X33" P4040C

## (undated) DEVICE — SUCTION CANISTER BEMIS HI FLOW 006772-901

## (undated) DEVICE — DRSG TELFA ISLAND 4X8" 7541

## (undated) DEVICE — SEAL SET MYOSURE ROD LENS SCOPE SINGLE USE 40-902

## (undated) DEVICE — SU VICRYL 4-0 PS-2 18" UND J496H

## (undated) DEVICE — ADH LIQUID MASTISOL TOPICAL VIAL 2-3ML 0523-48

## (undated) DEVICE — NDL 19GA 1.5"

## (undated) DEVICE — TUBING SYS AQUILEX BLUE INFLOW AQL-110 YLW OUTFLOW AQL-111

## (undated) DEVICE — Device

## (undated) DEVICE — LINEN TOWEL PACK X5 5464

## (undated) DEVICE — SYR 07ML EPIDURAL LOSS OF RESISTANCE PULSATOR 4905

## (undated) DEVICE — NDL 25GA 1.5" 305127

## (undated) DEVICE — SU SILK 2-0 SH CR 8X18" C012D

## (undated) DEVICE — CATH INTERMITTENT CLEAN-CATH FEMALE 14FR 6" VINYL LF 420614

## (undated) DEVICE — SPECIMEN BAG BEMIS HI FLOW SUCTION WHITE SOCK 533810

## (undated) DEVICE — SU VICRYL 0 CT-2 CR 8X18" J727D

## (undated) DEVICE — WIRELESS EXTERNAL NEUROSTIMULATOR

## (undated) DEVICE — NDL 22GA 1.5"

## (undated) RX ORDER — KETAMINE HCL IN NACL, ISO-OSM 100MG/10ML
SYRINGE (ML) INJECTION
Status: DISPENSED
Start: 2020-02-06

## (undated) RX ORDER — PROPOFOL 10 MG/ML
INJECTION, EMULSION INTRAVENOUS
Status: DISPENSED
Start: 2020-06-24

## (undated) RX ORDER — PROPOFOL 10 MG/ML
INJECTION, EMULSION INTRAVENOUS
Status: DISPENSED
Start: 2020-02-06

## (undated) RX ORDER — GLYCOPYRROLATE 0.2 MG/ML
INJECTION INTRAMUSCULAR; INTRAVENOUS
Status: DISPENSED
Start: 2020-06-24

## (undated) RX ORDER — PHENYLEPHRINE HCL IN 0.9% NACL 1 MG/10 ML
SYRINGE (ML) INTRAVENOUS
Status: DISPENSED
Start: 2020-06-24

## (undated) RX ORDER — DEXAMETHASONE SODIUM PHOSPHATE 4 MG/ML
INJECTION, SOLUTION INTRA-ARTICULAR; INTRALESIONAL; INTRAMUSCULAR; INTRAVENOUS; SOFT TISSUE
Status: DISPENSED
Start: 2020-06-24

## (undated) RX ORDER — FENTANYL CITRATE 50 UG/ML
INJECTION, SOLUTION INTRAMUSCULAR; INTRAVENOUS
Status: DISPENSED
Start: 2020-02-06

## (undated) RX ORDER — ACETAMINOPHEN 325 MG/1
TABLET ORAL
Status: DISPENSED
Start: 2020-06-24

## (undated) RX ORDER — LIDOCAINE HYDROCHLORIDE 20 MG/ML
INJECTION, SOLUTION EPIDURAL; INFILTRATION; INTRACAUDAL; PERINEURAL
Status: DISPENSED
Start: 2020-06-26

## (undated) RX ORDER — ONDANSETRON 2 MG/ML
INJECTION INTRAMUSCULAR; INTRAVENOUS
Status: DISPENSED
Start: 2020-06-24

## (undated) RX ORDER — ONDANSETRON 2 MG/ML
INJECTION INTRAMUSCULAR; INTRAVENOUS
Status: DISPENSED
Start: 2020-06-26

## (undated) RX ORDER — HYDROCODONE BITARTRATE AND ACETAMINOPHEN 5; 325 MG/1; MG/1
TABLET ORAL
Status: DISPENSED
Start: 2020-06-26

## (undated) RX ORDER — FENTANYL CITRATE 50 UG/ML
INJECTION, SOLUTION INTRAMUSCULAR; INTRAVENOUS
Status: DISPENSED
Start: 2020-06-24

## (undated) RX ORDER — PROPOFOL 10 MG/ML
INJECTION, EMULSION INTRAVENOUS
Status: DISPENSED
Start: 2020-06-26

## (undated) RX ORDER — DEXAMETHASONE SODIUM PHOSPHATE 4 MG/ML
INJECTION, SOLUTION INTRA-ARTICULAR; INTRALESIONAL; INTRAMUSCULAR; INTRAVENOUS; SOFT TISSUE
Status: DISPENSED
Start: 2020-06-26

## (undated) RX ORDER — LIDOCAINE HYDROCHLORIDE 20 MG/ML
INJECTION, SOLUTION EPIDURAL; INFILTRATION; INTRACAUDAL; PERINEURAL
Status: DISPENSED
Start: 2020-02-06

## (undated) RX ORDER — CEFAZOLIN SODIUM IN 0.9 % NACL 3 G/100 ML
INTRAVENOUS SOLUTION, PIGGYBACK (ML) INTRAVENOUS
Status: DISPENSED
Start: 2020-06-26

## (undated) RX ORDER — CEFAZOLIN SODIUM IN 0.9 % NACL 3 G/100 ML
INTRAVENOUS SOLUTION, PIGGYBACK (ML) INTRAVENOUS
Status: DISPENSED
Start: 2020-02-06

## (undated) RX ORDER — KETAMINE HCL IN NACL, ISO-OSM 100MG/10ML
SYRINGE (ML) INJECTION
Status: DISPENSED
Start: 2020-06-26

## (undated) RX ORDER — ONDANSETRON 2 MG/ML
INJECTION INTRAMUSCULAR; INTRAVENOUS
Status: DISPENSED
Start: 2020-02-06

## (undated) RX ORDER — FENTANYL CITRATE 50 UG/ML
INJECTION, SOLUTION INTRAMUSCULAR; INTRAVENOUS
Status: DISPENSED
Start: 2020-06-26